# Patient Record
Sex: MALE | Race: WHITE | NOT HISPANIC OR LATINO | Employment: OTHER | URBAN - METROPOLITAN AREA
[De-identification: names, ages, dates, MRNs, and addresses within clinical notes are randomized per-mention and may not be internally consistent; named-entity substitution may affect disease eponyms.]

---

## 2010-09-01 LAB — CRC RECOMMENDATION EXT: NORMAL

## 2017-01-05 RX ORDER — BENZONATATE 100 MG/1
CAPSULE ORAL
Qty: 30 CAPSULE | Refills: 0 | Status: SHIPPED | OUTPATIENT
Start: 2017-01-05 | End: 2018-12-15 | Stop reason: SDUPTHER

## 2017-04-30 RX ORDER — ZOLPIDEM TARTRATE 5 MG/1
TABLET ORAL
Qty: 90 TABLET | Refills: 1 | Status: SHIPPED | OUTPATIENT
Start: 2017-04-30 | End: 2017-11-02 | Stop reason: SDUPTHER

## 2017-06-22 RX ORDER — TAMSULOSIN HYDROCHLORIDE 0.4 MG/1
CAPSULE ORAL
Qty: 90 CAPSULE | Refills: 3 | Status: SHIPPED | OUTPATIENT
Start: 2017-06-22 | End: 2018-06-28 | Stop reason: SDUPTHER

## 2017-06-26 ENCOUNTER — TELEPHONE (OUTPATIENT)
Dept: HEMATOLOGY/ONCOLOGY | Facility: CLINIC | Age: 67
End: 2017-06-26

## 2017-06-26 NOTE — TELEPHONE ENCOUNTER
----- Message from Amanda Hale sent at 6/26/2017  3:15 PM CDT -----  Patient is returning nurse's (Natacha) call/please call patient back at [phone]

## 2017-06-26 NOTE — TELEPHONE ENCOUNTER
----- Message from David Granados sent at 6/26/2017  1:50 PM CDT -----  Contact: pt  He's calling in regards to being worked into the drs schedule on 7/10/17 for 9 ma, please advise, [phone] (home)

## 2017-07-17 ENCOUNTER — LAB VISIT (OUTPATIENT)
Dept: LAB | Facility: HOSPITAL | Age: 67
End: 2017-07-17
Attending: INTERNAL MEDICINE
Payer: COMMERCIAL

## 2017-07-17 DIAGNOSIS — C91.10 CHRONIC LYMPHOCYTIC LEUKEMIA OF B-CELL TYPE NOT HAVING ACHIEVED REMISSION: ICD-10-CM

## 2017-07-17 LAB
ALBUMIN SERPL BCP-MCNC: 3.4 G/DL
ALP SERPL-CCNC: 54 U/L
ALT SERPL W/O P-5'-P-CCNC: 11 U/L
ANION GAP SERPL CALC-SCNC: 10 MMOL/L
AST SERPL-CCNC: 11 U/L
B2 MICROGLOB SERPL-MCNC: 4.3 UG/ML
BASOPHILS # BLD AUTO: ABNORMAL K/UL
BASOPHILS NFR BLD: 0 %
BILIRUB SERPL-MCNC: 0.7 MG/DL
BUN SERPL-MCNC: 19 MG/DL
CALCIUM SERPL-MCNC: 8.9 MG/DL
CHLORIDE SERPL-SCNC: 101 MMOL/L
CO2 SERPL-SCNC: 27 MMOL/L
CREAT SERPL-MCNC: 1 MG/DL
DIFFERENTIAL METHOD: ABNORMAL
EOSINOPHIL # BLD AUTO: ABNORMAL K/UL
EOSINOPHIL NFR BLD: 1 %
ERYTHROCYTE [DISTWIDTH] IN BLOOD BY AUTOMATED COUNT: 13.7 %
EST. GFR  (AFRICAN AMERICAN): >60 ML/MIN/1.73 M^2
EST. GFR  (NON AFRICAN AMERICAN): >60 ML/MIN/1.73 M^2
GLUCOSE SERPL-MCNC: 129 MG/DL
HCT VFR BLD AUTO: 38.4 %
HGB BLD-MCNC: 12.7 G/DL
LDH SERPL L TO P-CCNC: 138 U/L
LYMPHOCYTES # BLD AUTO: ABNORMAL K/UL
LYMPHOCYTES NFR BLD: 73 %
MCH RBC QN AUTO: 30.2 PG
MCHC RBC AUTO-ENTMCNC: 33.1 %
MCV RBC AUTO: 91 FL
MONOCYTES # BLD AUTO: ABNORMAL K/UL
MONOCYTES NFR BLD: 7 %
NEUTROPHILS NFR BLD: 19 %
PLATELET # BLD AUTO: 167 K/UL
PLATELET BLD QL SMEAR: ABNORMAL
PMV BLD AUTO: 9.1 FL
POTASSIUM SERPL-SCNC: 4.4 MMOL/L
PROT SERPL-MCNC: 7.2 G/DL
RBC # BLD AUTO: 4.2 M/UL
SODIUM SERPL-SCNC: 138 MMOL/L
WBC # BLD AUTO: 12.74 K/UL

## 2017-07-17 PROCEDURE — 83615 LACTATE (LD) (LDH) ENZYME: CPT | Mod: PO

## 2017-07-17 PROCEDURE — 85027 COMPLETE CBC AUTOMATED: CPT | Mod: PO

## 2017-07-17 PROCEDURE — 82232 ASSAY OF BETA-2 PROTEIN: CPT

## 2017-07-17 PROCEDURE — 80053 COMPREHEN METABOLIC PANEL: CPT | Mod: PO

## 2017-07-17 PROCEDURE — 36415 COLL VENOUS BLD VENIPUNCTURE: CPT | Mod: PO

## 2017-07-17 PROCEDURE — 85007 BL SMEAR W/DIFF WBC COUNT: CPT | Mod: PO

## 2017-07-31 ENCOUNTER — OFFICE VISIT (OUTPATIENT)
Dept: HEMATOLOGY/ONCOLOGY | Facility: CLINIC | Age: 67
End: 2017-07-31
Payer: COMMERCIAL

## 2017-07-31 VITALS
HEIGHT: 70 IN | TEMPERATURE: 98 F | RESPIRATION RATE: 12 BRPM | BODY MASS INDEX: 25.98 KG/M2 | DIASTOLIC BLOOD PRESSURE: 64 MMHG | SYSTOLIC BLOOD PRESSURE: 114 MMHG | HEART RATE: 75 BPM | WEIGHT: 181.44 LBS

## 2017-07-31 DIAGNOSIS — C91.10 CLL (CHRONIC LYMPHOCYTIC LEUKEMIA): Primary | ICD-10-CM

## 2017-07-31 PROCEDURE — 99999 PR PBB SHADOW E&M-EST. PATIENT-LVL IV: CPT | Mod: PBBFAC,,, | Performed by: NURSE PRACTITIONER

## 2017-07-31 PROCEDURE — 99213 OFFICE O/P EST LOW 20 MIN: CPT | Mod: S$GLB,,, | Performed by: NURSE PRACTITIONER

## 2017-11-01 RX ORDER — ZOLPIDEM TARTRATE 5 MG/1
TABLET ORAL
Qty: 90 TABLET | Refills: 1 | OUTPATIENT
Start: 2017-11-01

## 2017-11-02 NOTE — TELEPHONE ENCOUNTER
Refill request. Last office visit 9/19/2016. Please advise. Called patient to schedule annual wellness exam. He did not realized he was past due. Scheduled on 12/11/2017.

## 2017-11-03 RX ORDER — ZOLPIDEM TARTRATE 5 MG/1
TABLET ORAL
Qty: 90 TABLET | Refills: 0 | Status: SHIPPED | OUTPATIENT
Start: 2017-11-03 | End: 2018-03-02 | Stop reason: SDUPTHER

## 2017-12-11 ENCOUNTER — HOSPITAL ENCOUNTER (OUTPATIENT)
Dept: RADIOLOGY | Facility: HOSPITAL | Age: 67
Discharge: HOME | End: 2017-12-11
Attending: FAMILY MEDICINE
Payer: COMMERCIAL

## 2017-12-11 ENCOUNTER — OFFICE VISIT (OUTPATIENT)
Dept: FAMILY MEDICINE | Facility: CLINIC | Age: 67
End: 2017-12-11
Payer: COMMERCIAL

## 2017-12-11 VITALS
DIASTOLIC BLOOD PRESSURE: 80 MMHG | HEIGHT: 70 IN | TEMPERATURE: 99 F | HEART RATE: 71 BPM | SYSTOLIC BLOOD PRESSURE: 138 MMHG | BODY MASS INDEX: 26.2 KG/M2 | WEIGHT: 183 LBS | OXYGEN SATURATION: 98 %

## 2017-12-11 DIAGNOSIS — I10 HYPERTENSION, UNSPECIFIED TYPE: ICD-10-CM

## 2017-12-11 DIAGNOSIS — Z12.5 PROSTATE CANCER SCREENING: ICD-10-CM

## 2017-12-11 DIAGNOSIS — Z13.6 ENCOUNTER FOR ABDOMINAL AORTIC ANEURYSM (AAA) SCREENING: ICD-10-CM

## 2017-12-11 DIAGNOSIS — F17.200 TOBACCO USE DISORDER: ICD-10-CM

## 2017-12-11 DIAGNOSIS — Z00.00 PREVENTATIVE HEALTH CARE: Primary | ICD-10-CM

## 2017-12-11 DIAGNOSIS — E78.5 HYPERLIPIDEMIA, UNSPECIFIED HYPERLIPIDEMIA TYPE: ICD-10-CM

## 2017-12-11 PROCEDURE — 90670 PCV13 VACCINE IM: CPT | Mod: S$GLB,,, | Performed by: FAMILY MEDICINE

## 2017-12-11 PROCEDURE — 71020 XR CHEST PA AND LATERAL: CPT | Mod: TC,PO

## 2017-12-11 PROCEDURE — 99999 PR PBB SHADOW E&M-EST. PATIENT-LVL V: CPT | Mod: PBBFAC,,, | Performed by: FAMILY MEDICINE

## 2017-12-11 PROCEDURE — 90471 IMMUNIZATION ADMIN: CPT | Mod: S$GLB,,, | Performed by: FAMILY MEDICINE

## 2017-12-11 PROCEDURE — 71020 XR CHEST PA AND LATERAL: CPT | Mod: 26,,, | Performed by: RADIOLOGY

## 2017-12-11 PROCEDURE — 76775 US EXAM ABDO BACK WALL LIM: CPT | Mod: TC,PO

## 2017-12-11 PROCEDURE — 99397 PER PM REEVAL EST PAT 65+ YR: CPT | Mod: 25,S$GLB,, | Performed by: FAMILY MEDICINE

## 2017-12-11 PROCEDURE — 76775 US EXAM ABDO BACK WALL LIM: CPT | Mod: 26,,, | Performed by: RADIOLOGY

## 2017-12-11 RX ORDER — LOSARTAN POTASSIUM AND HYDROCHLOROTHIAZIDE 12.5; 1 MG/1; MG/1
1 TABLET ORAL EVERY MORNING
Qty: 90 TABLET | Refills: 3 | Status: SHIPPED | OUTPATIENT
Start: 2017-12-11 | End: 2018-08-22 | Stop reason: SDUPTHER

## 2017-12-11 NOTE — PROGRESS NOTES
"Chief Complaint   Patient presents with    Preventative health care     HPI: 66 yo WM for annual physical.    HTN -  tolerating the Losartan HCT  He reports overall feeling well.  BPH -  tolerating the Flomax daily and saw palmetto  HLD - following low fat diet  FH CAD - tolerating ASA 81mg daily  Insomnia - taking Ambien 5mg QHS about 3 nights a week  Smoking 5 cigarettes daily    PAST MEDICAL HISTORY:   HTN  PURE HYPERCHOLESTEROLEM   F/HXOTHER CARDIAC DISEAS (Father/brother)   internal hemorrhoids  Knee DJD    REVIEW OF SYSTEMS:   Bothersome joint pains YES   Sexual problems (getting and keeping erections, completing intercourse, etc.) NO   Change in size/firmness of stools NO   Change in size/color of a mole NO   Often feeling down, depressed or hopeless during the past month NO   Often having little interest or pleasure in doing things duringthe past month NO   Problems with falling or doing routine tasks at home NO   Periods of weakness, numbness or inability to talk NO     FAMILY HISTORY:   Cancer of the prostate or intestine NO   Heart pain or heart attacks before the age of 55 YES (FATHER mi/BROTHER S/P PTCA W/STENT)     TOBACCO:   Have you ever used tobacco? YES   Average number of packs/day: 1/2   Number of years smoked: 20     ALCOHOL:   Do you drink alcohol? YES     Exercise:   Activity: BIKE  Days per week: 3-4  Time/duration: 60 minutes   Exertion: HEAVY      Colonoscopy 2010    PHYSICAL EXAMINATION:   /80   Pulse 71   Temp 98.5 °F (36.9 °C)   Ht 5' 10" (1.778 m)   Wt 83 kg (182 lb 15.7 oz)   SpO2 98%   BMI 26.26 kg/m²   ORAL EXAM : Mucous membranes moist; pharynx clear. No lesions.   NECK: Supple without nodes JVD, or bruits. No thyromegaly noted.   LUNGS: Clear to auscultation and percussion bilaterally.   HEART: Regular rate and rhythm; no rubs, murmurs or gallops.   ABD: soft, NT/ND, +BS no HSM   EXTREMITIES: No clubbing, cyanosis or edema. Distal pulses full/equal.   NEUROLOGIC: Alert " and oriented x 4. Cranial nerves grossly intact.   SKIN: Warm, dry; no lesions noted.   Left shoulder: some crepitus with ROM; FAROM without pain  Wrists: FROM; no weakness or thenar atrophy    Results for orders placed or performed in visit on 07/17/17   CBC w/ DIFF   Result Value Ref Range    WBC 12.74 (H) 3.90 - 12.70 K/uL    RBC 4.20 (L) 4.60 - 6.20 M/uL    Hemoglobin 12.7 (L) 14.0 - 18.0 g/dL    Hematocrit 38.4 (L) 40.0 - 54.0 %    MCV 91 82 - 98 fL    MCH 30.2 27.0 - 31.0 pg    MCHC 33.1 32.0 - 36.0 %    RDW 13.7 11.5 - 14.5 %    Platelets 167 150 - 350 K/uL    MPV 9.1 (L) 9.2 - 12.9 fL    Lymph # CANCELED 1.0 - 4.8 K/uL    Mono # CANCELED 0.3 - 1.0 K/uL    Eos # CANCELED 0.0 - 0.5 K/uL    Baso # CANCELED 0.00 - 0.20 K/uL    Gran% 19.0 (L) 38.0 - 73.0 %    Lymph% 73.0 (H) 18.0 - 48.0 %    Mono% 7.0 4.0 - 15.0 %    Eosinophil% 1.0 0.0 - 8.0 %    Basophil% 0.0 0.0 - 1.9 %    Platelet Estimate Appears normal     Differential Method Manual    CMP   Result Value Ref Range    Sodium 138 136 - 145 mmol/L    Potassium 4.4 3.5 - 5.1 mmol/L    Chloride 101 95 - 110 mmol/L    CO2 27 23 - 29 mmol/L    Glucose 129 (H) 70 - 110 mg/dL    BUN, Bld 19 8 - 23 mg/dL    Creatinine 1.0 0.5 - 1.4 mg/dL    Calcium 8.9 8.7 - 10.5 mg/dL    Total Protein 7.2 6.0 - 8.4 g/dL    Albumin 3.4 (L) 3.5 - 5.2 g/dL    Total Bilirubin 0.7 0.1 - 1.0 mg/dL    Alkaline Phosphatase 54 (L) 55 - 135 U/L    AST 11 10 - 40 U/L    ALT 11 10 - 44 U/L    Anion Gap 10 8 - 16 mmol/L    eGFR if African American >60 >60 mL/min/1.73 m^2    eGFR if non African American >60 >60 mL/min/1.73 m^2   LDH   Result Value Ref Range     110 - 260 U/L   BETA 2 MICROGLOBULIN   Result Value Ref Range    Beta-2 Microglobulin 4.3 (H) 0.0 - 2.5 ug/mL       DIAGNOSIS/PLAN:   Preventative health care  -     (In Office Administered) Pneumococcal Conjugate Vaccine (13 Valent) (IM)    Hypertension, unspecified type  -     X-Ray Chest PA And Lateral; Future; Expected date:  12/11/2017  -     losartan-hydrochlorothiazide 100-12.5 mg (HYZAAR) 100-12.5 mg Tab; Take 1 tablet by mouth every morning.  Dispense: 90 tablet; Refill: 3    Hyperlipidemia, unspecified hyperlipidemia type  -     Lipid panel; Future; Expected date: 12/06/2018  -     Comprehensive metabolic panel; Future; Expected date: 12/06/2018    Prostate cancer screening  -     PSA, Screening; Future; Expected date: 12/06/2018    Encounter for abdominal aortic aneurysm (AAA) screening  -     US Abdominal Aorta; Future; Expected date: 12/11/2017    Tobacco use disorder  -     Ambulatory referral to Smoking Cessation Program    continue Bradford Garcia  Counseled on regular exercise, maintenance of a healthy weight, balanced diet rich in fruits/vegetables and lean protein, and avoidance of unhealthy habits like smoking and excessive alcohol intake.  CTS stretching and left rotator cuff stretching  ASA 81 mg/d   continue losartan /12.5 daily  Continue smoking cessation efforts  F/u 6 months

## 2018-01-30 ENCOUNTER — TELEPHONE (OUTPATIENT)
Dept: FAMILY MEDICINE | Facility: CLINIC | Age: 68
End: 2018-01-30

## 2018-01-30 DIAGNOSIS — R68.89 FLU-LIKE SYMPTOMS: Primary | ICD-10-CM

## 2018-01-30 RX ORDER — OSELTAMIVIR PHOSPHATE 75 MG/1
75 CAPSULE ORAL 2 TIMES DAILY
Qty: 10 CAPSULE | Refills: 0 | Status: SHIPPED | OUTPATIENT
Start: 2018-01-30 | End: 2018-02-04

## 2018-01-30 NOTE — TELEPHONE ENCOUNTER
Spoke to patient. Patient is requesting a prescription for tamiflu. Patient states he has a cough and congestion that started on Sunday. He has been exposed to the flu. He states half of the people in his office have the flu. Please advise.

## 2018-01-30 NOTE — TELEPHONE ENCOUNTER
----- Message from Saida Paula sent at 1/30/2018  1:34 PM CST -----  Please call [phone] asking for a prescription for Tamiflu as a preventative   [facility] - [place] - [address]  [address] [place] [address]  Phone: [phone] Fax: [phone]

## 2018-03-02 RX ORDER — ZOLPIDEM TARTRATE 5 MG/1
TABLET ORAL
Qty: 90 TABLET | Refills: 0 | Status: SHIPPED | OUTPATIENT
Start: 2018-03-02 | End: 2018-08-02 | Stop reason: SDUPTHER

## 2018-05-15 ENCOUNTER — TELEPHONE (OUTPATIENT)
Dept: FAMILY MEDICINE | Facility: CLINIC | Age: 68
End: 2018-05-15

## 2018-05-15 NOTE — TELEPHONE ENCOUNTER
----- Message from Yue Shook sent at 5/15/2018 10:06 AM CDT -----  Contact: self [phone]  Type: Needs Medical Advice    Who Called:  Self   Symptoms (please be specific):  NA   How long has patient had these symptoms:  ALFREDO   Pharmacy name and phone #:  [facility] Call Back Number: [phone] Information: Patient called stating he received leaflet regarding lifeline screening. Patient asking if he should have test done.

## 2018-06-25 ENCOUNTER — TELEPHONE (OUTPATIENT)
Dept: FAMILY MEDICINE | Facility: CLINIC | Age: 68
End: 2018-06-25

## 2018-06-25 NOTE — TELEPHONE ENCOUNTER
"Spoke with patient and he states that he heard of a new procedure for prostate and wanted to know your thoughts. "Eurolift". Please advise if he needs to see urology.   "

## 2018-06-25 NOTE — TELEPHONE ENCOUNTER
"----- Message from Amanda Hale sent at 6/25/2018  2:43 PM CDT -----  Type: Needs Medical Advice    Who Called:  Patient  Best Call Back Number: [phone]  Additional Information: Patient requesting to speak with nurse or doctor concerning procedure he heard about "Eurolift" for enlarged prostate/has question/please call back to advise.  "

## 2018-06-26 NOTE — TELEPHONE ENCOUNTER
The prostatic lift (eg, Urolift) procedure uses a device that is introduced into the urethra to increase the size of the urethral opening and reduce obstruction to urine flow. Then, one or more small implants are placed to keep the urethra open. The prostatic lift technique may be an option for men who are poor candidates for other procedures. Longer term follow-up will be needed to determine the durability of the results.  If he is interested in this procedure, he should contact the urologist first to see if it is something they perform.

## 2018-06-28 RX ORDER — TAMSULOSIN HYDROCHLORIDE 0.4 MG/1
CAPSULE ORAL
Qty: 90 CAPSULE | Refills: 3 | Status: SHIPPED | OUTPATIENT
Start: 2018-06-28 | End: 2018-08-22 | Stop reason: SDUPTHER

## 2018-07-23 ENCOUNTER — TELEPHONE (OUTPATIENT)
Dept: FAMILY MEDICINE | Facility: CLINIC | Age: 68
End: 2018-07-23

## 2018-07-23 NOTE — TELEPHONE ENCOUNTER
----- Message from Amanda Jeffrey sent at 7/23/2018  2:37 PM CDT -----  Contact: Patient  Type: Needs Medical Advice    Who Called: Patient    Pharmacy name and phone #:    [facility]  [phone]    Best Call Back Number: [phone]  Additional Information: Patient is requesting a change in Pharmacy to [facility] due to change in insurance

## 2018-07-25 ENCOUNTER — TELEPHONE (OUTPATIENT)
Dept: FAMILY MEDICINE | Facility: CLINIC | Age: 68
End: 2018-07-25

## 2018-07-25 NOTE — TELEPHONE ENCOUNTER
----- Message from Amanda Hale sent at 7/25/2018  1:17 PM CDT -----  Type:  RX Refill Request    RX Name and Strength:  losartan-hydrochlorothiazide 100-12.5 mg (HYZAAR) 100-12.5 mg Tab;tamsulosin (FLOMAX) 0.4 mg Cp24 and zolpidem (AMBIEN) 5 MG Tab  Preferred Pharmacy with phone number:  [facility] at [phone]  Local or Mail Order:  Mail Order  Ordering Provider:  Dr. Dominga Colvin Call Back Number:  [phone]  Additional Information:

## 2018-07-30 ENCOUNTER — TELEPHONE (OUTPATIENT)
Dept: FAMILY MEDICINE | Facility: CLINIC | Age: 68
End: 2018-07-30

## 2018-07-30 NOTE — TELEPHONE ENCOUNTER
----- Message from Denver Mendoza sent at 7/30/2018  2:33 PM CDT -----  Type:  Patient Returning Call    Who Called:  Patient  Best Call Back Number:  [phone]  Additional Information:  Patient would like a call back from someone he can call about switching to [facility] and how it would work - he also has additional questions. Please call to advise today.

## 2018-07-30 NOTE — TELEPHONE ENCOUNTER
----- Message from Italia Romero sent at 7/30/2018  3:22 PM CDT -----  Type:  Patient Returning Call    Who Called:  Patient  Who Left Message for Patient:  MELODY VEGA  Does the patient know what this is regarding?:  Prescription  Best Call Back Number:  [phone]  Additional Information:  Just missed the call.

## 2018-08-02 RX ORDER — ZOLPIDEM TARTRATE 5 MG/1
TABLET ORAL
Qty: 90 TABLET | Refills: 0 | Status: SHIPPED | OUTPATIENT
Start: 2018-08-02 | End: 2018-08-22

## 2018-08-03 ENCOUNTER — TELEPHONE (OUTPATIENT)
Dept: HEMATOLOGY/ONCOLOGY | Facility: CLINIC | Age: 68
End: 2018-08-03

## 2018-08-03 NOTE — TELEPHONE ENCOUNTER
As per patient, labs and clinic visits schedule.  Pt verbalized understanding date, time and location.

## 2018-08-03 NOTE — TELEPHONE ENCOUNTER
----- Message from Ignacia Key sent at 8/3/2018 10:30 AM CDT -----  Contact: self  Patient needs first available appointment due to follow up. Please call patient at [phone]. Thanks!

## 2018-08-06 ENCOUNTER — LAB VISIT (OUTPATIENT)
Dept: LAB | Facility: HOSPITAL | Age: 68
End: 2018-08-06
Attending: NURSE PRACTITIONER
Payer: MEDICARE

## 2018-08-06 DIAGNOSIS — C91.10 CLL (CHRONIC LYMPHOCYTIC LEUKEMIA): ICD-10-CM

## 2018-08-06 LAB
ALBUMIN SERPL BCP-MCNC: 3.5 G/DL
ALP SERPL-CCNC: 60 U/L
ALT SERPL W/O P-5'-P-CCNC: 13 U/L
ANION GAP SERPL CALC-SCNC: 8 MMOL/L
ANISOCYTOSIS BLD QL SMEAR: SLIGHT
AST SERPL-CCNC: 12 U/L
B2 MICROGLOB SERPL-MCNC: 4.3 UG/ML
BASOPHILS # BLD AUTO: ABNORMAL K/UL
BASOPHILS NFR BLD: 0 %
BILIRUB SERPL-MCNC: 0.5 MG/DL
BUN SERPL-MCNC: 16 MG/DL
CALCIUM SERPL-MCNC: 9.4 MG/DL
CHLORIDE SERPL-SCNC: 102 MMOL/L
CO2 SERPL-SCNC: 26 MMOL/L
CREAT SERPL-MCNC: 1 MG/DL
DIFFERENTIAL METHOD: ABNORMAL
EOSINOPHIL # BLD AUTO: ABNORMAL K/UL
EOSINOPHIL NFR BLD: 0 %
ERYTHROCYTE [DISTWIDTH] IN BLOOD BY AUTOMATED COUNT: 13.6 %
EST. GFR  (AFRICAN AMERICAN): >60 ML/MIN/1.73 M^2
EST. GFR  (NON AFRICAN AMERICAN): >60 ML/MIN/1.73 M^2
GLUCOSE SERPL-MCNC: 107 MG/DL
HCT VFR BLD AUTO: 36.8 %
HGB BLD-MCNC: 11.9 G/DL
HYPOCHROMIA BLD QL SMEAR: ABNORMAL
LDH SERPL L TO P-CCNC: 159 U/L
LYMPHOCYTES # BLD AUTO: ABNORMAL K/UL
LYMPHOCYTES NFR BLD: 71 %
MCH RBC QN AUTO: 28.4 PG
MCHC RBC AUTO-ENTMCNC: 32.3 G/DL
MCV RBC AUTO: 88 FL
MONOCYTES # BLD AUTO: ABNORMAL K/UL
MONOCYTES NFR BLD: 4 %
NEUTROPHILS # BLD AUTO: ABNORMAL K/UL
NEUTROPHILS NFR BLD: 25 %
NRBC BLD-RTO: 0 /100 WBC
PLATELET # BLD AUTO: 169 K/UL
PLATELET BLD QL SMEAR: ABNORMAL
PMV BLD AUTO: 9.4 FL
POTASSIUM SERPL-SCNC: 4.6 MMOL/L
PROT SERPL-MCNC: 7.3 G/DL
RBC # BLD AUTO: 4.19 M/UL
SODIUM SERPL-SCNC: 136 MMOL/L
WBC # BLD AUTO: 13.38 K/UL

## 2018-08-06 PROCEDURE — 83615 LACTATE (LD) (LDH) ENZYME: CPT | Mod: PO

## 2018-08-06 PROCEDURE — 85027 COMPLETE CBC AUTOMATED: CPT

## 2018-08-06 PROCEDURE — 85060 BLOOD SMEAR INTERPRETATION: CPT | Mod: ,,, | Performed by: PATHOLOGY

## 2018-08-06 PROCEDURE — 36415 COLL VENOUS BLD VENIPUNCTURE: CPT | Mod: PO

## 2018-08-06 PROCEDURE — 85007 BL SMEAR W/DIFF WBC COUNT: CPT

## 2018-08-06 PROCEDURE — 82232 ASSAY OF BETA-2 PROTEIN: CPT

## 2018-08-06 PROCEDURE — 80053 COMPREHEN METABOLIC PANEL: CPT | Mod: PO

## 2018-08-07 LAB — PATH REV BLD -IMP: NORMAL

## 2018-08-13 ENCOUNTER — OFFICE VISIT (OUTPATIENT)
Dept: HEMATOLOGY/ONCOLOGY | Facility: CLINIC | Age: 68
End: 2018-08-13
Payer: MEDICARE

## 2018-08-13 VITALS
HEIGHT: 70 IN | DIASTOLIC BLOOD PRESSURE: 77 MMHG | SYSTOLIC BLOOD PRESSURE: 150 MMHG | RESPIRATION RATE: 16 BRPM | BODY MASS INDEX: 26.26 KG/M2 | HEART RATE: 75 BPM | WEIGHT: 183.44 LBS | TEMPERATURE: 98 F

## 2018-08-13 DIAGNOSIS — C91.10 CLL (CHRONIC LYMPHOCYTIC LEUKEMIA): Primary | ICD-10-CM

## 2018-08-13 PROCEDURE — 3077F SYST BP >= 140 MM HG: CPT | Mod: S$GLB,,, | Performed by: NURSE PRACTITIONER

## 2018-08-13 PROCEDURE — 3078F DIAST BP <80 MM HG: CPT | Mod: S$GLB,,, | Performed by: NURSE PRACTITIONER

## 2018-08-13 PROCEDURE — 99999 PR PBB SHADOW E&M-EST. PATIENT-LVL IV: CPT | Mod: PBBFAC,,, | Performed by: NURSE PRACTITIONER

## 2018-08-13 PROCEDURE — 99213 OFFICE O/P EST LOW 20 MIN: CPT | Mod: S$GLB,,, | Performed by: NURSE PRACTITIONER

## 2018-08-13 NOTE — PROGRESS NOTES
HISTORY OF PRESENT ILLNESS:  The patient is a 68-year-old white gentleman   known to Dr. Solares for mild anemia and chronic phase CLL.  He returns late to   follow for interval evaluation.  He denies any recurring illness, fevers, chills, drenching   night sweats, painful lymphadenopathy, unexplained weight loss, rashes, pruritus, etc.    No new complaint or pertinent finding on a 14-point review of systems.    PHYSICAL EXAMINATION:    GENERAL:  Well-developed, well-nourished, elderly white gentleman in no acute   distress.  Alert & oriented x 3.  VITAL SIGNS:  Weight:  Gain of 2 pounds in 1 year  Wt Readings from Last 3 Encounters:   08/13/18 83.2 kg (183 lb 6.8 oz)   12/11/17 83 kg (182 lb 15.7 oz)   07/31/17 82.3 kg (181 lb 7 oz)     Temp Readings from Last 3 Encounters:   08/13/18 98.4 °F (36.9 °C) (Oral)   12/11/17 98.5 °F (36.9 °C)   07/31/17 98.4 °F (36.9 °C)     BP Readings from Last 3 Encounters:   08/13/18 (!) 150/77   12/11/17 138/80   07/31/17 114/64     Pulse Readings from Last 3 Encounters:   08/13/18 75   12/11/17 71   07/31/17 75     HEENT:  Normocephalic, atraumatic.  Oral mucosa pink and moist.  Lips without   lesions.  Tongue midline.  Oropharynx clear.  Nonicteric sclerae.   NECK:  Supple, no adenopathy.  No carotid bruits, thyromegaly or thyroid nodule.  HEART:  Regular rate and rhythm without murmur, gallop or rub.                LUNGS:  Clear to auscultation bilaterally.  Normal respiratory effort.       ABDOMEN:  Soft, nontender, nondistended with positive normoactive bowel sounds,   no hepatosplenomegaly.    EXTREMITIES:  No cyanosis, clubbing or edema.  Distal pulses are intact.          AXILLAE AND GROIN:  No palpable pathologic lymphadenopathy is appreciated.        SKIN:  Intact/turgor normal                                                       NEUROLOGIC:  Cranial nerves II-XII grossly intact.  Motor:  Good muscle bulk and   tone.  Strength/sensory 5/5 throughout.  Gait stable.      LABORATORY:    Lab Results   Component Value Date    WBC 13.38 (H) 08/06/2018    HGB 11.9 (L) 08/06/2018    HCT 36.8 (L) 08/06/2018    MCV 88 08/06/2018     08/06/2018     Differential remarkable for 25% grans, 71% lymph    CMP  Sodium   Date Value Ref Range Status   08/06/2018 136 136 - 145 mmol/L Final     Potassium   Date Value Ref Range Status   08/06/2018 4.6 3.5 - 5.1 mmol/L Final     Chloride   Date Value Ref Range Status   08/06/2018 102 95 - 110 mmol/L Final     CO2   Date Value Ref Range Status   08/06/2018 26 23 - 29 mmol/L Final     Glucose   Date Value Ref Range Status   08/06/2018 107 70 - 110 mg/dL Final     BUN, Bld   Date Value Ref Range Status   08/06/2018 16 8 - 23 mg/dL Final     Creatinine   Date Value Ref Range Status   08/06/2018 1.0 0.5 - 1.4 mg/dL Final     Calcium   Date Value Ref Range Status   08/06/2018 9.4 8.7 - 10.5 mg/dL Final     Total Protein   Date Value Ref Range Status   08/06/2018 7.3 6.0 - 8.4 g/dL Final     Albumin   Date Value Ref Range Status   08/06/2018 3.5 3.5 - 5.2 g/dL Final     Total Bilirubin   Date Value Ref Range Status   08/06/2018 0.5 0.1 - 1.0 mg/dL Final     Comment:     For infants and newborns, interpretation of results should be based  on gestational age, weight and in agreement with clinical  observations.  Premature Infant recommended reference ranges:  Up to 24 hours.............<8.0 mg/dL  Up to 48 hours............<12.0 mg/dL  3-5 days..................<15.0 mg/dL  6-29 days.................<15.0 mg/dL       Alkaline Phosphatase   Date Value Ref Range Status   08/06/2018 60 55 - 135 U/L Final     AST ([place])   Date Value Ref Range Status   04/04/2016 16 (L) 17 - 59 U/L Final     AST   Date Value Ref Range Status   08/06/2018 12 10 - 40 U/L Final     ALT   Date Value Ref Range Status   08/06/2018 13 10 - 44 U/L Final     Anion Gap   Date Value Ref Range Status   08/06/2018 8 8 - 16 mmol/L Final     eGFR if    Date Value  Ref Range Status   08/06/2018 >60 >60 mL/min/1.73 m^2 Final     eGFR if non    Date Value Ref Range Status   08/06/2018 >60 >60 mL/min/1.73 m^2 Final     Comment:     Calculation used to obtain the estimated glomerular filtration  rate (eGFR) is the CKD-EPI equation.        , [id number] remains at 4.3    IMPRESSION:  1.  Chronic phase chronic lymphocytic leukemia, stable.  2.  Treated iron deficiency anemia.    PLAN:  Return to clinic in six months for surveillance with interval CBC, CMP, LDH and beta-2 microglobulin prior.      Assessment/plan reviewed and approved by Dr. Solares.

## 2018-08-22 ENCOUNTER — PATIENT MESSAGE (OUTPATIENT)
Dept: ADMINISTRATIVE | Facility: OTHER | Age: 68
End: 2018-08-22

## 2018-08-22 DIAGNOSIS — I10 HYPERTENSION, UNSPECIFIED TYPE: ICD-10-CM

## 2018-08-22 RX ORDER — RAMELTEON 8 MG/1
8 TABLET ORAL NIGHTLY
Qty: 30 TABLET | Refills: 1 | Status: SHIPPED | OUTPATIENT
Start: 2018-08-22 | End: 2018-12-26

## 2018-08-22 NOTE — TELEPHONE ENCOUNTER
Patient states that his insurance told him that they will not cover the ambien. States that he was told that he needs to try Rozerem (30 day supply) before he can go back to the ambien due to the Rozerem not working. Please advise on what you think and if this is possible.

## 2018-08-22 NOTE — TELEPHONE ENCOUNTER
Notified patient that prescription was sent. Patient needs other pending medications sent to his mail pharmacy. Please advise

## 2018-08-22 NOTE — TELEPHONE ENCOUNTER
----- Message from Yadira Gomez sent at 8/22/2018  9:31 AM CDT -----  Type:  RX Refill Request    Who Called: Patient  Refill or New Rx:  refilll  RX Name and Strength:  Rozerem  How is the patient currently taking it? (ex. 1XDay):  Na  Is this a 30 day or 90 day RX:  30  Preferred Pharmacy with phone number:    [id number] - [place] - [address]  [address]  [place] [address]  Phone: [phone] Fax: [phone]      Local or Mail Order:  local  Ordering Provider:  Dominga  Best Call Back Number:  [phone] (home)     Additional Information:  Stating he switched from [employer] to [employer] plan, advised to try this Rx before will pay for Ambien Rx he normally receives. Will also take generic version if

## 2018-08-23 RX ORDER — LOSARTAN POTASSIUM AND HYDROCHLOROTHIAZIDE 12.5; 1 MG/1; MG/1
1 TABLET ORAL EVERY MORNING
Qty: 90 TABLET | Refills: 3 | Status: SHIPPED | OUTPATIENT
Start: 2018-08-23 | End: 2019-01-18 | Stop reason: SDUPTHER

## 2018-08-23 RX ORDER — TAMSULOSIN HYDROCHLORIDE 0.4 MG/1
1 CAPSULE ORAL DAILY
Qty: 90 CAPSULE | Refills: 3 | Status: SHIPPED | OUTPATIENT
Start: 2018-08-23 | End: 2019-01-18 | Stop reason: SDUPTHER

## 2018-09-26 ENCOUNTER — PATIENT OUTREACH (OUTPATIENT)
Dept: OTHER | Facility: OTHER | Age: 68
End: 2018-09-26

## 2018-09-26 NOTE — LETTER
Imani Parks PharmD  [address] [place] [address]     Dear Saurav Bahena,    Welcome to the [facility]!           My name is Imani Parks PharmD and I am your dedicated Digital Medicine clinician.  As an expert in medication management, I will help ensure that the medications you are taking continue to provide you with the intended benefits.        I am Jeni Marina and I will be your health  for the duration of the program.  My  job is to help you identify lifestyle changes to improve your blood pressure control.  We will talk about nutrition, exercise, and other ways that you may be able to adjust your current habits to better your health. Together, we will work to improve your overall health and encourage you to meet your goals for a healthier lifestyle.    What we expect from YOU:    You will need to take blood pressure readings multiple times a week and no less than one reading per week.   It is important that you take your measurements at different times during the day, when possible.     What you should expect from your Digital Medicine Care Team:   We will provide you with education about high blood pressure, including lifestyle changes that could help you to control your blood pressure.   We will review your weekly readings and provide you with monthly blood pressure progress reports after you have been in the program for more than 30 days.   We will send monthly progress reports on your blood pressure control to your physician so they can follow along with your progress as well.    You will be able to reach me by phone at  or through your [facility] account by clicking my name under Care Team on the right side of the home screen.    I look forward to working with you to achieve your blood pressure goals!  Sincerely,    Imani Parks PharmD  Your personal clinician    Please visit [email or web address] to  learn more about high blood pressure and what you can do lower your blood pressure.                                                                                           Saurav Bahena  [address]  [place] [id number]

## 2018-09-26 NOTE — PROGRESS NOTES
Last 5 Patient Entered Readings                                      Current 30 Day Average: 133/77     Recent Readings 9/25/2018 9/24/2018 9/24/2018 9/22/2018 9/21/2018    SBP (mmHg) 137 153 132 123 111    DBP (mmHg) 78 87 80 71 68    Pulse 67 69 67 70 65        Digital Medicine: Health  Introduction    Introduced Mr. Saurav Bahena to Digital Medicine. Discussed health  role and recommended lifestyle modifications.    Lifestyle Assessment:  Current Dietary Habits(i.e. low sodium, food labels, dining out):    Exercise:   Would like to do more exercise but states that it is a matter of time.     Alcohol/Tobacco:    Medication Adherence: has been compliant with the medicaiton regimen Takes in AM. Patient has been taking baby aspirin for a long time for his BP, but wonders if he still should. [place] on the news that NSAIDS not good for BP. Informed patient to consult with Imani on this when she calls    Other goals:     Reviewed BP measuring technique. Patient sits for 10 minutes, but is watching TV while doing so. Sitting on couch. Agrees to adjust to table and chair.    Reviewed AHA/AACE recommendations:  Limit sodium intake to <2000mg/day  Recommended CHO intake, 45-65% of daily caloric intake  Perform 150 minutes of physical activity per week    Reviewed the importance of self-monitoring, medication adherence, and that the health  can be used as a resource for lifestyle modifications to help reduce or maintain a healthy lifestyle.  Reviewed that the Digital Medicine team is not available for emergencies and instructed the patient to call 911 or [facility] ([phone] or [phone]) if one arises.

## 2018-10-04 ENCOUNTER — PATIENT MESSAGE (OUTPATIENT)
Dept: FAMILY MEDICINE | Facility: CLINIC | Age: 68
End: 2018-10-04

## 2018-10-04 NOTE — TELEPHONE ENCOUNTER
----- Message from Hazel Robertson sent at 10/4/2018 11:20 AM CDT -----  Contact: Patient  Type: Needs Medical Advice    Who Called:  Patient  Symptoms (please be specific):  NA  How long has patient had these symptoms:  ALFREDO  Pharmacy name and phone #:  [facility]  Best Call Back Number: [phone] (home)     Additional Information: Patient has paperwork that he needs to have filled out by Dr. Orr, he is requesting a call back to discuss the best way to get the information to him, please call patient to discuss, thank you!

## 2018-10-04 NOTE — TELEPHONE ENCOUNTER
Spoke to patient. Patient says that he attached the [facility] that needs [facility] to get a determination to get approved to take Ambien instead of rozerem because the rozerem did not work for him at all. Printed and placed in box for review.

## 2018-10-05 ENCOUNTER — PATIENT MESSAGE (OUTPATIENT)
Dept: FAMILY MEDICINE | Facility: CLINIC | Age: 68
End: 2018-10-05

## 2018-10-05 RX ORDER — ZOLPIDEM TARTRATE 5 MG/1
TABLET ORAL
Qty: 90 TABLET | Refills: 1 | Status: SHIPPED | OUTPATIENT
Start: 2018-10-05 | End: 2019-01-18 | Stop reason: SDUPTHER

## 2018-10-05 NOTE — TELEPHONE ENCOUNTER
I attempted to do the PA for Ambien 5 mg. This was the result.     Drug is covered by current benefit plan. No further PA activity needed.    Need new prescription sent to [facility]. Please advise.

## 2018-10-06 ENCOUNTER — PATIENT MESSAGE (OUTPATIENT)
Dept: FAMILY MEDICINE | Facility: CLINIC | Age: 68
End: 2018-10-06

## 2018-10-09 NOTE — TELEPHONE ENCOUNTER
Spoke to [facility] pharmacy. Pharmacist said medication went through insurance but they needed to order the medication.

## 2018-10-09 NOTE — TELEPHONE ENCOUNTER
Informed patient medication was able to be ran through insurance. Patient verbalized understanding.

## 2018-10-15 ENCOUNTER — PATIENT OUTREACH (OUTPATIENT)
Dept: OTHER | Facility: OTHER | Age: 68
End: 2018-10-15

## 2018-10-15 NOTE — LETTER
November 28, 2018     Saurav aBhena  [address]  [place] [address]       Dear Saurav,    Thank you for enrolling in the [facility]. To participate in our programs, we ask that you submit weekly home readings through your [facility] account and maintain regular contact with your Care Team.  We have not received any data or heard from you in some time.     The Digital Medicine Care Team has attempted to reach you on multiple occasions to determine if you would like to continue participating in the program. While we encourage you to continue participating fully, we understand that circumstances may change.      To continue participating in the program, please contact me at [phone] . If we do not hear back, you will be un-enrolled and your physician will be notified of your decision.    If you have submitted home readings readings during the past 2 days and believe you are receiving this letter in error, please call the Digital Medicine Patient Support Line at [phone] for troubleshooting.      We look forward to hearing from you soon.    Sincerely,     Imani Parks  [facility]

## 2018-10-15 NOTE — PROGRESS NOTES
Last 5 Patient Entered Readings                                      Current 30 Day Average: 132/76     Recent Readings 10/7/2018 10/5/2018 10/4/2018 10/3/2018 10/2/2018    SBP (mmHg) 134 128 136 130 131    DBP (mmHg) 76 75 75 69 77    Pulse 71 71 72 69 68        1/3/18- Called patient for digital medicine clinical pharmacist introduction. No answer. Left message for call back. Will remove patient from the digital medicine program due to non-compliance and lack of communication with digital medicine team.     12/31/18- Called patient for digital medicine clinical pharmacist introduction. No answer. Left message for call back.     11/28/18- Called patient for digital medicine clinical pharmacist introduction. No answer. Left message for call back. Will send non-compliance letter.    11/23/18- Called patient for digital medicine clinical pharmacist introduction. No answer. Left message for call back. Will call back next week before sending non-compliance letter. Patient reported technical difficulties last time he spoke with health     10/19/18- Called patient for digital medicine clinical pharmacist introduction. Second attempt. No answer. Left message for call back. Will send [employer] message    Called patient for digital medicine clinical pharmacist introduction. No answer. Left message for call back      Imani Parks, Pharm.D.   Digital Medicine Clinical Pharmacist  [phone]

## 2018-10-17 ENCOUNTER — PATIENT OUTREACH (OUTPATIENT)
Dept: OTHER | Facility: OTHER | Age: 68
End: 2018-10-17

## 2018-10-17 NOTE — PROGRESS NOTES
Last 5 Patient Entered Readings                                      Current 30 Day Average: 132/77     Recent Readings 10/15/2018 10/11/2018 10/10/2018 10/7/2018 10/5/2018    SBP (mmHg) 131 129 134 134 128    DBP (mmHg) 73 99 77 76 75    Pulse 76 73 66 71 71        Mr. Bahena calls today to clarify his text alert about no recent readings. Patient discusses that he does not leave [employer] running on his phone, and typically logs out. Advised patient to leave [employer] running so that readings will be sent in real time, especially in case there are any highs or lows.     No further check in at this time on health and wellness goals. WCB in 3 weeks to allow for pharmD intro.

## 2018-11-08 ENCOUNTER — PATIENT OUTREACH (OUTPATIENT)
Dept: OTHER | Facility: OTHER | Age: 68
End: 2018-11-08

## 2018-11-08 NOTE — PROGRESS NOTES
Last 5 Patient Entered Readings                                      Current 30 Day Average: 131/77     Recent Readings 11/5/2018 10/30/2018 10/30/2018 10/29/2018 10/29/2018    SBP (mmHg) 151 131 120 149 161    DBP (mmHg) 82 68 80 90 80    Pulse 71 70 80 70 70        Digital Medicine: Health  Follow Up    Patient has been having trouble connecting phone with the cuff. Reports something with the bluetooth is not working. States that sometimes the cuff will begin taking a reading, but then will stop in the middle. Believes high reading recently was due to frustration with technical issues. Plans to go to [facility] on 11[facility]12    Patient's pharmD will plan to call in 1-2 weeks once he establishes readings with new cuff. WCB in 4 weeks

## 2018-12-04 ENCOUNTER — PATIENT OUTREACH (OUTPATIENT)
Dept: OTHER | Facility: OTHER | Age: 68
End: 2018-12-04

## 2018-12-04 NOTE — PROGRESS NOTES
Last 5 Patient Entered Readings                                      Current 30 Day Average: 138/78     Recent Readings 11/26/2018 11/25/2018 11/18/2018 11/8/2018 11/5/2018    SBP (mmHg) 138 131 137 134 151    DBP (mmHg) 76 78 77 75 82    Pulse 76 75 79 70 71        States that it takes a while for the bluetooth to connect, and then sometimes it will stop FPC through taking a reading. States that if he tries repeatedly he can get it to work, but it is aggravating. Has a different home cuff that compares very well with digital cuff.     Patient has not had time to go to the [facility] due to busy work schedule, but still intends to go eventually. We discussed the option of manual readings from his other cuff as a temporary solution in the meantime. Patient states that he can continue to take weekly readings and just persist with his digital cuff until he gets it replaced. Thanked Mr. Bahena for his patience

## 2018-12-17 RX ORDER — BENZONATATE 100 MG/1
CAPSULE ORAL
Qty: 30 CAPSULE | Refills: 0 | Status: SHIPPED | OUTPATIENT
Start: 2018-12-17 | End: 2019-01-03

## 2018-12-26 ENCOUNTER — OFFICE VISIT (OUTPATIENT)
Dept: FAMILY MEDICINE | Facility: CLINIC | Age: 68
End: 2018-12-26
Payer: MEDICARE

## 2018-12-26 ENCOUNTER — LAB VISIT (OUTPATIENT)
Dept: LAB | Facility: HOSPITAL | Age: 68
End: 2018-12-26
Attending: FAMILY MEDICINE
Payer: MEDICARE

## 2018-12-26 VITALS
HEART RATE: 66 BPM | HEIGHT: 70 IN | TEMPERATURE: 98 F | SYSTOLIC BLOOD PRESSURE: 140 MMHG | WEIGHT: 178.38 LBS | DIASTOLIC BLOOD PRESSURE: 70 MMHG | BODY MASS INDEX: 25.54 KG/M2

## 2018-12-26 DIAGNOSIS — Z00.00 PREVENTATIVE HEALTH CARE: Primary | ICD-10-CM

## 2018-12-26 DIAGNOSIS — Z00.00 PREVENTATIVE HEALTH CARE: ICD-10-CM

## 2018-12-26 DIAGNOSIS — Z12.9 SCREENING FOR CANCER: ICD-10-CM

## 2018-12-26 LAB
ALBUMIN SERPL BCP-MCNC: 3.6 G/DL
ALP SERPL-CCNC: 70 U/L
ALT SERPL W/O P-5'-P-CCNC: 14 U/L
ANION GAP SERPL CALC-SCNC: 8 MMOL/L
AST SERPL-CCNC: 15 U/L
BILIRUB SERPL-MCNC: 0.5 MG/DL
BUN SERPL-MCNC: 15 MG/DL
CALCIUM SERPL-MCNC: 9.8 MG/DL
CHLORIDE SERPL-SCNC: 100 MMOL/L
CHOLEST SERPL-MCNC: 159 MG/DL
CHOLEST/HDLC SERPL: 3.6 {RATIO}
CO2 SERPL-SCNC: 29 MMOL/L
COMPLEXED PSA SERPL-MCNC: 0.86 NG/ML
CREAT SERPL-MCNC: 1 MG/DL
EST. GFR  (AFRICAN AMERICAN): >60 ML/MIN/1.73 M^2
EST. GFR  (NON AFRICAN AMERICAN): >60 ML/MIN/1.73 M^2
GLUCOSE SERPL-MCNC: 109 MG/DL
HDLC SERPL-MCNC: 44 MG/DL
HDLC SERPL: 27.7 %
LDLC SERPL CALC-MCNC: 99.2 MG/DL
NONHDLC SERPL-MCNC: 115 MG/DL
POTASSIUM SERPL-SCNC: 4.6 MMOL/L
PROT SERPL-MCNC: 8 G/DL
SODIUM SERPL-SCNC: 137 MMOL/L
TRIGL SERPL-MCNC: 79 MG/DL

## 2018-12-26 PROCEDURE — 99999 PR PBB SHADOW E&M-EST. PATIENT-LVL III: CPT | Mod: PBBFAC,,, | Performed by: FAMILY MEDICINE

## 2018-12-26 PROCEDURE — 99397 PER PM REEVAL EST PAT 65+ YR: CPT | Mod: S$GLB,,, | Performed by: FAMILY MEDICINE

## 2018-12-26 PROCEDURE — 3077F SYST BP >= 140 MM HG: CPT | Mod: S$GLB,,, | Performed by: FAMILY MEDICINE

## 2018-12-26 PROCEDURE — 84153 ASSAY OF PSA TOTAL: CPT

## 2018-12-26 PROCEDURE — 3078F DIAST BP <80 MM HG: CPT | Mod: S$GLB,,, | Performed by: FAMILY MEDICINE

## 2018-12-26 PROCEDURE — 36415 COLL VENOUS BLD VENIPUNCTURE: CPT | Mod: PO

## 2018-12-26 PROCEDURE — 80053 COMPREHEN METABOLIC PANEL: CPT

## 2018-12-26 PROCEDURE — 80061 LIPID PANEL: CPT

## 2018-12-26 NOTE — PROGRESS NOTES
Chief Complaint   Patient presents with    Annual Exam     HPI: 69 yo WM for annual physical.    HTN -  tolerating the Losartan HCT  He reports overall feeling well.  BPH -  tolerating the Flomax daily and saw palmetto  HLD - following low fat diet  FH CAD - tolerating ASA 81mg daily  Insomnia - taking Ambien 5mg QHS about 2-3 nights a week  Smoking 5 cigarettes daily for over 30 years  C/o recovering from URI    PAST MEDICAL HISTORY:   HTN  PURE HYPERCHOLESTEROLEM   F/HXOTHER CARDIAC DISEAS (Father/brother)   internal hemorrhoids  Knee DJD    REVIEW OF SYSTEMS:   Bothersome joint pains YES   Sexual problems (getting and keeping erections, completing intercourse, etc.) NO   Change in size/firmness of stools NO   Change in size/color of a mole NO   Often feeling down, depressed or hopeless during the past month NO   Often having little interest or pleasure in doing things duringthe past month NO   Problems with falling or doing routine tasks at home NO   Periods of weakness, numbness or inability to talk NO     FAMILY HISTORY:   Cancer of the prostate or intestine NO   Heart pain or heart attacks before the age of 55 YES (FATHER mi/BROTHER S/P PTCA W/STENT)     TOBACCO:   Have you ever used tobacco? YES   Average number of packs/day: 1/2   Number of years smoked: 20     ALCOHOL:   Do you drink alcohol? YES     Exercise:   Activity: BIKE  Days per week: 3-4  Time/duration: 60 minutes   Exertion: HEAVY      Colonoscopy 2010    Current Outpatient Medications on File Prior to Visit   Medication Sig Dispense Refill    ascorbic acid (VITAMIN C) 500 MG tablet Take 500 mg by mouth once daily.       aspirin (ASPIRIN LOW DOSE) 81 MG EC tablet Take 81 mg by mouth once daily. Every day      benzonatate (TESSALON) 100 MG capsule TAKE ONE CAPSULE BY MOUTH THREE TIMES DAILY AS NEEDED FOR COUGH 30 capsule 0    ERGOCALCIFEROL, VITAMIN D2, (VITAMIN D ORAL) Take 5,000 Units by mouth once daily.      fish oil-omega-3 fatty acids  "300-1,000 mg capsule Take 4 capsules by mouth once daily.       losartan-hydrochlorothiazide 100-12.5 mg (HYZAAR) 100-12.5 mg Tab Take 1 tablet by mouth every morning. 90 tablet 3    multivitamin capsule Every day      tamsulosin (FLOMAX) 0.4 mg Cap Take 1 capsule (0.4 mg total) by mouth once daily. 90 capsule 3    zolpidem (AMBIEN) 5 MG Tab TAKE 1 TABLET BY MOUTH ONCE DAILY AT BEDTIME AS NEEDED 90 tablet 1    [DISCONTINUED] FLUZONE HIGH-DOSE 2018-19, PF, 180 mcg/0.5 mL vaccine       [DISCONTINUED] ramelteon (ROZEREM) 8 mg tablet Take 1 tablet (8 mg total) by mouth every evening. 30 tablet 1     No current facility-administered medications on file prior to visit.          PHYSICAL EXAMINATION:   BP (!) 140/70   Pulse 66   Temp 97.8 °F (36.6 °C) (Oral)   Ht 5' 10" (1.778 m)   Wt 80.9 kg (178 lb 5.6 oz)   BMI 25.59 kg/m²   ORAL EXAM : Mucous membranes moist; pharynx clear. No lesions.   NECK: Supple without nodes JVD, or bruits. No thyromegaly noted.   LUNGS: Clear to auscultation and percussion bilaterally.   HEART: Regular rate and rhythm; no rubs, murmurs or gallops.   ABD: soft, NT/ND, +BS no HSM   EXTREMITIES: No clubbing, cyanosis or edema. Distal pulses full/equal.   NEUROLOGIC: Alert and oriented x 4. Cranial nerves grossly intact.   SKIN: Warm, dry; no lesions noted.     Results for orders placed or performed in visit on 08/06/18   CBC w/ DIFF   Result Value Ref Range    WBC 13.38 (H) 3.90 - 12.70 K/uL    RBC 4.19 (L) 4.60 - 6.20 M/uL    Hemoglobin 11.9 (L) 14.0 - 18.0 g/dL    Hematocrit 36.8 (L) 40.0 - 54.0 %    MCV 88 82 - 98 fL    MCH 28.4 27.0 - 31.0 pg    MCHC 32.3 32.0 - 36.0 g/dL    RDW 13.6 11.5 - 14.5 %    Platelets 169 150 - 350 K/uL    MPV 9.4 9.2 - 12.9 fL    Gran # (ANC) CANCELED 1.8 - 7.7 K/uL    Lymph # CANCELED 1.0 - 4.8 K/uL    Mono # CANCELED 0.3 - 1.0 K/uL    Eos # CANCELED 0.0 - 0.5 K/uL    Baso # CANCELED 0.00 - 0.20 K/uL    nRBC 0 0 /100 WBC    Gran% 25.0 (L) 38.0 - 73.0 %    " Lymph% 71.0 (H) 18.0 - 48.0 %    Mono% 4.0 4.0 - 15.0 %    Eosinophil% 0.0 0.0 - 8.0 %    Basophil% 0.0 0.0 - 1.9 %    Platelet Estimate Appears normal     Aniso Slight     Hypo Occasional     Differential Method Manual    CMP   Result Value Ref Range    Sodium 136 136 - 145 mmol/L    Potassium 4.6 3.5 - 5.1 mmol/L    Chloride 102 95 - 110 mmol/L    CO2 26 23 - 29 mmol/L    Glucose 107 70 - 110 mg/dL    BUN, Bld 16 8 - 23 mg/dL    Creatinine 1.0 0.5 - 1.4 mg/dL    Calcium 9.4 8.7 - 10.5 mg/dL    Total Protein 7.3 6.0 - 8.4 g/dL    Albumin 3.5 3.5 - 5.2 g/dL    Total Bilirubin 0.5 0.1 - 1.0 mg/dL    Alkaline Phosphatase 60 55 - 135 U/L    AST 12 10 - 40 U/L    ALT 13 10 - 44 U/L    Anion Gap 8 8 - 16 mmol/L    eGFR if African American >60 >60 mL/min/1.73 m^2    eGFR if non African American >60 >60 mL/min/1.73 m^2   LDH   Result Value Ref Range     110 - 260 U/L   BETA 2 MICROGLOBULIN   Result Value Ref Range    Beta-2 Microglobulin 4.3 (H) 0.0 - 2.5 ug/mL   Pathologist Review   Result Value Ref Range    Pathologist Review Peripheral Smear REVIEWED        DIAGNOSIS/PLAN:   Preventative health care  -     Comprehensive metabolic panel; Future; Expected date: 12/26/2018  -     Lipid panel; Future; Expected date: 12/26/2018  -     PSA, Screening; Future; Expected date: 12/26/2018    Screening for cancer  -     CT Chest Lung Screening Low Dose; Future; Expected date: 12/26/2018    continue Bradford Garcia  Counseled on regular exercise, maintenance of a healthy weight, balanced diet rich in fruits/vegetables and lean protein, and avoidance of unhealthy habits like smoking and excessive alcohol intake.  CTS stretching and left rotator cuff stretching  ASA 81 mg/d   continue losartan /12.5 daily  Continue smoking cessation efforts  Continue monitoring in digital HTN program  F/u 12 months

## 2018-12-28 ENCOUNTER — TELEPHONE (OUTPATIENT)
Dept: FAMILY MEDICINE | Facility: CLINIC | Age: 68
End: 2018-12-28

## 2018-12-28 NOTE — TELEPHONE ENCOUNTER
Phoned pt back in regards to message. Pt was concerned about his glucose.level and hx of diabetes. Instructed pt to watch diet and increase his exercise. Pt voiced understanding. CLC

## 2018-12-28 NOTE — TELEPHONE ENCOUNTER
----- Message from Kris Lopez sent at 12/28/2018 10:35 AM CST -----  Contact: pt  Type:  Test Results    Who Called:  pt  Name of Test (Lab/Mammo/Etc):  Blood work  Date of Test:  12.26.18  Ordering Provider:  Dr Orr  Where the test was performed:  [facility]  Best Call Back Number:  [phone]  Additional Information:

## 2019-01-02 ENCOUNTER — PATIENT OUTREACH (OUTPATIENT)
Dept: OTHER | Facility: OTHER | Age: 69
End: 2019-01-02

## 2019-01-02 NOTE — PROGRESS NOTES
Last 5 Patient Entered Readings                                      Current 30 Day Average: 134/77     Recent Readings 12/26/2018 12/20/2018 12/11/2018 12/6/2018 11/26/2018    SBP (mmHg) 140 135 131 130 138    DBP (mmHg) 80 76 76 77 76    Pulse 80 66 77 69 76        Deferred due to pharmD outreach- NC protocol

## 2019-01-03 ENCOUNTER — PATIENT OUTREACH (OUTPATIENT)
Dept: OTHER | Facility: OTHER | Age: 69
End: 2019-01-03

## 2019-01-03 NOTE — PROGRESS NOTES
Last 5 Patient Entered Readings                                      Current 30 Day Average: 134/77     Recent Readings 12/26/2018 12/20/2018 12/11/2018 12/6/2018 11/26/2018    SBP (mmHg) 140 135 131 130 138    DBP (mmHg) 80 76 76 77 76    Pulse 80 66 77 69 76        Hypertension Medications     losartan-hydrochlorothiazide 100-12.5 mg (HYZAAR) 100-12.5 mg Tab Take 1 tablet by mouth every morning.     Patient returned my call and says that he apologizes for not returning my calls. He is interested in continuing in the digital medicine program. He reports no issues with medication compliance or issues with losartan/ HCTZ.     1) BP exceeds goal of <130/<80. Continue current BP medications. Last labs WNL  2) Discussed proper BP monitoring technique and BP goal  3) Patient knows to contact me with any non-urgent clinical changes or concerns. Go to ED or call [facility] for emergencies.   4) Will defer lifestyle counseling to digital medicine health .   5) Will continue to follow up. Will consider switching losartan to olmesartan 40 mg QD.     Imani Parks, Pharm.D.  [facility] Digital Medicine Clinical Pharmacist  [phone]

## 2019-01-04 ENCOUNTER — PATIENT MESSAGE (OUTPATIENT)
Dept: OTHER | Facility: OTHER | Age: 69
End: 2019-01-04

## 2019-01-14 ENCOUNTER — HOSPITAL ENCOUNTER (OUTPATIENT)
Dept: RADIOLOGY | Facility: HOSPITAL | Age: 69
Discharge: HOME | End: 2019-01-14
Attending: FAMILY MEDICINE
Payer: MEDICARE

## 2019-01-14 DIAGNOSIS — Z12.9 SCREENING FOR CANCER: ICD-10-CM

## 2019-01-14 PROCEDURE — G0297 LDCT FOR LUNG CA SCREEN: HCPCS | Mod: 26,,, | Performed by: RADIOLOGY

## 2019-01-14 PROCEDURE — G0297 CT CHEST LUNG SCREENING LOW DOSE: ICD-10-PCS | Mod: 26,,, | Performed by: RADIOLOGY

## 2019-01-14 PROCEDURE — G0297 LDCT FOR LUNG CA SCREEN: HCPCS | Mod: TC,PO

## 2019-01-16 NOTE — PROGRESS NOTES
Last 5 Patient Entered Readings                                      Current 30 Day Average: 138/79     Recent Readings 1/15/2019 1/13/2019 1/13/2019 1/10/2019 1/7/2019    SBP (mmHg) 133 162 161 140 144    DBP (mmHg) 77 89 83 80 79    Pulse 70 72 75 80 69        Patient exchanged BP cuff    Digital Medicine: Health  Follow Up    Lifestyle Modifications:    1.Dietary Modifications (Sodium intake <2,000mg/day, food labels, dining out):    Patient has been trying to eat more salad in the evenings with chicken or red beans and rice. Would like to lose 10-12 lbs- has not weighed himself yet.     2.Physical Activity:     3.Medication Therapy: Patient has been compliant with the medication regimen.    4.Patient has the following medication side effects/concerns:   (Frequency/Alleviating factors/Precipitating factors, etc.)     Follow up with Mr. Saurav Bahena completed. No further questions or concerns. Will continue to follow up to achieve health goals.

## 2019-01-18 DIAGNOSIS — G47.00 INSOMNIA, UNSPECIFIED TYPE: ICD-10-CM

## 2019-01-18 DIAGNOSIS — N40.0 BENIGN PROSTATIC HYPERPLASIA, UNSPECIFIED WHETHER LOWER URINARY TRACT SYMPTOMS PRESENT: Primary | ICD-10-CM

## 2019-01-18 DIAGNOSIS — I10 HYPERTENSION, UNSPECIFIED TYPE: ICD-10-CM

## 2019-01-18 RX ORDER — LOSARTAN POTASSIUM AND HYDROCHLOROTHIAZIDE 12.5; 1 MG/1; MG/1
1 TABLET ORAL EVERY MORNING
Qty: 90 TABLET | Refills: 1 | Status: SHIPPED | OUTPATIENT
Start: 2019-01-18 | End: 2019-01-21 | Stop reason: DRUGHIGH

## 2019-01-18 RX ORDER — TAMSULOSIN HYDROCHLORIDE 0.4 MG/1
1 CAPSULE ORAL DAILY
Qty: 90 CAPSULE | Refills: 3 | Status: SHIPPED | OUTPATIENT
Start: 2019-01-18 | End: 2019-12-05

## 2019-01-18 RX ORDER — ZOLPIDEM TARTRATE 5 MG/1
TABLET ORAL
Qty: 90 TABLET | Refills: 0 | Status: SHIPPED | OUTPATIENT
Start: 2019-01-18 | End: 2019-06-18 | Stop reason: SDUPTHER

## 2019-01-18 NOTE — PROGRESS NOTES
Refill Authorization Note     is requesting a refill authorization.    Brief assessment and rationale for refill: DEFER: zolpidem (outside of protocol);   Amount/Quantity of medication ordered: 90d        Refills Authorized: Yes  If authorized number of refills: 0           Medication Therapy Plan: HTN/BPH LCO 12/18 tolerating meds; Labs WNL 12/18; approve 6 more on losartan-hctz and 12 more on tamsulosin  Name and strength of medication: losartan-hydrochlorothiazide 100-12.5 mg (HYZAAR) 100-12.5 mg Tab/tamsulosin (FLOMAX) 0.4 mg Cap  How patient will take medication: utd  Medication reconciliation completed: No        Comments:   BP Readings from Last 3 Encounters:   12/26/18 (!) 140/70   08/13/18 (!) 150/77   12/11/17 138/80     Lab Results   Component Value Date    CREATININE 1.0 12/26/2018    BUN 15 12/26/2018     12/26/2018    K 4.6 12/26/2018     12/26/2018    CO2 29 12/26/2018

## 2019-01-21 ENCOUNTER — PATIENT OUTREACH (OUTPATIENT)
Dept: OTHER | Facility: OTHER | Age: 69
End: 2019-01-21

## 2019-01-21 DIAGNOSIS — I10 HYPERTENSION, UNSPECIFIED TYPE: Primary | ICD-10-CM

## 2019-01-21 RX ORDER — LOSARTAN POTASSIUM 100 MG/1
100 TABLET ORAL DAILY
Qty: 90 TABLET | Refills: 1 | Status: SHIPPED | OUTPATIENT
Start: 2019-01-21 | End: 2019-06-18 | Stop reason: ALTCHOICE

## 2019-01-21 RX ORDER — HYDROCHLOROTHIAZIDE 25 MG/1
25 TABLET ORAL DAILY
Qty: 90 TABLET | Refills: 1 | Status: SHIPPED | OUTPATIENT
Start: 2019-01-21 | End: 2019-06-18 | Stop reason: ALTCHOICE

## 2019-01-21 NOTE — PROGRESS NOTES
Last 5 Patient Entered Readings                                      Current 30 Day Average: 142/81     Recent Readings 1/15/2019 1/13/2019 1/13/2019 1/10/2019 1/7/2019    SBP (mmHg) 133 162 161 140 144    DBP (mmHg) 77 89 83 80 79    Pulse 70 72 75 80 69        Hypertension Medications     losartan-hydrochlorothiazide 100-12.5 mg (HYZAAR) 100-12.5 mg Tab Take 1 tablet by mouth every morning.     Called patient for BP follow up. No answer. Left message for call back.     Will consider increasing HCTZ to 25 mg    Padmini Ocasio.D.  Expert Medicine Clinical Pharmacist  [phone]

## 2019-01-21 NOTE — PROGRESS NOTES
Last 5 Patient Entered Readings                                      Current 30 Day Average: 142/81     Recent Readings 1/15/2019 1/13/2019 1/13/2019 1/10/2019 1/7/2019    SBP (mmHg) 133 162 161 140 144    DBP (mmHg) 77 89 83 80 79    Pulse 70 72 75 80 69        Hypertension Medications     losartan-hydrochlorothiazide 100-12.5 mg (HYZAAR) 100-12.5 mg Tab Take 1 tablet by mouth every morning.     Patient returned my call for BP follow up. Doing well with no complaints. BP trending up. Patient can't identify anything that has changed. He occasionally eats a can of soup or a hot pocket for lunch. Red beans, rice, salad for dinner. He says that he doesn't put a lot of dressing on his salads, but he does add chopped up olive salad.     1) BP exceeds goal of <130/<80. Stop losartan 100/ HCTZ 12.5. Start losartan 100 mg QD and HCTZ 25 mg QD  2) Patient knows to contact me with any non-urgent clinical changes or concerns. Go to ED or call [facility] for emergencies.   3) Will defer lifestyle counseling to digital medicine health . Increase hydration  4) Will continue to follow up. Will schedule follow up BMP at next encounter.     Imani Parks, Pharm.D.   Digital Medicine Clinical Pharmacist  [phone]

## 2019-02-08 ENCOUNTER — PATIENT OUTREACH (OUTPATIENT)
Dept: OTHER | Facility: OTHER | Age: 69
End: 2019-02-08

## 2019-02-08 NOTE — PROGRESS NOTES
Last 5 Patient Entered Readings                                      Current 30 Day Average: 139/80     Recent Readings 2/6/2019 2/4/2019 1/29/2019 1/23/2019 1/15/2019    SBP (mmHg) 135 146 117 138 133    DBP (mmHg) 76 82 75 80 77    Pulse - 67 75 70 70        Hypertension Medications     hydroCHLOROthiazide (HYDRODIURIL) 25 MG tablet Take 1 tablet (25 mg total) by mouth once daily.    losartan (COZAAR) 100 MG tablet Take 1 tablet (100 mg total) by mouth once daily.     Called patient for BP follow up. At last encounter, I increased HCTZ from 12.5 mg to 25 mg. Patient has not yet made this change. He has still been taking Losartan 100/ HCTZ 12.5 mg. He says he has a few more tablets left and will then begin losartan 100 mg and HCTZ 25 mg QD. He was at the ED yesterday for a cat bite and says his SBP never exceeded 134 mmHg.     1) BP exceeds goal of <130/<80. Start losartan 100 mg QD and HCTZ 25 mg QD.   2) Patient knows to contact me with any non-urgent clinical changes or concerns. Go to ED or call [facility] for emergencies.   3) Will defer lifestyle counseling to digital medicine health .   4) Will continue to follow up. Will schedule follow up labs at next encounter.     Imani Parks, Pharm.D.   Digital Medicine Clinical Pharmacist  [phone]

## 2019-02-11 ENCOUNTER — TELEPHONE (OUTPATIENT)
Dept: FAMILY MEDICINE | Facility: CLINIC | Age: 69
End: 2019-02-11
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
## 2019-02-11 PROBLEM — S51.852A CAT BITE OF LEFT FOREARM WITH INFECTION: Status: ACTIVE | Noted: 2019-02-11

## 2019-02-11 PROBLEM — L08.9 CAT BITE OF LEFT FOREARM WITH INFECTION: Status: ACTIVE | Noted: 2019-02-11

## 2019-02-11 NOTE — TELEPHONE ENCOUNTER
----- Message from Laura Zaragoza sent at 2/11/2019  2:20 PM CST -----  Contact: [phone]  Patient is requesting a call back from the nurse stated he at [facility] ER, stated he was bitten by michael cat last week red and swollen, he's checking in to get IV.  Patient was advised to notify his pcp.  Please call the patient upon request at phone number [phone].

## 2019-02-11 NOTE — TELEPHONE ENCOUNTER
----- Message from Laura Zaragoza sent at 2/11/2019  2:20 PM CST -----  Contact: [phone]  Patient is requesting a call back from the nurse stated he at [facility] ER, stated he was bitten by michael cat last week red and swollen, he's checking in to get IV.  Patient was advised to notify his pcp.  Please call the patient upon request at phone number [phone].

## 2019-02-15 ENCOUNTER — PATIENT OUTREACH (OUTPATIENT)
Dept: OTHER | Facility: OTHER | Age: 69
End: 2019-02-15

## 2019-02-20 ENCOUNTER — TELEPHONE (OUTPATIENT)
Dept: FAMILY MEDICINE | Facility: CLINIC | Age: 69
End: 2019-02-20

## 2019-02-20 NOTE — TELEPHONE ENCOUNTER
----- Message from Carolyn Roman sent at 2/20/2019 10:48 AM CST -----  Contact: patient  Type: Needs Medical Advice    Who Called:  patient  Best Call Back Number: [phone]  Additional Information: patient calling to schedule hospital followup from [facility] 02/13/2019 due to a dog bite. He wants to know if a followup is necessary before scheduling. Please advise
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
## 2019-02-25 ENCOUNTER — OFFICE VISIT (OUTPATIENT)
Dept: FAMILY MEDICINE | Facility: CLINIC | Age: 69
End: 2019-02-25
Payer: MEDICARE

## 2019-02-25 VITALS
DIASTOLIC BLOOD PRESSURE: 64 MMHG | OXYGEN SATURATION: 96 % | WEIGHT: 175.25 LBS | BODY MASS INDEX: 25.09 KG/M2 | HEART RATE: 83 BPM | HEIGHT: 70 IN | SYSTOLIC BLOOD PRESSURE: 118 MMHG | RESPIRATION RATE: 18 BRPM

## 2019-02-25 DIAGNOSIS — L08.9 CAT BITE OF LEFT FOREARM WITH INFECTION, SUBSEQUENT ENCOUNTER: Primary | ICD-10-CM

## 2019-02-25 DIAGNOSIS — I10 HYPERTENSION, UNSPECIFIED TYPE: ICD-10-CM
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
## 2019-02-25 DIAGNOSIS — S51.852D CAT BITE OF LEFT FOREARM WITH INFECTION, SUBSEQUENT ENCOUNTER: Primary | ICD-10-CM

## 2019-02-25 PROCEDURE — 1101F PR PT FALLS ASSESS DOC 0-1 FALLS W/OUT INJ PAST YR: ICD-10-PCS | Mod: CPTII,S$GLB,, | Performed by: FAMILY MEDICINE

## 2019-02-25 PROCEDURE — 99213 PR OFFICE/OUTPT VISIT, EST, LEVL III, 20-29 MIN: ICD-10-PCS | Mod: S$GLB,,, | Performed by: FAMILY MEDICINE

## 2019-02-25 PROCEDURE — 99213 OFFICE O/P EST LOW 20 MIN: CPT | Mod: S$GLB,,, | Performed by: FAMILY MEDICINE

## 2019-02-25 PROCEDURE — 99999 PR PBB SHADOW E&M-EST. PATIENT-LVL III: ICD-10-PCS | Mod: PBBFAC,,, | Performed by: FAMILY MEDICINE

## 2019-02-25 PROCEDURE — 3074F PR MOST RECENT SYSTOLIC BLOOD PRESSURE < 130 MM HG: ICD-10-PCS | Mod: CPTII,S$GLB,, | Performed by: FAMILY MEDICINE

## 2019-02-25 PROCEDURE — 1101F PT FALLS ASSESS-DOCD LE1/YR: CPT | Mod: CPTII,S$GLB,, | Performed by: FAMILY MEDICINE

## 2019-02-25 PROCEDURE — 3074F SYST BP LT 130 MM HG: CPT | Mod: CPTII,S$GLB,, | Performed by: FAMILY MEDICINE

## 2019-02-25 PROCEDURE — 99999 PR PBB SHADOW E&M-EST. PATIENT-LVL III: CPT | Mod: PBBFAC,,, | Performed by: FAMILY MEDICINE

## 2019-02-25 PROCEDURE — 3078F PR MOST RECENT DIASTOLIC BLOOD PRESSURE < 80 MM HG: ICD-10-PCS | Mod: CPTII,S$GLB,, | Performed by: FAMILY MEDICINE

## 2019-02-25 PROCEDURE — 3078F DIAST BP <80 MM HG: CPT | Mod: CPTII,S$GLB,, | Performed by: FAMILY MEDICINE

## 2019-02-25 NOTE — PROGRESS NOTES
Chief Complaint   Patient presents with    Animal Bite     HPI: 69 yo WM for f/u on recent hospitalization.    Patient Name: Saurav Bahena  MRN: [id number]  Admission Date: 2/11/2019  Hospital Length of Stay: 2 days  Discharge Date and Time:  02/13/2019 9:39 AM  Attending Physician: Lauri Boston MD   Discharging Provider: Lauri Boston MD  Primary Care Provider: Robbie Orr MD        HPI:   Briefly, this is a 68-year-old male with history of CLL, hypertension, hyperlipidemia, tobacco abuse who presents to the emergency department today with left forearm cellulitis.  Patient reports he had a cat bite and scratch to his left forearm on 02/06.  He presented to the emergency department the subsequent day and was given IV antibiotics and sent home with Augmentin.  He reports compliance with his Augmentin however he has continued edema to his left upper extremity extending to his left elbow.  Patient reports low-grade fevers at home but denies any shortness of breath, headaches, visual changes, chest pain, shortness of breath.  He does have decreased range of motion to his left elbow.  The severity is severe nature acute-onset no aggravating symptoms appreciated.  Consults:           Consults (From admission, onward)         Status Ordering Provider       Inpatient consult to Orthopedic Surgery  Once     Provider:  CHON Mccoy MD    Completed LAURI BOSTON.          * Cat bite of left forearm with infection     Patient is a 68-year-old male with history of CLL, hypertension, hyperlipidemia who initially presented to the emergency department after sustaining a cat bite and failing outpatient treatment with Augmentin.  He was placed on inpatient unit and treated with IV vancomycin and Zosyn.  He clinically improved rather quickly in-house requiring only 2 days of IV antibiotics.  I have discussed the case with Infectious Disease who is recommended Augmentin and doxycycline for  discharge. He will go out on 7 days of p.o. antibiotics with close outpatient follow-up.     He completed the course of Augmentin and doxycycline.  He reports left forearm seems back to normal with the exception of pain and some tenderness at the bite site.  He has last injection for rabies upcoming.    HTN -  tolerating the Losartan HCT  He reports overall feeling well.  BPH -  tolerating the Flomax daily and saw palmetto  HLD - following low fat diet  FH CAD - tolerating ASA 81mg daily  Insomnia - taking Ambien 5mg QHS about 2-3 nights a week  Smoking 5 cigarettes daily for over 30 years    PAST MEDICAL HISTORY:   HTN  PURE HYPERCHOLESTEROLEM   F/HXOTHER CARDIAC DISEAS (Father/brother)   internal hemorrhoids  Knee DJD    REVIEW OF SYSTEMS:   Bothersome joint pains YES   Sexual problems (getting and keeping erections, completing intercourse, etc.) NO   Change in size/firmness of stools NO   Change in size/color of a mole NO   Often feeling down, depressed or hopeless during the past month NO   Often having little interest or pleasure in doing things duringthe past month NO   Problems with falling or doing routine tasks at home NO   Periods of weakness, numbness or inability to talk NO     FAMILY HISTORY:   Cancer of the prostate or intestine NO   Heart pain or heart attacks before the age of 55 YES (FATHER mi/BROTHER S/P PTCA W/STENT)     TOBACCO:   Have you ever used tobacco? YES   Average number of packs/day: 1/2   Number of years smoked: 20     ALCOHOL:   Do you drink alcohol? YES     Exercise:   Activity: BIKE  Days per week: 3-4  Time/duration: 60 minutes   Exertion: HEAVY      Colonoscopy 2010    Current Outpatient Medications on File Prior to Visit   Medication Sig Dispense Refill    ascorbic acid (VITAMIN C) 500 MG tablet Take 500 mg by mouth once daily.       aspirin (ASPIRIN LOW DOSE) 81 MG EC tablet Take 81 mg by mouth once daily. Every day      ERGOCALCIFEROL, VITAMIN D2, (VITAMIN D ORAL) Take 5,000  "Units by mouth once daily.      fish oil-omega-3 fatty acids 300-1,000 mg capsule Take 4 capsules by mouth once daily.       hydroCHLOROthiazide (HYDRODIURIL) 25 MG tablet Take 1 tablet (25 mg total) by mouth once daily. 90 tablet 1    losartan (COZAAR) 100 MG tablet Take 1 tablet (100 mg total) by mouth once daily. 90 tablet 1    multivitamin capsule Every day      tamsulosin (FLOMAX) 0.4 mg Cap Take 1 capsule (0.4 mg total) by mouth once daily. 90 capsule 3    traMADol (ULTRAM) 50 mg tablet Take 1 tablet (50 mg total) by mouth every 4 (four) hours as needed for Pain (severe pain). 12 tablet 0    zolpidem (AMBIEN) 5 MG Tab TAKE 1 TABLET BY MOUTH ONCE DAILY AT BEDTIME AS NEEDED 90 tablet 0     No current facility-administered medications on file prior to visit.          PHYSICAL EXAMINATION:   /64   Pulse 83   Resp 18   Ht 5' 10" (1.778 m)   Wt 79.5 kg (175 lb 4.3 oz)   SpO2 96%   BMI 25.15 kg/m²   ORAL EXAM : Mucous membranes moist; pharynx clear. No lesions.   NECK: Supple without nodes JVD, or bruits. No thyromegaly noted.   LUNGS: Clear to auscultation and percussion bilaterally.   HEART: Regular rate and rhythm; no rubs, murmurs or gallops.   ABD: soft, NT/ND, +BS no HSM   EXTREMITIES: No clubbing, cyanosis or edema. Distal pulses full/equal.   NEUROLOGIC: Alert and oriented x 4. Cranial nerves grossly intact.   SKIN: Warm, dry; no lesions noted.   Left forearm: bite marks with no signs of infection; FROm in wrist; some pain with resisted wrist extension    Results for orders placed or performed during the hospital encounter of 02/11/19   Blood culture (site 1)   Result Value Ref Range    Blood Culture, Routine No growth after 5 days.    Blood culture (site 2)   Result Value Ref Range    Blood Culture, Routine No growth after 5 days.    CBC auto differential   Result Value Ref Range    WBC 17.50 (H) 3.90 - 12.70 K/uL    RBC 3.85 (L) 4.60 - 6.20 M/uL    Hemoglobin 10.5 (L) 14.0 - 18.0 g/dL    " Hematocrit 32.9 (L) 40.0 - 54.0 %    MCV 86 82 - 98 fL    MCH 27.3 27.0 - 31.0 pg    MCHC 31.9 (L) 32.0 - 36.0 g/dL    RDW 13.9 11.5 - 14.5 %    Platelets 190 150 - 350 K/uL    MPV 9.4 9.2 - 12.9 fL    Immature Granulocytes CANCELED 0.0 - 0.5 %    Gran # (ANC) 4.6 1.8 - 7.7 K/uL    Immature Grans (Abs) CANCELED 0.00 - 0.04 K/uL    nRBC 0 0 /100 WBC    Gran% 26.0 (L) 38.0 - 73.0 %    Lymph% 71.0 (H) 18.0 - 48.0 %    Mono% 2.0 (L) 4.0 - 15.0 %    Eosinophil% 0.0 0.0 - 8.0 %    Basophil% 0.0 0.0 - 1.9 %    Metamyelocytes 1.0 %    Platelet Estimate Appears normal     Aniso Slight     Poly Occasional     Hypo Occasional     Differential Method Manual    Comprehensive metabolic panel (CMP)   Result Value Ref Range    Sodium 134 (L) 136 - 145 mmol/L    Potassium 4.2 3.5 - 5.1 mmol/L    Chloride 97 95 - 110 mmol/L    CO2 27 22 - 31 mmol/L    Glucose 104 70 - 110 mg/dL    BUN, Bld 16 9 - 21 mg/dL    Creatinine 0.96 0.50 - 1.40 mg/dL    Calcium 9.6 8.4 - 10.2 mg/dL    Total Protein 8.3 6.0 - 8.4 g/dL    Albumin 4.1 3.5 - 5.2 g/dL    Total Bilirubin 0.6 0.2 - 1.3 mg/dL    Alkaline Phosphatase 77 38 - 145 U/L    AST 22 17 - 59 U/L    ALT 17 10 - 44 U/L    Anion Gap 10 8 - 16 mmol/L    eGFR if African American >60 >60 mL/min/1.73 m^2    eGFR if non African American >60 >60 mL/min/1.73 m^2   Sedimentation rate   Result Value Ref Range    Sed Rate 79 (H) 0 - 19 mm/Hr   C-reactive protein   Result Value Ref Range    CRP 7.20 (H) 0.00 - 0.90 mg/dL   Basic Metabolic Panel (BMP)   Result Value Ref Range    Sodium 136 136 - 145 mmol/L    Potassium 3.7 3.5 - 5.1 mmol/L    Chloride 101 95 - 110 mmol/L    CO2 29 22 - 31 mmol/L    Glucose 91 70 - 110 mg/dL    BUN, Bld 13 9 - 21 mg/dL    Creatinine 0.96 0.50 - 1.40 mg/dL    Calcium 8.9 8.4 - 10.2 mg/dL    Anion Gap 6 (L) 8 - 16 mmol/L    eGFR if African American >60 >60 mL/min/1.73 m^2    eGFR if non African American >60 >60 mL/min/1.73 m^2   CBC with Automated Differential   Result Value  Ref Range    WBC 14.10 (H) 3.90 - 12.70 K/uL    RBC 3.33 (L) 4.60 - 6.20 M/uL    Hemoglobin 9.3 (L) 14.0 - 18.0 g/dL    Hematocrit 28.8 (L) 40.0 - 54.0 %    MCV 87 82 - 98 fL    MCH 27.9 27.0 - 31.0 pg    MCHC 32.3 32.0 - 36.0 g/dL    RDW 14.0 11.5 - 14.5 %    Platelets 159 150 - 350 K/uL    MPV 9.4 9.2 - 12.9 fL    Immature Granulocytes CANCELED 0.0 - 0.5 %    Gran # (ANC) 2.3 1.8 - 7.7 K/uL    Immature Grans (Abs) CANCELED 0.00 - 0.04 K/uL    nRBC 0 0 /100 WBC    Gran% 16.0 (L) 38.0 - 73.0 %    Lymph% 80.0 (H) 18.0 - 48.0 %    Mono% 3.0 (L) 4.0 - 15.0 %    Eosinophil% 1.0 0.0 - 8.0 %    Basophil% 0.0 0.0 - 1.9 %    Aniso Slight     Poly Occasional     Hypo Occasional     Differential Method Manual    Pathologist Review   Result Value Ref Range    Pathologist Review Peripheral Smear REVIEWED    VANCOMYCIN, TROUGH before 4th dose   Result Value Ref Range    Vancomycin-Trough 18.5 10.0 - 20.0 ug/mL   CBC auto differential   Result Value Ref Range    WBC 12.09 3.90 - 12.70 K/uL    RBC 3.44 (L) 4.60 - 6.20 M/uL    Hemoglobin 9.5 (L) 14.0 - 18.0 g/dL    Hematocrit 29.8 (L) 40.0 - 54.0 %    MCV 87 82 - 98 fL    MCH 27.6 27.0 - 31.0 pg    MCHC 31.9 (L) 32.0 - 36.0 g/dL    RDW 13.9 11.5 - 14.5 %    Platelets 178 150 - 350 K/uL    MPV 9.4 9.2 - 12.9 fL    Immature Granulocytes CANCELED 0.0 - 0.5 %    Gran # (ANC) 2.5 1.8 - 7.7 K/uL    Immature Grans (Abs) CANCELED 0.00 - 0.04 K/uL    Lymph # CANCELED 1.0 - 4.8 K/uL    Mono # CANCELED 0.3 - 1.0 K/uL    Eos # CANCELED 0.0 - 0.5 K/uL    Baso # CANCELED 0.00 - 0.20 K/uL    nRBC 0 0 /100 WBC    Gran% 21.0 (L) 38.0 - 73.0 %    Lymph% 76.0 (H) 18.0 - 48.0 %    Mono% 2.0 (L) 4.0 - 15.0 %    Eosinophil% 1.0 0.0 - 8.0 %    Basophil% 0.0 0.0 - 1.9 %    Platelet Estimate Appears normal     Aniso Slight     Poik Slight     Hypo Occasional     Differential Method Manual      Hospital records reviewed      DIAGNOSIS/PLAN:   Cat bite of left forearm with infection, subsequent  encounter    Hypertension, unspecified type       Reassurance regarding healing bites  Cat bite healing as expected with no residual signs of infection  Complete rabies series as planned  Counseled on regular exercise, maintenance of a healthy weight, balanced diet rich in fruits/vegetables and lean protein, and avoidance of unhealthy habits like smoking and excessive alcohol intake.  ASA 81 mg/d   continue losartan /12.5 daily  Continue smoking cessation efforts  Continue monitoring in digital HTN program  F/u as previously advised

## 2019-03-06 ENCOUNTER — PATIENT OUTREACH (OUTPATIENT)
Dept: OTHER | Facility: OTHER | Age: 69
End: 2019-03-06

## 2019-03-06 NOTE — PROGRESS NOTES
Last 5 Patient Entered Readings                                      Current 30 Day Average: 132/73     Recent Readings 2/26/2019 2/6/2019 2/4/2019 1/29/2019 1/23/2019    SBP (mmHg) 116 135 146 117 138    DBP (mmHg) 62 76 82 75 80    Pulse 72 - 67 75 70        3/6-  Left voicemail for follow up

## 2019-03-21 ENCOUNTER — TELEPHONE (OUTPATIENT)
Dept: FAMILY MEDICINE | Facility: CLINIC | Age: 69
End: 2019-03-21

## 2019-03-21 NOTE — TELEPHONE ENCOUNTER
Please ask if he is on medications?   Yes he needs to be seen   Either today if he feels urgent  or i can see him tomorrow

## 2019-03-21 NOTE — TELEPHONE ENCOUNTER
----- Message from Italia Romero sent at 3/21/2019  9:21 AM CDT -----  Type: Needs Medical Advice    Who Called:  Patient  Best Call Back Number: [phone]  Additional Information: Woke up yesterday with bad abdominal pain. He went to urgent care who said it looks like he has diverticulitis and advised him to get an abdominal cat scan. Patient would like the doctor's opinion to see if this is the best route to take. Please call to advise. He would like to get the cat scan tomorrow, if needed.

## 2019-03-21 NOTE — TELEPHONE ENCOUNTER
Pt states that he woke up yesterday with abd pain. He went to UC and they said it maybe divertiiculitis advising him to get a CT. Pt wanted Drs opinion for best route. Would like CT tomorrow if possible. You want me to make an appt for him with you tomorrow or put him in one of Dr. Barton slots for tomorrow if he has one avail? Please advise.

## 2019-03-22 ENCOUNTER — HOSPITAL ENCOUNTER (OUTPATIENT)
Dept: RADIOLOGY | Facility: HOSPITAL | Age: 69
Discharge: HOME | End: 2019-03-22
Attending: NURSE PRACTITIONER
Payer: MEDICARE

## 2019-03-22 ENCOUNTER — OFFICE VISIT (OUTPATIENT)
Dept: FAMILY MEDICINE | Facility: CLINIC | Age: 69
End: 2019-03-22
Payer: MEDICARE

## 2019-03-22 VITALS
HEART RATE: 79 BPM | TEMPERATURE: 98 F | DIASTOLIC BLOOD PRESSURE: 72 MMHG | WEIGHT: 175.69 LBS | OXYGEN SATURATION: 98 % | HEIGHT: 70 IN | RESPIRATION RATE: 18 BRPM | SYSTOLIC BLOOD PRESSURE: 130 MMHG | BODY MASS INDEX: 25.15 KG/M2

## 2019-03-22 DIAGNOSIS — R19.7 DIARRHEA, UNSPECIFIED TYPE: Primary | ICD-10-CM

## 2019-03-22 DIAGNOSIS — R10.32 LLQ PAIN: ICD-10-CM

## 2019-03-22 DIAGNOSIS — K52.9 COLITIS: ICD-10-CM

## 2019-03-22 DIAGNOSIS — I10 HYPERTENSION, UNSPECIFIED TYPE: ICD-10-CM

## 2019-03-22 DIAGNOSIS — K92.1 BLOOD IN STOOL: ICD-10-CM

## 2019-03-22 DIAGNOSIS — C91.10 CLL (CHRONIC LYMPHOCYTIC LEUKEMIA): ICD-10-CM

## 2019-03-22 LAB
BILIRUB SERPL-MCNC: NORMAL MG/DL
BLOOD URINE, POC: NORMAL
COLOR, POC UA: NORMAL
GLUCOSE UR QL STRIP: NORMAL
KETONES UR QL STRIP: NORMAL
LEUKOCYTE ESTERASE URINE, POC: NORMAL
NITRITE, POC UA: NORMAL
PH, POC UA: 7
PROTEIN, POC: NORMAL
SPECIFIC GRAVITY, POC UA: 1
UROBILINOGEN, POC UA: NORMAL

## 2019-03-22 PROCEDURE — 81002 URINALYSIS NONAUTO W/O SCOPE: CPT | Mod: S$GLB,,, | Performed by: NURSE PRACTITIONER

## 2019-03-22 PROCEDURE — 99999 PR PBB SHADOW E&M-EST. PATIENT-LVL IV: ICD-10-PCS | Mod: PBBFAC,,, | Performed by: NURSE PRACTITIONER

## 2019-03-22 PROCEDURE — 3078F DIAST BP <80 MM HG: CPT | Mod: CPTII,S$GLB,, | Performed by: NURSE PRACTITIONER

## 2019-03-22 PROCEDURE — 3075F PR MOST RECENT SYSTOLIC BLOOD PRESS GE 130-139MM HG: ICD-10-PCS | Mod: CPTII,S$GLB,, | Performed by: NURSE PRACTITIONER

## 2019-03-22 PROCEDURE — 99499 RISK ADDL DX/OHS AUDIT: ICD-10-PCS | Mod: S$GLB,,, | Performed by: NURSE PRACTITIONER

## 2019-03-22 PROCEDURE — 1101F PR PT FALLS ASSESS DOC 0-1 FALLS W/OUT INJ PAST YR: ICD-10-PCS | Mod: CPTII,S$GLB,, | Performed by: NURSE PRACTITIONER

## 2019-03-22 PROCEDURE — 99999 PR PBB SHADOW E&M-EST. PATIENT-LVL IV: CPT | Mod: PBBFAC,,, | Performed by: NURSE PRACTITIONER

## 2019-03-22 PROCEDURE — 81002 POCT URINE DIPSTICK WITHOUT MICROSCOPE: ICD-10-PCS | Mod: S$GLB,,, | Performed by: NURSE PRACTITIONER

## 2019-03-22 PROCEDURE — 74177 CT ABDOMEN PELVIS WITH CONTRAST: ICD-10-PCS | Mod: 26,,, | Performed by: RADIOLOGY

## 2019-03-22 PROCEDURE — 3078F PR MOST RECENT DIASTOLIC BLOOD PRESSURE < 80 MM HG: ICD-10-PCS | Mod: CPTII,S$GLB,, | Performed by: NURSE PRACTITIONER

## 2019-03-22 PROCEDURE — 99214 OFFICE O/P EST MOD 30 MIN: CPT | Mod: S$GLB,,, | Performed by: NURSE PRACTITIONER

## 2019-03-22 PROCEDURE — 74177 CT ABD & PELVIS W/CONTRAST: CPT | Mod: TC,PO

## 2019-03-22 PROCEDURE — 25500020 PHARM REV CODE 255: Mod: PO | Performed by: NURSE PRACTITIONER

## 2019-03-22 PROCEDURE — 1101F PT FALLS ASSESS-DOCD LE1/YR: CPT | Mod: CPTII,S$GLB,, | Performed by: NURSE PRACTITIONER

## 2019-03-22 PROCEDURE — 3075F SYST BP GE 130 - 139MM HG: CPT | Mod: CPTII,S$GLB,, | Performed by: NURSE PRACTITIONER

## 2019-03-22 PROCEDURE — 74177 CT ABD & PELVIS W/CONTRAST: CPT | Mod: 26,,, | Performed by: RADIOLOGY

## 2019-03-22 PROCEDURE — 99499 UNLISTED E&M SERVICE: CPT | Mod: S$GLB,,, | Performed by: NURSE PRACTITIONER

## 2019-03-22 PROCEDURE — 99214 PR OFFICE/OUTPT VISIT, EST, LEVL IV, 30-39 MIN: ICD-10-PCS | Mod: S$GLB,,, | Performed by: NURSE PRACTITIONER

## 2019-03-22 RX ORDER — AMOXICILLIN AND CLAVULANATE POTASSIUM 875; 125 MG/1; MG/1
1 TABLET, FILM COATED ORAL 2 TIMES DAILY
Qty: 20 TABLET | Refills: 0 | Status: SHIPPED | OUTPATIENT
Start: 2019-03-22 | End: 2019-04-08

## 2019-03-22 RX ADMIN — IOHEXOL 100 ML: 350 INJECTION, SOLUTION INTRAVENOUS at 11:03

## 2019-03-22 RX ADMIN — IOHEXOL 30 ML: 350 INJECTION, SOLUTION INTRAVENOUS at 11:03

## 2019-03-22 NOTE — PROGRESS NOTES
Subjective:       Patient ID: Saurav Bahena is a 68 y.o. male.    Chief Complaint: Bladder Pain (started wednesday morning, feels like cramps, needs CT)    Here today with pain in LLQ     Went to urgent care- told he had diverticulitis   [facility]   Was not prescribed meds     Last colon 2010 - no polyp     Ate peanuts last Saturday - and had diarrhea and then ate peanuts night before pain returned \  Has had off and on bloody diarrhea     Abdominal Pain   This is a new problem. The current episode started in the past 7 days (2 days ). The onset quality is sudden. The problem occurs daily. The pain is located in the LLQ. The pain is at a severity of 5/10. The pain is moderate. The quality of the pain is colicky. The abdominal pain radiates to the LLQ and LUQ. Associated symptoms include diarrhea and melena. Pertinent negatives include no anorexia, arthralgias, belching, constipation, dysuria, fever, flatus, frequency, headaches, hematochezia, hematuria, myalgias, nausea, vomiting or weight loss. Associated symptoms comments: Bloody diarrhea  Intermittent pain . The pain is aggravated by palpation and movement. The pain is relieved by nothing. Treatments tried: anti diarrheal.     Vitals:    03/22/19 0859   BP: 130/72   Pulse: 79   Resp: 18   Temp: 98.3 °F (36.8 °C)     Review of Systems   Constitutional: Positive for activity change and appetite change. Negative for fatigue, fever and weight loss.   HENT: Negative.    Eyes: Negative.    Respiratory: Negative.  Negative for cough and shortness of breath.    Cardiovascular: Negative.  Negative for chest pain.   Gastrointestinal: Positive for abdominal distention, abdominal pain, blood in stool, diarrhea and melena. Negative for anorexia, constipation, flatus, hematochezia, nausea and vomiting.   Endocrine: Negative.    Genitourinary: Negative for dysuria, frequency and hematuria.   Musculoskeletal: Negative.  Negative for arthralgias and  myalgias.   Skin: Negative.  Negative for color change and rash.   Allergic/Immunologic: Negative.    Neurological: Negative.  Negative for numbness and headaches.   Hematological: Negative.    Psychiatric/Behavioral: Negative.        Past Medical History:   Diagnosis Date    BPH (benign prostatic hyperplasia)     Cat bite of left forearm with infection 2/11/2019    CTS (carpal tunnel syndrome)     Current smoker on some days     using e cig, regular cigarettes sometimes    DJD (degenerative joint disease) of knee     left    DJD (degenerative joint disease) of knee     bilateral/ LT more than RT    HLD (hyperlipidemia)     no current meds    HTN (hypertension)     Insomnia     Nocturia      Objective:      Physical Exam   Constitutional: He is oriented to person, place, and time. He appears well-developed and well-nourished.   HENT:   Head: Normocephalic and atraumatic.   Right Ear: Hearing, tympanic membrane, external ear and ear canal normal.   Left Ear: Hearing, tympanic membrane, external ear and ear canal normal.   Nose: Nose normal. No mucosal edema or rhinorrhea. Right sinus exhibits no maxillary sinus tenderness and no frontal sinus tenderness. Left sinus exhibits no maxillary sinus tenderness and no frontal sinus tenderness.   Mouth/Throat: Uvula is midline, oropharynx is clear and moist and mucous membranes are normal.   Eyes: Conjunctivae and EOM are normal. Pupils are equal, round, and reactive to light.   Neck: Normal range of motion. Neck supple.   Cardiovascular: Normal rate, regular rhythm, normal heart sounds and intact distal pulses. Exam reveals no friction rub.   No murmur heard.  Pulmonary/Chest: Effort normal and breath sounds normal. No stridor. No respiratory distress. He has no wheezes.   Abdominal: Soft. Bowel sounds are normal. He exhibits no distension and no mass. There is tenderness in the left upper quadrant and left lower quadrant. There is guarding.   Musculoskeletal:  Normal range of motion. He exhibits no edema.   Neurological: He is alert and oriented to person, place, and time. No cranial nerve deficit.   Skin: Skin is warm and dry.   Psychiatric: He has a normal mood and affect. His behavior is normal. Judgment and thought content normal.   Nursing note and vitals reviewed.      Assessment:       1. Diarrhea, unspecified type    2. LLQ pain    3. Blood in stool    4. Colitis    5. Hypertension, unspecified type    6. CLL (chronic lymphocytic leukemia)        Plan:       Diarrhea, unspecified type  -     CBC auto differential; Future; Expected date: 03/22/2019  -     Comprehensive metabolic panel; Future; Expected date: 03/22/2019  -     Ambulatory referral to Gastroenterology  -     amoxicillin-clavulanate 875-125mg (AUGMENTIN) 875-125 mg per tablet; Take 1 tablet by mouth 2 (two) times daily.  Dispense: 20 tablet; Refill: 0    LLQ pain  -     CT Abdomen Pelvis With Contrast; Future; Expected date: 03/22/2019  -     POCT URINE DIPSTICK WITHOUT MICROSCOPE  -     CBC auto differential; Future; Expected date: 03/22/2019  -     Comprehensive metabolic panel; Future; Expected date: 03/22/2019  -     Ambulatory referral to Gastroenterology  -     amoxicillin-clavulanate 875-125mg (AUGMENTIN) 875-125 mg per tablet; Take 1 tablet by mouth 2 (two) times daily.  Dispense: 20 tablet; Refill: 0    Blood in stool  -     CBC auto differential; Future; Expected date: 03/22/2019  -     Comprehensive metabolic panel; Future; Expected date: 03/22/2019  -     Ambulatory referral to Gastroenterology  -     amoxicillin-clavulanate 875-125mg (AUGMENTIN) 875-125 mg per tablet; Take 1 tablet by mouth 2 (two) times daily.  Dispense: 20 tablet; Refill: 0    Colitis  -     Ambulatory referral to Gastroenterology  -     amoxicillin-clavulanate 875-125mg (AUGMENTIN) 875-125 mg per tablet; Take 1 tablet by mouth 2 (two) times daily.  Dispense: 20 tablet; Refill: 0    Hypertension, unspecified type  Stable  on meds     CLL (chronic lymphocytic leukemia)  Followed by hematology and PCP             Reviewed with patient the results  To see Dr Orr also in few weeks to discuss further things on CT

## 2019-03-25 ENCOUNTER — PATIENT MESSAGE (OUTPATIENT)
Dept: ADMINISTRATIVE | Facility: OTHER | Age: 69
End: 2019-03-25

## 2019-03-25 ENCOUNTER — PATIENT MESSAGE (OUTPATIENT)
Dept: FAMILY MEDICINE | Facility: CLINIC | Age: 69
End: 2019-03-25

## 2019-03-29 ENCOUNTER — PATIENT MESSAGE (OUTPATIENT)
Dept: FAMILY MEDICINE | Facility: CLINIC | Age: 69
End: 2019-03-29

## 2019-04-01 ENCOUNTER — PATIENT OUTREACH (OUTPATIENT)
Dept: ADMINISTRATIVE | Facility: HOSPITAL | Age: 69
End: 2019-04-01

## 2019-04-04 ENCOUNTER — TELEPHONE (OUTPATIENT)
Dept: FAMILY MEDICINE | Facility: CLINIC | Age: 69
End: 2019-04-04

## 2019-04-04 ENCOUNTER — PATIENT OUTREACH (OUTPATIENT)
Dept: OTHER | Facility: OTHER | Age: 69
End: 2019-04-04

## 2019-04-04 NOTE — PROGRESS NOTES
Last 5 Patient Entered Readings                                      Current 30 Day Average: 135/76     Recent Readings 4/1/2019 4/1/2019 3/27/2019 3/26/2019 3/25/2019    SBP (mmHg) 153 152 125 132 128    DBP (mmHg) 74 84 75 76 70    Pulse 70 68 69 71 70        Digital Medicine: Health  Follow Up    Patient believes recent elevation was due to recent course of antibiotics    Lifestyle Modifications:    1.Dietary Modifications (Sodium intake <2,000mg/day, food labels, dining out):    Reviewed patient's typical diet. Upon further assessment, patient likely not following a low sodium diet. In a typical day, patient drinks a protein drink (2-300 mg) in the morning, boiled eggs or orange peanut butter crackers (300 mg) as a snack, bagged salad with avocado, canned artichoke hearts (400 mg per serving) and lean cuisine (5-600 mg) for dinner. Patient agrees to track his sodium as an exercise for a few days to further assess his sodium intake and look for areas for improvement. Will follow up in 2-3 weeks.     2.Physical Activity:    Patient would like to resume with 5-10 miles of bike riding on weekends, and walks on the treadmill a couple days a week.     3.Medication Therapy: Patient has been compliant with the medication regimen.    4.Patient has the following medication side effects/concerns:   (Frequency/Alleviating factors/Precipitating factors, etc.)     Follow up with Mr. Saurav Bahena completed. No further questions or concerns. Will continue to follow up to achieve health goals.

## 2019-04-04 NOTE — TELEPHONE ENCOUNTER
Me           3/21/19 9:37 AM   Note      Pt states that he woke up yesterday with abd pain. He went to UC and they said it maybe divertiiculitis advising him to get a CT. Pt wanted Drs opinion for best route. Would like CT tomorrow if possible. You want me to make an appt for him with you tomorrow or put him in one of Dr. Ps slots for tomorrow if he has one avail? Please advise.                                3/21/19 1:53 PM      You contacted Saurav Bahena NP   to [facility] Staff         3/21/19 12:45 PM   Note      Please ask if he is on medications?   Yes he needs to be seen   Either today if he feels urgent  or i can see him tomorrow                     Me           3/21/19 1:53 PM   Note      Pt states he is not taking anything for the diverticulitis, appt scheduled for tomorrow

## 2019-04-08 ENCOUNTER — OFFICE VISIT (OUTPATIENT)
Dept: GASTROENTEROLOGY | Facility: CLINIC | Age: 69
End: 2019-04-08
Payer: MEDICARE

## 2019-04-08 ENCOUNTER — TELEPHONE (OUTPATIENT)
Dept: GASTROENTEROLOGY | Facility: CLINIC | Age: 69
End: 2019-04-08

## 2019-04-08 VITALS
WEIGHT: 174.81 LBS | RESPIRATION RATE: 18 BRPM | SYSTOLIC BLOOD PRESSURE: 129 MMHG | BODY MASS INDEX: 25.03 KG/M2 | HEIGHT: 70 IN | DIASTOLIC BLOOD PRESSURE: 72 MMHG | HEART RATE: 64 BPM

## 2019-04-08 DIAGNOSIS — K63.9 COLON WALL THICKENING: ICD-10-CM

## 2019-04-08 DIAGNOSIS — R79.89 ABNORMAL CBC: ICD-10-CM

## 2019-04-08 DIAGNOSIS — Z87.19 HISTORY OF RECTAL BLEEDING: ICD-10-CM

## 2019-04-08 DIAGNOSIS — R10.30 LOWER ABDOMINAL PAIN: Primary | ICD-10-CM

## 2019-04-08 DIAGNOSIS — Z79.01 ANTICOAGULANT LONG-TERM USE: ICD-10-CM

## 2019-04-08 DIAGNOSIS — R93.2 ABNORMAL LIVER CT: ICD-10-CM

## 2019-04-08 PROCEDURE — 3074F PR MOST RECENT SYSTOLIC BLOOD PRESSURE < 130 MM HG: ICD-10-PCS | Mod: CPTII,S$GLB,, | Performed by: NURSE PRACTITIONER

## 2019-04-08 PROCEDURE — 99999 PR PBB SHADOW E&M-EST. PATIENT-LVL IV: CPT | Mod: PBBFAC,,, | Performed by: NURSE PRACTITIONER

## 2019-04-08 PROCEDURE — 99204 PR OFFICE/OUTPT VISIT, NEW, LEVL IV, 45-59 MIN: ICD-10-PCS | Mod: S$GLB,,, | Performed by: NURSE PRACTITIONER

## 2019-04-08 PROCEDURE — 3078F PR MOST RECENT DIASTOLIC BLOOD PRESSURE < 80 MM HG: ICD-10-PCS | Mod: CPTII,S$GLB,, | Performed by: NURSE PRACTITIONER

## 2019-04-08 PROCEDURE — 1101F PR PT FALLS ASSESS DOC 0-1 FALLS W/OUT INJ PAST YR: ICD-10-PCS | Mod: CPTII,S$GLB,, | Performed by: NURSE PRACTITIONER

## 2019-04-08 PROCEDURE — 3078F DIAST BP <80 MM HG: CPT | Mod: CPTII,S$GLB,, | Performed by: NURSE PRACTITIONER

## 2019-04-08 PROCEDURE — 3074F SYST BP LT 130 MM HG: CPT | Mod: CPTII,S$GLB,, | Performed by: NURSE PRACTITIONER

## 2019-04-08 PROCEDURE — 1101F PT FALLS ASSESS-DOCD LE1/YR: CPT | Mod: CPTII,S$GLB,, | Performed by: NURSE PRACTITIONER

## 2019-04-08 PROCEDURE — 99204 OFFICE O/P NEW MOD 45 MIN: CPT | Mod: S$GLB,,, | Performed by: NURSE PRACTITIONER

## 2019-04-08 PROCEDURE — 99999 PR PBB SHADOW E&M-EST. PATIENT-LVL IV: ICD-10-PCS | Mod: PBBFAC,,, | Performed by: NURSE PRACTITIONER

## 2019-04-08 NOTE — PROGRESS NOTES
Subjective:       Patient ID: Saurav Bahena is a 68 y.o. male Body mass index is 25.08 kg/m².    Chief Complaint: GI Problem (lower abd pain, colitis, abn ct scan)    This patient is new to me.  Referring Provider: MYRNA Melton NP for LLQ pain, blood in stool.  Established patient of Dr. Aguilar (last in 2010).    Abdominal Pain   Episode onset: started ~3/19/19, went to urgent care and told had diverticulitis, saw PCP team and had CT scan & was prescribed augmentin. The most recent episode lasted 3 days. The problem has been resolved. The pain is located in the LLQ and periumbilical region. The pain is at a severity of 0/10 (currently). Associated symptoms include diarrhea (had when abdominal pain first started; has resolved) and hematochezia (reports when abdominal pain was present; has resolved). Pertinent negatives include no anorexia, belching, constipation, dysuria, fever, flatus, frequency, melena, nausea, vomiting or weight loss. Associated symptoms comments: Bowel movements once daily of formed stool currently. Exacerbated by: had eaten roasted peanuts the weekend prior episode onset. He has tried antibiotics (completed augmentin course) for the symptoms. The treatment provided significant relief. Prior diagnostic workup includes CT scan.     Review of Systems   Constitutional: Negative for appetite change, chills, fatigue, fever and weight loss.   HENT: Negative for sore throat and trouble swallowing.    Respiratory: Negative for cough, choking and shortness of breath.    Cardiovascular: Negative for chest pain.   Gastrointestinal: Positive for anal bleeding, diarrhea (had when abdominal pain first started; has resolved) and hematochezia (reports when abdominal pain was present; has resolved). Negative for abdominal pain, anorexia, constipation, flatus, melena, nausea, rectal pain and vomiting.   Genitourinary: Negative for difficulty urinating, dysuria, flank pain and frequency.   Neurological:  Negative for weakness.       Past Medical History:   Diagnosis Date    BPH (benign prostatic hyperplasia)     Cat bite of left forearm with infection 2/11/2019    CLL (chronic lymphocytic leukemia)     sees Dr. Solares    CTS (carpal tunnel syndrome)     Current smoker on some days     using e cig, regular cigarettes sometimes    DJD (degenerative joint disease) of knee     left    DJD (degenerative joint disease) of knee     bilateral/ LT more than RT    HLD (hyperlipidemia)     no current meds    HTN (hypertension)     Insomnia     Nocturia      Past Surgical History:   Procedure Laterality Date    COLONOSCOPY  09/01/2010    Dr. Aguilar, in legacy: int. hemorrhoids, repeat in 10 years for screening    ELBOW SURGERY      left elbow, bursitis    KNEE SURGERY  1981?    left    MULTIPLE TOOTH EXTRACTIONS      RELEASE-FINGER-TRIGGER, left 4th digit Left 9/17/2015    Performed by Gregory [facility]ila [facility] at [facility]    TONSILLECTOMY      TRIGGER FINGER RELEASE Left      Family History   Problem Relation Age of Onset    Stroke Mother     Diabetes Mother     Heart disease Father 65        MI    Arthritis Father         RA    Diabetes Brother     Colon cancer Neg Hx     Crohn's disease Neg Hx     Ulcerative colitis Neg Hx     Stomach cancer Neg Hx     Esophageal cancer Neg Hx      Wt Readings from Last 10 Encounters:   04/08/19 79.3 kg (174 lb 13.2 oz)   03/22/19 79.7 kg (175 lb 11.3 oz)   02/25/19 79.5 kg (175 lb 4.3 oz)   02/11/19 79.7 kg (175 lb 11.3 oz)   02/11/19 78 kg (171 lb 15.3 oz)   02/07/19 79.7 kg (175 lb 11.3 oz)   12/26/18 80.9 kg (178 lb 5.6 oz)   08/13/18 83.2 kg (183 lb 6.8 oz)   12/11/17 83 kg (182 lb 15.7 oz)   07/31/17 82.3 kg (181 lb 7 oz)     Lab Results   Component Value Date    WBC 15.23 (H) 03/22/2019    HGB 11.1 (L) 03/22/2019    HCT 35.1 (L) 03/22/2019    MCV 87 03/22/2019     03/22/2019     CMP  Sodium   Date Value Ref Range Status   03/22/2019 137 136 - 145 mmol/L  Final     Potassium   Date Value Ref Range Status   03/22/2019 4.1 3.5 - 5.1 mmol/L Final     Chloride   Date Value Ref Range Status   03/22/2019 99 95 - 110 mmol/L Final     CO2   Date Value Ref Range Status   03/22/2019 25 23 - 29 mmol/L Final     Glucose   Date Value Ref Range Status   03/22/2019 97 70 - 110 mg/dL Final     BUN, Bld   Date Value Ref Range Status   03/22/2019 17 8 - 23 mg/dL Final     Creatinine   Date Value Ref Range Status   03/22/2019 1.0 0.5 - 1.4 mg/dL Final     Calcium   Date Value Ref Range Status   03/22/2019 9.7 8.7 - 10.5 mg/dL Final     Total Protein   Date Value Ref Range Status   03/22/2019 7.6 6.0 - 8.4 g/dL Final     Albumin   Date Value Ref Range Status   03/22/2019 3.5 3.5 - 5.2 g/dL Final     Total Bilirubin   Date Value Ref Range Status   03/22/2019 0.5 0.1 - 1.0 mg/dL Final     Comment:     For infants and newborns, interpretation of results should be based  on gestational age, weight and in agreement with clinical  observations.  Premature Infant recommended reference ranges:  Up to 24 hours.............<8.0 mg/dL  Up to 48 hours............<12.0 mg/dL  3-5 days..................<15.0 mg/dL  6-29 days.................<15.0 mg/dL       Alkaline Phosphatase   Date Value Ref Range Status   03/22/2019 66 55 - 135 U/L Final     AST ([place])   Date Value Ref Range Status   04/04/2016 16 (L) 17 - 59 U/L Final     AST   Date Value Ref Range Status   03/22/2019 12 10 - 40 U/L Final     ALT   Date Value Ref Range Status   03/22/2019 13 10 - 44 U/L Final     Anion Gap   Date Value Ref Range Status   03/22/2019 13 8 - 16 mmol/L Final     eGFR if    Date Value Ref Range Status   03/22/2019 >60 >60 mL/min/1.73 m^2 Final     eGFR if non    Date Value Ref Range Status   03/22/2019 >60 >60 mL/min/1.73 m^2 Final     Comment:     Calculation used to obtain the estimated glomerular filtration  rate (eGFR) is the CKD-EPI equation.        Lab Results    Component Value Date    TSH 1.386 09/19/2016     Reviewed prior medical records including radiology report of 3/22/19 ct abdomen pelvis & endoscopy history (see surgical history).    Objective:      Physical Exam   Constitutional: He is oriented to person, place, and time. He appears well-developed and well-nourished. No distress.   HENT:   Mouth/Throat: Oropharynx is clear and moist and mucous membranes are normal. No oral lesions. No oropharyngeal exudate.   Eyes: Pupils are equal, round, and reactive to light. Conjunctivae are normal. No scleral icterus.   Pulmonary/Chest: Effort normal and breath sounds normal. No respiratory distress. He has no wheezes.   Abdominal: Soft. Normal appearance and bowel sounds are normal. He exhibits no distension, no abdominal bruit and no mass. There is no tenderness. There is no rigidity, no rebound, no guarding, no tenderness at McBurney's point and negative Espinoza's sign.   deferred rectal exam.   Neurological: He is alert and oriented to person, place, and time.   Skin: Skin is warm and dry. No rash noted. He is not diaphoretic. No erythema. No pallor.   Non-jaundiced   Psychiatric: He has a normal mood and affect. His behavior is normal. Judgment and thought content normal.   Nursing note and vitals reviewed.      Assessment:       1. Lower abdominal pain    2. Colon wall thickening    3. Abnormal liver CT    4. Abnormal CBC    5. History of rectal bleeding    6. Anticoagulant long-term use        Plan:       Lower abdominal pain & Colon wall thickening  - schedule Colonoscopy to be done in 4-6 weeks, discussed procedure with the patient, verbalized understanding  - discussed the diagnosis of diverticulosis and diverticulitis; advised a low fiber diet is recommended for diverticulitis (for about 4 weeks), but to prevent diverticulitis, high fiber diet is recommended.  -Recommended high fiber diet after episode has completely resolved. Recommended daily exercise, adequate  water intake (six 8-oz glasses of water daily), and high fiber diet. OTC fiber supplements are recommended if diet does not reach daily fiber goal (25 grams daily), such as Metamucil, Citrucel, or FiberCon (take as directed, separate from other oral medications by >2 hours).  - instructed patient that if symptoms do not resolve or if worsen prior to colonoscopy to contact us or go to ER, patient verbalized understanding  - discussed with patient that if episodes of diverticulitis recur frequently, may need to consult general surgery    Abnormal liver CT  -     Ambulatory Referral to Hepatology  - minimize/avoid alcohol and tylenol products.    Abnormal CBC  - schedule Colonoscopy to be done in 4-6 weeks, discussed procedure with the patient, verbalized understanding  Recommend follow-up with Primary Care Provider & hematology (history of leukemia) for continued evaluation and management.    History of rectal bleeding  - discussed about different etiologies that can cause rectal bleeding, such as but not limited to diverticulosis, polyps, colon mass, colon inflammation or infection, anal fissure or hemorrhoids.   - schedule Colonoscopy, discussed procedure with the patient, verbalized understanding   - You may resume normal activity as long as you feel well.  - Avoid/minimize aspirin and anti-inflammatory drugs such as ibuprofen (Advil, Motrin) and naproxen (Aleve and Naprosyn).  - Avoid alcohol.    Anticoagulant long-term use  - informed patient that the anticoagulant(s) will likely need to be held for endoscopy, nurse will confirm with cardiologist/PCP.    Follow up in about 1 month (around 5/8/2019), or if symptoms worsen or fail to improve.      If no improvement in symptoms or symptoms worsen, call/follow-up at clinic or go to ER.

## 2019-04-08 NOTE — LETTER
April 15, 2019      Shell Melton, MARIS  [address] [facility] [address] [place] [address]           [facility]  [address] [facility] [address] [place] [address]  Phone: [phone]          Patient: Saurav Bahena   MR Number: [id number]   YOB: 1950   Date of Visit: 4/8/2019       Dear Shell Melton:    Thank you for referring Saurav Bahena to me for evaluation. Attached you will find relevant portions of my assessment and plan of care.    If you have questions, please do not hesitate to call me. I look forward to following Saurav Bahena along with you.    Sincerely,    Cadence Tracy, [facility]    Enclosure  CC:  No Recipients    If you would like to receive this communication electronically, please contact [email or web address] or [phone] to request more information on [employer] access.    For providers and/or their staff who would like to refer a patient to [facility], please contact us through our one-stop-shop provider referral line, [facility] , at [phone].    If you feel you have received this communication in error or would no longer like to receive these types of communications, please e-mail [email or web address]

## 2019-04-08 NOTE — PATIENT INSTRUCTIONS
Abdominal Pain    Abdominal pain is pain in the stomach or belly area. Everyone has this pain from time to time. In many cases it goes away on its own. But abdominal pain can sometimes be due to a serious problem, such as appendicitis. So its important to know when to seek help.  Causes of abdominal pain  There are many possible causes of abdominal pain. Common causes in adults include:  · Constipation, diarrhea, or gas  · Stomach acid flowing back up into the esophagus (acid reflux or heartburn)  · Severe acid reflux, called GERD (gastroesophageal reflux disease)  · A sore in the lining of the stomach or small intestine (peptic ulcer)  · Inflammation of the gallbladder, liver, or pancreas  · Gallstones or kidney stones  · Appendicitis   · Intestinal blockage   · An internal organ pushing through a muscle or other tissue (hernia)  · Urinary tract infections  · In women, menstrual cramps, fibroids, or endometriosis  · Inflammation or infection of the intestines  Diagnosing the cause of abdominal pain  Your healthcare provider will do a physical exam help find the cause of your pain. If needed, tests will be ordered. Belly pain has many possible causes. So it can be hard to find the reason for your pain. Giving details about your pain can help. Tell your provider where and when you feel the pain, and what makes it better or worse. Also let your provider know if you have other symptoms such as:  · Fever  · Tiredness  · Upset stomach (nausea)  · Vomiting  · Changes in bathroom habits  Treating abdominal pain  Some causes of pain need emergency medical treatment right away. These include appendicitis or a bowel blockage. Other problems can be treated with rest, fluids, or medicines. Your healthcare provider can give you specific instructions for treatment or self-care based on what is causing your pain.  If you have vomiting or diarrhea, sip water or other clear fluids. When you are ready to eat solid foods again,  start with small amounts of easy-to-digest, low-fat foods. These include apple sauce, toast, or crackers.   When to seek medical care  Call 911 or go to the hospital right away if you:  · Cant pass stool and are vomiting  · Are vomiting blood or have bloody diarrhea or black, tarry diarrhea  · Have chest, neck, or shoulder pain  · Feel like you might pass out  · Have pain in your shoulder blades with nausea  · Have sudden, severe belly pain  · Have new, severe pain unlike any you have felt before  · Have a belly that is rigid, hard, and tender to touch  Call your healthcare provider if you have:  · Pain for more than 5 days  · Bloating for more than 2 days  · Diarrhea for more than 5 days  · A fever of 100.4°F (38.0°C) or higher, or as directed by your provider  · Pain that gets worse  · Weight loss for no reason  · Continued lack of appetite  · Blood in your stool  How to prevent abdominal pain  Here are some tips to help prevent abdominal pain:  · Eat smaller amounts of food at one time.  · Avoid greasy, fried, or other high-fat foods.  · Avoid foods that give you gas.  · Exercise regularly.  · Drink plenty of fluids.  To help prevent GERD symptoms:  · Quit smoking.  · Reduce alcohol and certain foods that increase stomach acid.  · Avoid aspirin and over-the-counter pain and fever medicines (NSAIDS or nonsteroidal anti-inflammatory drugs), if possible  · Lose extra weight.  · Finish eating at least 2 hours before you go to bed or lie down.  · Raise the head of your bed.  Date Last Reviewed: 7/1/2016  © 9829-6854 [employer]. [address] [place] [address], [place] [address]. All rights reserved. This information is not intended as a substitute for professional medical care. Always follow your healthcare professional's instructions.

## 2019-04-08 NOTE — TELEPHONE ENCOUNTER
Pt having Colonoscopy 5/13/19 with Dr. Aguilar. Can pt stop ASA 81 mg 3-5 days prior to procedure? Please advise. Thanks

## 2019-04-19 ENCOUNTER — PATIENT MESSAGE (OUTPATIENT)
Dept: ADMINISTRATIVE | Facility: OTHER | Age: 69
End: 2019-04-19

## 2019-04-24 ENCOUNTER — DOCUMENTATION ONLY (OUTPATIENT)
Dept: TRANSPLANT | Facility: CLINIC | Age: 69
End: 2019-04-24

## 2019-04-24 NOTE — LETTER
April 24, 2019    Saurav Bahena  [address]  [place] [address]      Dear Saurav Bahena:    Your doctor has referred you to the [facility]. We are sending this letter to remind you to make an appointment with us to complete the referral process.     Please call us at [phone] to schedule an appointment. We look forward to seeing you soon.     If you received a call and have been scheduled, please disregard this letter.       Sincerely,        [facility]   [facility] [place] [address]  [phone]

## 2019-04-24 NOTE — NURSING
Pt records reviewed.   Pt will be referred to Hepatology.  Abnormal liver CT  Initial referral received  from the [facility].   Referring Provider/diagnosis  CHASIDY Thomas      Referral letter sent to patient.

## 2019-04-25 ENCOUNTER — PATIENT OUTREACH (OUTPATIENT)
Dept: OTHER | Facility: OTHER | Age: 69
End: 2019-04-25

## 2019-04-25 NOTE — PROGRESS NOTES
Last 5 Patient Entered Readings                                      Current 30 Day Average: 131/75     Recent Readings 4/22/2019 4/12/2019 4/5/2019 4/4/2019 4/1/2019    SBP (mmHg) 133 119 125 130 153    DBP (mmHg) 75 69 70 73 74    Pulse 68 69 65 68 70        Left voicemail for follow up

## 2019-05-02 NOTE — PROGRESS NOTES
Last 5 Patient Entered Readings                                      Current 30 Day Average: 127/72     Recent Readings 5/1/2019 4/30/2019 4/22/2019 4/12/2019 4/5/2019    SBP (mmHg) 127 126 133 119 125    DBP (mmHg) 70 72 75 69 70    Pulse 69 67 68 69 65        Patient had received a call that he believes was from billing leading up to his colonoscopy on 5/13. Patient accidentally called patient access, who referred him to me. Provided patient with the number for billing, and did a brief check in.     Patient reports that everything is going well with his BP, and we discussed that while patient initially wasn't sure if his reading were accurate, his most recent office reading was 129/72, which compares very closely to his current 30 day avg.

## 2019-05-07 ENCOUNTER — TELEPHONE (OUTPATIENT)
Dept: GASTROENTEROLOGY | Facility: CLINIC | Age: 69
End: 2019-05-07

## 2019-05-07 NOTE — TELEPHONE ENCOUNTER
----- Message from RT Alexis sent at 5/7/2019  9:24 AM CDT -----  Contact: pt    pt , requesting a call back to confirm his time of arrival for his procedure appt of 05/13/2019, thanks.

## 2019-05-13 ENCOUNTER — TELEPHONE (OUTPATIENT)
Dept: GASTROENTEROLOGY | Facility: CLINIC | Age: 69
End: 2019-05-13

## 2019-05-13 NOTE — TELEPHONE ENCOUNTER
----- Message from Jeannette Hay sent at 5/13/2019  7:08 AM CDT -----  Contact: self [phone]  He is calling this morning to cancel his colonoscopy.  He said he is running a fever of 101 and has been nauseous  since he took the prep.  He will call back when he wants to reschedule.  Thank you!

## 2019-05-20 ENCOUNTER — OFFICE VISIT (OUTPATIENT)
Dept: FAMILY MEDICINE | Facility: CLINIC | Age: 69
End: 2019-05-20
Payer: MEDICARE

## 2019-05-20 VITALS
HEART RATE: 65 BPM | WEIGHT: 169.75 LBS | OXYGEN SATURATION: 97 % | HEIGHT: 70 IN | BODY MASS INDEX: 24.3 KG/M2 | DIASTOLIC BLOOD PRESSURE: 74 MMHG | SYSTOLIC BLOOD PRESSURE: 128 MMHG | TEMPERATURE: 99 F

## 2019-05-20 DIAGNOSIS — Z12.5 PROSTATE CANCER SCREENING: ICD-10-CM

## 2019-05-20 DIAGNOSIS — I10 HYPERTENSION, UNSPECIFIED TYPE: ICD-10-CM

## 2019-05-20 DIAGNOSIS — K52.9 COLITIS: Primary | ICD-10-CM

## 2019-05-20 DIAGNOSIS — N26.1 ATROPHY OF RIGHT KIDNEY: ICD-10-CM

## 2019-05-20 PROCEDURE — 99499 UNLISTED E&M SERVICE: CPT | Mod: S$GLB,,, | Performed by: FAMILY MEDICINE

## 2019-05-20 PROCEDURE — 99214 OFFICE O/P EST MOD 30 MIN: CPT | Mod: S$GLB,,, | Performed by: FAMILY MEDICINE

## 2019-05-20 PROCEDURE — 99214 PR OFFICE/OUTPT VISIT, EST, LEVL IV, 30-39 MIN: ICD-10-PCS | Mod: S$GLB,,, | Performed by: FAMILY MEDICINE

## 2019-05-20 PROCEDURE — 99999 PR PBB SHADOW E&M-EST. PATIENT-LVL IV: ICD-10-PCS | Mod: PBBFAC,,, | Performed by: FAMILY MEDICINE

## 2019-05-20 PROCEDURE — 1101F PT FALLS ASSESS-DOCD LE1/YR: CPT | Mod: CPTII,S$GLB,, | Performed by: FAMILY MEDICINE

## 2019-05-20 PROCEDURE — 3078F PR MOST RECENT DIASTOLIC BLOOD PRESSURE < 80 MM HG: ICD-10-PCS | Mod: CPTII,S$GLB,, | Performed by: FAMILY MEDICINE

## 2019-05-20 PROCEDURE — 99999 PR PBB SHADOW E&M-EST. PATIENT-LVL IV: CPT | Mod: PBBFAC,,, | Performed by: FAMILY MEDICINE

## 2019-05-20 PROCEDURE — 99499 RISK ADDL DX/OHS AUDIT: ICD-10-PCS | Mod: S$GLB,,, | Performed by: FAMILY MEDICINE

## 2019-05-20 PROCEDURE — 1101F PR PT FALLS ASSESS DOC 0-1 FALLS W/OUT INJ PAST YR: ICD-10-PCS | Mod: CPTII,S$GLB,, | Performed by: FAMILY MEDICINE

## 2019-05-20 PROCEDURE — 3078F DIAST BP <80 MM HG: CPT | Mod: CPTII,S$GLB,, | Performed by: FAMILY MEDICINE

## 2019-05-20 PROCEDURE — 3074F SYST BP LT 130 MM HG: CPT | Mod: CPTII,S$GLB,, | Performed by: FAMILY MEDICINE

## 2019-05-20 PROCEDURE — 3074F PR MOST RECENT SYSTOLIC BLOOD PRESSURE < 130 MM HG: ICD-10-PCS | Mod: CPTII,S$GLB,, | Performed by: FAMILY MEDICINE

## 2019-05-20 RX ORDER — LOSARTAN POTASSIUM AND HYDROCHLOROTHIAZIDE 12.5; 1 MG/1; MG/1
TABLET ORAL
COMMUNITY
Start: 2019-04-10 | End: 2019-08-08 | Stop reason: SDUPTHER

## 2019-05-20 NOTE — PROGRESS NOTES
Subjective:       Patient ID: Saurav Bahena is a 68 y.o. male.    Chief Complaint: Follow-up (Possible diverticulitis, follow-up from GI)    Here today to f/u on recent CT scan for diverticulitis    He completed course of Augmentin    Last colonoscopy 2010 - no polyp     He canceled recent colonoscopy due to fever which developed the morning of the test.  He is taking fiber supplement and probiotic.      Past Medical History:   Diagnosis Date    Anticoagulant long-term use     aspirin    BPH (benign prostatic hyperplasia)     Cat bite of left forearm with infection 2/11/2019    CLL (chronic lymphocytic leukemia)     sees Dr. Solares    CTS (carpal tunnel syndrome)     Current smoker on some days     using e cig, regular cigarettes sometimes    DJD (degenerative joint disease) of knee     left    DJD (degenerative joint disease) of knee     bilateral/ LT more than RT    Emphysema lung     HLD (hyperlipidemia)     no current meds    HTN (hypertension)     Insomnia     Nocturia        Past Surgical History:   Procedure Laterality Date    COLONOSCOPY  09/01/2010    Dr. Aguilar, in legacy: int. hemorrhoids, repeat in 10 years for screening    ELBOW SURGERY      left elbow, bursitis    KNEE SURGERY  1981?    left    MULTIPLE TOOTH EXTRACTIONS      RELEASE-FINGER-TRIGGER, left 4th digit Left 9/17/2015    Performed by [facility] at [facility]    TONSILLECTOMY      TRIGGER FINGER RELEASE Left        Review of patient's allergies indicates:   Allergen Reactions    No known drug allergies        Social History     Socioeconomic History    Marital status: Single     Spouse name: Not on file    Number of children: Not on file    Years of education: Not on file    Highest education level: Not on file   Occupational History    Occupation:    Social Needs    Financial resource strain: Somewhat hard    Food insecurity:     Worry: Never true     Inability: Never true     Transportation needs:     Medical: No     Non-medical: No   Tobacco Use    Smoking status: Former Smoker     Packs/day: 0.30     Years: 20.00     Pack years: 6.00     Last attempt to quit: 2015     Years since quittin.4    Smokeless tobacco: Never Used    Tobacco comment: nicorette, vapor cigs/ 3 cigarettes   Substance and Sexual Activity    Alcohol use: Yes     Alcohol/week: 1.2 - 1.8 oz     Types: 2 - 3 Shots of liquor per week     Frequency: 2-4 times a month     Drinks per session: 1 or 2     Binge frequency: Never     Comment: 2-3/day on weekends    Drug use: No    Sexual activity: Not on file   Lifestyle    Physical activity:     Days per week: 2 days     Minutes per session: 30 min    Stress: Not on file   Relationships    Social connections:     Talks on phone: Twice a week     Gets together: Three times a week     Attends Protestant service: Not on file     Active member of club or organization: No     Attends meetings of clubs or organizations: Never     Relationship status: Never    Other Topics Concern    Not on file   Social History Narrative    Not on file       Current Outpatient Medications on File Prior to Visit   Medication Sig Dispense Refill    ascorbic acid (VITAMIN C) 500 MG tablet Take 500 mg by mouth once daily.       aspirin (ASPIRIN LOW DOSE) 81 MG EC tablet Take 81 mg by mouth once daily. Every day      ERGOCALCIFEROL, VITAMIN D2, (VITAMIN D ORAL) Take 5,000 Units by mouth once daily.      fish oil-omega-3 fatty acids 300-1,000 mg capsule Take 4 capsules by mouth once daily.       Lactobac no.41/Bifidobact no.7 (PROBIOTIC-10 ORAL) Take by mouth once daily.      multivitamin capsule Every day      saw palmetto xtr/zinc picolin (SAW PALMETTO EXTRACT ORAL) Take by mouth.      tamsulosin (FLOMAX) 0.4 mg Cap Take 1 capsule (0.4 mg total) by mouth once daily. 90 capsule 3    zolpidem (AMBIEN) 5 MG Tab TAKE 1 TABLET BY MOUTH ONCE DAILY AT BEDTIME AS NEEDED 90  "tablet 0    hydroCHLOROthiazide (HYDRODIURIL) 25 MG tablet Take 1 tablet (25 mg total) by mouth once daily. 90 tablet 1    losartan (COZAAR) 100 MG tablet Take 1 tablet (100 mg total) by mouth once daily. 90 tablet 1    losartan-hydrochlorothiazide 100-12.5 mg (HYZAAR) 100-12.5 mg Tab        No current facility-administered medications on file prior to visit.        Family History   Problem Relation Age of Onset    Stroke Mother     Diabetes Mother     Heart disease Father 65        MI    Arthritis Father         RA    Diabetes Brother     Colon cancer Neg Hx     Crohn's disease Neg Hx     Ulcerative colitis Neg Hx     Stomach cancer Neg Hx     Esophageal cancer Neg Hx        Review of Systems   Constitutional: Negative for activity change, appetite change, fatigue and unexpected weight change.   HENT: Negative.  Negative for hearing loss, rhinorrhea and trouble swallowing.    Eyes: Negative.  Negative for discharge and visual disturbance.   Respiratory: Negative.  Negative for cough, chest tightness, shortness of breath and wheezing.    Cardiovascular: Negative.  Negative for chest pain and palpitations.   Gastrointestinal: Negative for abdominal distention, abdominal pain and blood in stool.   Endocrine: Negative.  Negative for polydipsia.   Genitourinary: Negative for difficulty urinating and urgency.   Musculoskeletal: Negative.  Negative for joint swelling and neck pain.   Skin: Negative.  Negative for color change and rash.   Allergic/Immunologic: Negative.    Neurological: Negative.  Negative for weakness and numbness.   Hematological: Negative.    Psychiatric/Behavioral: Negative.  Negative for confusion and dysphoric mood.         Objective:     /74   Pulse 65   Temp 98.7 °F (37.1 °C) (Oral)   Ht 5' 10" (1.778 m)   Wt 77 kg (169 lb 12.1 oz)   SpO2 97%   BMI 24.36 kg/m²     Physical Exam   Constitutional: He is oriented to person, place, and time. He appears well-developed and " well-nourished.   HENT:   Head: Normocephalic and atraumatic.   Right Ear: Hearing, tympanic membrane, external ear and ear canal normal.   Left Ear: Hearing, tympanic membrane, external ear and ear canal normal.   Nose: Nose normal. No mucosal edema or rhinorrhea. Right sinus exhibits no maxillary sinus tenderness and no frontal sinus tenderness. Left sinus exhibits no maxillary sinus tenderness and no frontal sinus tenderness.   Mouth/Throat: Uvula is midline, oropharynx is clear and moist and mucous membranes are normal.   Eyes: Pupils are equal, round, and reactive to light. Conjunctivae and EOM are normal.   Neck: Normal range of motion. Neck supple.   Cardiovascular: Normal rate, regular rhythm, normal heart sounds and intact distal pulses. Exam reveals no friction rub.   No murmur heard.  Pulmonary/Chest: Effort normal and breath sounds normal. No stridor. No respiratory distress. He has no wheezes.   Abdominal: Soft. Bowel sounds are normal. He exhibits no distension and no mass. There is no tenderness. There is no guarding.   Musculoskeletal: Normal range of motion. He exhibits no edema.   Neurological: He is alert and oriented to person, place, and time. No cranial nerve deficit.   Skin: Skin is warm and dry.   Psychiatric: He has a normal mood and affect. His behavior is normal. Judgment and thought content normal.   Nursing note and vitals reviewed.      Results for orders placed or performed in visit on 03/22/19   CBC auto differential   Result Value Ref Range    WBC 15.23 (H) 3.90 - 12.70 K/uL    RBC 4.03 (L) 4.60 - 6.20 M/uL    Hemoglobin 11.1 (L) 14.0 - 18.0 g/dL    Hematocrit 35.1 (L) 40.0 - 54.0 %    Mean Corpuscular Volume 87 82 - 98 fL    Mean Corpuscular Hemoglobin 27.5 27.0 - 31.0 pg    Mean Corpuscular Hemoglobin Conc 31.6 (L) 32.0 - 36.0 g/dL    RDW 14.4 11.5 - 14.5 %    Platelets 206 150 - 350 K/uL    MPV 9.7 9.2 - 12.9 fL    Lymph # CANCELED 1.0 - 4.8 K/uL    Mono # CANCELED 0.3 - 1.0 K/uL     Eos # CANCELED 0.0 - 0.5 K/uL    Baso # CANCELED 0.00 - 0.20 K/uL    Gran% 28.0 (L) 38.0 - 73.0 %    Lymph% 68.0 (H) 18.0 - 48.0 %    Mono% 4.0 4.0 - 15.0 %    Eosinophil% 0.0 0.0 - 8.0 %    Basophil% 0.0 0.0 - 1.9 %    Platelet Estimate Appears normal     Differential Method Manual    Comprehensive metabolic panel   Result Value Ref Range    Sodium 137 136 - 145 mmol/L    Potassium 4.1 3.5 - 5.1 mmol/L    Chloride 99 95 - 110 mmol/L    CO2 25 23 - 29 mmol/L    Glucose 97 70 - 110 mg/dL    BUN, Bld 17 8 - 23 mg/dL    Creatinine 1.0 0.5 - 1.4 mg/dL    Calcium 9.7 8.7 - 10.5 mg/dL    Total Protein 7.6 6.0 - 8.4 g/dL    Albumin 3.5 3.5 - 5.2 g/dL    Total Bilirubin 0.5 0.1 - 1.0 mg/dL    Alkaline Phosphatase 66 55 - 135 U/L    AST 12 10 - 40 U/L    ALT 13 10 - 44 U/L    Anion Gap 13 8 - 16 mmol/L    eGFR if African American >60 >60 mL/min/1.73 m^2    eGFR if non African American >60 >60 mL/min/1.73 m^2     Recent CT scan reviewed    Assessment:       1. Colitis    2. Atrophy of right kidney    3. Hypertension, unspecified type    4. Prostate cancer screening        Plan:       Colitis    Atrophy of right kidney  -     Ambulatory referral to Urology    Hypertension, unspecified type  -     Comprehensive metabolic panel; Future; Expected date: 11/16/2019  -     Lipid panel; Future; Expected date: 11/16/2019    Prostate cancer screening  -     PSA, Screening; Future; Expected date: 11/16/2019           Recent colitis episode resolved  Recommend repeating labs with Edd Siegel as planned to recheck WBC and CMP  Reassurance regarding other findings on CT scan - likely all related to recent colitis episode  Urology referral for eval of right kidney atrophy  Continue present meds

## 2019-05-22 ENCOUNTER — TELEPHONE (OUTPATIENT)
Dept: HEMATOLOGY/ONCOLOGY | Facility: CLINIC | Age: 69
End: 2019-05-22

## 2019-05-22 NOTE — TELEPHONE ENCOUNTER
----- Message from Juventino ROBEL Frisard sent at 5/22/2019 10:01 AM CDT -----  Contact: same  Patient called in and stated he needs to schedule his 6 month f/u with Edd and really needs an appt early on any Monday.  Call back number is [phone]

## 2019-05-30 ENCOUNTER — PATIENT OUTREACH (OUTPATIENT)
Dept: OTHER | Facility: OTHER | Age: 69
End: 2019-05-30

## 2019-05-30 NOTE — PROGRESS NOTES
Last 5 Patient Entered Readings                                      Current 30 Day Average: 123/69     Recent Readings 5/17/2019 5/9/2019 5/7/2019 5/6/2019 5/2/2019    SBP (mmHg) 114 114 129 122 128    DBP (mmHg) 66 67 67 68 76    Pulse 74 63 70 73 65        Sent message reminding patient to continue taking readings. Will call for follow up in 3 weeks

## 2019-06-03 ENCOUNTER — LAB VISIT (OUTPATIENT)
Dept: LAB | Facility: HOSPITAL | Age: 69
End: 2019-06-03
Attending: NURSE PRACTITIONER
Payer: MEDICARE

## 2019-06-03 DIAGNOSIS — C91.10 CLL (CHRONIC LYMPHOCYTIC LEUKEMIA): ICD-10-CM

## 2019-06-03 LAB
ALBUMIN SERPL BCP-MCNC: 3.4 G/DL (ref 3.5–5.2)
ALP SERPL-CCNC: 65 U/L (ref 55–135)
ALT SERPL W/O P-5'-P-CCNC: 10 U/L (ref 10–44)
ANION GAP SERPL CALC-SCNC: 9 MMOL/L (ref 8–16)
ANISOCYTOSIS BLD QL SMEAR: SLIGHT
AST SERPL-CCNC: 12 U/L (ref 10–40)
B2 MICROGLOB SERPL-MCNC: 4.6 UG/ML (ref 0–2.5)
BASOPHILS # BLD AUTO: ABNORMAL K/UL (ref 0–0.2)
BASOPHILS NFR BLD: 0 % (ref 0–1.9)
BILIRUB SERPL-MCNC: 0.5 MG/DL (ref 0.1–1)
BUN SERPL-MCNC: 13 MG/DL (ref 8–23)
CALCIUM SERPL-MCNC: 9.2 MG/DL (ref 8.7–10.5)
CHLORIDE SERPL-SCNC: 100 MMOL/L (ref 95–110)
CO2 SERPL-SCNC: 27 MMOL/L (ref 23–29)
CREAT SERPL-MCNC: 1 MG/DL (ref 0.5–1.4)
DIFFERENTIAL METHOD: ABNORMAL
EOSINOPHIL # BLD AUTO: ABNORMAL K/UL (ref 0–0.5)
EOSINOPHIL NFR BLD: 0 % (ref 0–8)
ERYTHROCYTE [DISTWIDTH] IN BLOOD BY AUTOMATED COUNT: 14.8 % (ref 11.5–14.5)
EST. GFR  (AFRICAN AMERICAN): >60 ML/MIN/1.73 M^2
EST. GFR  (NON AFRICAN AMERICAN): >60 ML/MIN/1.73 M^2
GLUCOSE SERPL-MCNC: 108 MG/DL (ref 70–110)
HCT VFR BLD AUTO: 34.1 % (ref 40–54)
HGB BLD-MCNC: 10.9 G/DL (ref 14–18)
LDH SERPL L TO P-CCNC: 130 U/L (ref 110–260)
LYMPHOCYTES # BLD AUTO: ABNORMAL K/UL (ref 1–4.8)
LYMPHOCYTES NFR BLD: 77 % (ref 18–48)
MCH RBC QN AUTO: 26.5 PG (ref 27–31)
MCHC RBC AUTO-ENTMCNC: 32 G/DL (ref 32–36)
MCV RBC AUTO: 83 FL (ref 82–98)
MONOCYTES # BLD AUTO: ABNORMAL K/UL (ref 0.3–1)
MONOCYTES NFR BLD: 4 % (ref 4–15)
NEUTROPHILS NFR BLD: 19 % (ref 38–73)
PLATELET # BLD AUTO: 176 K/UL (ref 150–350)
PLATELET BLD QL SMEAR: ABNORMAL
PMV BLD AUTO: 9.1 FL (ref 9.2–12.9)
POTASSIUM SERPL-SCNC: 4.3 MMOL/L (ref 3.5–5.1)
PROT SERPL-MCNC: 7.3 G/DL (ref 6–8.4)
RBC # BLD AUTO: 4.11 M/UL (ref 4.6–6.2)
SODIUM SERPL-SCNC: 136 MMOL/L (ref 136–145)
WBC # BLD AUTO: 13.19 K/UL (ref 3.9–12.7)

## 2019-06-03 PROCEDURE — 83615 LACTATE (LD) (LDH) ENZYME: CPT | Mod: PO

## 2019-06-03 PROCEDURE — 80053 COMPREHEN METABOLIC PANEL: CPT | Mod: PO

## 2019-06-03 PROCEDURE — 36415 COLL VENOUS BLD VENIPUNCTURE: CPT | Mod: PO

## 2019-06-03 PROCEDURE — 85007 BL SMEAR W/DIFF WBC COUNT: CPT | Mod: PO

## 2019-06-03 PROCEDURE — 82232 ASSAY OF BETA-2 PROTEIN: CPT

## 2019-06-03 PROCEDURE — 85027 COMPLETE CBC AUTOMATED: CPT | Mod: PO

## 2019-06-06 ENCOUNTER — TELEPHONE (OUTPATIENT)
Dept: FAMILY MEDICINE | Facility: CLINIC | Age: 69
End: 2019-06-06

## 2019-06-06 RX ORDER — COLCHICINE 0.6 MG/1
0.6 TABLET ORAL 2 TIMES DAILY
Qty: 30 TABLET | Refills: 0 | Status: SHIPPED | OUTPATIENT
Start: 2019-06-06 | End: 2019-07-22

## 2019-06-06 NOTE — TELEPHONE ENCOUNTER
----- Message from Italia Romero sent at 6/6/2019 10:03 AM CDT -----  Type: Needs Medical Advice    Who Called:  Patient  Symptoms (please be specific):  Gout in left foot, red and painful  How long has patient had these symptoms:  Since last night  Pharmacy name and phone #:    [id number] - [place] - [address]  [address]  [place] [address]  Phone: [phone] Fax: [phone]  Best Call Back Number: [phone] (home)     Additional Information: States that he had the gout years ago and would like office to please send something in for him. Please call to advise before you ordering.

## 2019-06-10 ENCOUNTER — OFFICE VISIT (OUTPATIENT)
Dept: HEMATOLOGY/ONCOLOGY | Facility: CLINIC | Age: 69
End: 2019-06-10
Payer: MEDICARE

## 2019-06-10 VITALS
WEIGHT: 171.06 LBS | SYSTOLIC BLOOD PRESSURE: 141 MMHG | BODY MASS INDEX: 24.49 KG/M2 | HEART RATE: 66 BPM | TEMPERATURE: 99 F | HEIGHT: 70 IN | DIASTOLIC BLOOD PRESSURE: 76 MMHG | RESPIRATION RATE: 16 BRPM

## 2019-06-10 DIAGNOSIS — I10 ESSENTIAL HYPERTENSION: ICD-10-CM

## 2019-06-10 DIAGNOSIS — C91.10 CLL (CHRONIC LYMPHOCYTIC LEUKEMIA): Primary | ICD-10-CM

## 2019-06-10 PROCEDURE — 99213 OFFICE O/P EST LOW 20 MIN: CPT | Mod: S$GLB,,, | Performed by: NURSE PRACTITIONER

## 2019-06-10 PROCEDURE — 99499 UNLISTED E&M SERVICE: CPT | Mod: S$GLB,,, | Performed by: NURSE PRACTITIONER

## 2019-06-10 PROCEDURE — 3078F PR MOST RECENT DIASTOLIC BLOOD PRESSURE < 80 MM HG: ICD-10-PCS | Mod: CPTII,S$GLB,, | Performed by: NURSE PRACTITIONER

## 2019-06-10 PROCEDURE — 99213 PR OFFICE/OUTPT VISIT, EST, LEVL III, 20-29 MIN: ICD-10-PCS | Mod: S$GLB,,, | Performed by: NURSE PRACTITIONER

## 2019-06-10 PROCEDURE — 1101F PR PT FALLS ASSESS DOC 0-1 FALLS W/OUT INJ PAST YR: ICD-10-PCS | Mod: CPTII,S$GLB,, | Performed by: NURSE PRACTITIONER

## 2019-06-10 PROCEDURE — 99999 PR PBB SHADOW E&M-EST. PATIENT-LVL IV: CPT | Mod: PBBFAC,,, | Performed by: NURSE PRACTITIONER

## 2019-06-10 PROCEDURE — 99999 PR PBB SHADOW E&M-EST. PATIENT-LVL IV: ICD-10-PCS | Mod: PBBFAC,,, | Performed by: NURSE PRACTITIONER

## 2019-06-10 PROCEDURE — 1101F PT FALLS ASSESS-DOCD LE1/YR: CPT | Mod: CPTII,S$GLB,, | Performed by: NURSE PRACTITIONER

## 2019-06-10 PROCEDURE — 3077F PR MOST RECENT SYSTOLIC BLOOD PRESSURE >= 140 MM HG: ICD-10-PCS | Mod: CPTII,S$GLB,, | Performed by: NURSE PRACTITIONER

## 2019-06-10 PROCEDURE — 99499 RISK ADDL DX/OHS AUDIT: ICD-10-PCS | Mod: S$GLB,,, | Performed by: NURSE PRACTITIONER

## 2019-06-10 PROCEDURE — 3078F DIAST BP <80 MM HG: CPT | Mod: CPTII,S$GLB,, | Performed by: NURSE PRACTITIONER

## 2019-06-10 PROCEDURE — 3077F SYST BP >= 140 MM HG: CPT | Mod: CPTII,S$GLB,, | Performed by: NURSE PRACTITIONER

## 2019-06-10 NOTE — PROGRESS NOTES
HISTORY OF PRESENT ILLNESS:  The patient is a 68-year-old white gentleman   known to Dr. Solares for mild anemia and chronic phase CLL.      He presents to the clinic today late to follow for interval evaluation.  He reports a   cat bite/cellulitis in February requiring hospitalization/IV antibiotics & a bout of colitis   in April.  He denies any recurring illness, fevers, chills, drenching night sweats, painful   lymphadenopathy, unexplained weight loss, rashes, pruritus, etc.  No new complaints   or pertinent finding on a 14-point review of systems.    PHYSICAL EXAMINATION:    GENERAL:  Well-developed, well-nourished, elderly white gentleman in no acute   distress.  Alert & oriented x 3.  VITAL SIGNS:  Weight:  Loss of 12 pounds in 10 months  Wt Readings from Last 3 Encounters:   06/10/19 77.6 kg (171 lb 1.2 oz)   05/20/19 77 kg (169 lb 12.1 oz)   04/08/19 79.3 kg (174 lb 13.2 oz)     Temp Readings from Last 3 Encounters:   06/10/19 98.6 °F (37 °C)   05/20/19 98.7 °F (37.1 °C) (Oral)   03/22/19 98.3 °F (36.8 °C)     BP Readings from Last 3 Encounters:   06/10/19 (!) 141/76   05/20/19 128/74   04/08/19 129/72     Pulse Readings from Last 3 Encounters:   06/10/19 66   05/20/19 65   04/08/19 64     HEENT:  Normocephalic, atraumatic.  Oral mucosa pink and moist.  Lips without   lesions.  Tongue midline.  Oropharynx clear.  Nonicteric sclerae.   NECK:  Supple, no adenopathy.  No carotid bruits, thyromegaly or thyroid nodule.  HEART:  Regular rate and rhythm without murmur, gallop or rub.                LUNGS:  Clear to auscultation bilaterally.  Normal respiratory effort.       ABDOMEN:  Soft, nontender, nondistended with positive normoactive bowel sounds,   no hepatosplenomegaly.    EXTREMITIES:  No cyanosis, clubbing or edema.  Distal pulses are intact.          AXILLAE AND GROIN:  No palpable pathologic lymphadenopathy is appreciated.        SKIN:  Intact/turgor normal                                                        NEUROLOGIC:  Cranial nerves II-XII grossly intact.  Motor:  Good muscle bulk and   tone.  Strength/sensory 5/5 throughout.  Gait stable.     LABORATORY:    Lab Results   Component Value Date    WBC 13.19 (H) 06/03/2019    HGB 10.9 (L) 06/03/2019    HCT 34.1 (L) 06/03/2019    MCV 83 06/03/2019     06/03/2019     Differential remarkable for 19% grans, 77% lymph    CMP  Sodium   Date Value Ref Range Status   06/03/2019 136 136 - 145 mmol/L Final     Potassium   Date Value Ref Range Status   06/03/2019 4.3 3.5 - 5.1 mmol/L Final     Chloride   Date Value Ref Range Status   06/03/2019 100 95 - 110 mmol/L Final     CO2   Date Value Ref Range Status   06/03/2019 27 23 - 29 mmol/L Final     Glucose   Date Value Ref Range Status   06/03/2019 108 70 - 110 mg/dL Final     BUN, Bld   Date Value Ref Range Status   06/03/2019 13 8 - 23 mg/dL Final     Creatinine   Date Value Ref Range Status   06/03/2019 1.0 0.5 - 1.4 mg/dL Final     Calcium   Date Value Ref Range Status   06/03/2019 9.2 8.7 - 10.5 mg/dL Final     Total Protein   Date Value Ref Range Status   06/03/2019 7.3 6.0 - 8.4 g/dL Final     Albumin   Date Value Ref Range Status   06/03/2019 3.4 (L) 3.5 - 5.2 g/dL Final     Total Bilirubin   Date Value Ref Range Status   06/03/2019 0.5 0.1 - 1.0 mg/dL Final     Comment:     For infants and newborns, interpretation of results should be based  on gestational age, weight and in agreement with clinical  observations.  Premature Infant recommended reference ranges:  Up to 24 hours.............<8.0 mg/dL  Up to 48 hours............<12.0 mg/dL  3-5 days..................<15.0 mg/dL  6-29 days.................<15.0 mg/dL       Alkaline Phosphatase   Date Value Ref Range Status   06/03/2019 65 55 - 135 U/L Final     AST ([place])   Date Value Ref Range Status   04/04/2016 16 (L) 17 - 59 U/L Final     AST   Date Value Ref Range Status   06/03/2019 12 10 - 40 U/L Final     ALT   Date Value Ref Range Status    06/03/2019 10 10 - 44 U/L Final     Anion Gap   Date Value Ref Range Status   06/03/2019 9 8 - 16 mmol/L Final     eGFR if    Date Value Ref Range Status   06/03/2019 >60 >60 mL/min/1.73 m^2 Final     eGFR if non    Date Value Ref Range Status   06/03/2019 >60 >60 mL/min/1.73 m^2 Final     Comment:     Calculation used to obtain the estimated glomerular filtration  rate (eGFR) is the CKD-EPI equation.        ,   [id number]: 4.6    IMPRESSION:  1.  Chronic phase chronic lymphocytic leukemia, stable.  2.  Treated iron deficiency anemia - asymptomatic.  3.  HTN - following digital monitoring    PLAN:    1.  In re# 1, Return to clinic in six months for surveillance with interval CBC, CMP, LDH and beta-2 microglobulin prior.  2.  RICK - iron studies over 2 years ago, declining hemoglobin:  Patient declines iron studies, will start    oral iron on his own with Vitamin C.    Assessment/plan reviewed and approved by Dr. Solares.

## 2019-06-14 ENCOUNTER — PATIENT MESSAGE (OUTPATIENT)
Dept: FAMILY MEDICINE | Facility: CLINIC | Age: 69
End: 2019-06-14

## 2019-06-18 ENCOUNTER — PATIENT OUTREACH (OUTPATIENT)
Dept: OTHER | Facility: OTHER | Age: 69
End: 2019-06-18

## 2019-06-18 DIAGNOSIS — G47.00 INSOMNIA, UNSPECIFIED TYPE: ICD-10-CM

## 2019-06-18 RX ORDER — ZOLPIDEM TARTRATE 5 MG/1
TABLET ORAL
Qty: 90 TABLET | Refills: 1 | Status: SHIPPED | OUTPATIENT
Start: 2019-06-18 | End: 2020-05-14 | Stop reason: SDUPTHER

## 2019-06-18 NOTE — PROGRESS NOTES
Last 5 Patient Entered Readings                                      Current 30 Day Average: 134/74     Recent Readings 6/13/2019 6/10/2019 6/7/2019 6/4/2019 6/3/2019    SBP (mmHg) 133 141 127 140 134    DBP (mmHg) 76 76 75 73 69    Pulse 69 80 61 66 70        Hypertension Medications     losartan-hydrochlorothiazide 100-12.5 mg (HYZAAR) 100-12.5 mg Tab      Called patient for BP follow up. Overall, patient is doing well today with no complaints. He says that he was prescribed losartan 100 mg and HCTZ 25 mg after hospital discharge. PCP directed him to continue taking losartan/ HCTZ 100/ 12.5 mg QD. Patient confirms that he takes medications in the morning and takes BP in the evenings.     1) 30-day BP average exceeds goal of <130/<80. Continue current BP medications.   2) Reviewed correct BP monitoring technique  3) Patient knows to contact me with any non-urgent clinical changes or concerns. Go to ED or call [facility] for emergencies.   4) Will defer lifestyle counseling to digital medicine health .   5) Will continue to follow up.     Imani Galicia, Pharm.D.   Digital Medicine Clinical Pharmacist  [phone]

## 2019-06-20 NOTE — PROGRESS NOTES
Last 5 Patient Entered Readings                                      Current 30 Day Average: 134/74     Recent Readings 6/13/2019 6/10/2019 6/7/2019 6/4/2019 6/3/2019    SBP (mmHg) 133 141 127 140 134    DBP (mmHg) 76 76 75 73 69    Pulse 69 80 61 66 70        Deferred due to pharmD follow up this week

## 2019-06-24 ENCOUNTER — TELEPHONE (OUTPATIENT)
Dept: GASTROENTEROLOGY | Facility: CLINIC | Age: 69
End: 2019-06-24

## 2019-06-24 NOTE — TELEPHONE ENCOUNTER
----- Message from Lexi Archer sent at 6/24/2019 11:12 AM CDT -----  Regarding: REFERRAL  Maciej Vila, just an FYI, we have not been successful in our attempts to reach and schedule Mr. Bahena's Hep appointment.  I'm mailing a third and final letter today in hopes that he responds.    Thanks!    Lexi  ----- Message -----  From: Cadence Delaney LPN  Sent: 4/24/2019   5:47 PM  To: Hepatology Scheduling    Pt records reviewed.   Pt will be referred to Hepatology.  Abnormal liver CT  Initial referral received  from the [facility].   Referring Provider/diagnosis  CHASIDY Thomas

## 2019-07-02 ENCOUNTER — PATIENT OUTREACH (OUTPATIENT)
Dept: OTHER | Facility: OTHER | Age: 69
End: 2019-07-02

## 2019-07-02 NOTE — PROGRESS NOTES
Last 5 Patient Entered Readings                                      Current 30 Day Average: 135/74     Recent Readings 6/24/2019 6/23/2019 6/18/2019 6/13/2019 6/10/2019    SBP (mmHg) 127 148 129 133 141    DBP (mmHg) 65 85 73 76 76    Pulse 68 72 69 69 80        Hypertension Medications     losartan-hydrochlorothiazide 100-12.5 mg (HYZAAR) 100-12.5 mg Tab Take 1 tablet by mouth once daily     Called patient for BP follow up. I received a fax requesting a refill on HCTZ 25 mg QD and losartan 100 mg QD. Patient confirms he has been taking losartan/ HCTZ 100/ 12.5 mg QD. He says [facility] is refilling both prescriptions. He says he received an email stating that a prescription was mailed from [facility]. He is not home to tell me what it is. He says he will call back tomorrow to read me the email.     1) 30-day BP average exceeds goal of <130/<80. Continue current BP medications.   2) Patient knows to contact me with any non-urgent clinical changes or concerns. Go to ED or call [facility] for emergencies.   3) Will defer lifestyle counseling to digital medicine health .   4) Will continue to follow up. Will review medications with patient when he calls back    Imani Galicia, Pharm.D.   Digital Medicine Clinical Pharmacist  [phone]

## 2019-07-11 NOTE — PROGRESS NOTES
Last 5 Patient Entered Readings                                      Current 30 Day Average: 134/78     Recent Readings 2/6/2019 2/4/2019 1/29/2019 1/23/2019 1/15/2019    SBP (mmHg) 135 146 117 138 133    DBP (mmHg) 76 82 75 80 77    Pulse - 67 75 70 70        Hypertension Medications     hydroCHLOROthiazide (HYDRODIURIL) 25 MG tablet Take 1 tablet (25 mg total) by mouth once daily.    losartan (COZAAR) 100 MG tablet Take 1 tablet (100 mg total) by mouth once daily.     Patient called to inform me that he hasn't taken a BP reading in a while because he had an accident (cat bite) and has been in and out of the hospital. Patient says that BP in hospital has been running 105-110/ 70s mmHg. He would like to be placed on hiatus for ~ 2 weeks because the arm that he takes his BP on his swollen.     1) BP exceeds goal of <130/<80. Continue current BP medications.   2) Patient knows to contact me with any non-urgent clinical changes or concerns. Go to ED or call [facility] for emergencies.   3) Will defer lifestyle counseling to digital medicine health .   4) Will continue to follow up. Will place patient on hiatus for ~2 weeks     Radha OcasioD.   Digital Medicine Clinical Pharmacist  [phone]               Pt here for results.  Pt denies dysuria or gross hem.  No UA at this time.  GERMAINE Khan CMA

## 2019-07-22 ENCOUNTER — OFFICE VISIT (OUTPATIENT)
Dept: UROLOGY | Facility: CLINIC | Age: 69
End: 2019-07-22
Payer: MEDICARE

## 2019-07-22 VITALS
DIASTOLIC BLOOD PRESSURE: 70 MMHG | SYSTOLIC BLOOD PRESSURE: 124 MMHG | WEIGHT: 170.44 LBS | BODY MASS INDEX: 24.4 KG/M2 | HEIGHT: 70 IN | HEART RATE: 78 BPM

## 2019-07-22 DIAGNOSIS — N13.30 HYDROURETERONEPHROSIS: ICD-10-CM

## 2019-07-22 DIAGNOSIS — N40.1 ENLARGED PROSTATE WITH URINARY OBSTRUCTION: Primary | ICD-10-CM

## 2019-07-22 DIAGNOSIS — N13.8 ENLARGED PROSTATE WITH URINARY OBSTRUCTION: Primary | ICD-10-CM

## 2019-07-22 LAB
BILIRUB SERPL-MCNC: ABNORMAL MG/DL
BLOOD URINE, POC: ABNORMAL
COLOR, POC UA: YELLOW
GLUCOSE UR QL STRIP: ABNORMAL
KETONES UR QL STRIP: ABNORMAL
LEUKOCYTE ESTERASE URINE, POC: ABNORMAL
NITRITE, POC UA: ABNORMAL
PH, POC UA: 7.5
PROTEIN, POC: ABNORMAL
SPECIFIC GRAVITY, POC UA: 1.01
UROBILINOGEN, POC UA: ABNORMAL

## 2019-07-22 PROCEDURE — 81002 POCT URINE DIPSTICK WITHOUT MICROSCOPE: ICD-10-PCS | Mod: S$GLB,,, | Performed by: UROLOGY

## 2019-07-22 PROCEDURE — 1101F PT FALLS ASSESS-DOCD LE1/YR: CPT | Mod: CPTII,S$GLB,, | Performed by: UROLOGY

## 2019-07-22 PROCEDURE — 99204 PR OFFICE/OUTPT VISIT, NEW, LEVL IV, 45-59 MIN: ICD-10-PCS | Mod: 25,S$GLB,, | Performed by: UROLOGY

## 2019-07-22 PROCEDURE — 3078F DIAST BP <80 MM HG: CPT | Mod: CPTII,S$GLB,, | Performed by: UROLOGY

## 2019-07-22 PROCEDURE — 3078F PR MOST RECENT DIASTOLIC BLOOD PRESSURE < 80 MM HG: ICD-10-PCS | Mod: CPTII,S$GLB,, | Performed by: UROLOGY

## 2019-07-22 PROCEDURE — 99999 PR PBB SHADOW E&M-EST. PATIENT-LVL III: CPT | Mod: PBBFAC,,, | Performed by: UROLOGY

## 2019-07-22 PROCEDURE — 3074F SYST BP LT 130 MM HG: CPT | Mod: CPTII,S$GLB,, | Performed by: UROLOGY

## 2019-07-22 PROCEDURE — 99999 PR PBB SHADOW E&M-EST. PATIENT-LVL III: ICD-10-PCS | Mod: PBBFAC,,, | Performed by: UROLOGY

## 2019-07-22 PROCEDURE — 1101F PR PT FALLS ASSESS DOC 0-1 FALLS W/OUT INJ PAST YR: ICD-10-PCS | Mod: CPTII,S$GLB,, | Performed by: UROLOGY

## 2019-07-22 PROCEDURE — 3074F PR MOST RECENT SYSTOLIC BLOOD PRESSURE < 130 MM HG: ICD-10-PCS | Mod: CPTII,S$GLB,, | Performed by: UROLOGY

## 2019-07-22 PROCEDURE — 81002 URINALYSIS NONAUTO W/O SCOPE: CPT | Mod: S$GLB,,, | Performed by: UROLOGY

## 2019-07-22 PROCEDURE — 99204 OFFICE O/P NEW MOD 45 MIN: CPT | Mod: 25,S$GLB,, | Performed by: UROLOGY

## 2019-07-22 NOTE — LETTER
July 22, 2019      Robbie Orr MD  [address] [facility] [address]  [facility] [address]           [facility]  [address] [facility] [address] [place] [address]  Phone: [phone]  Fax: [phone]          Patient: Saurav Bahena   MR Number: [id number]   YOB: 1950   Date of Visit: 7/22/2019       Dear Dr. Robbie Orr:    Thank you for referring Saurav Bahena to me for evaluation. Attached you will find relevant portions of my assessment and plan of care.    If you have questions, please do not hesitate to call me. I look forward to following Saurav Bahena along with you.    Sincerely,    CHON Richter MD    Enclosure  CC:  No Recipients    If you would like to receive this communication electronically, please contact [email or web address] or [phone] to request more information on [facility] access.    For providers and/or their staff who would like to refer a patient to [facility], please contact us through our one-stop-shop provider referral line, [facility], at [phone].    If you feel you have received this communication in error or would no longer like to receive these types of communications, please e-mail [email or web address]

## 2019-07-22 NOTE — PROGRESS NOTES
Subjective:       Patient ID: Suarav Bahena is a 69 y.o. male.    Chief Complaint: Atrophy of right kidney (consult)    HPI     69-year-old who was evaluated for abdominal pain several months ago.  He underwent a CT scan with contrast and findings were suggestive of colitis.  He was treated with antibiotics and his abdominal pain has resolved.  Also in the scanned the right kidney was noted to be atrophic and there was hydro ureteral nephrosis to the level of the bladder.  He has overall normal kidney function with a recent GFR greater than 60.  He has had no previous problems with his kidney.  He denies a history of urinary tract infections or kidney infections.  He denies flank pain he has no history of stones.  He denies gross hematuria or dysuria.  He does have a long history of smoking.  I reviewed the CT images with him the right kidney appears to be poorly functional.  It is atrophic with a very thin rim of parenchyma.  He also has a large prostate.  He has moderate obstructive voiding symptoms.  He is currently taking Flomax.  His PSA last year was 0.86.  Urine dipstick shows negative for all components.      Past Medical History:   Diagnosis Date    Anticoagulant long-term use     aspirin    BPH (benign prostatic hyperplasia)     Cat bite of left forearm with infection 2/11/2019    CLL (chronic lymphocytic leukemia)     sees Dr. Solares    CTS (carpal tunnel syndrome)     Current smoker on some days     using e cig, regular cigarettes sometimes    DJD (degenerative joint disease) of knee     left    DJD (degenerative joint disease) of knee     bilateral/ LT more than RT    Emphysema lung     HLD (hyperlipidemia)     no current meds    HTN (hypertension)     Insomnia     Nocturia      Past Surgical History:   Procedure Laterality Date    COLONOSCOPY  09/01/2010    Dr. Aguilar, in [place]: int. hemorrhoids, repeat in 10 years for screening    ELBOW SURGERY      left elbow, bursitis     KNEE SURGERY  1981?    left    MULTIPLE TOOTH EXTRACTIONS      RELEASE-FINGER-TRIGGER, left 4th digit Left 9/17/2015    Performed by [facility] at [facility] OR    TONSILLECTOMY      TRIGGER FINGER RELEASE Left        Current Outpatient Medications:     ascorbic acid (VITAMIN C) 500 MG tablet, Take 500 mg by mouth once daily. , Disp: , Rfl:     aspirin (ASPIRIN LOW DOSE) 81 MG EC tablet, Take 81 mg by mouth once daily. Every day, Disp: , Rfl:     ERGOCALCIFEROL, VITAMIN D2, (VITAMIN D ORAL), Take 5,000 Units by mouth once daily., Disp: , Rfl:     fish oil-omega-3 fatty acids 300-1,000 mg capsule, Take 4 capsules by mouth once daily. , Disp: , Rfl:     Lactobac no.41/Bifidobact no.7 (PROBIOTIC-10 ORAL), Take by mouth once daily., Disp: , Rfl:     losartan-hydrochlorothiazide 100-12.5 mg (HYZAAR) 100-12.5 mg Tab, , Disp: , Rfl:     multivitamin capsule, Every day, Disp: , Rfl:     saw palmetto xtr/zinc picolin (SAW PALMETTO EXTRACT ORAL), Take by mouth., Disp: , Rfl:     tamsulosin (FLOMAX) 0.4 mg Cap, Take 1 capsule (0.4 mg total) by mouth once daily., Disp: 90 capsule, Rfl: 3    zolpidem (AMBIEN) 5 MG Tab, TAKE 1 TABLET BY MOUTH ONCE DAILY AT BEDTIME AS NEEDED, Disp: 90 tablet, Rfl: 1      Review of Systems   Constitutional: Negative for fever.   Eyes: Negative for visual disturbance.   Respiratory: Negative for shortness of breath.    Cardiovascular: Negative for chest pain.   Gastrointestinal: Negative for nausea.   Genitourinary: Negative for dysuria and hematuria.   Musculoskeletal: Negative for gait problem.   Skin: Negative for rash.   Neurological: Negative for seizures.   Psychiatric/Behavioral: Negative for confusion.       Objective:      Physical Exam   Constitutional: He is oriented to person, place, and time. He appears well-developed and well-nourished.   HENT:   Head: Normocephalic and atraumatic.   Eyes: Conjunctivae are normal.   Cardiovascular: Normal rate.   Pulmonary/Chest:  Effort normal.   Abdominal: Hernia confirmed negative in the right inguinal area and confirmed negative in the left inguinal area.   Genitourinary: Testes normal and penis normal. Rectal exam shows no mass and anal tone normal. Prostate is enlarged (60-70g, s/s/a). Prostate is not tender.   Musculoskeletal: Normal range of motion.   Neurological: He is alert and oriented to person, place, and time.   Skin: Skin is warm and dry. No rash noted.   Psychiatric: He has a normal mood and affect.   Vitals reviewed.      Assessment:       1. Enlarged prostate with urinary obstruction    2. Hydroureteronephrosis        Plan:       Enlarged prostate with urinary obstruction  -     US Retroperitoneal Complete (Kidney and; Future; Expected date: 07/22/2019  -     POCT urine dipstick without microscope    Hydroureteronephrosis  -     US Retroperitoneal Complete (Kidney and; Future; Expected date: 07/22/2019  -     POCT urine dipstick without microscope      he has an atrophic kidney which appears poorly functional.  I suspect this has been a chronic problem.  Possible causes include ureteral stricture vesicoureteral reflux and intravesical tumor or ureteral tumor causing obstruction.  I plan a renal scan to assess the function of the right kidney and will schedule cystoscopy with retrograde pyelogram and likely ureteroscopy.

## 2019-07-30 NOTE — PROGRESS NOTES
Last 5 Patient Entered Readings                                      Current 30 Day Average: 132/70     Recent Readings 7/22/2019 7/14/2019 7/13/2019 7/12/2019 7/8/2019    SBP (mmHg) 124 139 130 135 126    DBP (mmHg) 70 68 69 69 69    Pulse 78 70 71 72 77        Left voicemail for follow up

## 2019-08-01 ENCOUNTER — PATIENT OUTREACH (OUTPATIENT)
Dept: OTHER | Facility: OTHER | Age: 69
End: 2019-08-01

## 2019-08-01 NOTE — PROGRESS NOTES
Last 5 Patient Entered Readings                                      Current 30 Day Average: 132/70     Recent Readings 7/22/2019 7/14/2019 7/13/2019 7/12/2019 7/8/2019    SBP (mmHg) 124 139 130 135 126    DBP (mmHg) 70 68 69 69 69    Pulse 78 70 71 72 77        Hypertension Medications     losartan-hydrochlorothiazide 100-12.5 mg (HYZAAR) 100-12.5 mg Tab      Called patient for BP follow up. No answer. Left message for call back      Imani Galicia, Pharm.D.   Digital Medicine Clinical Pharmacist  [phone]

## 2019-08-05 DIAGNOSIS — I10 HYPERTENSION, UNSPECIFIED TYPE: ICD-10-CM

## 2019-08-06 NOTE — PROGRESS NOTES
Refill Authorization Note     is requesting a refill authorization.    Brief assessment and rationale for refill: APPROVE: prr  Name and strength of medication: LOSARTAN/HCT -12.5       Medication Therapy Plan: BP stable, elevated reading at hemoc OV; Labs WNL 6/19; approve 3 more    Medication reconciliation completed: No              How patient will take medication: t1t qd          Comments:   Blood Pressure Readings: <139/89mmHg (12 months)  BP Readings from Last 3 Encounters:   07/22/19 124/70   06/10/19 (!) 141/76   05/20/19 128/74        Last Creatinine/ Potassium/ Sodium (diuretics): (6 months: Diuretics-(Cr/K/Na)  or 12 months: non-diuretics)  Lab Results   Component Value Date    CREATININE 1.0 06/03/2019    CREATININE 1.0 03/22/2019    CREATININE 0.96 02/12/2019       Lab Results   Component Value Date    K 4.3 06/03/2019    K 4.1 03/22/2019    K 3.7 02/12/2019    Lab Results   Component Value Date     06/03/2019     03/22/2019     02/12/2019        Digital Hypertension Data: (values will display if enrolled)  Last 5 Patient Entered Readings                                      Current 30 Day Average: 129/69     Recent Readings 8/5/2019 7/22/2019 7/14/2019 7/13/2019 7/12/2019    SBP (mmHg) 119 124 139 130 135    DBP (mmHg) 66 70 68 69 69    Pulse 80 78 70 71 72          APPOINTMENTS(past 12m or future 3m authorizing provider)   Date Provider   LAST VISIT  5/20/2019 Robbie Orr MD   NEXT VISIT  Visit date not found Robbie Orr MD

## 2019-08-08 RX ORDER — LOSARTAN POTASSIUM AND HYDROCHLOROTHIAZIDE 12.5; 1 MG/1; MG/1
TABLET ORAL
Qty: 90 TABLET | Refills: 0 | Status: SHIPPED | OUTPATIENT
Start: 2019-08-08 | End: 2019-11-19 | Stop reason: SDUPTHER

## 2019-08-12 ENCOUNTER — HOSPITAL ENCOUNTER (OUTPATIENT)
Dept: RADIOLOGY | Facility: HOSPITAL | Age: 69
Discharge: HOME | End: 2019-08-12
Attending: UROLOGY
Payer: MEDICARE

## 2019-08-12 DIAGNOSIS — N40.1 ENLARGED PROSTATE WITH URINARY OBSTRUCTION: ICD-10-CM

## 2019-08-12 DIAGNOSIS — N13.39 OTHER HYDRONEPHROSIS: ICD-10-CM

## 2019-08-12 DIAGNOSIS — N13.30 HYDROURETERONEPHROSIS: ICD-10-CM

## 2019-08-12 DIAGNOSIS — N13.8 ENLARGED PROSTATE WITH URINARY OBSTRUCTION: ICD-10-CM

## 2019-08-12 PROCEDURE — 76770 US EXAM ABDO BACK WALL COMP: CPT | Mod: 26,,, | Performed by: RADIOLOGY

## 2019-08-12 PROCEDURE — 76770 US EXAM ABDO BACK WALL COMP: CPT | Mod: TC,PO

## 2019-08-12 PROCEDURE — 76770 US RETROPERITONEAL COMPLETE: ICD-10-PCS | Mod: 26,,, | Performed by: RADIOLOGY

## 2019-08-13 NOTE — PROGRESS NOTES
Last 5 Patient Entered Readings                                      Current 30 Day Average: 127/68     Recent Readings 8/5/2019 7/22/2019 7/14/2019 7/13/2019 7/12/2019    SBP (mmHg) 119 124 139 130 135    DBP (mmHg) 66 70 68 69 69    Pulse 80 78 70 71 72        Digital Medicine: Health  Follow Up    Patient expresses his frustrations with his BP monitor. He reports that something is wrong with the bluetooth and will not connect with his phone. Patient would prefer to manually enter readings with a different machine. He states that he will purchase one when he has time at a drug store. Patient states that he still occasionally tries to use his ihealth monitor, but finds himself frustrated.    We discussed that BP is well controlled, and lifestyle continues to go well.

## 2019-08-15 NOTE — PROGRESS NOTES
Last 5 Patient Entered Readings                                      Current 30 Day Average: 122/68     Recent Readings 8/5/2019 7/22/2019 7/14/2019 7/13/2019 7/12/2019    SBP (mmHg) 119 124 139 130 135    DBP (mmHg) 66 70 68 69 69    Pulse 80 78 70 71 72        Hypertension Medications     losartan-hydrochlorothiazide 100-12.5 mg (HYZAAR) 100-12.5 mg Tab TAKE 1 TABLET EVERY MORNING     Called patient for BP follow up. He is doing well with no complaints. He says that is having trouble with ihealth BP machine connecting. He says he prefers to get another BP machine to take BP and manually enter the readings. He says he has an appointment next week with PCP.     1) 30-day BP average meets goal of <130/<80. Continue current BP medications.  2) Discussed with patient that he can manually enter BP using another machine; however, I would like for him to bring new BP machine into clinic for validation. Also discussed with patient that digital medicine may not get BP alerts if he uses another machine and manually enters.   3) Patient knows to contact me with any non-urgent clinical changes or concerns. Go to ED or call [facility] for emergencies.   4) Will defer lifestyle counseling to digital medicine health .   5) Will continue to follow up.     Imani Galicia, Pharm.D.   Digital Medicine Clinical Pharmacist  [phone]

## 2019-09-04 ENCOUNTER — PATIENT MESSAGE (OUTPATIENT)
Dept: ADMINISTRATIVE | Facility: OTHER | Age: 69
End: 2019-09-04

## 2019-09-09 ENCOUNTER — HOSPITAL ENCOUNTER (OUTPATIENT)
Dept: RADIOLOGY | Facility: HOSPITAL | Age: 69
Discharge: HOME | End: 2019-09-09
Attending: UROLOGY
Payer: MEDICARE

## 2019-09-09 DIAGNOSIS — N13.39 OTHER HYDRONEPHROSIS: ICD-10-CM

## 2019-09-09 PROCEDURE — 78708 K FLOW/FUNCT IMAGE W/DRUG: CPT | Mod: 26,,, | Performed by: RADIOLOGY

## 2019-09-09 PROCEDURE — 78708 NM RENOGRAM WITH LASIX: ICD-10-PCS | Mod: 26,,, | Performed by: RADIOLOGY

## 2019-09-09 PROCEDURE — A9562 TC99M MERTIATIDE: HCPCS | Mod: PO

## 2019-09-11 ENCOUNTER — PATIENT MESSAGE (OUTPATIENT)
Dept: ADMINISTRATIVE | Facility: OTHER | Age: 69
End: 2019-09-11

## 2019-09-11 ENCOUNTER — OFFICE VISIT (OUTPATIENT)
Dept: PODIATRY | Facility: CLINIC | Age: 69
End: 2019-09-11
Payer: MEDICARE

## 2019-09-11 ENCOUNTER — HOSPITAL ENCOUNTER (OUTPATIENT)
Dept: RADIOLOGY | Facility: HOSPITAL | Age: 69
Discharge: HOME | End: 2019-09-11
Attending: PODIATRIST
Payer: MEDICARE

## 2019-09-11 VITALS
HEART RATE: 68 BPM | WEIGHT: 170.44 LBS | BODY MASS INDEX: 24.4 KG/M2 | SYSTOLIC BLOOD PRESSURE: 126 MMHG | HEIGHT: 70 IN | DIASTOLIC BLOOD PRESSURE: 69 MMHG

## 2019-09-11 DIAGNOSIS — M76.71 PERONEAL TENDONITIS OF RIGHT LOWER EXTREMITY: Primary | ICD-10-CM

## 2019-09-11 DIAGNOSIS — M76.71 PERONEAL TENDONITIS OF RIGHT LOWER EXTREMITY: ICD-10-CM

## 2019-09-11 PROCEDURE — 73630 X-RAY EXAM OF FOOT: CPT | Mod: TC,PO,RT

## 2019-09-11 PROCEDURE — 1101F PT FALLS ASSESS-DOCD LE1/YR: CPT | Mod: CPTII,S$GLB,, | Performed by: PODIATRIST

## 2019-09-11 PROCEDURE — 73630 XR FOOT COMPLETE 3 VIEW RIGHT: ICD-10-PCS | Mod: 26,RT,, | Performed by: RADIOLOGY

## 2019-09-11 PROCEDURE — 73630 X-RAY EXAM OF FOOT: CPT | Mod: 26,RT,, | Performed by: RADIOLOGY

## 2019-09-11 PROCEDURE — 3078F DIAST BP <80 MM HG: CPT | Mod: CPTII,S$GLB,, | Performed by: PODIATRIST

## 2019-09-11 PROCEDURE — 1101F PR PT FALLS ASSESS DOC 0-1 FALLS W/OUT INJ PAST YR: ICD-10-PCS | Mod: CPTII,S$GLB,, | Performed by: PODIATRIST

## 2019-09-11 PROCEDURE — 99999 PR PBB SHADOW E&M-EST. PATIENT-LVL III: ICD-10-PCS | Mod: PBBFAC,,, | Performed by: PODIATRIST

## 2019-09-11 PROCEDURE — 3074F SYST BP LT 130 MM HG: CPT | Mod: CPTII,S$GLB,, | Performed by: PODIATRIST

## 2019-09-11 PROCEDURE — 3078F PR MOST RECENT DIASTOLIC BLOOD PRESSURE < 80 MM HG: ICD-10-PCS | Mod: CPTII,S$GLB,, | Performed by: PODIATRIST

## 2019-09-11 PROCEDURE — 99203 PR OFFICE/OUTPT VISIT, NEW, LEVL III, 30-44 MIN: ICD-10-PCS | Mod: S$GLB,,, | Performed by: PODIATRIST

## 2019-09-11 PROCEDURE — 3074F PR MOST RECENT SYSTOLIC BLOOD PRESSURE < 130 MM HG: ICD-10-PCS | Mod: CPTII,S$GLB,, | Performed by: PODIATRIST

## 2019-09-11 PROCEDURE — 99999 PR PBB SHADOW E&M-EST. PATIENT-LVL III: CPT | Mod: PBBFAC,,, | Performed by: PODIATRIST

## 2019-09-11 PROCEDURE — 99203 OFFICE O/P NEW LOW 30 MIN: CPT | Mod: S$GLB,,, | Performed by: PODIATRIST

## 2019-09-11 RX ORDER — MELOXICAM 15 MG/1
15 TABLET ORAL DAILY
Qty: 30 TABLET | Refills: 0 | Status: SHIPPED | OUTPATIENT
Start: 2019-09-11 | End: 2019-10-08 | Stop reason: ALTCHOICE

## 2019-09-11 NOTE — PATIENT INSTRUCTIONS
2. Supportive shoes at all times (athletic shoe including yang adrenaline or beast, new balance 840 or 940. For house slippers would recommend Fitflop or Spenco found on amazon.com, Never walk barefoot or in flats.    ([employer] sports, [employer], [place] running company, Masseys, Goodfeet, Cantilever, Feet First, Foot Solutions, Therapeutic shoes, SAS, [facility] pro shop) http://[email or web address].[email or web address]    3. Orthotic (recommend the following brands: Spenco              4. Meloxicam once daily for seven days then once a day as needed for pain and inflammation    5. ICE massage once per day for 30 minutes.

## 2019-09-11 NOTE — LETTER
September 11, 2019      Robbie Orr MD  [address] [facility] [address] [place] [address]           [place] [facility]  [address] [facility] [address] [place] [address]  Phone: [phone]          Patient: Saurav Baehna   MR Number: [id number]   YOB: 1950   Date of Visit: 9/11/2019       Dear Dr. Robbie Orr:    Thank you for referring Saurav Bahena to me for evaluation. Attached you will find relevant portions of my assessment and plan of care.    If you have questions, please do not hesitate to call me. I look forward to following Saurav Bahena along with you.    Sincerely,    Robert Osborn, KAYKAY    Enclosure  CC:  No Recipients    If you would like to receive this communication electronically, please contact [email or web address] or [phone] to request more information on [employer] access.    For providers and/or their staff who would like to refer a patient to [facility], please contact us through our one-stop-shop provider referral line, [facility], at [phone].    If you feel you have received this communication in error or would no longer like to receive these types of communications, please e-mail [email or web address]

## 2019-09-11 NOTE — PROGRESS NOTES
Subjective:      Patient ID: Saurav Bahena is a 69 y.o. male.    Chief Complaint: Foot Pain (Right foot pain, PCP--05/20/2019)    Saurav is a 69 y.o. male who presents to the podiatry clinic  with complaint of  right foot pain lateral foot. Onset of the symptoms was several weeks ago. Precipitating event: none known. Current symptoms include: ability to bear weight, but with some pain, swelling and worsening symptoms after a period of activity. Aggravating factors: any weight bearing. Symptoms have waxed and waned but are worse overall. Patient has had no prior foot problems. Evaluation to date: none. Treatment to date: ibuprofen helps. Patients rates pain 8/10 on pain scale.    Review of Systems   Constitution: Negative for chills and fever.   Cardiovascular: Negative for claudication and leg swelling.   Respiratory: Negative for shortness of breath.    Skin: Negative for itching, nail changes and rash.   Musculoskeletal: Negative for muscle cramps, muscle weakness and myalgias.        Right lateral foot pain   Gastrointestinal: Negative for nausea and vomiting.   Neurological: Negative for focal weakness, loss of balance, numbness and paresthesias.           Objective:      Physical Exam   Constitutional: He is oriented to person, place, and time. He appears well-developed and well-nourished. No distress.   Cardiovascular:   Pulses:       Dorsalis pedis pulses are 2+ on the right side, and 2+ on the left side.        Posterior tibial pulses are 2+ on the right side, and 2+ on the left side.   < 3 sec capillary refill time to toes 1-5 bilateral. Toes and feet are warm to touch proximally with normal distal cooling b/l. There is some hair growth on the feet and toes b/l. There is no edema b/l. No spider veins or varicosities present b/l.      Musculoskeletal:   Right foot pain with palpation at the base of the fifth metatarsal and proximal at the distal aspect of the peroneal brevis. There is mild  pain with active eversion against resistance.    Equinus noted b/l ankles with < 10 deg DF noted. MMT 5/5 in DF/PF/Inv/Ev resistance with no reproduction of pain in any direction. Passive range of motion of ankle and pedal joints is painless b/l.     Neurological: He is alert and oriented to person, place, and time. He has normal strength. He displays no atrophy and no tremor. No sensory deficit. He exhibits normal muscle tone.   Negative tinel sign bilateral.   Skin: Skin is warm, dry and intact. No abrasion, no bruising, no burn, no ecchymosis, no laceration, no lesion, no petechiae and no rash noted. He is not diaphoretic. No cyanosis or erythema. No pallor. Nails show no clubbing.   Skin temperature, texture and turgor within normal limits.   Psychiatric: He has a normal mood and affect. His behavior is normal.             Assessment:       Encounter Diagnosis   Name Primary?    Peroneal tendonitis of right lower extremity Yes         Plan:       Saurav was seen today for foot pain.    Diagnoses and all orders for this visit:    Peroneal tendonitis of right lower extremity  -     X-Ray Foot Complete Right; Future    Other orders  -     meloxicam (MOBIC) 15 MG tablet; Take 1 tablet (15 mg total) by mouth once daily.      I counseled the patient on his conditions, their implications and medical management.    Dispensed, fitted and gait trained with ankle brace right foot. Instructed to wear with supportive shoe at all times when placing weight on the foot.    The patient was advised that NSAID-type medications have two very important potential side effects: gastrointestinal irritation including hemorrhage and renal injuries. He was asked to take the medication with food and to stop if he experiences any GI upset.     Recommended supportive shoes for his foot type I recommended Gill adrenaline or beast or New balance 940 or 840    Return in 6 weeks follow up, consider orthopedic boot in not better    Robert  KAYKAY Osborn

## 2019-10-01 ENCOUNTER — PATIENT OUTREACH (OUTPATIENT)
Dept: OTHER | Facility: OTHER | Age: 69
End: 2019-10-01

## 2019-10-04 ENCOUNTER — PATIENT MESSAGE (OUTPATIENT)
Dept: FAMILY MEDICINE | Facility: CLINIC | Age: 69
End: 2019-10-04

## 2019-10-14 ENCOUNTER — OFFICE VISIT (OUTPATIENT)
Dept: PODIATRY | Facility: CLINIC | Age: 69
End: 2019-10-14
Payer: MEDICARE

## 2019-10-14 VITALS
DIASTOLIC BLOOD PRESSURE: 70 MMHG | SYSTOLIC BLOOD PRESSURE: 137 MMHG | HEIGHT: 70 IN | BODY MASS INDEX: 25.06 KG/M2 | HEART RATE: 74 BPM | WEIGHT: 175.06 LBS

## 2019-10-14 DIAGNOSIS — M76.71 PERONEAL TENDONITIS OF RIGHT LOWER EXTREMITY: Primary | ICD-10-CM

## 2019-10-14 PROCEDURE — 99999 PR PBB SHADOW E&M-EST. PATIENT-LVL III: CPT | Mod: PBBFAC,,, | Performed by: PODIATRIST

## 2019-10-14 PROCEDURE — 3075F SYST BP GE 130 - 139MM HG: CPT | Mod: CPTII,S$GLB,, | Performed by: PODIATRIST

## 2019-10-14 PROCEDURE — 99212 OFFICE O/P EST SF 10 MIN: CPT | Mod: S$GLB,,, | Performed by: PODIATRIST

## 2019-10-14 PROCEDURE — 1101F PR PT FALLS ASSESS DOC 0-1 FALLS W/OUT INJ PAST YR: ICD-10-PCS | Mod: CPTII,S$GLB,, | Performed by: PODIATRIST

## 2019-10-14 PROCEDURE — 3078F DIAST BP <80 MM HG: CPT | Mod: CPTII,S$GLB,, | Performed by: PODIATRIST

## 2019-10-14 PROCEDURE — 3075F PR MOST RECENT SYSTOLIC BLOOD PRESS GE 130-139MM HG: ICD-10-PCS | Mod: CPTII,S$GLB,, | Performed by: PODIATRIST

## 2019-10-14 PROCEDURE — 3078F PR MOST RECENT DIASTOLIC BLOOD PRESSURE < 80 MM HG: ICD-10-PCS | Mod: CPTII,S$GLB,, | Performed by: PODIATRIST

## 2019-10-14 PROCEDURE — 99999 PR PBB SHADOW E&M-EST. PATIENT-LVL III: ICD-10-PCS | Mod: PBBFAC,,, | Performed by: PODIATRIST

## 2019-10-14 PROCEDURE — 99212 PR OFFICE/OUTPT VISIT, EST, LEVL II, 10-19 MIN: ICD-10-PCS | Mod: S$GLB,,, | Performed by: PODIATRIST

## 2019-10-14 PROCEDURE — 1101F PT FALLS ASSESS-DOCD LE1/YR: CPT | Mod: CPTII,S$GLB,, | Performed by: PODIATRIST

## 2019-10-14 NOTE — PROGRESS NOTES
Subjective:      Patient ID: Saurav Bahena is a 69 y.o. male.    Chief Complaint: Foot Pain (6 week f/u right foot, PCP--05/20/2019)    Saurav is a 69 y.o. male who presents to the podiatry clinic  with complaint of  right foot pain lateral foot. Onset of the symptoms was several weeks ago. Precipitating event: none known. Current symptoms include: ability to bear weight, but with some pain, swelling and worsening symptoms after a period of activity. Aggravating factors: any weight bearing. Symptoms have waxed and waned but are worse overall. Patient has had no prior foot problems. Evaluation to date: none. Treatment to date: ibuprofen helps. Patients rates pain 8/10 on pain scale.    10/14/19: Patient returns for follow up right peroneal tendonitis and pes planovalgus pain, he did get the Gill adrenaline and the New Balance 940 and notices a big difference, he relates about 75% better overall. Some pain still especially with going up and down stairs.    Review of Systems   Constitution: Negative for chills and fever.   Cardiovascular: Negative for claudication and leg swelling.   Respiratory: Negative for shortness of breath.    Skin: Negative for itching, nail changes and rash.   Musculoskeletal: Negative for muscle cramps, muscle weakness and myalgias.        Right lateral foot pain   Gastrointestinal: Negative for nausea and vomiting.   Neurological: Negative for focal weakness, loss of balance, numbness and paresthesias.           Objective:      Physical Exam   Constitutional: He is oriented to person, place, and time. He appears well-developed and well-nourished. No distress.   Cardiovascular:   Pulses:       Dorsalis pedis pulses are 2+ on the right side, and 2+ on the left side.        Posterior tibial pulses are 2+ on the right side, and 2+ on the left side.   < 3 sec capillary refill time to toes 1-5 bilateral. Toes and feet are warm to touch proximally with normal distal cooling b/l.  There is some hair growth on the feet and toes b/l. There is no edema b/l. No spider veins or varicosities present b/l.      Musculoskeletal:   Right foot discomfort with palpation at the base of the fifth metatarsal and proximal at the distal aspect of the peroneal brevis. There is no pain with active eversion against resistance.    Equinus noted b/l ankles with < 10 deg DF noted. MMT 5/5 in DF/PF/Inv/Ev resistance with no reproduction of pain in any direction. Passive range of motion of ankle and pedal joints is painless b/l.     Neurological: He is alert and oriented to person, place, and time. He has normal strength. He displays no atrophy and no tremor. No sensory deficit. He exhibits normal muscle tone.   Negative tinel sign bilateral.   Skin: Skin is warm, dry and intact. No abrasion, no bruising, no burn, no ecchymosis, no laceration, no lesion, no petechiae and no rash noted. He is not diaphoretic. No cyanosis or erythema. No pallor. Nails show no clubbing.   Skin temperature, texture and turgor within normal limits.   Psychiatric: He has a normal mood and affect. His behavior is normal.             Assessment:       Encounter Diagnosis   Name Primary?    Peroneal tendonitis of right lower extremity Yes         Plan:       Saurav was seen today for foot pain.    Diagnoses and all orders for this visit:    Peroneal tendonitis of right lower extremity      I counseled the patient on his conditions, their implications and medical management.    Continue wearing the supportive shoes at all times    Patient will stretch the tendo achilles complex three times daily as demonstrated in the office.  Literature was dispensed illustrating proper stretching technique.    Return PRN if pain does not continue to resolve or if it returns    Robert Osborn DPM

## 2019-10-21 ENCOUNTER — TELEPHONE (OUTPATIENT)
Dept: FAMILY MEDICINE | Facility: CLINIC | Age: 69
End: 2019-10-21

## 2019-10-21 NOTE — TELEPHONE ENCOUNTER
----- Message from Yadira Gomez MA sent at 10/19/2019 11:08 AM CDT -----  Type: Needs Medical Advice    Who Called: Patient    Best Call Back Number: [phone] (home)     Additional Information: Patient would like annual labs ordered before his appt scheduled for 12/16. Thank you

## 2019-10-22 NOTE — PROGRESS NOTES
"Digital Medicine: Health  Follow-Up        Follow Up  Follow-up reason(s): reading review and goal follow-up      Readings are missing.   patient reminder needed.Patient will continue with manual entry. Agrees to resume with more regular readings.           Diet:   Patient reports eating or drinking the following: fresh vegetables and "the same"; continuing to eat salads    Physical Activity:   When asked if exercising, patient responded: yes  Patient has limited mobility: old injury1 day(s) a week.      Patient participates in the following activities: weights and biking    Patient has not been exercising as much as he would like due to foot problem, but he has gotten new shoes recently. Rides his bike and does free weights. Would like to increase but denies a specific goal.       SDOH    INTERVENTION(S)  encouragement/support    PLAN  additional monitoring needed      There are no preventive care reminders to display for this patient.    Last 5 Patient Entered Readings                                      Current 30 Day Average: 128/76     Recent Readings 10/8/2019 10/7/2019 10/7/2019 10/7/2019 10/6/2019    SBP (mmHg) 117 145 138 141 121    DBP (mmHg) 83 75 79 75 69    Pulse 80 71 69 68 81                "

## 2019-11-12 ENCOUNTER — PATIENT MESSAGE (OUTPATIENT)
Dept: PODIATRY | Facility: CLINIC | Age: 69
End: 2019-11-12

## 2019-11-19 DIAGNOSIS — N40.0 BENIGN PROSTATIC HYPERPLASIA, UNSPECIFIED WHETHER LOWER URINARY TRACT SYMPTOMS PRESENT: ICD-10-CM

## 2019-11-19 DIAGNOSIS — I10 HYPERTENSION, UNSPECIFIED TYPE: ICD-10-CM

## 2019-11-20 RX ORDER — TAMSULOSIN HYDROCHLORIDE 0.4 MG/1
CAPSULE ORAL
Qty: 90 CAPSULE | Refills: 0 | OUTPATIENT
Start: 2019-11-20

## 2019-11-20 RX ORDER — LOSARTAN POTASSIUM AND HYDROCHLOROTHIAZIDE 12.5; 1 MG/1; MG/1
1 TABLET ORAL EVERY MORNING
Qty: 90 TABLET | Refills: 0 | Status: SHIPPED | OUTPATIENT
Start: 2019-11-20 | End: 2020-03-11 | Stop reason: SDUPTHER

## 2019-11-20 RX ORDER — LOSARTAN POTASSIUM AND HYDROCHLOROTHIAZIDE 12.5; 1 MG/1; MG/1
TABLET ORAL
Qty: 90 TABLET | Refills: 0 | OUTPATIENT
Start: 2019-11-20

## 2019-11-20 NOTE — TELEPHONE ENCOUNTER
I have reviewed and agree with the assessment below with changes. Duplicate request. Handled in earlier encounter.    Last Prescribed Info:       Disp Refills Start End    losartan-hydrochlorothiazide 100-12.5 mg (HYZAAR) 100-12.5 mg Tab 90 tablet 0 11/20/2019     Sig - Route: Take 1 tablet by mouth every morning. - Oral    Sent to pharmacy as: losartan-hydrochlorothiazide 100-12.5 mg (HYZAAR) 100-12.5 mg Tab    E-Prescribing Status: Receipt confirmed by pharmacy (11/20/2019  2:29 PM CST)

## 2019-11-20 NOTE — PROGRESS NOTES
Refill Authorization Note     is requesting a refill authorization.    Brief assessment and rationale for refill: APPROVE: prr   Name and strength of medication: losartan-hydrochlorothiazide 100-12.5 mg (HYZAAR) 100-12.5 mg Tab            Medication reconciliation completed: No              How patient will take medication: t1t po qam           Comments:   Requested Prescriptions   Pending Prescriptions Disp Refills    losartan-hydrochlorothiazide 100-12.5 mg (HYZAAR) 100-12.5 mg Tab 90 tablet 0     Sig: Take 1 tablet by mouth every morning.       Cardiovascular: ARB + Diuretic Combos Passed - 11/19/2019 11:21 AM        Passed - Patient is at least 18 years old        Passed - Last BP in normal range within 360 days     BP Readings from Last 3 Encounters:   10/14/19 137/70   10/08/19 121/78   09/11/19 126/69              Passed - Office visit in past 12 months or future 90 days     Recent Outpatient Visits            1 month ago Peroneal tendonitis of right lower extremity    [place] [facility] Robert Osborn DPM    2 months ago Peroneal tendonitis of right lower extremity    [place] - Podiatrjennifer Osborn DPM    4 months ago Enlarged prostate with urinary obstruction    [facility] CHON Richter MD    5 months ago CLL (chronic lymphocytic leukemia)    [facility] Edd Siegel NP    6 months ago Colitis    [facility] Robbie Orr MD          Future Appointments              In 2 weeks LAB, [place] [facility] [place]    In 3 weeks Robbie Orr MD [facility]                Passed - K in normal range and within 180 days     POC Potassium   Date Value Ref Range Status   02/11/2019 3.9 3.6 - 5.1 mmol/L Final   02/07/2019 4.0 3.6 - 5.1 mmol/L Final     Potassium   Date Value Ref Range Status   06/03/2019 4.3 3.5 - 5.1 mmol/L Final   03/22/2019 4.1 3.5 - 5.1 mmol/L Final    02/12/2019 3.7 3.5 - 5.1 mmol/L Final              Passed - Na in normal range and within 180 days     POC Sodium   Date Value Ref Range Status   02/11/2019 135 128 - 145 mmol/L Final   02/07/2019 134 128 - 145 mmol/L Final     Sodium   Date Value Ref Range Status   06/03/2019 136 136 - 145 mmol/L Final   03/22/2019 137 136 - 145 mmol/L Final   02/12/2019 136 136 - 145 mmol/L Final              Passed - Cr is 1.3 or below and within 180 days     Creatinine   Date Value Ref Range Status   06/03/2019 1.0 0.5 - 1.4 mg/dL Final   03/22/2019 1.0 0.5 - 1.4 mg/dL Final   02/12/2019 0.96 0.50 - 1.40 mg/dL Final     POC Creatinine   Date Value Ref Range Status   02/11/2019 0.8 0.6 - 1.2 mg/dL Final   02/07/2019 1.1 0.6 - 1.2 mg/dL Final              Passed - Ca in normal range and within 360 days     Calcium   Date Value Ref Range Status   06/03/2019 9.2 8.7 - 10.5 mg/dL Final   03/22/2019 9.7 8.7 - 10.5 mg/dL Final   02/12/2019 8.9 8.4 - 10.2 mg/dL Final     Calcium, POC   Date Value Ref Range Status   02/11/2019 9.2 8.0 - 10.3 mg/dL Final   02/07/2019 9.6 8.0 - 10.3 mg/dL Final              Passed - eGFR within 180 days     POC eGFR   Date Value Ref Range Status   02/11/2019 >60 61 - 2,000 mL/min Final   02/07/2019 >60 61 - 2,000 mL/min Final     eGFR if non    Date Value Ref Range Status   06/03/2019 >60 >60 mL/min/1.73 m^2 Final     Comment:     Calculation used to obtain the estimated glomerular filtration  rate (eGFR) is the CKD-EPI equation.      03/22/2019 >60 >60 mL/min/1.73 m^2 Final     Comment:     Calculation used to obtain the estimated glomerular filtration  rate (eGFR) is the CKD-EPI equation.      02/12/2019 >60 >60 mL/min/1.73 m^2 Final     Comment:     Calculation used to obtain the estimated glomerular filtration  rate (eGFR) is the CKD-EPI equation.

## 2019-11-20 NOTE — PROGRESS NOTES
Refill Authorization Note     is requesting a refill authorization.    Brief assessment and rationale for refill: QUICK DC: rts(1/20)-Tamsulosin // APPROVE: prr(Hyzaar)   Name and strength of medication: TAMSULOSIN CAP 0.4MG // LOSARTAN/HCT -12.5            Medication reconciliation completed: No              How patient will take medication: utd          Comments:   Requested Prescriptions   Pending Prescriptions Disp Refills    tamsulosin (FLOMAX) 0.4 mg Cap [Pharmacy Med Name: TAMSULOSIN CAP 0.4MG] 90 capsule 0     Sig: TAKE 1 CAPSULE ONCE DAILY       Urology: Alpha-Adrenergic Blocker Passed - 11/19/2019 11:52 AM        Passed - Patient is at least 18 years old        Passed - Last BP in normal range within 360 days     BP Readings from Last 3 Encounters:   10/14/19 137/70   10/08/19 121/78   09/11/19 126/69              Passed - Office visit in past 12 months or future 90 days     Recent Outpatient Visits            1 month ago Peroneal tendonitis of right lower extremity    [place] - Podiatry Robert Osborn DPM    2 months ago Peroneal tendonitis of right lower extremity    [place] - Podiat[facility]enn[facility] Osborn DPM    4 months ago Enlarged prostate with urinary obstruction    [facility] CHON Richter MD    5 months ago CLL (chronic lymphocytic leukemia)    [facility] Edd Siegel NP    6 months ago Colitis    [facility] Robbie Orr MD          Future Appointments              In 2 weeks LAB, [place] [facility]    In 3 weeks Robbie Orr MD [facility]               losartan-hydrochlorothiazide 100-12.5 mg (HYZAAR) 100-12.5 mg Tab [Pharmacy Med Name: LOSARTAN/HCT -12.5] 90 tablet 0     Sig: TAKE 1 TABLET EVERY MORNING       Cardiovascular: ARB + Diuretic Combos Passed - 11/19/2019 11:52 AM        Passed - Patient is at least 18 years old         Passed - Last BP in normal range within 360 days     BP Readings from Last 3 Encounters:   10/14/19 137/70   10/08/19 121/78   09/11/19 126/69              Passed - Office visit in past 12 months or future 90 days     Recent Outpatient Visits            1 month ago Peroneal tendonitis of right lower extremity    [facility]iat[facility]ennifer [facility]born DPM    2 months ago Peroneal tendonitis of right lower extremity    [facility] Podyo Osborn DPM    4 months ago Enlarged prostate with urinary obstruction    [place] - [facility] CHON Richter MD    5 months ago CLL (chronic lymphocytic leukemia)    [facility] Edd Siegel NP    6 months ago Colitis    [facility] Robbie Orr MD          Future Appointments              In 2 weeks LAB, [place] [facility] [place]    In 3 weeks Robbie Orr MD [facility]                Passed - K in normal range and within 180 days     POC Potassium   Date Value Ref Range Status   02/11/2019 3.9 3.6 - 5.1 mmol/L Final   02/07/2019 4.0 3.6 - 5.1 mmol/L Final     Potassium   Date Value Ref Range Status   06/03/2019 4.3 3.5 - 5.1 mmol/L Final   03/22/2019 4.1 3.5 - 5.1 mmol/L Final   02/12/2019 3.7 3.5 - 5.1 mmol/L Final              Passed - Na in normal range and within 180 days     POC Sodium   Date Value Ref Range Status   02/11/2019 135 128 - 145 mmol/L Final   02/07/2019 134 128 - 145 mmol/L Final     Sodium   Date Value Ref Range Status   06/03/2019 136 136 - 145 mmol/L Final   03/22/2019 137 136 - 145 mmol/L Final   02/12/2019 136 136 - 145 mmol/L Final              Passed - Cr is 1.3 or below and within 180 days     Creatinine   Date Value Ref Range Status   06/03/2019 1.0 0.5 - 1.4 mg/dL Final   03/22/2019 1.0 0.5 - 1.4 mg/dL Final   02/12/2019 0.96 0.50 - 1.40 mg/dL Final     POC Creatinine   Date Value Ref Range Status   02/11/2019 0.8 0.6 - 1.2  mg/dL Final   02/07/2019 1.1 0.6 - 1.2 mg/dL Final              Passed - Ca in normal range and within 360 days     Calcium   Date Value Ref Range Status   06/03/2019 9.2 8.7 - 10.5 mg/dL Final   03/22/2019 9.7 8.7 - 10.5 mg/dL Final   02/12/2019 8.9 8.4 - 10.2 mg/dL Final     Calcium, POC   Date Value Ref Range Status   02/11/2019 9.2 8.0 - 10.3 mg/dL Final   02/07/2019 9.6 8.0 - 10.3 mg/dL Final              Passed - eGFR within 180 days     POC eGFR   Date Value Ref Range Status   02/11/2019 >60 61 - 2,000 mL/min Final   02/07/2019 >60 61 - 2,000 mL/min Final     eGFR if non    Date Value Ref Range Status   06/03/2019 >60 >60 mL/min/1.73 m^2 Final     Comment:     Calculation used to obtain the estimated glomerular filtration  rate (eGFR) is the CKD-EPI equation.      03/22/2019 >60 >60 mL/min/1.73 m^2 Final     Comment:     Calculation used to obtain the estimated glomerular filtration  rate (eGFR) is the CKD-EPI equation.      02/12/2019 >60 >60 mL/min/1.73 m^2 Final     Comment:     Calculation used to obtain the estimated glomerular filtration  rate (eGFR) is the CKD-EPI equation.                 Last Prescribed Info:           Ordering Provider: Robbie Orr MD KYREE #:  [id number] NPI:  [id number]    Authorizing Provider: Robbie Orr MD KYREE #:  [id number] NPI:  [id number]    Ordering User:  Kendall Noble, Sandra            Diagnosis Association: Benign prostatic hyperplasia, unspecified whether lower urinary tract symptoms present (N40.0)      Original Order:  tamsulosin (FLOMAX) 0.4 mg Cap [[id number]]      Pharmacy:  [facility] - [place], [place] - [address] AT Portal to [facility] Sites KYREE #:  --     Pharmacy Comments:  --          Fill quantity remaining:  -- Fill quantity used:  -- Next fill due: --       Outpatient Medication Detail      Disp Refills Start End    tamsulosin (FLOMAX) 0.4 mg Cap 90 capsule 3 1/18/2019     Sig - Route: Take  1 capsule (0.4 mg total) by mouth once daily. - Oral    Sent to pharmacy as: tamsulosin (FLOMAX) 0.4 mg Cap    E-Prescribing Status: Receipt confirmed by pharmacy (1/18/2019  2:35 PM CST)

## 2019-12-02 ENCOUNTER — TELEPHONE (OUTPATIENT)
Dept: FAMILY MEDICINE | Facility: CLINIC | Age: 69
End: 2019-12-02

## 2019-12-02 DIAGNOSIS — M17.9 DJD (DEGENERATIVE JOINT DISEASE) OF KNEE: ICD-10-CM

## 2019-12-02 RX ORDER — MELOXICAM 15 MG/1
TABLET ORAL
Qty: 30 TABLET | Refills: 0 | OUTPATIENT
Start: 2019-12-02

## 2019-12-02 RX ORDER — NAPROXEN 500 MG/1
500 TABLET ORAL 2 TIMES DAILY PRN
Qty: 60 TABLET | Refills: 5 | Status: SHIPPED | OUTPATIENT
Start: 2019-12-02 | End: 2020-08-10 | Stop reason: ALTCHOICE

## 2019-12-02 NOTE — TELEPHONE ENCOUNTER
Pt states that he had surgery years ago on his right knee, and states that every now and then it swells up and states that he normally takes Anaprox 500mg for it. Pt is requesting a rx. Please advise.

## 2019-12-02 NOTE — TELEPHONE ENCOUNTER
----- Message from Juventino UHSTON Param sent at 12/2/2019  8:51 AM CST -----  Contact: same  Patient called in and wanted to see if Dr. Orr would call him in a anti-inflammatory for his right knee?  Patient stated it has been several years since he has done so and cannot remember name of Rx.      [address] [place] - [address]  [address]  [place] [address]  Phone: [phone] Fax: [phone]    Patient call back number is [phone]

## 2019-12-04 ENCOUNTER — TELEPHONE (OUTPATIENT)
Dept: FAMILY MEDICINE | Facility: CLINIC | Age: 69
End: 2019-12-04

## 2019-12-04 DIAGNOSIS — N40.0 BENIGN PROSTATIC HYPERPLASIA, UNSPECIFIED WHETHER LOWER URINARY TRACT SYMPTOMS PRESENT: ICD-10-CM

## 2019-12-04 NOTE — TELEPHONE ENCOUNTER
----- Message from Juventino HUSTON Umualia sent at 12/4/2019  3:12 PM CST -----  Contact: same  Patient called in and stated he would like a new Rx sent over for his tamsulosin (FLOMAX) 0.4 mg Cap but would like this increased to the 0.8 mg because taking 2/0.4 mg does not help.    [facility] - [place], [place] - [address] AT Portal to [place] [id number]  [facility] [address]  Phone: [phone] Fax: [phone]    Patient call back number is [phone]

## 2019-12-05 RX ORDER — TAMSULOSIN HYDROCHLORIDE 0.4 MG/1
0.8 CAPSULE ORAL DAILY
Qty: 180 CAPSULE | Refills: 3 | Status: SHIPPED | OUTPATIENT
Start: 2019-12-05 | End: 2020-03-16 | Stop reason: SDUPTHER

## 2019-12-09 ENCOUNTER — LAB VISIT (OUTPATIENT)
Dept: LAB | Facility: HOSPITAL | Age: 69
End: 2019-12-09
Attending: FAMILY MEDICINE
Payer: MEDICARE

## 2019-12-09 DIAGNOSIS — Z12.5 PROSTATE CANCER SCREENING: ICD-10-CM

## 2019-12-09 DIAGNOSIS — I10 HYPERTENSION, UNSPECIFIED TYPE: ICD-10-CM

## 2019-12-09 LAB
ALBUMIN SERPL BCP-MCNC: 3.3 G/DL (ref 3.5–5.2)
ALP SERPL-CCNC: 63 U/L (ref 55–135)
ALT SERPL W/O P-5'-P-CCNC: 13 U/L (ref 10–44)
ANION GAP SERPL CALC-SCNC: 7 MMOL/L (ref 8–16)
AST SERPL-CCNC: 15 U/L (ref 10–40)
BILIRUB SERPL-MCNC: 0.5 MG/DL (ref 0.1–1)
BUN SERPL-MCNC: 19 MG/DL (ref 8–23)
CALCIUM SERPL-MCNC: 9.1 MG/DL (ref 8.7–10.5)
CHLORIDE SERPL-SCNC: 103 MMOL/L (ref 95–110)
CHOLEST SERPL-MCNC: 147 MG/DL (ref 120–199)
CHOLEST/HDLC SERPL: 3.2 {RATIO} (ref 2–5)
CO2 SERPL-SCNC: 28 MMOL/L (ref 23–29)
COMPLEXED PSA SERPL-MCNC: 0.7 NG/ML (ref 0–4)
CREAT SERPL-MCNC: 0.9 MG/DL (ref 0.5–1.4)
EST. GFR  (AFRICAN AMERICAN): >60 ML/MIN/1.73 M^2
EST. GFR  (NON AFRICAN AMERICAN): >60 ML/MIN/1.73 M^2
GLUCOSE SERPL-MCNC: 100 MG/DL (ref 70–110)
HDLC SERPL-MCNC: 46 MG/DL (ref 40–75)
HDLC SERPL: 31.3 % (ref 20–50)
LDLC SERPL CALC-MCNC: 89.8 MG/DL (ref 63–159)
NONHDLC SERPL-MCNC: 101 MG/DL
POTASSIUM SERPL-SCNC: 4.4 MMOL/L (ref 3.5–5.1)
PROT SERPL-MCNC: 7.6 G/DL (ref 6–8.4)
SODIUM SERPL-SCNC: 138 MMOL/L (ref 136–145)
TRIGL SERPL-MCNC: 56 MG/DL (ref 30–150)

## 2019-12-09 PROCEDURE — 36415 COLL VENOUS BLD VENIPUNCTURE: CPT | Mod: PO

## 2019-12-09 PROCEDURE — 80061 LIPID PANEL: CPT

## 2019-12-09 PROCEDURE — 84153 ASSAY OF PSA TOTAL: CPT

## 2019-12-09 PROCEDURE — 80053 COMPREHEN METABOLIC PANEL: CPT

## 2019-12-16 ENCOUNTER — OFFICE VISIT (OUTPATIENT)
Dept: FAMILY MEDICINE | Facility: CLINIC | Age: 69
End: 2019-12-16
Payer: MEDICARE

## 2019-12-16 VITALS
HEIGHT: 70 IN | SYSTOLIC BLOOD PRESSURE: 134 MMHG | BODY MASS INDEX: 24.59 KG/M2 | DIASTOLIC BLOOD PRESSURE: 74 MMHG | TEMPERATURE: 99 F | HEART RATE: 72 BPM | WEIGHT: 171.75 LBS | OXYGEN SATURATION: 97 %

## 2019-12-16 DIAGNOSIS — Z12.11 COLON CANCER SCREENING: ICD-10-CM

## 2019-12-16 DIAGNOSIS — Z00.00 PREVENTATIVE HEALTH CARE: Primary | ICD-10-CM

## 2019-12-16 DIAGNOSIS — M54.50 MIDLINE LOW BACK PAIN WITHOUT SCIATICA, UNSPECIFIED CHRONICITY: ICD-10-CM

## 2019-12-16 PROCEDURE — 99214 OFFICE O/P EST MOD 30 MIN: CPT | Mod: S$GLB,,, | Performed by: FAMILY MEDICINE

## 2019-12-16 PROCEDURE — 99999 PR PBB SHADOW E&M-EST. PATIENT-LVL IV: CPT | Mod: PBBFAC,,, | Performed by: FAMILY MEDICINE

## 2019-12-16 PROCEDURE — 99214 PR OFFICE/OUTPT VISIT, EST, LEVL IV, 30-39 MIN: ICD-10-PCS | Mod: S$GLB,,, | Performed by: FAMILY MEDICINE

## 2019-12-16 PROCEDURE — 99999 PR PBB SHADOW E&M-EST. PATIENT-LVL IV: ICD-10-PCS | Mod: PBBFAC,,, | Performed by: FAMILY MEDICINE

## 2019-12-16 NOTE — PROGRESS NOTES
Chief Complaint   Patient presents with    Annual Exam     HPI: 68 yo WM for annual physical.    HTN -  tolerating the Losartan HCT  He reports overall feeling well.  BPH -  tolerating the Flomax daily and saw palmetto; following with urology with planned cystoscopy for atropic right kidney with hydronephrosis  HLD - following low fat diet  FH CAD - tolerating ASA 81mg daily  Insomnia - taking Ambien 5mg QHS about 2-3 nights a week  Smoking 5 cigarettes daily for over 30 years    Requesting Dr. Cummings compression belt for low back pain        Past Medical History:   Diagnosis Date    Anticoagulant long-term use     aspirin    Atrophic kidney     right    BPH (benign prostatic hyperplasia)     Cat bite of left forearm with infection 2/11/2019    CLL (chronic lymphocytic leukemia)     sees Dr. Solares    CTS (carpal tunnel syndrome)     Current smoker on some days     using e cig, regular cigarettes sometimes    DJD (degenerative joint disease) of knee     left    DJD (degenerative joint disease) of knee     bilateral/ LT more than RT    Emphysema lung     HLD (hyperlipidemia)     no current meds    HTN (hypertension)     Insomnia     Nocturia          REVIEW OF SYSTEMS:   Bothersome joint pains YES   Sexual problems (getting and keeping erections, completing intercourse, etc.) NO   Change in size/firmness of stools NO   Change in size/color of a mole NO   Often feeling down, depressed or hopeless during the past month NO   Often having little interest or pleasure in doing things duringthe past month NO   Problems with falling or doing routine tasks at home NO   Periods of weakness, numbness or inability to talk NO     Answers for HPI/ROS submitted by the patient on 12/12/2019   activity change: No  unexpected weight change: No  neck pain: No  hearing loss: No  rhinorrhea: No  trouble swallowing: No  eye discharge: No  visual disturbance: No  chest tightness: No  wheezing: No  chest pain: No  palpitations:  No  blood in stool: No  constipation: No  vomiting: No  diarrhea: No  polydipsia: No  polyuria: No  difficulty urinating: Yes  urgency: Yes  hematuria: No  joint swelling: Yes  arthralgias: No  headaches: No  weakness: No  confusion: No  dysphoric mood: No      FAMILY HISTORY:   Cancer of the prostate or intestine NO   Heart pain or heart attacks before the age of 55 YES (FATHER mi/BROTHER S/P PTCA W/STENT)     TOBACCO:   Have you ever used tobacco? YES   Average number of packs/day: 1/2   Number of years smoked: 20     ALCOHOL:   Do you drink alcohol? YES     Exercise:   Activity: BIKE  Days per week: 3-4  Time/duration: 60 minutes   Exertion: HEAVY      Colonoscopy 2010    Current Outpatient Medications on File Prior to Visit   Medication Sig Dispense Refill    ascorbic acid (VITAMIN C) 500 MG tablet Take 500 mg by mouth once daily.       aspirin (ASPIRIN LOW DOSE) 81 MG EC tablet Take 81 mg by mouth once daily. Every day      ERGOCALCIFEROL, VITAMIN D2, (VITAMIN D ORAL) Take 5,000 Units by mouth once daily.      fish oil-omega-3 fatty acids 300-1,000 mg capsule Take 4 capsules by mouth once daily.       Lactobac no.41/Bifidobact no.7 (PROBIOTIC-10 ORAL) Take by mouth once daily.      losartan-hydrochlorothiazide 100-12.5 mg (HYZAAR) 100-12.5 mg Tab Take 1 tablet by mouth every morning. 90 tablet 0    multivitamin capsule Every day      naproxen (NAPROSYN) 500 MG tablet Take 1 tablet (500 mg total) by mouth 2 (two) times daily as needed. 60 tablet 5    saw palmetto xtr/zinc picolin (SAW PALMETTO EXTRACT ORAL) Take by mouth.      tamsulosin (FLOMAX) 0.4 mg Cap Take 2 capsules (0.8 mg total) by mouth once daily. 180 capsule 3    tramadol-acetaminophen 37.5-325 mg (ULTRACET) 37.5-325 mg Tab Take 1 tablet by mouth every 4 (four) hours as needed for Pain (SEVERE). 8 tablet 0    zolpidem (AMBIEN) 5 MG Tab TAKE 1 TABLET BY MOUTH ONCE DAILY AT BEDTIME AS NEEDED 90 tablet 1    [DISCONTINUED] diclofenac  "(VOLTAREN) 75 MG EC tablet Take 1 tablet (75 mg total) by mouth 2 (two) times daily. 14 tablet 0     No current facility-administered medications on file prior to visit.          PHYSICAL EXAMINATION:   /74 (BP Location: Left arm, Patient Position: Sitting)   Pulse 72   Temp 98.8 °F (37.1 °C) (Oral)   Ht 5' 10" (1.778 m)   Wt 77.9 kg (171 lb 11.8 oz)   SpO2 97%   BMI 24.64 kg/m²   ORAL EXAM : Mucous membranes moist; pharynx clear. No lesions.   NECK: Supple without nodes JVD, or bruits. No thyromegaly noted.   LUNGS: Clear to auscultation and percussion bilaterally.   HEART: Regular rate and rhythm; no rubs, murmurs or gallops.   ABD: soft, NT/ND, +BS no HSM   EXTREMITIES: No clubbing, cyanosis or edema. Distal pulses full/equal.   NEUROLOGIC: Alert and oriented x 4. Cranial nerves grossly intact.   SKIN: Warm, dry; no lesions noted.     Results for orders placed or performed in visit on 12/09/19   Comprehensive metabolic panel   Result Value Ref Range    Sodium 138 136 - 145 mmol/L    Potassium 4.4 3.5 - 5.1 mmol/L    Chloride 103 95 - 110 mmol/L    CO2 28 23 - 29 mmol/L    Glucose 100 70 - 110 mg/dL    BUN, Bld 19 8 - 23 mg/dL    Creatinine 0.9 0.5 - 1.4 mg/dL    Calcium 9.1 8.7 - 10.5 mg/dL    Total Protein 7.6 6.0 - 8.4 g/dL    Albumin 3.3 (L) 3.5 - 5.2 g/dL    Total Bilirubin 0.5 0.1 - 1.0 mg/dL    Alkaline Phosphatase 63 55 - 135 U/L    AST 15 10 - 40 U/L    ALT 13 10 - 44 U/L    Anion Gap 7 (L) 8 - 16 mmol/L    eGFR if African American >60.0 >60 mL/min/1.73 m^2    eGFR if non African American >60.0 >60 mL/min/1.73 m^2   Lipid panel   Result Value Ref Range    Cholesterol 147 120 - 199 mg/dL    Triglycerides 56 30 - 150 mg/dL    HDL 46 40 - 75 mg/dL    LDL Cholesterol 89.8 63.0 - 159.0 mg/dL    Hdl/Cholesterol Ratio 31.3 20.0 - 50.0 %    Total Cholesterol/HDL Ratio 3.2 2.0 - 5.0    Non-HDL Cholesterol 101 mg/dL   PSA, Screening   Result Value Ref Range    PSA, SCREEN 0.70 0.00 - 4.00 ng/mL "       DIAGNOSIS/PLAN:   Preventative health care    Colon cancer screening  -     Fecal Immunochemical Test (iFOBT); Future; Expected date: 12/16/2019    Midline low back pain without sciatica, unspecified chronicity    back brace Rx  Fit Kit  Shingrix at pharmacy  Counseled on regular exercise, maintenance of a healthy weight, balanced diet rich in fruits/vegetables and lean protein, and avoidance of unhealthy habits like smoking and excessive alcohol intake.   ASA 81 mg/d   continue losartan /12.5 daily  Continue smoking cessation efforts  Urology f/u as planned  F/u 12 months

## 2019-12-17 ENCOUNTER — PATIENT OUTREACH (OUTPATIENT)
Dept: OTHER | Facility: OTHER | Age: 69
End: 2019-12-17

## 2019-12-18 ENCOUNTER — LAB VISIT (OUTPATIENT)
Dept: LAB | Facility: HOSPITAL | Age: 69
End: 2019-12-18
Attending: FAMILY MEDICINE
Payer: MEDICARE

## 2019-12-18 DIAGNOSIS — Z12.11 COLON CANCER SCREENING: ICD-10-CM

## 2019-12-18 PROCEDURE — 82274 ASSAY TEST FOR BLOOD FECAL: CPT

## 2019-12-20 ENCOUNTER — TELEPHONE (OUTPATIENT)
Dept: FAMILY MEDICINE | Facility: CLINIC | Age: 69
End: 2019-12-20

## 2019-12-20 DIAGNOSIS — M54.50 MIDLINE LOW BACK PAIN WITHOUT SCIATICA, UNSPECIFIED CHRONICITY: Primary | ICD-10-CM

## 2019-12-20 NOTE — TELEPHONE ENCOUNTER
----- Message from Kay Quiroga sent at 12/20/2019 10:09 AM CST -----  Contact: [facility]  Type: Needs Medical Advice    Who Called:  Amada  Vinicius Call Back Number: [phone]  Additional Information: Need an order for a back brace and she needs clinical notes faxed to [phone] she stated the patient has a paper order for a back brace and this not the way that goes they need an order from the Dr faxed to the number above

## 2019-12-31 LAB — HEMOCCULT STL QL IA: NEGATIVE

## 2020-01-03 ENCOUNTER — TELEPHONE (OUTPATIENT)
Dept: HEMATOLOGY/ONCOLOGY | Facility: CLINIC | Age: 70
End: 2020-01-03

## 2020-01-03 NOTE — TELEPHONE ENCOUNTER
Labs scheduled for 1/20----- Message from Dante Lopez sent at 1/3/2020 12:17 PM CST -----  Contact: Pt   Pt would like to be called back regarding orders in system for labwork per Dr. Siegel. Pt requesting to be scheduled on 1/20/2020.    Pt can be reached at [phone].    Thank You.

## 2020-01-15 RX ORDER — BENZONATATE 100 MG/1
CAPSULE ORAL
Qty: 30 CAPSULE | Refills: 0 | Status: SHIPPED | OUTPATIENT
Start: 2020-01-15 | End: 2020-06-20

## 2020-01-15 NOTE — PROGRESS NOTES
Refill Routing Note     Medication(s) are not appropriate for processing by [facility]:    Medication Outside of Protocol    Appointments  past 12m or future 3m with PCP    Date Provider   Last Visit   12/16/2019 Robbie Orr MD   Next Visit   Visit date not found Robbie Orr MD           Automatic Epic Protocol Generated Data:    Requested Prescriptions   Pending Prescriptions Disp Refills    benzonatate (TESSALON) 100 MG capsule [Pharmacy Med Name: Benzonatate 100 MG Oral Capsule]  0     Sig: TAKE 1 CAPSULE BY MOUTH THREE TIMES DAILY AS NEEDED FOR COUGH       There is no refill protocol information for this order

## 2020-01-20 ENCOUNTER — TELEPHONE (OUTPATIENT)
Dept: HEMATOLOGY/ONCOLOGY | Facility: CLINIC | Age: 70
End: 2020-01-20

## 2020-01-20 ENCOUNTER — LAB VISIT (OUTPATIENT)
Dept: LAB | Facility: HOSPITAL | Age: 70
End: 2020-01-20
Payer: MEDICARE

## 2020-01-20 DIAGNOSIS — C91.10 CLL (CHRONIC LYMPHOCYTIC LEUKEMIA): ICD-10-CM

## 2020-01-20 LAB
ALBUMIN SERPL BCP-MCNC: 3.5 G/DL (ref 3.5–5.2)
ALP SERPL-CCNC: 62 U/L (ref 55–135)
ALT SERPL W/O P-5'-P-CCNC: 23 U/L (ref 10–44)
ANION GAP SERPL CALC-SCNC: 9 MMOL/L (ref 8–16)
ANISOCYTOSIS BLD QL SMEAR: SLIGHT
AST SERPL-CCNC: 17 U/L (ref 10–40)
B2 MICROGLOB SERPL-MCNC: 4.9 UG/ML (ref 0–2.5)
BASOPHILS # BLD AUTO: ABNORMAL K/UL (ref 0–0.2)
BASOPHILS NFR BLD: 0 % (ref 0–1.9)
BILIRUB SERPL-MCNC: 0.7 MG/DL (ref 0.1–1)
BUN SERPL-MCNC: 17 MG/DL (ref 8–23)
CALCIUM SERPL-MCNC: 9.2 MG/DL (ref 8.7–10.5)
CHLORIDE SERPL-SCNC: 97 MMOL/L (ref 95–110)
CO2 SERPL-SCNC: 28 MMOL/L (ref 23–29)
CREAT SERPL-MCNC: 1 MG/DL (ref 0.5–1.4)
DIFFERENTIAL METHOD: ABNORMAL
EOSINOPHIL # BLD AUTO: ABNORMAL K/UL (ref 0–0.5)
EOSINOPHIL NFR BLD: 0 % (ref 0–8)
ERYTHROCYTE [DISTWIDTH] IN BLOOD BY AUTOMATED COUNT: 14.8 % (ref 11.5–14.5)
EST. GFR  (AFRICAN AMERICAN): >60 ML/MIN/1.73 M^2
EST. GFR  (NON AFRICAN AMERICAN): >60 ML/MIN/1.73 M^2
GLUCOSE SERPL-MCNC: 123 MG/DL (ref 70–110)
HCT VFR BLD AUTO: 37.6 % (ref 40–54)
HGB BLD-MCNC: 11.9 G/DL (ref 14–18)
LDH SERPL L TO P-CCNC: 120 U/L (ref 110–260)
LYMPHOCYTES # BLD AUTO: ABNORMAL K/UL (ref 1–4.8)
LYMPHOCYTES NFR BLD: 79 % (ref 18–48)
MCH RBC QN AUTO: 27.4 PG (ref 27–31)
MCHC RBC AUTO-ENTMCNC: 31.6 G/DL (ref 32–36)
MCV RBC AUTO: 86 FL (ref 82–98)
MONOCYTES # BLD AUTO: ABNORMAL K/UL (ref 0.3–1)
MONOCYTES NFR BLD: 1 % (ref 4–15)
NEUTROPHILS # BLD AUTO: ABNORMAL K/UL (ref 1.8–7.7)
NEUTROPHILS NFR BLD: 20 % (ref 38–73)
PLATELET # BLD AUTO: 212 K/UL (ref 150–350)
PLATELET BLD QL SMEAR: ABNORMAL
PMV BLD AUTO: 9.2 FL (ref 9.2–12.9)
POTASSIUM SERPL-SCNC: 4.2 MMOL/L (ref 3.5–5.1)
PROT SERPL-MCNC: 7.7 G/DL (ref 6–8.4)
RBC # BLD AUTO: 4.35 M/UL (ref 4.6–6.2)
SODIUM SERPL-SCNC: 134 MMOL/L (ref 136–145)
SPHEROCYTES BLD QL SMEAR: ABNORMAL
WBC # BLD AUTO: 13.14 K/UL (ref 3.9–12.7)

## 2020-01-20 PROCEDURE — 82232 ASSAY OF BETA-2 PROTEIN: CPT

## 2020-01-20 PROCEDURE — 83615 LACTATE (LD) (LDH) ENZYME: CPT | Mod: PO

## 2020-01-20 PROCEDURE — 85007 BL SMEAR W/DIFF WBC COUNT: CPT | Mod: PO

## 2020-01-20 PROCEDURE — 85027 COMPLETE CBC AUTOMATED: CPT | Mod: PO

## 2020-01-20 PROCEDURE — 36415 COLL VENOUS BLD VENIPUNCTURE: CPT | Mod: PN

## 2020-01-20 PROCEDURE — 80053 COMPREHEN METABOLIC PANEL: CPT | Mod: PO

## 2020-01-20 NOTE — TELEPHONE ENCOUNTER
Attempted to call pt, regarding appt on 1/27/2020.  Need to change time of appt r/t education block.  No answer.  Lm for rtn call w office number provided.

## 2020-01-21 ENCOUNTER — TELEPHONE (OUTPATIENT)
Dept: HEMATOLOGY/ONCOLOGY | Facility: CLINIC | Age: 70
End: 2020-01-21

## 2020-01-21 ENCOUNTER — TELEPHONE (OUTPATIENT)
Dept: FAMILY MEDICINE | Facility: CLINIC | Age: 70
End: 2020-01-21

## 2020-01-21 DIAGNOSIS — J44.9 CHRONIC OBSTRUCTIVE PULMONARY DISEASE, UNSPECIFIED COPD TYPE: Primary | ICD-10-CM

## 2020-01-21 NOTE — TELEPHONE ENCOUNTER
----- Message from Laura Zaragoza sent at 1/21/2020 11:40 AM CST -----  Contact: self [phone]  Patient is requesting a call back from the nurse stated he need order to schedule PFT.    Please call the patient upon request at phone number [phone].

## 2020-01-21 NOTE — TELEPHONE ENCOUNTER
----- Message from Jaz Rand LPN sent at 1/20/2020  4:56 PM CST -----  Contact: Patient      ----- Message -----  From: Zarina Alanis  Sent: 1/20/2020   4:42 PM CST  To: Robbin Puga Staff (Hem/Onc)    Type:  Patient Returning Call    Who Called:  Patient  Who Left Message for Patient:  Jaz  Does the patient know what this is regarding?:  rescheduling  Best Call Back Number:    Additional Information:  Call to pod, no answer. Patient advised the appointment time change was okay with him.

## 2020-01-27 ENCOUNTER — OFFICE VISIT (OUTPATIENT)
Dept: HEMATOLOGY/ONCOLOGY | Facility: CLINIC | Age: 70
End: 2020-01-27
Payer: MEDICARE

## 2020-01-27 VITALS
BODY MASS INDEX: 24.68 KG/M2 | DIASTOLIC BLOOD PRESSURE: 76 MMHG | TEMPERATURE: 98 F | WEIGHT: 172.38 LBS | RESPIRATION RATE: 18 BRPM | OXYGEN SATURATION: 99 % | HEART RATE: 74 BPM | HEIGHT: 70 IN | SYSTOLIC BLOOD PRESSURE: 124 MMHG

## 2020-01-27 DIAGNOSIS — C91.10 CLL (CHRONIC LYMPHOCYTIC LEUKEMIA): Primary | ICD-10-CM

## 2020-01-27 PROCEDURE — 1101F PR PT FALLS ASSESS DOC 0-1 FALLS W/OUT INJ PAST YR: ICD-10-PCS | Mod: CPTII,S$GLB,, | Performed by: NURSE PRACTITIONER

## 2020-01-27 PROCEDURE — 99213 OFFICE O/P EST LOW 20 MIN: CPT | Mod: S$GLB,,, | Performed by: NURSE PRACTITIONER

## 2020-01-27 PROCEDURE — 3074F SYST BP LT 130 MM HG: CPT | Mod: CPTII,S$GLB,, | Performed by: NURSE PRACTITIONER

## 2020-01-27 PROCEDURE — 1126F AMNT PAIN NOTED NONE PRSNT: CPT | Mod: S$GLB,,, | Performed by: NURSE PRACTITIONER

## 2020-01-27 PROCEDURE — 1159F PR MEDICATION LIST DOCUMENTED IN MEDICAL RECORD: ICD-10-PCS | Mod: S$GLB,,, | Performed by: NURSE PRACTITIONER

## 2020-01-27 PROCEDURE — 99999 PR PBB SHADOW E&M-EST. PATIENT-LVL IV: ICD-10-PCS | Mod: PBBFAC,,, | Performed by: NURSE PRACTITIONER

## 2020-01-27 PROCEDURE — 99499 UNLISTED E&M SERVICE: CPT | Mod: S$GLB,,, | Performed by: NURSE PRACTITIONER

## 2020-01-27 PROCEDURE — 99999 PR PBB SHADOW E&M-EST. PATIENT-LVL IV: CPT | Mod: PBBFAC,,, | Performed by: NURSE PRACTITIONER

## 2020-01-27 PROCEDURE — 3078F PR MOST RECENT DIASTOLIC BLOOD PRESSURE < 80 MM HG: ICD-10-PCS | Mod: CPTII,S$GLB,, | Performed by: NURSE PRACTITIONER

## 2020-01-27 PROCEDURE — 1126F PR PAIN SEVERITY QUANTIFIED, NO PAIN PRESENT: ICD-10-PCS | Mod: S$GLB,,, | Performed by: NURSE PRACTITIONER

## 2020-01-27 PROCEDURE — 1159F MED LIST DOCD IN RCRD: CPT | Mod: S$GLB,,, | Performed by: NURSE PRACTITIONER

## 2020-01-27 PROCEDURE — 3078F DIAST BP <80 MM HG: CPT | Mod: CPTII,S$GLB,, | Performed by: NURSE PRACTITIONER

## 2020-01-27 PROCEDURE — 99499 RISK ADDL DX/OHS AUDIT: ICD-10-PCS | Mod: S$GLB,,, | Performed by: NURSE PRACTITIONER

## 2020-01-27 PROCEDURE — 1101F PT FALLS ASSESS-DOCD LE1/YR: CPT | Mod: CPTII,S$GLB,, | Performed by: NURSE PRACTITIONER

## 2020-01-27 PROCEDURE — 3074F PR MOST RECENT SYSTOLIC BLOOD PRESSURE < 130 MM HG: ICD-10-PCS | Mod: CPTII,S$GLB,, | Performed by: NURSE PRACTITIONER

## 2020-01-27 PROCEDURE — 99213 PR OFFICE/OUTPT VISIT, EST, LEVL III, 20-29 MIN: ICD-10-PCS | Mod: S$GLB,,, | Performed by: NURSE PRACTITIONER

## 2020-01-27 NOTE — PROGRESS NOTES
HISTORY OF PRESENT ILLNESS:  The patient is a 69-year-old white gentleman known to Dr. Solares for mild anemia and chronic phase CLL.      He presents to the clinic today late to follow for interval evaluation.  He reports a recent URI in which he presented to the ER earlier this month.  A CXR revealed worsening COPD.  He was given prednisone & advised to have PFT's done with Pulmonology.  He did not need the antibiotic they provided him with.  Another visit to the ER in October/2019 for right elbow cellulitis.  He denies any recurring illness, fevers, chills, drenching night sweats, painful lymphadenopathy, unexplained weight loss, rashes, pruritus, etc.  No new complaints or pertinent finding on a 14-point review of systems.    PHYSICAL EXAMINATION:    GENERAL:  Well-developed, well-nourished, elderly white gentleman in no acute distress.  Alert & oriented x 3.  VITAL SIGNS:  Weight:  Gain of 1 pound in 7 months  Wt Readings from Last 3 Encounters:   01/27/20 78.2 kg (172 lb 6.4 oz)   01/13/20 74.4 kg (164 lb 0.4 oz)   12/16/19 77.9 kg (171 lb 11.8 oz)     Temp Readings from Last 3 Encounters:   01/27/20 97.8 °F (36.6 °C) (Oral)   01/13/20 98.6 °F (37 °C) (Oral)   12/16/19 98.8 °F (37.1 °C) (Oral)     BP Readings from Last 3 Encounters:   01/27/20 124/76   01/13/20 125/73   12/16/19 134/74     Pulse Readings from Last 3 Encounters:   01/27/20 74   01/13/20 91   12/16/19 72     HEENT:  Normocephalic, atraumatic.  Oral mucosa pink and moist.  Lips without lesions.  Tongue midline.  Oropharynx clear.  Nonicteric sclerae.   NECK:  Supple, no adenopathy.  No carotid bruits, thyromegaly or thyroid nodule.  HEART:  Regular rate and rhythm without murmur, gallop or rub.                LUNGS:  Diminished breath sounds to bases.  Normal respiratory effort.       ABDOMEN:  Soft, nontender, nondistended with positive normoactive bowel sounds, no hepatosplenomegaly.    EXTREMITIES:  No cyanosis, clubbing or edema.  Distal  pulses are intact.          AXILLAE AND GROIN:  No palpable pathologic lymphadenopathy is appreciated.        SKIN:  Intact/turgor normal                                                       NEUROLOGIC:  Cranial nerves II-XII grossly intact.  Motor:  Good muscle bulk and tone.  Strength/sensory 5/5 throughout.  Gait stable.     LABORATORY:    Lab Results   Component Value Date    WBC 13.14 (H) 01/20/2020    RBC 4.35 (L) 01/20/2020    HGB 11.9 (L) 01/20/2020    HCT 37.6 (L) 01/20/2020    MCV 86 01/20/2020    MCH 27.4 01/20/2020    MCHC 31.6 (L) 01/20/2020    RDW 14.8 (H) 01/20/2020     01/20/2020    MPV 9.2 01/20/2020    GRAN CANCELED 01/20/2020    GRAN 20.0 (L) 01/20/2020    LYMPH CANCELED 01/20/2020    LYMPH 79.0 (H) 01/20/2020    MONO CANCELED 01/20/2020    MONO 1.0 (L) 01/20/2020    EOS CANCELED 01/20/2020    BASO CANCELED 01/20/2020    EOSINOPHIL 0.0 01/20/2020    BASOPHIL 0.0 01/20/2020     CMP  Sodium   Date Value Ref Range Status   01/20/2020 134 (L) 136 - 145 mmol/L Final     Potassium   Date Value Ref Range Status   01/20/2020 4.2 3.5 - 5.1 mmol/L Final     Chloride   Date Value Ref Range Status   01/20/2020 97 95 - 110 mmol/L Final     CO2   Date Value Ref Range Status   01/20/2020 28 23 - 29 mmol/L Final     Glucose   Date Value Ref Range Status   01/20/2020 123 (H) 70 - 110 mg/dL Final     BUN, Bld   Date Value Ref Range Status   01/20/2020 17 8 - 23 mg/dL Final     Creatinine   Date Value Ref Range Status   01/20/2020 1.0 0.5 - 1.4 mg/dL Final     Calcium   Date Value Ref Range Status   01/20/2020 9.2 8.7 - 10.5 mg/dL Final     Total Protein   Date Value Ref Range Status   01/20/2020 7.7 6.0 - 8.4 g/dL Final     Albumin   Date Value Ref Range Status   01/20/2020 3.5 3.5 - 5.2 g/dL Final     Total Bilirubin   Date Value Ref Range Status   01/20/2020 0.7 0.1 - 1.0 mg/dL Final     Comment:     For infants and newborns, interpretation of results should be based  on gestational age, weight and in  agreement with clinical  observations.  Premature Infant recommended reference ranges:  Up to 24 hours.............<8.0 mg/dL  Up to 48 hours............<12.0 mg/dL  3-5 days..................<15.0 mg/dL  6-29 days.................<15.0 mg/dL       Alkaline Phosphatase   Date Value Ref Range Status   01/20/2020 62 55 - 135 U/L Final     AST ([place])   Date Value Ref Range Status   04/04/2016 16 (L) 17 - 59 U/L Final     AST   Date Value Ref Range Status   01/20/2020 17 10 - 40 U/L Final     ALT   Date Value Ref Range Status   01/20/2020 23 10 - 44 U/L Final     Anion Gap   Date Value Ref Range Status   01/20/2020 9 8 - 16 mmol/L Final     eGFR if    Date Value Ref Range Status   01/20/2020 >60 >60 mL/min/1.73 m^2 Final     eGFR if non    Date Value Ref Range Status   01/20/2020 >60 >60 mL/min/1.73 m^2 Final     Comment:     Calculation used to obtain the estimated glomerular filtration  rate (eGFR) is the CKD-EPI equation.           [id number]: 4.9 (4.6, 4.3)    CXR dated 01/13/2020:  Impression:  Pulmonary hyperinflation and mild chronic interstitial changes.  No lobar type consolidation or evidence of acute cardiac decompensation.    IMPRESSION:  1.  Chronic phase chronic lymphocytic leukemia, stable.  2.  Treated iron deficiency anemia - asymptomatic; continues to take oral iron with Vitamin C  3.  HTN - following digital monitoring; at goal; follow with Dr. Orr  4.  COPD - patient to make an appointment with [facility] & obtain PFT's  5.  URI - recent; responsive to prednisone & Tessalon Perles  6.  Hyponatremia - mild; asymptomatic.    PLAN:    1.  In re# 1, Return to clinic in six months for surveillance with interval CBC, CMP, LDH and beta-2 microglobulin prior.  2.  RICK - iron studies over 2 years ago, declining hemoglobin:  To note:  Patient declined iron studies and opted to start his own oral iron with Vitamin C.    Assessment/plan  reviewed and approved by Dr. Solares.

## 2020-02-07 ENCOUNTER — PATIENT MESSAGE (OUTPATIENT)
Dept: FAMILY MEDICINE | Facility: CLINIC | Age: 70
End: 2020-02-07

## 2020-02-07 NOTE — TELEPHONE ENCOUNTER
Call placed to patient and offered same day appointment, which he declined.   Advised that he seek care at urgent care as it is the weekend and we do not want him to suffer all weekend and come in on Monday. Address given to closest location.     Voiced understanding.

## 2020-02-12 ENCOUNTER — TELEPHONE (OUTPATIENT)
Dept: FAMILY MEDICINE | Facility: CLINIC | Age: 70
End: 2020-02-12

## 2020-02-12 NOTE — TELEPHONE ENCOUNTER
----- Message from Jeni Marina sent at 2/11/2020  8:46 AM CST -----  Regarding: Non-compliant digital medicine patient  Dr. Orr,    Your patient Mr. Saurav Bahena is enrolled in the [facility] Digital Medicine program. Unfortunately, we have been unable to maintain contact with him to continue monitoring, despite our best efforts.     If you still believe this patient is a good candidate for digital medicine, please reach out to him to encourage participation. If going forward we are unable to communicate with him, we will unfortunately have to discharge him from the program.    Please let me know if you have any questions.    Sincerely,  Jeni Marina, MPH  Digital Medicine Health   [phone]

## 2020-02-13 ENCOUNTER — OFFICE VISIT (OUTPATIENT)
Dept: URGENT CARE | Facility: CLINIC | Age: 70
End: 2020-02-13
Payer: MEDICARE

## 2020-02-13 VITALS
OXYGEN SATURATION: 97 % | HEART RATE: 71 BPM | TEMPERATURE: 97 F | RESPIRATION RATE: 16 BRPM | WEIGHT: 172 LBS | HEIGHT: 70 IN | BODY MASS INDEX: 24.62 KG/M2 | DIASTOLIC BLOOD PRESSURE: 78 MMHG | SYSTOLIC BLOOD PRESSURE: 150 MMHG

## 2020-02-13 DIAGNOSIS — R05.9 COUGH: Primary | ICD-10-CM

## 2020-02-13 DIAGNOSIS — R09.81 NASAL CONGESTION: ICD-10-CM

## 2020-02-13 PROCEDURE — 99214 OFFICE O/P EST MOD 30 MIN: CPT | Mod: S$GLB,,, | Performed by: PHYSICIAN ASSISTANT

## 2020-02-13 PROCEDURE — 99214 PR OFFICE/OUTPT VISIT, EST, LEVL IV, 30-39 MIN: ICD-10-PCS | Mod: S$GLB,,, | Performed by: PHYSICIAN ASSISTANT

## 2020-02-13 RX ORDER — FLUTICASONE PROPIONATE 50 MCG
1 SPRAY, SUSPENSION (ML) NASAL 2 TIMES DAILY
Qty: 16 G | Refills: 0 | Status: SHIPPED | OUTPATIENT
Start: 2020-02-13 | End: 2020-07-22

## 2020-02-13 RX ORDER — BENZONATATE 100 MG/1
100 CAPSULE ORAL EVERY 6 HOURS PRN
Qty: 60 CAPSULE | Refills: 1 | Status: SHIPPED | OUTPATIENT
Start: 2020-02-13 | End: 2020-06-20

## 2020-02-13 RX ORDER — PREDNISONE 20 MG/1
20 TABLET ORAL DAILY
Qty: 5 TABLET | Refills: 0 | Status: SHIPPED | OUTPATIENT
Start: 2020-02-13 | End: 2020-02-18

## 2020-02-13 RX ORDER — ALBUTEROL SULFATE 90 UG/1
2 AEROSOL, METERED RESPIRATORY (INHALATION) EVERY 6 HOURS PRN
Qty: 18 G | Refills: 0 | Status: SHIPPED | OUTPATIENT
Start: 2020-02-13 | End: 2022-03-08 | Stop reason: SDUPTHER

## 2020-02-13 RX ORDER — AZELASTINE 1 MG/ML
1 SPRAY, METERED NASAL 2 TIMES DAILY
Qty: 30 ML | Refills: 0 | Status: SHIPPED | OUTPATIENT
Start: 2020-02-13 | End: 2020-07-22

## 2020-02-13 NOTE — PROGRESS NOTES
"Subjective:       Patient ID: Saurav Bahena is a 69 y.o. male.    Vitals:  height is 5' 10" (1.778 m) and weight is 78 kg (172 lb). His temperature is 97 °F (36.1 °C). His blood pressure is 150/78 (abnormal) and his pulse is 71. His respiration is 16 and oxygen saturation is 97%.     Chief Complaint: Sinus Problem    Pt c/o sinus symptoms x 5 weeks . Pt was seen 1-13-20 for same reason and is currently taking tessalon pearls and has a z pack on standby.. Pt is scheduled for PFT early March.     Sinus Problem   This is a new problem. The current episode started more than 1 month ago. The problem is unchanged. There has been no fever. Associated symptoms include congestion and sinus pressure. Pertinent negatives include no chills, coughing, diaphoresis, ear pain, shortness of breath or sore throat. Past treatments include oral decongestants. The treatment provided mild relief.       Constitution: Positive for fatigue. Negative for chills, sweating and fever.   HENT: Positive for congestion and sinus pressure. Negative for ear pain, sinus pain, sore throat and voice change.    Neck: Negative for painful lymph nodes.   Eyes: Negative for eye redness.   Respiratory: Negative for chest tightness, cough, sputum production, bloody sputum, COPD, shortness of breath, stridor, wheezing and asthma.    Gastrointestinal: Negative for nausea and vomiting.   Musculoskeletal: Negative for muscle ache.   Skin: Negative for rash.   Allergic/Immunologic: Positive for seasonal allergies. Negative for asthma.   Hematologic/Lymphatic: Negative for swollen lymph nodes.       Objective:      Physical Exam   Constitutional: He is oriented to person, place, and time. He appears well-developed and well-nourished. He is cooperative.  Non-toxic appearance. He does not have a sickly appearance. He does not appear ill. No distress.   HENT:   Head: Normocephalic and atraumatic.   Right Ear: Hearing, tympanic membrane, external ear and ear " canal normal.   Left Ear: Hearing, tympanic membrane, external ear and ear canal normal.   Nose: Nose normal. No mucosal edema, rhinorrhea or nasal deformity. No epistaxis. Right sinus exhibits no maxillary sinus tenderness and no frontal sinus tenderness. Left sinus exhibits no maxillary sinus tenderness and no frontal sinus tenderness.   Mouth/Throat: Uvula is midline, oropharynx is clear and moist and mucous membranes are normal. No trismus in the jaw. Normal dentition. No uvula swelling. No oropharyngeal exudate, posterior oropharyngeal edema or posterior oropharyngeal erythema.   Eyes: Conjunctivae and lids are normal. No scleral icterus.   Neck: Trachea normal, full passive range of motion without pain and phonation normal. Neck supple. No neck rigidity. No edema and no erythema present.   Cardiovascular: Normal rate, regular rhythm, normal heart sounds, intact distal pulses and normal pulses.   Pulmonary/Chest: Effort normal. No respiratory distress. He has no decreased breath sounds. He has wheezes (faint) in the right lower field and the left lower field. He has no rhonchi.   Abdominal: Normal appearance.   Musculoskeletal: Normal range of motion. He exhibits no edema or deformity.   Neurological: He is alert and oriented to person, place, and time. He exhibits normal muscle tone. Coordination normal.   Skin: Skin is warm, dry, intact, not diaphoretic, not pale and no rash.   Psychiatric: He has a normal mood and affect. His speech is normal and behavior is normal. Judgment and thought content normal. Cognition and memory are normal.   Nursing note and vitals reviewed.        Assessment:       1. Cough    2. Nasal congestion        Plan:         Cough    Nasal congestion    Other orders  -     fluticasone propionate (FLONASE ALLERGY RELIEF) 50 mcg/actuation nasal spray; 1 spray (50 mcg total) by Each Nostril route 2 (two) times daily.  Dispense: 16 g; Refill: 0  -     azelastine (ASTELIN) 137 mcg (0.1 %)  nasal spray; 1 spray (137 mcg total) by Nasal route 2 (two) times daily.  Dispense: 30 mL; Refill: 0  -     predniSONE (DELTASONE) 20 MG tablet; Take 1 tablet (20 mg total) by mouth once daily. for 5 days  Dispense: 5 tablet; Refill: 0  -     benzonatate (TESSALON PERLES) 100 MG capsule; Take 1 capsule (100 mg total) by mouth every 6 (six) hours as needed.  Dispense: 60 capsule; Refill: 1  -     albuterol (PROVENTIL HFA) 90 mcg/actuation inhaler; Inhale 2 puffs into the lungs every 6 (six) hours as needed for Wheezing. Rescue  Dispense: 18 g; Refill: 0     Discussed post viral syndrome versus new virus versus allergies.  Discussed risks versus benefits of steroids and patient would like to proceed with small dose over the next couple of days.  Discussed if develops any fever, shortness of breath or painful breathing should either return to clinic or see PCP.  Patient states understanding and agrees with plan.    You must understand that you've received an Urgent Care treatment only and that you may be released before all your medical problems are known or treated. You, the patient, will arrange for follow up care as instructed.  Follow up with your PCP or specialty clinic as directed in the next 1-2 weeks if not improved or as needed.  You can call [phone] to schedule an appointment with the appropriate provider.  If your condition worsens we recommend that you receive another evaluation at the emergency room immediately or contact your primary medical clinics after hours call service to discuss your concerns.  Please return here or go to the Emergency Department for any concerns or worsening of condition.

## 2020-02-13 NOTE — PATIENT INSTRUCTIONS

## 2020-02-28 NOTE — PROGRESS NOTES
Digital Medicine: Health  Follow-Up        Follow Up  Follow-up reason(s): reading review      Readings are missing.   patient reminder needed.Patient reports that he has been manually checking his BP, and he didn't think that manually entered readings would submit. I instructed him to use the [employer] robb to enter in readings, and to make sure he is logged into his [employer] robb as well. He will try this and see if readings are uploading. WCB to confirm      INTERVENTION(S)  encouragement/support    PLAN  additional monitoring needed      There are no preventive care reminders to display for this patient.    Last 5 Patient Entered Readings                                      Current 30 Day Average:      Recent Readings 12/16/2019 12/2/2019 12/2/2019 12/1/2019 11/30/2019    SBP (mmHg) 143 153 151 153 125    DBP (mmHg) 72 86 88 83 71    Pulse 80 63 69 76 72                  Screenings    SDOH

## 2020-03-11 DIAGNOSIS — N40.0 BENIGN PROSTATIC HYPERPLASIA, UNSPECIFIED WHETHER LOWER URINARY TRACT SYMPTOMS PRESENT: ICD-10-CM

## 2020-03-11 DIAGNOSIS — I10 HYPERTENSION, UNSPECIFIED TYPE: ICD-10-CM

## 2020-03-11 NOTE — TELEPHONE ENCOUNTER
----- Message from Zarina Alanis sent at 3/11/2020 11:27 AM CDT -----  Contact: Patient  Type:  RX Refill Request    Who Called:  Patient  Refill or New Rx:  New RX  RX Name and Strength:  losartan-hydrochlorothiazide 100-12.5 mg (HYZAAR) 100-12.5 mg Tab  How is the patient currently taking it? (ex. 1XDay):  Take 1 tablet by mouth every morning. - Oral  Is this a 30 day or 90 day RX:  90 tablet  Preferred Pharmacy with phone number:    [facility] - [address]  [address] [place] [address]  Phone: [phone] Fax: [phone]  Local or Mail Order:  mail  Ordering Provider:  Robbie Orr MD  Best Call Back Number:  [phone]      Type:  RX Refill Request    Who Called:  Pat ient  Refill or New Rx:  New RX  RX Name and Strength:  tamsulosin (FLOMAX) 0.4 mg Cap  How is the patient currently taking it? (ex. 1XDay):  Take 2 capsules (0.8 mg total) by mouth once daily. - Oral  Is this a 30 day or 90 day RX:  90 day  Preferred Pharmacy with phone number:    [facility] - [address] [place] [address]  Phone: [phone] Fax: [phone]  Local or Mail Order:  mail  Ordering Provider:  Robbie Orr MD  Best Call Back Number:  [phone]  Additional Information: Patient advised the dosage on this medication should be changed to reflect that he takes two once a day and for a 90 day supply. Would also like a call back from the nurse about getting a pneumonia shot possibly

## 2020-03-16 NOTE — PROGRESS NOTES
Refill Authorization Note     is requesting a refill authorization.    Brief assessment and rationale for refill: REVIEW: recent ed visit          Medication Therapy Plan: will pend tamsulosin in addition to losartan as it is requested it pt note to [facility], recent ed visit, please review        Pharmacist Review Requested: Yes                     Comments:   Refill Center Care Gap Closure protocols temporarily suspended.   Requested Prescriptions   Pending Prescriptions Disp Refills    losartan-hydrochlorothiazide 100-12.5 mg (HYZAAR) 100-12.5 mg Tab 90 tablet 1     Sig: Take 1 tablet by mouth every morning.       Cardiovascular: ARB + Diuretic Combos Passed - 3/16/2020  5:03 PM        Passed - Patient is at least 18 years old        Passed - Last BP in normal range within 360 days.     BP Readings from Last 3 Encounters:   03/02/20 130/80   02/13/20 (!) 150/78   01/27/20 124/76              Passed - Office visit in past 12 months or future 90 days.     Recent Outpatient Visits            2 weeks ago Chronic obstructive pulmonary disease, unspecified COPD type    [facility] Ottoniel Fung MD    1 month ago Cough    [facility] MARY Blair    1 month ago CLL (chronic lymphocytic leukemia)    [facility] Edd Siegel NP    3 months ago Preventative health care    [facility] Robbie Orr MD    5 months ago Peroneal tendonitis of right lower extremity    [facility] Robert Osborn, KAYKAY          Future Appointments              In 2 weeks [facility] CT1 LIMIT 500 LBS [facility]                Passed - K in normal range and within 180 days     POC Potassium   Date Value Ref Range Status   02/11/2019 3.9 3.6 - 5.1 mmol/L Final   02/07/2019 4.0 3.6 - 5.1 mmol/L Final     Potassium   Date Value Ref Range Status   01/20/2020 4.2 3.5 - 5.1 mmol/L Final   12/09/2019 4.4 3.5 -  5.1 mmol/L Final   06/03/2019 4.3 3.5 - 5.1 mmol/L Final              Passed - Na is between 130 and 148 and within 180 days     POC Sodium   Date Value Ref Range Status   02/11/2019 135 128 - 145 mmol/L Final   02/07/2019 134 128 - 145 mmol/L Final     Sodium   Date Value Ref Range Status   01/20/2020 134 (L) 136 - 145 mmol/L Final   12/09/2019 138 136 - 145 mmol/L Final   06/03/2019 136 136 - 145 mmol/L Final              Passed - Cr is 1.3 or below and within 180 days     Creatinine   Date Value Ref Range Status   01/20/2020 1.0 0.5 - 1.4 mg/dL Final   12/09/2019 0.9 0.5 - 1.4 mg/dL Final   06/03/2019 1.0 0.5 - 1.4 mg/dL Final     POC Creatinine   Date Value Ref Range Status   02/11/2019 0.8 0.6 - 1.2 mg/dL Final   02/07/2019 1.1 0.6 - 1.2 mg/dL Final              Passed - Ca in normal range and within 360 days     Calcium   Date Value Ref Range Status   01/20/2020 9.2 8.7 - 10.5 mg/dL Final   12/09/2019 9.1 8.7 - 10.5 mg/dL Final   06/03/2019 9.2 8.7 - 10.5 mg/dL Final     Calcium, POC   Date Value Ref Range Status   02/11/2019 9.2 8.0 - 10.3 mg/dL Final   02/07/2019 9.6 8.0 - 10.3 mg/dL Final              Passed - eGFR within 180 days     POC eGFR   Date Value Ref Range Status   02/11/2019 >60 61 - 2,000 mL/min Final   02/07/2019 >60 61 - 2,000 mL/min Final     eGFR if non    Date Value Ref Range Status   01/20/2020 >60 >60 mL/min/1.73 m^2 Final     Comment:     Calculation used to obtain the estimated glomerular filtration  rate (eGFR) is the CKD-EPI equation.      12/09/2019 >60.0 >60 mL/min/1.73 m^2 Final     Comment:     Calculation used to obtain the estimated glomerular filtration  rate (eGFR) is the CKD-EPI equation.      06/03/2019 >60 >60 mL/min/1.73 m^2 Final     Comment:     Calculation used to obtain the estimated glomerular filtration  rate (eGFR) is the CKD-EPI equation.        eGFR if    Date Value Ref Range Status   01/20/2020 >60 >60 mL/min/1.73 m^2 Final    12/09/2019 >60.0 >60 mL/min/1.73 m^2 Final   06/03/2019 >60 >60 mL/min/1.73 m^2 Final             tamsulosin (FLOMAX) 0.4 mg Cap 180 capsule 0     Sig: Take 2 capsules (0.8 mg total) by mouth once daily.       Urology: Alpha-Adrenergic Blocker Passed - 3/16/2020  5:03 PM        Passed - Patient is at least 18 years old        Passed - Last BP in normal range within 360 days.     BP Readings from Last 3 Encounters:   03/02/20 130/80   02/13/20 (!) 150/78   01/27/20 124/76              Passed - Office visit in past 12 months or future 90 days.     Recent Outpatient Visits            2 weeks ago Chronic obstructive pulmonary disease, unspecified COPD type    [facility] Ottoniel Fung MD    1 month ago Cough    [facility] MARY Caal    1 month ago CLL (chronic lymphocytic leukemia)    [facility] Edd Siegel NP    3 months ago Preventative health care    [facility] Robbie Orr MD    5 months ago Peroneal tendonitis of right lower extremity    [facility] Robert Osborn, DPM          Future Appointments              In 2 weeks [facility] CT1 LIMIT 500 LBS [facility]                Passed - Prostate Cancer is not on problem list         Appointments  past 12m or future 3m with PCP    Date Provider   Last Visit   12/16/2019 Robbie Orr MD   Next Visit   Visit date not found Robbie Orr MD   .  ED visits in past 90 days: 1       Note composed:5:03 PM 03/16/2020

## 2020-03-17 ENCOUNTER — PATIENT OUTREACH (OUTPATIENT)
Dept: OTHER | Facility: OTHER | Age: 70
End: 2020-03-17

## 2020-03-17 RX ORDER — LOSARTAN POTASSIUM AND HYDROCHLOROTHIAZIDE 12.5; 1 MG/1; MG/1
1 TABLET ORAL EVERY MORNING
Qty: 90 TABLET | Refills: 3 | Status: SHIPPED | OUTPATIENT
Start: 2020-03-17 | End: 2020-12-23

## 2020-03-17 RX ORDER — TAMSULOSIN HYDROCHLORIDE 0.4 MG/1
0.8 CAPSULE ORAL DAILY
Qty: 180 CAPSULE | Refills: 3 | Status: SHIPPED | OUTPATIENT
Start: 2020-03-17 | End: 2021-02-23

## 2020-03-17 NOTE — TELEPHONE ENCOUNTER
Please see the following assessment. This refill request still requires some action on your part. Pt visited ED 1/20 for URI. Also visited urgent care 2/20 for sinus problem. Defer to you. Thank you.

## 2020-03-17 NOTE — PROGRESS NOTES
Digital Medicine: Clinician Follow-Up    Called patient for BP follow up. He is doing well today with no complaints. He says that he has been manually entering BP readings. He continues to do well on losartan/ HCTZ.     The history is provided by the patient.     Follow Up  Follow-up reason(s): reading review and routine education      Routine Education Topics: eating patterns and physical activity  Patient's 30-day BP average is above goal of <130/<80 mmHg.       INTERVENTION(S)  reviewed appropriate dose schedule, recommended diet modifications and recommended physical activity    PLAN  patient verbalizes understanding and continue monitoring    Continue current medications.       There are no preventive care reminders to display for this patient.    Last 5 Patient Entered Readings                                      Current 30 Day Average: 130/75     Recent Readings 3/16/2020 3/11/2020 3/11/2020 3/1/2020 12/16/2019    SBP (mmHg) 135 132 132 123 143    DBP (mmHg) 76 83 78 63 72    Pulse - 84 - 84 80        Hypertension Medications     losartan-hydrochlorothiazide 100-12.5 mg (HYZAAR) 100-12.5 mg Tab Take 1 tablet by mouth every morning.                 Screenings

## 2020-04-06 ENCOUNTER — TELEPHONE (OUTPATIENT)
Dept: FAMILY MEDICINE | Facility: CLINIC | Age: 70
End: 2020-04-06

## 2020-04-06 NOTE — TELEPHONE ENCOUNTER
----- Message from Princess DONNY Salomon sent at 4/6/2020  4:36 PM CDT -----  Contact: pt  Type:  RX Refill Request    Who Called:  Patient  Refill or New Rx:  New  RX Name and Strength:  Hydroxy-chloquine  Preferred Pharmacy with phone number:    [id number] [place] [facility] [address]  [address] [facility] [place] [address]  Phone: [phone] Fax: [phone]  Local or Mail Order:  Local  Ordering Provider:  Dr. Dominga Colvin Call Back Number:    Additional Information:  Requesting a call in regards to patient would like the provider to prescribe the rx above

## 2020-04-06 NOTE — TELEPHONE ENCOUNTER
Pt returned call, he was watching governor in a news conference and they all were talking about the medication plaquenil and how it is being given to patients with covid19. He is wanting to know your opinion on him taking it as a preventative? Please advise

## 2020-04-07 NOTE — TELEPHONE ENCOUNTER
It is not available for outpatient use in Covid-19 yet. At this time it is only available to be used experimentally in hospitalized patients.

## 2020-04-25 ENCOUNTER — PATIENT MESSAGE (OUTPATIENT)
Dept: ADMINISTRATIVE | Facility: OTHER | Age: 70
End: 2020-04-25

## 2020-04-29 ENCOUNTER — OFFICE VISIT (OUTPATIENT)
Dept: FAMILY MEDICINE | Facility: CLINIC | Age: 70
End: 2020-04-29
Payer: MEDICARE

## 2020-04-29 DIAGNOSIS — R53.83 FATIGUE, UNSPECIFIED TYPE: Primary | ICD-10-CM

## 2020-04-29 DIAGNOSIS — B34.9 VIRAL SYNDROME: ICD-10-CM

## 2020-04-29 PROCEDURE — 99441 PR PHYSICIAN TELEPHONE EVALUATION 5-10 MIN: ICD-10-PCS | Mod: 95,,, | Performed by: FAMILY MEDICINE

## 2020-04-29 PROCEDURE — 99441 PR PHYSICIAN TELEPHONE EVALUATION 5-10 MIN: CPT | Mod: 95,,, | Performed by: FAMILY MEDICINE

## 2020-04-29 PROCEDURE — U0002 COVID-19 LAB TEST NON-CDC: HCPCS

## 2020-04-29 NOTE — LETTER
April 29, 2020    Saurav Bahena  [address] [place] [address]         [facility]  [address]  [facility] [place] [facility] [address]  Phone: [phone]  Fax: [phone] April 29, 2020     Patient: Saurav Bahena   YOB: 1950   Date of Visit: 4/29/2020       To Whom It May Concern:    It is my medical opinion that Saurav Bahena should be allowed to continue to work from home until the governor's stay-at-home order is lifted due to his chronic medication conditions and increased risk related to Covid 19.    If you have any questions or concerns, please don't hesitate to call.    Sincerely,          Robbie Orr MD

## 2020-04-29 NOTE — PROGRESS NOTES
Established Patient - Audio Only Telehealth Visit     The patient location is: home  The chief complaint leading to consultation is: fatigue  Visit type: Virtual visit with audio only (telephone)     The reason for the audio only service rather than synchronous audio and video virtual visit was related to technical difficulties or patient preference/necessity.     Each patient to whom I provide medical services by telemedicine is:  (1) informed of the relationship between the physician and patient and the respective role of any other health care provider with respect to management of the patient; and (2) notified that they may decline to receive medical services by telemedicine and may withdraw from such care at any time. Patient verbally consented to receive this service via voice-only telephone call.       HPI:   C/o fatigue, lethargic, achy, myalgias, rhinorrhea for the past week  Onset yesterday PM with low-grade fever of 100.5  He did take aspirin and this was affective to lower the fever    BP has been 130/70s     Fatigue, unspecified type    Viral syndrome  -     COVID-19 Routine Screening     covid 19 screen  Patient advised to pursue rest, increase fluids, take OTC multi-symptom cold relief medication of choice, and exercise contact precautions.  Reassurance regarding likely viral symptoms  Letter to allow him to continue to work from home until stay-at-home order is lifted due to his increased risk related to underlying health issues.                        This service was not originating from a related E/M service provided within the previous 7 days nor will  to an E/M service or procedure within the next 24 hours or my soonest available appointment.  Prevailing standard of care was able to be met in this audio-only visit.

## 2020-04-30 ENCOUNTER — LAB VISIT (OUTPATIENT)
Dept: FAMILY MEDICINE | Facility: CLINIC | Age: 70
End: 2020-04-30
Payer: MEDICARE

## 2020-05-01 ENCOUNTER — TELEPHONE (OUTPATIENT)
Dept: FAMILY MEDICINE | Facility: CLINIC | Age: 70
End: 2020-05-01

## 2020-05-01 ENCOUNTER — PATIENT MESSAGE (OUTPATIENT)
Dept: FAMILY MEDICINE | Facility: CLINIC | Age: 70
End: 2020-05-01

## 2020-05-01 LAB — SARS-COV-2 RNA RESP QL NAA+PROBE: NOT DETECTED

## 2020-05-01 NOTE — TELEPHONE ENCOUNTER
Please inform patient his Covid 19 test was negative.  Patient advised to pursue rest, increase fluids, take OTC multi-symptom cold relief medication of choice, and exercise contact precautions.

## 2020-05-05 ENCOUNTER — PATIENT MESSAGE (OUTPATIENT)
Dept: ADMINISTRATIVE | Facility: HOSPITAL | Age: 70
End: 2020-05-05

## 2020-05-07 ENCOUNTER — HOSPITAL ENCOUNTER (OUTPATIENT)
Dept: RADIOLOGY | Facility: HOSPITAL | Age: 70
Discharge: HOME | End: 2020-05-07
Attending: FAMILY MEDICINE
Payer: MEDICARE

## 2020-05-07 DIAGNOSIS — Z12.9 SCREENING FOR CANCER: ICD-10-CM

## 2020-05-07 PROCEDURE — 71250 CT THORAX DX C-: CPT | Mod: TC,PO

## 2020-05-07 PROCEDURE — 71250 CT CHEST WITHOUT CONTRAST: ICD-10-PCS | Mod: 26,,, | Performed by: RADIOLOGY

## 2020-05-07 PROCEDURE — 71250 CT THORAX DX C-: CPT | Mod: 26,,, | Performed by: RADIOLOGY

## 2020-05-14 DIAGNOSIS — G47.00 INSOMNIA, UNSPECIFIED TYPE: ICD-10-CM

## 2020-05-14 RX ORDER — ZOLPIDEM TARTRATE 5 MG/1
TABLET ORAL
Qty: 90 TABLET | Refills: 1 | Status: SHIPPED | OUTPATIENT
Start: 2020-05-14 | End: 2020-08-06 | Stop reason: SDUPTHER

## 2020-05-14 NOTE — TELEPHONE ENCOUNTER
----- Message from Lu Sanchez sent at 5/14/2020 10:52 AM CDT -----  Contact: pt  Type:  RX Refill Request    Who Called:  pt  Refill or New Rx:  New   RX Name and Strength:  zolpidem (AMBIEN) 5 MG Tab  How is the patient currently taking it? (ex. 1XDay):  1 x day  Is this a 30 day or 90 day RX:  90  Preferred Pharmacy with phone number:        [facility] - [address]  [address]  [facility] [address]  Phone: [phone] Fax: [phone]      Local or Mail Order: Mail  Ordering Provider:  Dr Dominga Colvin Call Back Number:  [phone] (home)     Additional Information:  Pt states medication zolpidem (AMBIEN) 5 MG Tab needs to be called into his pharmacy because this is a new script

## 2020-05-18 ENCOUNTER — PATIENT OUTREACH (OUTPATIENT)
Dept: OTHER | Facility: OTHER | Age: 70
End: 2020-05-18

## 2020-05-18 NOTE — PROGRESS NOTES
Digital Medicine: Health  Follow-Up        Follow Up  Follow-up reason(s): reading review      Readings are trending down due to improved technique.  Patient continues to enter in his BP readings manually. Patient has adjusted his technique from his arm in his lap to his arm at heart level. He also feels that he has been relaxing more before taking readings as well.     Patient has started back at work this week.       INTERVENTION(S)  encouragement/support    PLAN  continue monitoring      There are no preventive care reminders to display for this patient.    Last 5 Patient Entered Readings                                      Current 30 Day Average: 122/64     Recent Readings 5/17/2020 5/17/2020 5/10/2020 5/10/2020 5/6/2020    SBP (mmHg) 116 116 126 126 129    DBP (mmHg) 55 55 66 66 70    Pulse 86 86 80 80 68                      Diet Screening       Patient purchases bagged salads and frozen meals. Occasionally eats hamburgers. Patient avoids salt when he can, and uses no-salt [employer]    Intervention(s): reducing sodium intake    Physical Activity Screening   When asked if exercising, patient responded: yes    Patient participates in the following activities: body weight exercises and biking    He identified the following barriers to physical activity: lack of time    Patient has been doing push ups, and rides his bike. His exercise schedule often depends on his work schedule and weather.       SDOH

## 2020-06-20 ENCOUNTER — OFFICE VISIT (OUTPATIENT)
Dept: UROLOGY | Facility: CLINIC | Age: 70
End: 2020-06-20
Payer: MEDICARE

## 2020-06-20 VITALS
WEIGHT: 169 LBS | HEART RATE: 69 BPM | HEIGHT: 70 IN | SYSTOLIC BLOOD PRESSURE: 132 MMHG | BODY MASS INDEX: 24.2 KG/M2 | DIASTOLIC BLOOD PRESSURE: 70 MMHG

## 2020-06-20 DIAGNOSIS — N23 RENAL COLIC: Primary | ICD-10-CM

## 2020-06-20 DIAGNOSIS — N13.8 BPH WITH OBSTRUCTION/LOWER URINARY TRACT SYMPTOMS: ICD-10-CM

## 2020-06-20 DIAGNOSIS — N13.30 HYDRONEPHROSIS, UNSPECIFIED HYDRONEPHROSIS TYPE: ICD-10-CM

## 2020-06-20 DIAGNOSIS — N40.1 BPH WITH OBSTRUCTION/LOWER URINARY TRACT SYMPTOMS: ICD-10-CM

## 2020-06-20 PROCEDURE — 3078F DIAST BP <80 MM HG: CPT | Mod: CPTII,S$GLB,, | Performed by: UROLOGY

## 2020-06-20 PROCEDURE — 1126F AMNT PAIN NOTED NONE PRSNT: CPT | Mod: S$GLB,,, | Performed by: UROLOGY

## 2020-06-20 PROCEDURE — 3078F PR MOST RECENT DIASTOLIC BLOOD PRESSURE < 80 MM HG: ICD-10-PCS | Mod: CPTII,S$GLB,, | Performed by: UROLOGY

## 2020-06-20 PROCEDURE — 99999 PR PBB SHADOW E&M-EST. PATIENT-LVL IV: CPT | Mod: PBBFAC,,, | Performed by: UROLOGY

## 2020-06-20 PROCEDURE — 3075F PR MOST RECENT SYSTOLIC BLOOD PRESS GE 130-139MM HG: ICD-10-PCS | Mod: CPTII,S$GLB,, | Performed by: UROLOGY

## 2020-06-20 PROCEDURE — 1159F MED LIST DOCD IN RCRD: CPT | Mod: S$GLB,,, | Performed by: UROLOGY

## 2020-06-20 PROCEDURE — 99999 PR PBB SHADOW E&M-EST. PATIENT-LVL IV: ICD-10-PCS | Mod: PBBFAC,,, | Performed by: UROLOGY

## 2020-06-20 PROCEDURE — 99214 PR OFFICE/OUTPT VISIT, EST, LEVL IV, 30-39 MIN: ICD-10-PCS | Mod: S$GLB,,, | Performed by: UROLOGY

## 2020-06-20 PROCEDURE — 1159F PR MEDICATION LIST DOCUMENTED IN MEDICAL RECORD: ICD-10-PCS | Mod: S$GLB,,, | Performed by: UROLOGY

## 2020-06-20 PROCEDURE — 1101F PT FALLS ASSESS-DOCD LE1/YR: CPT | Mod: CPTII,S$GLB,, | Performed by: UROLOGY

## 2020-06-20 PROCEDURE — 99214 OFFICE O/P EST MOD 30 MIN: CPT | Mod: S$GLB,,, | Performed by: UROLOGY

## 2020-06-20 PROCEDURE — 1101F PR PT FALLS ASSESS DOC 0-1 FALLS W/OUT INJ PAST YR: ICD-10-PCS | Mod: CPTII,S$GLB,, | Performed by: UROLOGY

## 2020-06-20 PROCEDURE — 1126F PR PAIN SEVERITY QUANTIFIED, NO PAIN PRESENT: ICD-10-PCS | Mod: S$GLB,,, | Performed by: UROLOGY

## 2020-06-20 PROCEDURE — 3075F SYST BP GE 130 - 139MM HG: CPT | Mod: CPTII,S$GLB,, | Performed by: UROLOGY

## 2020-06-20 NOTE — PROGRESS NOTES
Subjective:       Patient ID: Saurav Bahena is a 69 y.o. male.    Chief Complaint: Flank Pain    HPI  Past Medical History:   Diagnosis Date    Anticoagulant long-term use     aspirin    Atrophic kidney     right    BPH (benign prostatic hyperplasia)     Cat bite of left forearm with infection 2019    CLL (chronic lymphocytic leukemia)     sees Dr. Solares    CTS (carpal tunnel syndrome)     Current smoker on some days     using e cig, regular cigarettes sometimes    DJD (degenerative joint disease) of knee     left    DJD (degenerative joint disease) of knee     bilateral/ LT more than RT    Emphysema lung     HLD (hyperlipidemia)     no current meds    HTN (hypertension)     Insomnia     Nocturia       Past Surgical History:   Procedure Laterality Date    COLONOSCOPY  2010    Dr. Aguilar, in legacy: int. hemorrhoids, repeat in 10 years for screening    ELBOW SURGERY      left elbow, bursitis    KNEE SURGERY  ?    left    MULTIPLE TOOTH EXTRACTIONS      TONSILLECTOMY      TRIGGER FINGER RELEASE Left      Social History     Socioeconomic History    Marital status: Single     Spouse name: Not on file    Number of children: Not on file    Years of education: Not on file    Highest education level: Not on file   Occupational History    Occupation:    Social Needs    Financial resource strain: Somewhat hard    Food insecurity     Worry: Never true     Inability: Never true    Transportation needs     Medical: No     Non-medical: No   Tobacco Use    Smoking status: Former Smoker     Packs/day: 0.30     Years: 20.00     Pack years: 6.00     Quit date: 2015     Years since quittin.4    Smokeless tobacco: Never Used    Tobacco comment: nicorette, vapor cigs/ 3 cigarettes   Substance and Sexual Activity    Alcohol use: Yes     Alcohol/week: 2.0 - 3.0 standard drinks     Types: 2 - 3 Shots of liquor per week     Frequency: 2-4 times a month      Drinks per session: 1 or 2     Binge frequency: Never     Comment: 2-3/day on weekends    Drug use: No    Sexual activity: Not on file   Lifestyle    Physical activity     Days per week: 2 days     Minutes per session: 30 min    Stress: Not on file   Relationships    Social connections     Talks on phone: Twice a week     Gets together: Three times a week     Attends Islam service: Not on file     Active member of club or organization: No     Attends meetings of clubs or organizations: Never     Relationship status: Never    Other Topics Concern    Not on file   Social History Narrative    Not on file       Family History   Problem Relation Age of Onset    Stroke Mother     Diabetes Mother     Heart disease Father 65        MI    Arthritis Father         RA    Diabetes Brother     Colon cancer Neg Hx     Crohn's disease Neg Hx     Ulcerative colitis Neg Hx     Stomach cancer Neg Hx     Esophageal cancer Neg Hx       Review of patient's allergies indicates:   Allergen Reactions    No known drug allergies      Medication List with Changes/Refills   Current Medications    ALBUTEROL (PROVENTIL HFA) 90 MCG/ACTUATION INHALER    Inhale 2 puffs into the lungs every 6 (six) hours as needed for Wheezing. Rescue    ASCORBIC ACID (VITAMIN C) 500 MG TABLET    Take 500 mg by mouth once daily.     ASPIRIN (ASPIRIN LOW DOSE) 81 MG EC TABLET    Take 81 mg by mouth once daily. Every day    AZELASTINE (ASTELIN) 137 MCG (0.1 %) NASAL SPRAY    1 spray (137 mcg total) by Nasal route 2 (two) times daily.    AZITHROMYCIN (Z-LUZ) 250 MG TABLET    Take 1 tablet (250 mg total) by mouth once daily. Take first 2 tablets together, then 1 every day until finished.    ERGOCALCIFEROL, VITAMIN D2, (VITAMIN D ORAL)    Take 5,000 Units by mouth once daily.    FISH OIL-OMEGA-3 FATTY ACIDS 300-1,000 MG CAPSULE    Take 4 capsules by mouth once daily.     FLUTICASONE FUROATE-VILANTEROL (BREO ELLIPTA) 100-25 MCG/DOSE DISKUS  INHALER    Inhale 1 puff into the lungs once daily. Controller    FLUTICASONE PROPIONATE (FLONASE ALLERGY RELIEF) 50 MCG/ACTUATION NASAL SPRAY    1 spray (50 mcg total) by Each Nostril route 2 (two) times daily.    LACTOBAC NO.41/BIFIDOBACT NO.7 (PROBIOTIC-10 ORAL)    Take by mouth once daily.    LOSARTAN-HYDROCHLOROTHIAZIDE 100-12.5 MG (HYZAAR) 100-12.5 MG TAB    Take 1 tablet by mouth every morning.    MULTIVITAMIN CAPSULE    Every day    NAPROXEN (NAPROSYN) 500 MG TABLET    Take 1 tablet (500 mg total) by mouth 2 (two) times daily as needed.    SAW PALMETTO XTR/ZINC PICOLIN (SAW PALMETTO EXTRACT ORAL)    Take by mouth.    SOD CHLOR-BICARB-SQUEEZ BOTTLE (NEILMED SINUS RINSE COMPLETE) PKDV    1 packet by Nasal route 4 (four) times daily.    TAMSULOSIN (FLOMAX) 0.4 MG CAP    Take 2 capsules (0.8 mg total) by mouth once daily.    TRAMADOL-ACETAMINOPHEN 37.5-325 MG (ULTRACET) 37.5-325 MG TAB    Take 1 tablet by mouth every 4 (four) hours as needed for Pain (SEVERE).    ZOLPIDEM (AMBIEN) 5 MG TAB    TAKE 1 TABLET BY MOUTH ONCE DAILY AT BEDTIME AS NEEDED   Discontinued Medications    BENZONATATE (TESSALON PERLES) 100 MG CAPSULE    Take 1 capsule (100 mg total) by mouth every 6 (six) hours as needed.    BENZONATATE (TESSALON) 100 MG CAPSULE    TAKE 1 CAPSULE BY MOUTH THREE TIMES DAILY AS NEEDED FOR COUGH      Review of Systems   Constitutional: Positive for appetite change. Negative for fatigue, fever and unexpected weight change.   HENT: Negative for congestion.    Eyes: Negative for visual disturbance.   Respiratory: Negative for cough, choking and shortness of breath.    Cardiovascular: Negative for chest pain and leg swelling.   Gastrointestinal: Negative for abdominal distention, constipation, diarrhea, nausea and vomiting.   Endocrine: Negative for polyuria.   Genitourinary: Positive for difficulty urinating, flank pain, frequency and urgency. Negative for hematuria and testicular pain.        Incomplete bladder  emptying   Musculoskeletal: Negative for back pain.   Skin: Negative for color change and wound.   Allergic/Immunologic: Negative for immunocompromised state.   Neurological: Negative for weakness.   Hematological: Negative for adenopathy. Does not bruise/bleed easily.   Psychiatric/Behavioral: Negative for confusion.       Objective:      Physical Exam   Nursing note and vitals reviewed.  Constitutional: He is oriented to person, place, and time. He appears well-developed. No distress.   HENT:   Head: Normocephalic and atraumatic.   Eyes: Pupils are equal, round, and reactive to light.   Neck: Neck supple.   Cardiovascular: Normal rate.    Pulmonary/Chest: Effort normal. No respiratory distress. He has no wheezes.   Abdominal: Soft. He exhibits no distension and no mass. There is no abdominal tenderness. There is no rebound and no guarding. Hernia confirmed negative in the right inguinal area and confirmed negative in the left inguinal area.   Genitourinary:    Prostate, testes and penis normal.   Rectum:      No rectal mass, tenderness, external hemorrhoid or internal hemorrhoid.   Prostate is not tender. Right testis shows no swelling and no tenderness. Left testis shows no swelling and no tenderness. Circumcised. No penile tenderness.    Genitourinary Comments: Prostate smooth 40 grams  Mild right flank tenderness     Neurological: He is alert and oriented to person, place, and time.   Skin: Skin is warm. He is not diaphoretic.           Sodium   Date Value Ref Range Status   01/20/2020 134 (L) 136 - 145 mmol/L Final     Potassium   Date Value Ref Range Status   01/20/2020 4.2 3.5 - 5.1 mmol/L Final     Chloride   Date Value Ref Range Status   01/20/2020 97 95 - 110 mmol/L Final     CO2   Date Value Ref Range Status   01/20/2020 28 23 - 29 mmol/L Final     Creatinine   Date Value Ref Range Status   01/20/2020 1.0 0.5 - 1.4 mg/dL Final     Glucose   Date Value Ref Range Status   01/20/2020 123 (H) 70 - 110 mg/dL  Final     AST ([place])   Date Value Ref Range Status   04/04/2016 16 (L) 17 - 59 U/L Final     AST   Date Value Ref Range Status   01/20/2020 17 10 - 40 U/L Final     ALT   Date Value Ref Range Status   01/20/2020 23 10 - 44 U/L Final     WBC   Date Value Ref Range Status   01/20/2020 13.14 (H) 3.90 - 12.70 K/uL Final     Hemoglobin   Date Value Ref Range Status   01/20/2020 11.9 (L) 14.0 - 18.0 g/dL Final     Hematocrit   Date Value Ref Range Status   01/20/2020 37.6 (L) 40.0 - 54.0 % Final     Platelets   Date Value Ref Range Status   01/20/2020 212 150 - 350 K/uL Final     PSA, SCREEN   Date Value Ref Range Status   12/09/2019 0.70 0.00 - 4.00 ng/mL Final     Comment:     PSA Expected levels:  Hormonal Therapy: <0.05 ng/ml  Prostatectomy: <0.01 ng/ml  Radiation Therapy: <1.00 ng/ml            Assessment/Plan: Right atropic kidney with associated hydronephrosis and intermittent                                       flank pain - Schedule cystoscopy/RPG                                  BPH/LUTS refractory to medical therapy - Will evaluate with ultrasound       Problem List Items Addressed This Visit     None

## 2020-06-24 ENCOUNTER — PATIENT MESSAGE (OUTPATIENT)
Dept: UROLOGY | Facility: CLINIC | Age: 70
End: 2020-06-24

## 2020-06-24 DIAGNOSIS — N13.8 BPH WITH OBSTRUCTION/LOWER URINARY TRACT SYMPTOMS: ICD-10-CM

## 2020-06-24 DIAGNOSIS — N40.1 BPH WITH OBSTRUCTION/LOWER URINARY TRACT SYMPTOMS: ICD-10-CM

## 2020-06-24 DIAGNOSIS — Z01.818 PRE-OP EXAM: Primary | ICD-10-CM

## 2020-06-24 DIAGNOSIS — N13.30 HYDRONEPHROSIS, UNSPECIFIED HYDRONEPHROSIS TYPE: ICD-10-CM

## 2020-06-24 DIAGNOSIS — N40.1 ENLARGED PROSTATE WITH URINARY OBSTRUCTION: ICD-10-CM

## 2020-06-24 DIAGNOSIS — N13.8 ENLARGED PROSTATE WITH URINARY OBSTRUCTION: ICD-10-CM

## 2020-06-25 ENCOUNTER — PATIENT MESSAGE (OUTPATIENT)
Dept: UROLOGY | Facility: CLINIC | Age: 70
End: 2020-06-25

## 2020-06-29 ENCOUNTER — PATIENT MESSAGE (OUTPATIENT)
Dept: FAMILY MEDICINE | Facility: CLINIC | Age: 70
End: 2020-06-29

## 2020-06-30 ENCOUNTER — LAB VISIT (OUTPATIENT)
Dept: FAMILY MEDICINE | Facility: CLINIC | Age: 70
End: 2020-06-30
Payer: MEDICARE

## 2020-06-30 DIAGNOSIS — Z01.818 PRE-OP EXAM: ICD-10-CM

## 2020-06-30 PROCEDURE — U0003 INFECTIOUS AGENT DETECTION BY NUCLEIC ACID (DNA OR RNA); SEVERE ACUTE RESPIRATORY SYNDROME CORONAVIRUS 2 (SARS-COV-2) (CORONAVIRUS DISEASE [COVID-19]), AMPLIFIED PROBE TECHNIQUE, MAKING USE OF HIGH THROUGHPUT TECHNOLOGIES AS DESCRIBED BY CMS-2020-01-R: HCPCS

## 2020-07-01 ENCOUNTER — ANESTHESIA EVENT (OUTPATIENT)
Dept: SURGERY | Facility: HOSPITAL | Age: 70
End: 2020-07-01
Payer: MEDICARE

## 2020-07-01 LAB — SARS-COV-2 RNA RESP QL NAA+PROBE: NOT DETECTED

## 2020-07-02 ENCOUNTER — HOSPITAL ENCOUNTER (OUTPATIENT)
Dept: RADIOLOGY | Facility: HOSPITAL | Age: 70
Discharge: HOME | End: 2020-07-02
Attending: UROLOGY | Admitting: UROLOGY
Payer: MEDICARE

## 2020-07-02 ENCOUNTER — HOSPITAL ENCOUNTER (OUTPATIENT)
Facility: HOSPITAL | Age: 70
Discharge: HOME | End: 2020-07-02
Attending: UROLOGY | Admitting: UROLOGY
Payer: MEDICARE

## 2020-07-02 ENCOUNTER — PATIENT MESSAGE (OUTPATIENT)
Dept: SURGERY | Facility: HOSPITAL | Age: 70
End: 2020-07-02

## 2020-07-02 ENCOUNTER — ANESTHESIA (OUTPATIENT)
Dept: SURGERY | Facility: HOSPITAL | Age: 70
End: 2020-07-02
Payer: MEDICARE

## 2020-07-02 VITALS
TEMPERATURE: 98 F | HEART RATE: 74 BPM | OXYGEN SATURATION: 98 % | HEIGHT: 70 IN | BODY MASS INDEX: 23.91 KG/M2 | DIASTOLIC BLOOD PRESSURE: 68 MMHG | RESPIRATION RATE: 16 BRPM | SYSTOLIC BLOOD PRESSURE: 108 MMHG | WEIGHT: 167 LBS

## 2020-07-02 DIAGNOSIS — N40.1 BPH WITH OBSTRUCTION/LOWER URINARY TRACT SYMPTOMS: Primary | ICD-10-CM

## 2020-07-02 DIAGNOSIS — N13.2 HYDRONEPHROSIS WITH URINARY OBSTRUCTION DUE TO URETERAL CALCULUS: ICD-10-CM

## 2020-07-02 DIAGNOSIS — N23 RENAL COLIC: ICD-10-CM

## 2020-07-02 DIAGNOSIS — N13.8 BPH WITH OBSTRUCTION/LOWER URINARY TRACT SYMPTOMS: Primary | ICD-10-CM

## 2020-07-02 PROBLEM — N13.30 HYDRONEPHROSIS: Status: ACTIVE | Noted: 2020-07-02

## 2020-07-02 PROCEDURE — 76000 FLUOROSCOPY <1 HR PHYS/QHP: CPT | Mod: TC,PO

## 2020-07-02 PROCEDURE — D9220A PRA ANESTHESIA: ICD-10-PCS | Mod: CRNA,,, | Performed by: NURSE ANESTHETIST, CERTIFIED REGISTERED

## 2020-07-02 PROCEDURE — D9220A PRA ANESTHESIA: Mod: CRNA,,, | Performed by: NURSE ANESTHETIST, CERTIFIED REGISTERED

## 2020-07-02 PROCEDURE — 25000003 PHARM REV CODE 250: Mod: PO | Performed by: NURSE ANESTHETIST, CERTIFIED REGISTERED

## 2020-07-02 PROCEDURE — 37000008 HC ANESTHESIA 1ST 15 MINUTES: Mod: PO | Performed by: UROLOGY

## 2020-07-02 PROCEDURE — 37000009 HC ANESTHESIA EA ADD 15 MINS: Mod: PO | Performed by: UROLOGY

## 2020-07-02 PROCEDURE — D9220A PRA ANESTHESIA: ICD-10-PCS | Mod: ANES,,, | Performed by: ANESTHESIOLOGY

## 2020-07-02 PROCEDURE — 36000706: Mod: PO | Performed by: UROLOGY

## 2020-07-02 PROCEDURE — 63600175 PHARM REV CODE 636 W HCPCS: Mod: PO | Performed by: ANESTHESIOLOGY

## 2020-07-02 PROCEDURE — 63600175 PHARM REV CODE 636 W HCPCS: Mod: PO | Performed by: NURSE ANESTHETIST, CERTIFIED REGISTERED

## 2020-07-02 PROCEDURE — 25500020 PHARM REV CODE 255: Mod: PO | Performed by: UROLOGY

## 2020-07-02 PROCEDURE — D9220A PRA ANESTHESIA: Mod: ANES,,, | Performed by: ANESTHESIOLOGY

## 2020-07-02 PROCEDURE — 36000707: Mod: PO | Performed by: UROLOGY

## 2020-07-02 PROCEDURE — 71000033 HC RECOVERY, INTIAL HOUR: Mod: PO | Performed by: UROLOGY

## 2020-07-02 PROCEDURE — 71000015 HC POSTOP RECOV 1ST HR: Mod: PO | Performed by: UROLOGY

## 2020-07-02 PROCEDURE — C1769 GUIDE WIRE: HCPCS | Mod: PO | Performed by: UROLOGY

## 2020-07-02 RX ORDER — FENTANYL CITRATE 50 UG/ML
INJECTION, SOLUTION INTRAMUSCULAR; INTRAVENOUS
Status: DISCONTINUED | OUTPATIENT
Start: 2020-07-02 | End: 2020-07-02

## 2020-07-02 RX ORDER — LIDOCAINE HYDROCHLORIDE 10 MG/ML
1 INJECTION, SOLUTION EPIDURAL; INFILTRATION; INTRACAUDAL; PERINEURAL ONCE
Status: ACTIVE | OUTPATIENT
Start: 2020-07-02

## 2020-07-02 RX ORDER — ONDANSETRON 2 MG/ML
INJECTION INTRAMUSCULAR; INTRAVENOUS
Status: DISCONTINUED | OUTPATIENT
Start: 2020-07-02 | End: 2020-07-02

## 2020-07-02 RX ORDER — FENTANYL CITRATE 50 UG/ML
25 INJECTION, SOLUTION INTRAMUSCULAR; INTRAVENOUS EVERY 5 MIN PRN
Status: DISCONTINUED | OUTPATIENT
Start: 2020-07-02 | End: 2020-07-02 | Stop reason: HOSPADM

## 2020-07-02 RX ORDER — FINASTERIDE 5 MG/1
5 TABLET, FILM COATED ORAL DAILY
Qty: 30 TABLET | Refills: 11 | Status: SHIPPED | OUTPATIENT
Start: 2020-07-02 | End: 2020-08-21 | Stop reason: SDUPTHER

## 2020-07-02 RX ORDER — MIDAZOLAM HYDROCHLORIDE 1 MG/ML
INJECTION, SOLUTION INTRAMUSCULAR; INTRAVENOUS
Status: DISCONTINUED | OUTPATIENT
Start: 2020-07-02 | End: 2020-07-02

## 2020-07-02 RX ORDER — HYDROCODONE BITARTRATE AND ACETAMINOPHEN 5; 325 MG/1; MG/1
1 TABLET ORAL EVERY 4 HOURS PRN
Status: DISCONTINUED | OUTPATIENT
Start: 2020-07-02 | End: 2020-07-02 | Stop reason: HOSPADM

## 2020-07-02 RX ORDER — ACETAMINOPHEN 325 MG/1
650 TABLET ORAL EVERY 4 HOURS PRN
Status: DISCONTINUED | OUTPATIENT
Start: 2020-07-02 | End: 2020-07-02 | Stop reason: HOSPADM

## 2020-07-02 RX ORDER — OXYCODONE HYDROCHLORIDE 5 MG/1
5 TABLET ORAL
Status: DISCONTINUED | OUTPATIENT
Start: 2020-07-02 | End: 2020-07-02 | Stop reason: HOSPADM

## 2020-07-02 RX ORDER — KETAMINE HCL IN 0.9 % NACL 50 MG/5 ML
SYRINGE (ML) INTRAVENOUS
Status: DISCONTINUED | OUTPATIENT
Start: 2020-07-02 | End: 2020-07-02

## 2020-07-02 RX ORDER — HYDROCODONE BITARTRATE AND ACETAMINOPHEN 10; 325 MG/1; MG/1
1 TABLET ORAL EVERY 4 HOURS PRN
Status: DISCONTINUED | OUTPATIENT
Start: 2020-07-02 | End: 2020-07-02 | Stop reason: HOSPADM

## 2020-07-02 RX ORDER — LIDOCAINE HYDROCHLORIDE 20 MG/ML
INJECTION INTRAVENOUS
Status: DISCONTINUED | OUTPATIENT
Start: 2020-07-02 | End: 2020-07-02

## 2020-07-02 RX ORDER — SODIUM CHLORIDE, SODIUM LACTATE, POTASSIUM CHLORIDE, CALCIUM CHLORIDE 600; 310; 30; 20 MG/100ML; MG/100ML; MG/100ML; MG/100ML
INJECTION, SOLUTION INTRAVENOUS CONTINUOUS
Status: DISPENSED | OUTPATIENT
Start: 2020-07-02

## 2020-07-02 RX ORDER — PROPOFOL 10 MG/ML
VIAL (ML) INTRAVENOUS CONTINUOUS PRN
Status: DISCONTINUED | OUTPATIENT
Start: 2020-07-02 | End: 2020-07-02

## 2020-07-02 RX ORDER — PROPOFOL 10 MG/ML
VIAL (ML) INTRAVENOUS
Status: DISCONTINUED | OUTPATIENT
Start: 2020-07-02 | End: 2020-07-02

## 2020-07-02 RX ORDER — CEFAZOLIN SODIUM 1 G/3ML
INJECTION, POWDER, FOR SOLUTION INTRAMUSCULAR; INTRAVENOUS
Status: DISCONTINUED | OUTPATIENT
Start: 2020-07-02 | End: 2020-07-02

## 2020-07-02 RX ADMIN — MIDAZOLAM HYDROCHLORIDE 2 MG: 1 INJECTION, SOLUTION INTRAMUSCULAR; INTRAVENOUS at 07:07

## 2020-07-02 RX ADMIN — SODIUM CHLORIDE, SODIUM LACTATE, POTASSIUM CHLORIDE, AND CALCIUM CHLORIDE: .6; .31; .03; .02 INJECTION, SOLUTION INTRAVENOUS at 06:07

## 2020-07-02 RX ADMIN — FENTANYL CITRATE 50 MCG: 50 INJECTION, SOLUTION INTRAMUSCULAR; INTRAVENOUS at 07:07

## 2020-07-02 RX ADMIN — Medication 25 MG: at 07:07

## 2020-07-02 RX ADMIN — PROPOFOL 50 MCG/KG/MIN: 10 INJECTION, EMULSION INTRAVENOUS at 07:07

## 2020-07-02 RX ADMIN — FENTANYL CITRATE 25 MCG: 50 INJECTION, SOLUTION INTRAMUSCULAR; INTRAVENOUS at 07:07

## 2020-07-02 RX ADMIN — ONDANSETRON 4 MG: 2 INJECTION, SOLUTION INTRAMUSCULAR; INTRAVENOUS at 07:07

## 2020-07-02 RX ADMIN — CEFAZOLIN 2 G: 1 INJECTION, POWDER, FOR SOLUTION INTRAVENOUS at 07:07

## 2020-07-02 RX ADMIN — LIDOCAINE HYDROCHLORIDE 100 MG: 20 INJECTION, SOLUTION INTRAVENOUS at 07:07

## 2020-07-02 RX ADMIN — PROPOFOL 40 MG: 10 INJECTION, EMULSION INTRAVENOUS at 07:07

## 2020-07-02 NOTE — DISCHARGE INSTRUCTIONS
CYSTOSCOPE    DO'S   Minimal activity for first 24 hours.   Eat light meals for the first 24 hours.   Drinks lots of fluids for 7 days.   Burning and blood tinged urine is normal day of procedure.     DON'T   No driving for next 24 hours or while taking narcotic medication.   No tub baths, shower only for 7 days.  DO NOT TAKE ANY ADDITIONAL TYLENOL/ACETAMINOPHEN WHILE TAKING NARCOTIC PAIN MEDICATION.      CALL PHYSICIAN FOR:   Unable to urinate within 6 hours after procedure.   Fever greater than 101.   Persistent pain not relieved by pain meds.   Blood in urine with clots lasting greater than 24 hours.    RETURN APPOINTMENT AS INSTRUCTED  CALL [phone] for doctor.

## 2020-07-02 NOTE — ANESTHESIA PREPROCEDURE EVALUATION
07/02/2020  Saurav Bahena is a 69 y.o., male.    Anesthesia Evaluation    I have reviewed the Patient Summary Reports.    I have reviewed the Nursing Notes. I have reviewed the NPO Status.   I have reviewed the Medications.     Review of Systems  Hematology/Oncology:        Hematology Comments: CLL   Cardiovascular:   Hypertension    Pulmonary:   COPD, mild    Renal/:   Chronic Renal Disease BPH Atrophic kidney   Musculoskeletal:   Arthritis     Neurological:   Neuromuscular Disease,    Endocrine:  Endocrine Normal        Physical Exam  General:  Well nourished    Airway/Jaw/Neck:  Airway Findings: Mouth Opening: Normal Tongue: Normal  General Airway Assessment: Adult  Mallampati: III  Improves to II with phonation.      Dental:  Dental Findings: In tactcaps   Chest/Lungs:  Chest/Lungs Findings: Clear to auscultation, Normal Respiratory Rate         Mental Status:  Mental Status Findings:  Cooperative, Alert and Oriented         Anesthesia Plan  Type of Anesthesia, risks & benefits discussed:  Anesthesia Type:  general  Patient's Preference:   Intra-op Monitoring Plan: standard ASA monitors  Intra-op Monitoring Plan Comments:   Post Op Pain Control Plan:   Post Op Pain Control Plan Comments:   Induction:   IV and Inhalation  Beta Blocker:  Patient is not currently on a Beta-Blocker (No further documentation required).       Informed Consent: Patient understands risks and agrees with Anesthesia plan.  Questions answered. Anesthesia consent signed with patient.  ASA Score: 3     Day of Surgery Review of History & Physical:            Ready For Surgery From Anesthesia Perspective.

## 2020-07-02 NOTE — BRIEF OP NOTE
[facility]  Brief Operative Note    Surgery Date: 7/2/2020     Surgeon(s) and Role:     * Andrea Tam MD - Primary    Assisting Surgeon: None    Pre-op Diagnosis:  Hydronephrosis, unspecified hydronephrosis type [N13.30]  BPH with obstruction/lower urinary tract symptoms [N40.1, N13.8]  Enlarged prostate with urinary obstruction [N40.1, N13.8]    Post-op Diagnosis:  Post-Op Diagnosis Codes:     * Hydronephrosis, unspecified hydronephrosis type [N13.30]     * BPH with obstruction/lower urinary tract symptoms [N40.1, N13.8]     * Enlarged prostate with urinary obstruction [N40.1, N13.8]    Procedure(s) (LRB):  CYSTOSCOPY, WITH RETROGRADE PYELOGRAM (Bilateral)  ULTRASOUND, RECTAL APPROACH (N/A)    Anesthesia: Choice    Description of the findings of the procedure(s): Dictated    Estimated Blood Loss: * No values recorded between 7/2/2020  7:20 AM and 7/2/2020  7:36 AM *         Specimens:   Specimen (12h ago, onward)    None            Discharge Note    OUTCOME: Patient tolerated treatment/procedure well without complication and is now ready for discharge.    DISPOSITION: Home or Self Care    FINAL DIAGNOSIS:  BPH with obstruction/lower urinary tract symptoms    FOLLOWUP: In clinic    DISCHARGE INSTRUCTIONS:    Discharge Procedure Orders   Diet general     Call MD for:  temperature >100.4     Call MD for:  persistent nausea and vomiting     Call MD for:  severe uncontrolled pain     Call MD for:  difficulty breathing, headache or visual disturbances     No dressing needed

## 2020-07-02 NOTE — TRANSFER OF CARE
"Anesthesia Transfer of Care Note    Patient: Saurav Bahena    Procedure(s) Performed: Procedure(s) (LRB):  CYSTOSCOPY, WITH RETROGRADE PYELOGRAM (Bilateral)  ULTRASOUND, RECTAL APPROACH (N/A)    Patient location: PACU    Anesthesia Type: MAC    Transport from OR: Transported from OR on room air with adequate spontaneous ventilation    Post pain: adequate analgesia    Post assessment: no apparent anesthetic complications and tolerated procedure well    Post vital signs: stable    Level of consciousness: awake    Nausea/Vomiting: no nausea/vomiting    Complications: none    Transfer of care protocol was followed      Last vitals:   Visit Vitals  BP (!) 147/70   Pulse 78   Temp 36.4 °C (97.5 °F) (Oral)   Resp 18   Ht 5' 10" (1.778 m)   Wt 75.8 kg (167 lb)   SpO2 98%   BMI 23.96 kg/m²     "

## 2020-07-02 NOTE — ANESTHESIA POSTPROCEDURE EVALUATION
Anesthesia Post Evaluation    Patient: Saurav Bahena    Procedure(s) Performed: Procedure(s) (LRB):  CYSTOSCOPY, WITH RETROGRADE PYELOGRAM (Bilateral)  ULTRASOUND, RECTAL APPROACH (N/A)    Final Anesthesia Type: general    Patient location during evaluation: PACU  Patient participation: Yes- Able to Participate  Level of consciousness: sedated and awake  Post-procedure vital signs: reviewed and stable  Pain management: adequate  Airway patency: patent    PONV status at discharge: No PONV  Anesthetic complications: no      Cardiovascular status: blood pressure returned to baseline  Respiratory status: spontaneous ventilation  Hydration status: euvolemic  Follow-up not needed.          Vitals Value Taken Time   /68 07/02/20 0814   Temp 36.6 °C (97.8 °F) 07/02/20 0742   Pulse 74 07/02/20 0814   Resp 16 07/02/20 0814   SpO2 98 % 07/02/20 0814         Event Time   Out of Recovery 08:03:12         Pain/Jayy Score: Jayy Score: 10 (7/2/2020  8:03 AM)

## 2020-07-02 NOTE — H&P
[facility]  Urology  History & Physical    Patient Name: Saurav Bahena  MRN: [id number]  Admission Date: 2020  Code Status: Prior   Attending Provider: Andrea Tam MD   Primary Care Physician: Robbie Orr MD  Principal Problem:<principal problem not specified>    Subjective:     HPI: Right flank pain with history BPH/LUTS    Past Medical History:   Diagnosis Date    Anticoagulant long-term use     aspirin    Atrophic kidney     right    BPH (benign prostatic hyperplasia)     Cat bite of left forearm with infection 2019    CLL (chronic lymphocytic leukemia)     sees Dr. Solares    CTS (carpal tunnel syndrome)     Current smoker on some days     using e cig, regular cigarettes sometimes    DJD (degenerative joint disease) of knee     left    DJD (degenerative joint disease) of knee     bilateral/ LT more than RT    Emphysema lung     HLD (hyperlipidemia)     no current meds    HTN (hypertension)     Insomnia     Nocturia        Past Surgical History:   Procedure Laterality Date    COLONOSCOPY  2010    Dr. Aguilar, in legacy: int. hemorrhoids, repeat in 10 years for screening    ELBOW SURGERY      left elbow, bursitis    KNEE SURGERY  ?    left    MULTIPLE TOOTH EXTRACTIONS      TONSILLECTOMY      TRIGGER FINGER RELEASE Left        Review of patient's allergies indicates:   Allergen Reactions    No known drug allergies        Family History     Problem Relation (Age of Onset)    Arthritis Father    Diabetes Mother, Brother    Heart disease Father (65)    Stroke Mother          Tobacco Use    Smoking status: Former Smoker     Packs/day: 0.30     Years: 20.00     Pack years: 6.00     Quit date: 2015     Years since quittin.5    Smokeless tobacco: Never Used    Tobacco comment: nicorette, vapor cigs/ 3 cigarettes   Substance and Sexual Activity    Alcohol use: Yes     Alcohol/week: 2.0 - 3.0 standard drinks     Types: 2 - 3 Shots of  liquor per week     Frequency: 2-4 times a month     Drinks per session: 1 or 2     Binge frequency: Never     Comment: 2-3/day on weekends    Drug use: No    Sexual activity: Not on file       Review of Systems   Constitutional: Negative.    HENT: Negative.    Eyes: Negative.    Respiratory: Negative.    Cardiovascular: Negative.    Gastrointestinal: Negative.    Genitourinary: Positive for difficulty urinating, flank pain, frequency and urgency.   Musculoskeletal: Positive for back pain.   Skin: Negative.    Neurological: Negative.    Psychiatric/Behavioral: Negative.    All other systems reviewed and are negative.      Objective:     Temp:  [97.5 °F (36.4 °C)] 97.5 °F (36.4 °C)  Pulse:  [78] 78  Resp:  [18] 18  SpO2:  [98 %] 98 %  BP: (147)/(70) 147/70     Body mass index is 23.96 kg/m².    No intake/output data recorded.       Lines/Drains/Airways     Peripheral Intravenous Line                 Peripheral IV - Single Lumen 07/02/20 0642 20 G Right Forearm less than 1 day                Physical Exam   Nursing note and vitals reviewed.  Constitutional: He is oriented to person, place, and time. He appears well-developed. No distress.   HENT:   Head: Normocephalic and atraumatic.   Eyes: Pupils are equal, round, and reactive to light.   Neck: Neck supple.   Cardiovascular: Normal rate.    Pulmonary/Chest: Effort normal. No respiratory distress. He has no wheezes.   Abdominal: Soft. He exhibits no distension and no mass. There is no abdominal tenderness. There is no rebound and no guarding. Hernia confirmed negative in the right inguinal area and confirmed negative in the left inguinal area.   Genitourinary: Rectum:      No rectal mass, tenderness, external hemorrhoid or internal hemorrhoid.   Prostate is not tender. Right testis shows no swelling and no tenderness. Left testis shows no swelling and no tenderness. Circumcised. No penile tenderness.    Genitourinary Comments: Prostate smooth     Neurological: He  is alert and oriented to person, place, and time.   Skin: Skin is warm. He is not diaphoretic.         Significant Labs:  BMP:  No results for input(s): NA, K, CL, CO2, BUN, CREATININE, LABGLOM, GLUCOSE, CALCIUM in the last 168 hours.    CBC:  No results for input(s): WBC, HGB, HCT, PLT in the last 168 hours.    Recent Lab Results     None          Significant Imaging:  All pertinent imaging results/findings from the past 24 hours have been reviewed.    Assessment and Plan:     There are no hospital problems to display for this patient.      VTE Risk Mitigation (From admission, onward)    None        Cystoscopy/TRUS    Andrea Tam MD  Urology  [facility]

## 2020-07-02 NOTE — PROGRESS NOTES
[facility]  Urology  Progress Note    Patient Name: Saurav Bahena  MRN: [id number]  Admission Date: 7/2/2020  Hospital Length of Stay: 0 days  Code Status: Prior   Attending Provider: Andrea Tam MD   Primary Care Physician: Robbie Orr MD    Subjective:       Interval History: Flank pain with atrophic kidney    Review of Systems   Constitutional: Negative.    HENT: Negative.    Eyes: Negative.    Respiratory: Negative.    Cardiovascular: Negative.    Gastrointestinal: Negative.    Genitourinary: Positive for flank pain.   Skin: Negative.    Neurological: Negative.    Psychiatric/Behavioral: Negative.      Objective:     Temp:  [97.5 °F (36.4 °C)] 97.5 °F (36.4 °C)  Pulse:  [78] 78  Resp:  [18] 18  SpO2:  [98 %] 98 %  BP: (147)/(70) 147/70     Body mass index is 23.96 kg/m².           Lines/Drains/Airways     Peripheral Intravenous Line                 Peripheral IV - Single Lumen 07/02/20 0642 20 G Right Forearm less than 1 day                Physical Exam   Nursing note and vitals reviewed.  Constitutional: He is oriented to person, place, and time.   HENT:   Nose: Nose normal.   Mouth/Throat: Mucous membranes are moist.   Eyes: Pupils are equal, round, and reactive to light.   Neck: Neck supple.   Cardiovascular: Normal rate and regular rhythm.    Abdominal: Normal appearance.   Genitourinary:    Penis normal.     Musculoskeletal: Normal range of motion.   Neurological: He is alert and oriented to person, place, and time.   Skin: Skin is warm and dry.     Psychiatric: Mood, judgment and thought content normal.       Significant Labs:  BMP:  No results for input(s): NA, K, CL, CO2, BUN, CREATININE, LABGLOM, GLUCOSE, CALCIUM in the last 168 hours.    CBC:  No results for input(s): WBC, HGB, HCT, PLT in the last 168 hours.    Recent Lab Results     None          Significant Imaging:  All pertinent imaging results/findings from the past 24 hours have been  reviewed.    Assessment/Plan:     There are no hospital problems to display for this patient.      VTE Risk Mitigation (From admission, onward)    None        Cystoscopy/RPG    Andrea Tam MD  Urology  [facility]

## 2020-07-02 NOTE — PLAN OF CARE
Pt discharged to home.  Discharge instructions given, pt stated understanding.   IV removed.  Pt left via wheelchair with brother to home.

## 2020-07-14 ENCOUNTER — PATIENT OUTREACH (OUTPATIENT)
Dept: OTHER | Facility: OTHER | Age: 70
End: 2020-07-14

## 2020-07-22 ENCOUNTER — OFFICE VISIT (OUTPATIENT)
Dept: UROLOGY | Facility: CLINIC | Age: 70
End: 2020-07-22
Payer: MEDICARE

## 2020-07-22 VITALS — BODY MASS INDEX: 24.18 KG/M2 | WEIGHT: 168.88 LBS | HEIGHT: 70 IN

## 2020-07-22 DIAGNOSIS — N40.1 BPH WITH OBSTRUCTION/LOWER URINARY TRACT SYMPTOMS: Primary | ICD-10-CM

## 2020-07-22 DIAGNOSIS — N13.8 BPH WITH OBSTRUCTION/LOWER URINARY TRACT SYMPTOMS: Primary | ICD-10-CM

## 2020-07-22 DIAGNOSIS — N26.1 ATROPHIC KIDNEY: ICD-10-CM

## 2020-07-22 PROCEDURE — 1159F MED LIST DOCD IN RCRD: CPT | Mod: S$GLB,,, | Performed by: UROLOGY

## 2020-07-22 PROCEDURE — 3008F PR BODY MASS INDEX (BMI) DOCUMENTED: ICD-10-PCS | Mod: CPTII,S$GLB,, | Performed by: UROLOGY

## 2020-07-22 PROCEDURE — 1159F PR MEDICATION LIST DOCUMENTED IN MEDICAL RECORD: ICD-10-PCS | Mod: S$GLB,,, | Performed by: UROLOGY

## 2020-07-22 PROCEDURE — 1101F PR PT FALLS ASSESS DOC 0-1 FALLS W/OUT INJ PAST YR: ICD-10-PCS | Mod: CPTII,S$GLB,, | Performed by: UROLOGY

## 2020-07-22 PROCEDURE — 99214 PR OFFICE/OUTPT VISIT, EST, LEVL IV, 30-39 MIN: ICD-10-PCS | Mod: S$GLB,,, | Performed by: UROLOGY

## 2020-07-22 PROCEDURE — 99999 PR PBB SHADOW E&M-EST. PATIENT-LVL III: CPT | Mod: PBBFAC,,, | Performed by: UROLOGY

## 2020-07-22 PROCEDURE — 99214 OFFICE O/P EST MOD 30 MIN: CPT | Mod: S$GLB,,, | Performed by: UROLOGY

## 2020-07-22 PROCEDURE — 3008F BODY MASS INDEX DOCD: CPT | Mod: CPTII,S$GLB,, | Performed by: UROLOGY

## 2020-07-22 PROCEDURE — 1101F PT FALLS ASSESS-DOCD LE1/YR: CPT | Mod: CPTII,S$GLB,, | Performed by: UROLOGY

## 2020-07-22 PROCEDURE — 1126F AMNT PAIN NOTED NONE PRSNT: CPT | Mod: S$GLB,,, | Performed by: UROLOGY

## 2020-07-22 PROCEDURE — 99999 PR PBB SHADOW E&M-EST. PATIENT-LVL III: ICD-10-PCS | Mod: PBBFAC,,, | Performed by: UROLOGY

## 2020-07-22 PROCEDURE — 1126F PR PAIN SEVERITY QUANTIFIED, NO PAIN PRESENT: ICD-10-PCS | Mod: S$GLB,,, | Performed by: UROLOGY

## 2020-07-22 RX ORDER — FINASTERIDE 5 MG/1
TABLET, FILM COATED ORAL
COMMUNITY
Start: 2020-07-02 | End: 2020-07-22

## 2020-07-22 NOTE — OP NOTE
PATIENT NAME: Saurav Bahena    YOB: 1950    DATE OF OPERATION: 07/02/2020    PREOPERATIVE DIAGNOSIS: 1. Right Ureteral dilation involving atrophic right kidney.      2. Benign Prostate Hyperplasia        POSTOPERATIVE DIAGNOSIS:    1. Right Ureteral dilation involving atrophic right kidney.      2. Benign Prostate Hyperplasia    SURGEON:    Andrea Tam M.D.    ANESTHESIA:  General.    COMPLICATIONS:  None.    ESTIMATED BLOOD LOSS: None.    PROCEDURE/OPERATION:    1. Cystourethroscopy with bilateral retrograde pyelogram.        2. Transrectal ultrasound of prostate.     BRIEF HISTORY:  This 69-year-old male was scheduled for the above procedure.  Risk and benefits were explained.  Appropriate consents were signed.    PROCEDURE IN DETAIL:  The patient was taken to the OR and laid in supine position.  Appropriate anesthesia was administered.  The patient was then placed in dorsolithotomy position.  The genitalia were prepped and draped in a standard surgical fashion.  The urethra was calibrated to #18-Singaporean and dilated to the size #24-Singaporean.  A rigid cystoscope was placed into the urethra.  No obvious urethral lesions were noted. The prostate revealed significant trilobar hyperplasia.  Upon inspection of the bladder, left ureteral orifices appeared to have clear efflux. There was no efflux noted from right ureteral orifice. There was no evidence of any bladder tumors or lesions. Bladder capacity was adequate with 1+ trabeculations. A #5-Singaporean cone tipped catheter was inserted into both ureteral orifices respectively.  The left ureter was normal along its courses without any filling defect.  The upper collecting system was normal as well and the calices were sharp.  Adequate drainage was noted. The right ureter was obstructed at UVJ level. The bladder was drained and reinspected again.  No other lesions were evident.  The patient was placed in a left lateral decubitus position. A rectal exam  revealed a large prostate. No obvious nodules were encountered.  The 8 MHz ultrasound probe was inserted.  The prostate was thoroughly inspected.  Multiple calcifications were noted throughout the prostate.  A moderate sized median lobe was noted.  Seminal vesicles appeared to be normal.  The overall volume of the prostate was 174 cm3.  The height was 5.25 cm, the width 5.24 cm, and the length 6.33 cm. The patient tolerated the procedure well and was transferred to the recovery room in stable condition.        _____________________________  Andrea Tam M.D. F.A.C.S.

## 2020-07-22 NOTE — PROGRESS NOTES
Subjective:       Patient ID: Saurva Bahena is a 70 y.o. male.    Chief Complaint: Post-op Evaluation    S/P cystoscopy/trus  He has started on finasteride along with the tamsulosin  No new complaints  He has chronic right hydronephrosis and is without complaints    Past Medical History:   Diagnosis Date    Anticoagulant long-term use     aspirin    Atrophic kidney     right    BPH (benign prostatic hyperplasia)     Cat bite of left forearm with infection 2/11/2019    CLL (chronic lymphocytic leukemia)     sees Dr. Solares    CTS (carpal tunnel syndrome)     Current smoker on some days     using e cig, regular cigarettes sometimes    DJD (degenerative joint disease) of knee     left    DJD (degenerative joint disease) of knee     bilateral/ LT more than RT    Emphysema lung     HLD (hyperlipidemia)     no current meds    HTN (hypertension)     Insomnia     Nocturia       Past Surgical History:   Procedure Laterality Date    COLONOSCOPY  09/01/2010    Dr. Aguilar, in legacy: int. hemorrhoids, repeat in 10 years for screening    CYSTOSCOPY W/ RETROGRADES Bilateral 7/2/2020    Procedure: CYSTOSCOPY, WITH RETROGRADE PYELOGRAM;  Surgeon: Andrea Tam MD;  Location: [facility] OR;  Service: Urology;  Laterality: Bilateral;    ELBOW SURGERY      left elbow, bursitis    KNEE SURGERY  1981?    left    MULTIPLE TOOTH EXTRACTIONS      TONSILLECTOMY      TRANSRECTAL ULTRASOUND EXAMINATION N/A 7/2/2020    Procedure: ULTRASOUND, RECTAL APPROACH;  Surgeon: Andrea Tam MD;  Location: [facility] OR;  Service: Urology;  Laterality: N/A;    TRIGGER FINGER RELEASE Left      Social History     Socioeconomic History    Marital status: Single     Spouse name: Not on file    Number of children: Not on file    Years of education: Not on file    Highest education level: Not on file   Occupational History    Occupation:    Social Needs    Financial resource strain: Somewhat hard    Food  insecurity     Worry: Never true     Inability: Never true    Transportation needs     Medical: No     Non-medical: No   Tobacco Use    Smoking status: Former Smoker     Packs/day: 0.30     Years: 20.00     Pack years: 6.00     Quit date: 2015     Years since quittin.5    Smokeless tobacco: Never Used    Tobacco comment: nicorette, vapor cigs/ 3 cigarettes   Substance and Sexual Activity    Alcohol use: Yes     Alcohol/week: 2.0 - 3.0 standard drinks     Types: 2 - 3 Shots of liquor per week     Frequency: 2-4 times a month     Drinks per session: 1 or 2     Binge frequency: Never     Comment: 2-3/day on weekends    Drug use: No    Sexual activity: Not on file   Lifestyle    Physical activity     Days per week: 2 days     Minutes per session: 30 min    Stress: Not on file   Relationships    Social connections     Talks on phone: Twice a week     Gets together: Three times a week     Attends Taoist service: Not on file     Active member of club or organization: No     Attends meetings of clubs or organizations: Never     Relationship status: Never    Other Topics Concern    Not on file   Social History Narrative    Not on file       Family History   Problem Relation Age of Onset    Stroke Mother     Diabetes Mother     Heart disease Father 65        MI    Arthritis Father         RA    Diabetes Brother     Colon cancer Neg Hx     Crohn's disease Neg Hx     Ulcerative colitis Neg Hx     Stomach cancer Neg Hx     Esophageal cancer Neg Hx       Review of patient's allergies indicates:   Allergen Reactions    No known drug allergies      Medication List with Changes/Refills   Current Medications    ALBUTEROL (PROVENTIL HFA) 90 MCG/ACTUATION INHALER    Inhale 2 puffs into the lungs every 6 (six) hours as needed for Wheezing. Rescue    ASCORBIC ACID (VITAMIN C) 500 MG TABLET    Take 500 mg by mouth once daily.     ASPIRIN (ASPIRIN LOW DOSE) 81 MG EC TABLET    Take 81 mg by mouth  once daily. Every day    AZITHROMYCIN (Z-LUZ) 250 MG TABLET    Take 1 tablet (250 mg total) by mouth once daily. Take first 2 tablets together, then 1 every day until finished.    ERGOCALCIFEROL, VITAMIN D2, (VITAMIN D ORAL)    Take 5,000 Units by mouth once daily.    FINASTERIDE (PROSCAR) 5 MG TABLET    Take 1 tablet (5 mg total) by mouth once daily.    FISH OIL-OMEGA-3 FATTY ACIDS 300-1,000 MG CAPSULE    Take 4 capsules by mouth once daily.     FLUTICASONE FUROATE-VILANTEROL (BREO ELLIPTA) 100-25 MCG/DOSE DISKUS INHALER    Inhale 1 puff into the lungs once daily. Controller    LACTOBAC NO.41/BIFIDOBACT NO.7 (PROBIOTIC-10 ORAL)    Take by mouth once daily.    LOSARTAN-HYDROCHLOROTHIAZIDE 100-12.5 MG (HYZAAR) 100-12.5 MG TAB    Take 1 tablet by mouth every morning.    MULTIVITAMIN CAPSULE    Every day    NAPROXEN (NAPROSYN) 500 MG TABLET    Take 1 tablet (500 mg total) by mouth 2 (two) times daily as needed.    SOD CHLOR-BICARB-SQUEEZ BOTTLE (NEILMED SINUS RINSE COMPLETE) PKDV    1 packet by Nasal route 4 (four) times daily.    TAMSULOSIN (FLOMAX) 0.4 MG CAP    Take 2 capsules (0.8 mg total) by mouth once daily.    TRAMADOL-ACETAMINOPHEN 37.5-325 MG (ULTRACET) 37.5-325 MG TAB    Take 1 tablet by mouth every 4 (four) hours as needed for Pain (SEVERE).    ZOLPIDEM (AMBIEN) 5 MG TAB    TAKE 1 TABLET BY MOUTH ONCE DAILY AT BEDTIME AS NEEDED   Discontinued Medications    AZELASTINE (ASTELIN) 137 MCG (0.1 %) NASAL SPRAY    1 spray (137 mcg total) by Nasal route 2 (two) times daily.    FINASTERIDE (PROSCAR) 5 MG TABLET        FLUTICASONE PROPIONATE (FLONASE ALLERGY RELIEF) 50 MCG/ACTUATION NASAL SPRAY    1 spray (50 mcg total) by Each Nostril route 2 (two) times daily.    SAW PALMETTO XTR/ZINC PICOLIN (SAW PALMETTO EXTRACT ORAL)    Take by mouth.      Review of Systems   Constitutional: Negative.    HENT: Negative.    Eyes: Negative.    Respiratory: Negative.    Cardiovascular: Negative.    Gastrointestinal: Negative.     Endocrine: Negative.    Genitourinary: Positive for frequency and urgency.   Musculoskeletal: Negative.    Allergic/Immunologic: Negative.    Neurological: Negative.    Hematological: Negative.    Psychiatric/Behavioral: Negative.        Objective:      Physical Exam   Nursing note and vitals reviewed.  Constitutional: He is oriented to person, place, and time. He appears well-developed. No distress.   HENT:   Head: Normocephalic and atraumatic.   Eyes: Pupils are equal, round, and reactive to light.   Neck: Neck supple.   Cardiovascular: Normal rate.    Pulmonary/Chest: Effort normal. No respiratory distress. He has no wheezes.   Abdominal: Soft. He exhibits no distension and no mass. There is no abdominal tenderness. There is no rebound and no guarding. Hernia confirmed negative in the right inguinal area and confirmed negative in the left inguinal area.   Genitourinary: Rectum:      No rectal mass, tenderness, external hemorrhoid or internal hemorrhoid.   Prostate is not tender. Right testis shows no swelling and no tenderness. Left testis shows no swelling and no tenderness. Circumcised. No penile tenderness.   Neurological: He is alert and oriented to person, place, and time.   Skin: Skin is warm. He is not diaphoretic.           Sodium   Date Value Ref Range Status   01/20/2020 134 (L) 136 - 145 mmol/L Final     Potassium   Date Value Ref Range Status   01/20/2020 4.2 3.5 - 5.1 mmol/L Final     Chloride   Date Value Ref Range Status   01/20/2020 97 95 - 110 mmol/L Final     CO2   Date Value Ref Range Status   01/20/2020 28 23 - 29 mmol/L Final     Creatinine   Date Value Ref Range Status   01/20/2020 1.0 0.5 - 1.4 mg/dL Final     Glucose   Date Value Ref Range Status   01/20/2020 123 (H) 70 - 110 mg/dL Final     AST ([place])   Date Value Ref Range Status   04/04/2016 16 (L) 17 - 59 U/L Final     AST   Date Value Ref Range Status   01/20/2020 17 10 - 40 U/L Final     ALT   Date Value Ref Range Status    01/20/2020 23 10 - 44 U/L Final     WBC   Date Value Ref Range Status   01/20/2020 13.14 (H) 3.90 - 12.70 K/uL Final     Hemoglobin   Date Value Ref Range Status   01/20/2020 11.9 (L) 14.0 - 18.0 g/dL Final     Hematocrit   Date Value Ref Range Status   01/20/2020 37.6 (L) 40.0 - 54.0 % Final     Platelets   Date Value Ref Range Status   01/20/2020 212 150 - 350 K/uL Final     PSA, SCREEN   Date Value Ref Range Status   12/09/2019 0.70 0.00 - 4.00 ng/mL Final     Comment:     PSA Expected levels:  Hormonal Therapy: <0.05 ng/ml  Prostatectomy: <0.01 ng/ml  Radiation Therapy: <1.00 ng/ml            Assessment/Plan: BPH/LUTS - Continue tamsulosin/finasteride                                                      - UFS/PVR with f/u 4 months                                    Atrophic right kidney with chronic hydronephrosis                                                     - Patient is asymptomatic        Problem List Items Addressed This Visit     None

## 2020-07-29 ENCOUNTER — OFFICE VISIT (OUTPATIENT)
Dept: FAMILY MEDICINE | Facility: CLINIC | Age: 70
End: 2020-07-29
Payer: MEDICARE

## 2020-07-29 ENCOUNTER — LAB VISIT (OUTPATIENT)
Dept: LAB | Facility: HOSPITAL | Age: 70
End: 2020-07-29
Attending: NURSE PRACTITIONER
Payer: MEDICARE

## 2020-07-29 VITALS
HEART RATE: 78 BPM | SYSTOLIC BLOOD PRESSURE: 130 MMHG | HEIGHT: 70 IN | OXYGEN SATURATION: 95 % | WEIGHT: 168.19 LBS | BODY MASS INDEX: 24.08 KG/M2 | DIASTOLIC BLOOD PRESSURE: 70 MMHG | TEMPERATURE: 98 F

## 2020-07-29 DIAGNOSIS — G47.30 SLEEP APNEA, UNSPECIFIED TYPE: ICD-10-CM

## 2020-07-29 DIAGNOSIS — Z79.899 OTHER LONG TERM (CURRENT) DRUG THERAPY: ICD-10-CM

## 2020-07-29 DIAGNOSIS — R79.9 ABNORMAL FINDING OF BLOOD CHEMISTRY, UNSPECIFIED: ICD-10-CM

## 2020-07-29 DIAGNOSIS — R53.83 FATIGUE, UNSPECIFIED TYPE: Primary | ICD-10-CM

## 2020-07-29 DIAGNOSIS — R20.9 UNSPECIFIED DISTURBANCES OF SKIN SENSATION: ICD-10-CM

## 2020-07-29 DIAGNOSIS — R53.83 FATIGUE, UNSPECIFIED TYPE: ICD-10-CM

## 2020-07-29 LAB
25(OH)D3+25(OH)D2 SERPL-MCNC: 77 NG/ML (ref 30–96)
ESTIMATED AVG GLUCOSE: 100 MG/DL (ref 68–131)
HBA1C MFR BLD HPLC: 5.1 % (ref 4–5.6)
IRON SERPL-MCNC: 22 UG/DL (ref 45–160)
SATURATED IRON: 7 % (ref 20–50)
T4 FREE SERPL-MCNC: 0.95 NG/DL (ref 0.71–1.51)
TOTAL IRON BINDING CAPACITY: 303 UG/DL (ref 250–450)
TRANSFERRIN SERPL-MCNC: 205 MG/DL (ref 200–375)
TSH SERPL DL<=0.005 MIU/L-ACNC: 1.26 UIU/ML (ref 0.4–4)
VIT B12 SERPL-MCNC: 1166 PG/ML (ref 210–950)

## 2020-07-29 PROCEDURE — 3075F PR MOST RECENT SYSTOLIC BLOOD PRESS GE 130-139MM HG: ICD-10-PCS | Mod: CPTII,S$GLB,, | Performed by: NURSE PRACTITIONER

## 2020-07-29 PROCEDURE — 1101F PT FALLS ASSESS-DOCD LE1/YR: CPT | Mod: CPTII,S$GLB,, | Performed by: NURSE PRACTITIONER

## 2020-07-29 PROCEDURE — 84439 ASSAY OF FREE THYROXINE: CPT

## 2020-07-29 PROCEDURE — 83036 HEMOGLOBIN GLYCOSYLATED A1C: CPT

## 2020-07-29 PROCEDURE — 1159F PR MEDICATION LIST DOCUMENTED IN MEDICAL RECORD: ICD-10-PCS | Mod: S$GLB,,, | Performed by: NURSE PRACTITIONER

## 2020-07-29 PROCEDURE — 84403 ASSAY OF TOTAL TESTOSTERONE: CPT

## 2020-07-29 PROCEDURE — 99214 PR OFFICE/OUTPT VISIT, EST, LEVL IV, 30-39 MIN: ICD-10-PCS | Mod: S$GLB,,, | Performed by: NURSE PRACTITIONER

## 2020-07-29 PROCEDURE — 36415 COLL VENOUS BLD VENIPUNCTURE: CPT | Mod: PO

## 2020-07-29 PROCEDURE — 3008F BODY MASS INDEX DOCD: CPT | Mod: CPTII,S$GLB,, | Performed by: NURSE PRACTITIONER

## 2020-07-29 PROCEDURE — 1159F MED LIST DOCD IN RCRD: CPT | Mod: S$GLB,,, | Performed by: NURSE PRACTITIONER

## 2020-07-29 PROCEDURE — 1126F AMNT PAIN NOTED NONE PRSNT: CPT | Mod: S$GLB,,, | Performed by: NURSE PRACTITIONER

## 2020-07-29 PROCEDURE — 99999 PR PBB SHADOW E&M-EST. PATIENT-LVL V: ICD-10-PCS | Mod: PBBFAC,,, | Performed by: NURSE PRACTITIONER

## 2020-07-29 PROCEDURE — 1101F PR PT FALLS ASSESS DOC 0-1 FALLS W/OUT INJ PAST YR: ICD-10-PCS | Mod: CPTII,S$GLB,, | Performed by: NURSE PRACTITIONER

## 2020-07-29 PROCEDURE — 82607 VITAMIN B-12: CPT

## 2020-07-29 PROCEDURE — 3078F DIAST BP <80 MM HG: CPT | Mod: CPTII,S$GLB,, | Performed by: NURSE PRACTITIONER

## 2020-07-29 PROCEDURE — 82306 VITAMIN D 25 HYDROXY: CPT

## 2020-07-29 PROCEDURE — 84443 ASSAY THYROID STIM HORMONE: CPT

## 2020-07-29 PROCEDURE — 3078F PR MOST RECENT DIASTOLIC BLOOD PRESSURE < 80 MM HG: ICD-10-PCS | Mod: CPTII,S$GLB,, | Performed by: NURSE PRACTITIONER

## 2020-07-29 PROCEDURE — 3075F SYST BP GE 130 - 139MM HG: CPT | Mod: CPTII,S$GLB,, | Performed by: NURSE PRACTITIONER

## 2020-07-29 PROCEDURE — 83540 ASSAY OF IRON: CPT

## 2020-07-29 PROCEDURE — 3008F PR BODY MASS INDEX (BMI) DOCUMENTED: ICD-10-PCS | Mod: CPTII,S$GLB,, | Performed by: NURSE PRACTITIONER

## 2020-07-29 PROCEDURE — 99214 OFFICE O/P EST MOD 30 MIN: CPT | Mod: S$GLB,,, | Performed by: NURSE PRACTITIONER

## 2020-07-29 PROCEDURE — 99999 PR PBB SHADOW E&M-EST. PATIENT-LVL V: CPT | Mod: PBBFAC,,, | Performed by: NURSE PRACTITIONER

## 2020-07-29 PROCEDURE — 1126F PR PAIN SEVERITY QUANTIFIED, NO PAIN PRESENT: ICD-10-PCS | Mod: S$GLB,,, | Performed by: NURSE PRACTITIONER

## 2020-07-29 NOTE — PROGRESS NOTES
Subjective:       Patient ID: Saurav Bahena is a 70 y.o. male.    Chief Complaint: Fatigue    Patient complains of progressive gradually worsening fatigue for weeks. He would like to have some blood work done to evaluate this. No recent medications changed. No significant change in life stress.  Patient does snore, has not been evaluated for sleep apnea, he does sometimes wake gasping for breath.   He does occasionally notice numbness in extremities.     Past Medical History:  No date: Anticoagulant long-term use      Comment:  aspirin  No date: Atrophic kidney      Comment:  right  No date: BPH (benign prostatic hyperplasia)  2/11/2019: Cat bite of left forearm with infection  No date: CLL (chronic lymphocytic leukemia)      Comment:  sees Dr. Solares  No date: CTS (carpal tunnel syndrome)  No date: Current smoker on some days      Comment:  using e cig, regular cigarettes sometimes  No date: DJD (degenerative joint disease) of knee      Comment:  left  No date: DJD (degenerative joint disease) of knee      Comment:  bilateral/ LT more than RT  No date: Emphysema lung  No date: HLD (hyperlipidemia)      Comment:  no current meds  No date: HTN (hypertension)  No date: Insomnia  No date: Nocturia    Past Surgical History:  09/01/2010: COLONOSCOPY      Comment:  Dr. Aguilar, in legacy: int. hemorrhoids, repeat in 10                years for screening  7/2/2020: CYSTOSCOPY W/ RETROGRADES; Bilateral      Comment:  Procedure: CYSTOSCOPY, WITH RETROGRADE PYELOGRAM;                 Surgeon: Andrea Tam MD;  Location: [facility] OR;                 Service: Urology;  Laterality: Bilateral;  No date: ELBOW SURGERY      Comment:  left elbow, bursitis  1981?: KNEE SURGERY      Comment:  left  No date: MULTIPLE TOOTH EXTRACTIONS  No date: TONSILLECTOMY  7/2/2020: TRANSRECTAL ULTRASOUND EXAMINATION; N/A      Comment:  Procedure: ULTRASOUND, RECTAL APPROACH;  Surgeon: Andrea Tam MD;  Location: [facility]  OR;  Service: Urology;                 Laterality: N/A;  No date: TRIGGER FINGER RELEASE; Left    Review of patient's family history indicates:  Problem: Stroke      Relation: Mother          Age of Onset: (Not Specified)  Problem: Diabetes      Relation: Mother          Age of Onset: (Not Specified)  Problem: Heart disease      Relation: Father          Age of Onset: 65          Comment: MI  Problem: Arthritis      Relation: Father          Age of Onset: (Not Specified)          Comment: RA  Problem: Diabetes      Relation: Brother          Age of Onset: (Not Specified)  Problem: Colon cancer      Relation: Neg Hx          Age of Onset: (Not Specified)  Problem: Crohn's disease      Relation: Neg Hx          Age of Onset: (Not Specified)  Problem: Ulcerative colitis      Relation: Neg Hx          Age of Onset: (Not Specified)  Problem: Stomach cancer      Relation: Neg Hx          Age of Onset: (Not Specified)  Problem: Esophageal cancer      Relation: Neg Hx          Age of Onset: (Not Specified)      Social History    Socioeconomic History      Marital status: Single      Spouse name: Not on file      Number of children: Not on file      Years of education: Not on file      Highest education level: Not on file    Occupational History      Occupation:     Social Needs      Financial resource strain: Somewhat hard      Food insecurity        Worry: Never true        Inability: Never true      Transportation needs        Medical: No        Non-medical: No    Tobacco Use      Smoking status: Former Smoker        Packs/day: 0.30        Years: 20.00        Pack years: 6        Quit date: 2015        Years since quittin.6      Smokeless tobacco: Never Used      Tobacco comment: nicorette, vapor cigs/ 3 cigarettes    Substance and Sexual Activity      Alcohol use: Yes        Alcohol/week: 2.0 - 3.0 standard drinks        Types: 2 - 3 Shots of liquor per week        Frequency: 2-4 times a  month        Drinks per session: 1 or 2        Binge frequency: Never        Comment: 2-3/day on weekends      Drug use: No      Sexual activity: Not on file    Lifestyle      Physical activity        Days per week: 2 days        Minutes per session: 30 min      Stress: Not on file    Relationships      Social connections        Talks on phone: Twice a week        Gets together: Three times a week        Attends Episcopalian service: Not on file        Active member of club or organization: No        Attends meetings of clubs or organizations: Never        Relationship status: Never     Other Topics      Concerns:        Not on file    Social History Narrative      Not on file      Current Outpatient Medications:  albuterol (PROVENTIL HFA) 90 mcg/actuation inhaler, Inhale 2 puffs into the lungs every 6 (six) hours as needed for Wheezing. Rescue, Disp: 18 g, Rfl: 0  ascorbic acid (VITAMIN C) 500 MG tablet, Take 500 mg by mouth once daily. , Disp: , Rfl:   aspirin (ASPIRIN LOW DOSE) 81 MG EC tablet, Take 81 mg by mouth once daily. Every day, Disp: , Rfl:   azithromycin (Z-LUZ) 250 MG tablet, Take 1 tablet (250 mg total) by mouth once daily. Take first 2 tablets together, then 1 every day until finished., Disp: 6 tablet, Rfl: 0  ERGOCALCIFEROL, VITAMIN D2, (VITAMIN D ORAL), Take 5,000 Units by mouth once daily., Disp: , Rfl:   finasteride (PROSCAR) 5 mg tablet, Take 1 tablet (5 mg total) by mouth once daily., Disp: 30 tablet, Rfl: 11  fish oil-omega-3 fatty acids 300-1,000 mg capsule, Take 4 capsules by mouth once daily. , Disp: , Rfl:   fluticasone furoate-vilanterol (BREO ELLIPTA) 100-25 mcg/dose diskus inhaler, Inhale 1 puff into the lungs once daily. Controller (Patient not taking: Reported on 7/22/2020), Disp: 1 each, Rfl: 11  Lactobac no.41/Bifidobact no.7 (PROBIOTIC-10 ORAL), Take by mouth once daily., Disp: , Rfl:   losartan-hydrochlorothiazide 100-12.5 mg (HYZAAR) 100-12.5 mg Tab, Take 1 tablet by mouth  every morning., Disp: 90 tablet, Rfl: 3  multivitamin capsule, Every day, Disp: , Rfl:   naproxen (NAPROSYN) 500 MG tablet, Take 1 tablet (500 mg total) by mouth 2 (two) times daily as needed. (Patient not taking: Reported on 7/22/2020), Disp: 60 tablet, Rfl: 5  sod chlor-bicarb-squeez bottle (NEILMED SINUS RINSE COMPLETE) pkdv, 1 packet by Nasal route 4 (four) times daily., Disp: 25 each, Rfl:   tamsulosin (FLOMAX) 0.4 mg Cap, Take 2 capsules (0.8 mg total) by mouth once daily., Disp: 180 capsule, Rfl: 3  tramadol-acetaminophen 37.5-325 mg (ULTRACET) 37.5-325 mg Tab, Take 1 tablet by mouth every 4 (four) hours as needed for Pain (SEVERE). (Patient not taking: Reported on 7/22/2020), Disp: 8 tablet, Rfl: 0  zolpidem (AMBIEN) 5 MG Tab, TAKE 1 TABLET BY MOUTH ONCE DAILY AT BEDTIME AS NEEDED, Disp: 90 tablet, Rfl: 1    No current facility-administered medications for this visit.   Facility-Administered Medications Ordered in Other Visits:  lactated ringers infusion, , Intravenous, Continuous, Mateo Cheng MD, Stopped at 07/02/20 0824  lidocaine (PF) 10 mg/ml (1%) injection 10 mg, 1 mL, Intradermal, Once, Mateo Cheng MD        Review of patient's allergies indicates:   -- No known drug allergies     Review of Systems   Constitutional: Positive for fatigue. Negative for activity change, chills, diaphoresis and unexpected weight change.   HENT: Positive for rhinorrhea. Negative for hearing loss and trouble swallowing.    Eyes: Negative for discharge and visual disturbance.   Respiratory: Negative for cough, chest tightness, shortness of breath and wheezing.    Cardiovascular: Negative for chest pain and palpitations.   Gastrointestinal: Negative for blood in stool, constipation, diarrhea and vomiting.   Endocrine: Negative for polydipsia and polyuria.   Genitourinary: Positive for difficulty urinating and urgency. Negative for hematuria.   Musculoskeletal: Negative for arthralgias, joint swelling and neck pain.    Neurological: Positive for weakness and numbness. Negative for tremors, speech difficulty and headaches.   Psychiatric/Behavioral: Negative for confusion, dysphoric mood and sleep disturbance. The patient is not nervous/anxious.          Objective:      Physical Exam  Constitutional:       Appearance: He is normal weight.   HENT:      Head: Normocephalic and atraumatic.   Cardiovascular:      Rate and Rhythm: Normal rate and regular rhythm.      Heart sounds: No murmur.   Pulmonary:      Effort: Pulmonary effort is normal. No respiratory distress.      Breath sounds: Normal breath sounds.   Abdominal:      General: Bowel sounds are normal.      Tenderness: There is no abdominal tenderness.   Skin:     General: Skin is warm and dry.      Findings: No rash.   Neurological:      General: No focal deficit present.      Mental Status: He is alert and oriented to person, place, and time.      Cranial Nerves: No cranial nerve deficit.   Psychiatric:         Mood and Affect: Mood normal.         Behavior: Behavior normal.         Thought Content: Thought content normal.         Judgment: Judgment normal.         Assessment:       1. Fatigue, unspecified type    2. Sleep apnea, unspecified type    3. Unspecified disturbances of skin sensation     4. Other long term (current) drug therapy     5. Abnormal finding of blood chemistry, unspecified         Plan:     Discussed need to rule out sleep apnea, labs ordered and scheduled. Follow up with PCP to review once results are received.   1. Fatigue, unspecified type    - TSH; Future  - T4, free; Future  - Vitamin B12; Future  - Vitamin D; Future  - Iron and TIBC; Future  - Testosterone; Future  - Hemoglobin A1C; Future    2. Sleep apnea, unspecified type    - Home Sleep Studies; Future    3. Unspecified disturbances of skin sensation     - Vitamin B12; Future    4. Other long term (current) drug therapy     - Vitamin D; Future    5. Abnormal finding of blood chemistry,  unspecified     - Iron and TIBC; Future  - Hemoglobin A1C; Future

## 2020-07-29 NOTE — PROGRESS NOTES
Answers for HPI/ROS submitted by the patient on 7/27/2020   activity change: No  unexpected weight change: No  neck pain: No  hearing loss: No  rhinorrhea: Yes  trouble swallowing: No  eye discharge: No  visual disturbance: No  chest tightness: No  wheezing: No  chest pain: No  palpitations: No  blood in stool: No  constipation: No  vomiting: No  diarrhea: No  polydipsia: No  polyuria: No  difficulty urinating: Yes  urgency: Yes  hematuria: No  joint swelling: No  arthralgias: No  headaches: No  weakness: Yes  confusion: No  dysphoric mood: No

## 2020-07-30 LAB — TESTOST SERPL-MCNC: 512 NG/DL (ref 304–1227)

## 2020-08-06 DIAGNOSIS — G47.00 INSOMNIA, UNSPECIFIED TYPE: ICD-10-CM

## 2020-08-07 RX ORDER — ZOLPIDEM TARTRATE 5 MG/1
TABLET ORAL
Qty: 90 TABLET | Refills: 1 | Status: SHIPPED | OUTPATIENT
Start: 2020-08-07 | End: 2021-02-25

## 2020-08-10 RX ORDER — MELOXICAM 15 MG/1
15 TABLET ORAL DAILY
Qty: 30 TABLET | Refills: 0 | Status: SHIPPED | OUTPATIENT
Start: 2020-08-10 | End: 2020-09-28 | Stop reason: SDUPTHER

## 2020-08-17 ENCOUNTER — PATIENT OUTREACH (OUTPATIENT)
Dept: OTHER | Facility: OTHER | Age: 70
End: 2020-08-17

## 2020-08-17 ENCOUNTER — PATIENT MESSAGE (OUTPATIENT)
Dept: FAMILY MEDICINE | Facility: CLINIC | Age: 70
End: 2020-08-17

## 2020-08-19 DIAGNOSIS — G47.33 MODERATE OBSTRUCTIVE SLEEP APNEA: Primary | ICD-10-CM

## 2020-08-20 ENCOUNTER — TELEPHONE (OUTPATIENT)
Dept: PODIATRY | Facility: CLINIC | Age: 70
End: 2020-08-20

## 2020-08-21 DIAGNOSIS — N40.1 BPH WITH OBSTRUCTION/LOWER URINARY TRACT SYMPTOMS: Primary | ICD-10-CM

## 2020-08-21 DIAGNOSIS — N13.8 BPH WITH OBSTRUCTION/LOWER URINARY TRACT SYMPTOMS: Primary | ICD-10-CM

## 2020-08-21 RX ORDER — FINASTERIDE 5 MG/1
5 TABLET, FILM COATED ORAL DAILY
Qty: 90 TABLET | Refills: 3 | Status: SHIPPED | OUTPATIENT
Start: 2020-08-21 | End: 2021-06-18

## 2020-08-21 NOTE — TELEPHONE ENCOUNTER
----- Message from Kay Quiroga sent at 8/21/2020  1:50 PM CDT -----  Type: Needs Medical Advice  Who Called:  Patient  Pharmacy name and phone #:  [facility]  Best Call Back Number: [phone] (home)   Additional Information: the patient would like the Rx Finasteride sent to the pharm to be refilled today

## 2020-09-28 ENCOUNTER — OFFICE VISIT (OUTPATIENT)
Dept: FAMILY MEDICINE | Facility: CLINIC | Age: 70
End: 2020-09-28
Payer: MEDICARE

## 2020-09-28 ENCOUNTER — HOSPITAL ENCOUNTER (OUTPATIENT)
Dept: RADIOLOGY | Facility: HOSPITAL | Age: 70
Discharge: HOME | End: 2020-09-28
Attending: FAMILY MEDICINE
Payer: MEDICARE

## 2020-09-28 VITALS
OXYGEN SATURATION: 97 % | SYSTOLIC BLOOD PRESSURE: 136 MMHG | BODY MASS INDEX: 24.46 KG/M2 | TEMPERATURE: 98 F | HEART RATE: 72 BPM | DIASTOLIC BLOOD PRESSURE: 66 MMHG | HEIGHT: 70 IN | WEIGHT: 170.88 LBS

## 2020-09-28 DIAGNOSIS — G47.33 MODERATE OBSTRUCTIVE SLEEP APNEA: ICD-10-CM

## 2020-09-28 DIAGNOSIS — M17.11 OSTEOARTHRITIS OF RIGHT KNEE, UNSPECIFIED OSTEOARTHRITIS TYPE: Primary | ICD-10-CM

## 2020-09-28 DIAGNOSIS — G47.30 SLEEP APNEA, UNSPECIFIED TYPE: ICD-10-CM

## 2020-09-28 DIAGNOSIS — M17.11 OSTEOARTHRITIS OF RIGHT KNEE, UNSPECIFIED OSTEOARTHRITIS TYPE: ICD-10-CM

## 2020-09-28 DIAGNOSIS — I10 ESSENTIAL HYPERTENSION: ICD-10-CM

## 2020-09-28 DIAGNOSIS — G47.00 INSOMNIA, UNSPECIFIED TYPE: ICD-10-CM

## 2020-09-28 DIAGNOSIS — C91.10 CLL (CHRONIC LYMPHOCYTIC LEUKEMIA): ICD-10-CM

## 2020-09-28 PROCEDURE — 3075F PR MOST RECENT SYSTOLIC BLOOD PRESS GE 130-139MM HG: ICD-10-PCS | Mod: CPTII,S$GLB,, | Performed by: FAMILY MEDICINE

## 2020-09-28 PROCEDURE — 3008F PR BODY MASS INDEX (BMI) DOCUMENTED: ICD-10-PCS | Mod: CPTII,S$GLB,, | Performed by: FAMILY MEDICINE

## 2020-09-28 PROCEDURE — 3075F SYST BP GE 130 - 139MM HG: CPT | Mod: CPTII,S$GLB,, | Performed by: FAMILY MEDICINE

## 2020-09-28 PROCEDURE — 1159F MED LIST DOCD IN RCRD: CPT | Mod: S$GLB,,, | Performed by: FAMILY MEDICINE

## 2020-09-28 PROCEDURE — 99214 OFFICE O/P EST MOD 30 MIN: CPT | Mod: S$GLB,,, | Performed by: FAMILY MEDICINE

## 2020-09-28 PROCEDURE — 1126F AMNT PAIN NOTED NONE PRSNT: CPT | Mod: S$GLB,,, | Performed by: FAMILY MEDICINE

## 2020-09-28 PROCEDURE — 1126F PR PAIN SEVERITY QUANTIFIED, NO PAIN PRESENT: ICD-10-PCS | Mod: S$GLB,,, | Performed by: FAMILY MEDICINE

## 2020-09-28 PROCEDURE — 99999 PR PBB SHADOW E&M-EST. PATIENT-LVL V: ICD-10-PCS | Mod: PBBFAC,,, | Performed by: FAMILY MEDICINE

## 2020-09-28 PROCEDURE — 3078F DIAST BP <80 MM HG: CPT | Mod: CPTII,S$GLB,, | Performed by: FAMILY MEDICINE

## 2020-09-28 PROCEDURE — 99214 PR OFFICE/OUTPT VISIT, EST, LEVL IV, 30-39 MIN: ICD-10-PCS | Mod: S$GLB,,, | Performed by: FAMILY MEDICINE

## 2020-09-28 PROCEDURE — 1101F PR PT FALLS ASSESS DOC 0-1 FALLS W/OUT INJ PAST YR: ICD-10-PCS | Mod: CPTII,S$GLB,, | Performed by: FAMILY MEDICINE

## 2020-09-28 PROCEDURE — 1159F PR MEDICATION LIST DOCUMENTED IN MEDICAL RECORD: ICD-10-PCS | Mod: S$GLB,,, | Performed by: FAMILY MEDICINE

## 2020-09-28 PROCEDURE — 1101F PT FALLS ASSESS-DOCD LE1/YR: CPT | Mod: CPTII,S$GLB,, | Performed by: FAMILY MEDICINE

## 2020-09-28 PROCEDURE — 73562 XR KNEE ORTHO BILAT: ICD-10-PCS | Mod: 26,50,, | Performed by: RADIOLOGY

## 2020-09-28 PROCEDURE — 73562 X-RAY EXAM OF KNEE 3: CPT | Mod: TC,50,FY,PO

## 2020-09-28 PROCEDURE — 3078F PR MOST RECENT DIASTOLIC BLOOD PRESSURE < 80 MM HG: ICD-10-PCS | Mod: CPTII,S$GLB,, | Performed by: FAMILY MEDICINE

## 2020-09-28 PROCEDURE — 3008F BODY MASS INDEX DOCD: CPT | Mod: CPTII,S$GLB,, | Performed by: FAMILY MEDICINE

## 2020-09-28 PROCEDURE — 73562 X-RAY EXAM OF KNEE 3: CPT | Mod: 26,50,, | Performed by: RADIOLOGY

## 2020-09-28 PROCEDURE — 99999 PR PBB SHADOW E&M-EST. PATIENT-LVL V: CPT | Mod: PBBFAC,,, | Performed by: FAMILY MEDICINE

## 2020-09-28 RX ORDER — MELOXICAM 15 MG/1
15 TABLET ORAL DAILY
Qty: 90 TABLET | Refills: 3 | Status: SHIPPED | OUTPATIENT
Start: 2020-09-28 | End: 2021-06-16 | Stop reason: ALTCHOICE

## 2020-09-28 NOTE — PROGRESS NOTES
Subjective:       Patient ID: Saurav Bahena is a 70 y.o. male.    Chief Complaint: Fatigue (not sleeping well)        C/o some progressive right knee pains and regular swelling with activity.  He had surgery on this in the past.  Using meloxicam 15mg PRN which does provide some relief.  He has stopped walking and riding back due to recurrent pain and swelling.    HTN -  tolerating the Losartan HCT  BPH -  tolerating the Flomax daily and finesteride and saw palmetto; following with urology  atropic right kidney with hydronephrosis - stable per recent ultrasound  HLD - following low fat diet  FH CAD - tolerating ASA 81mg daily  Insomnia - taking Ambien 5mg QHS about 2-3 nights a week  COPD - following with pulmonology; Smoking 5 cigarettes daily for over 30 years  CLL, iron deficiency - following with hematology; taking iron supplement  Moderate sleep apnea - about to begin CPAP         Past Medical History:   Diagnosis Date    Anticoagulant long-term use     aspirin    Atrophic kidney     right    BPH (benign prostatic hyperplasia)     Cat bite of left forearm with infection 2/11/2019    CLL (chronic lymphocytic leukemia)     sees Dr. Solares    CTS (carpal tunnel syndrome)     Current smoker on some days     using e cig, regular cigarettes sometimes    DJD (degenerative joint disease) of knee     left    DJD (degenerative joint disease) of knee     bilateral/ LT more than RT    Emphysema lung     HLD (hyperlipidemia)     no current meds    HTN (hypertension)     Insomnia     Nocturia        Past Surgical History:   Procedure Laterality Date    COLONOSCOPY  09/01/2010    Dr. Aguilar, in legacy: int. hemorrhoids, repeat in 10 years for screening    CYSTOSCOPY W/ RETROGRADES Bilateral 7/2/2020    Procedure: CYSTOSCOPY, WITH RETROGRADE PYELOGRAM;  Surgeon: Andrea Tam MD;  Location: [facility] OR;  Service: Urology;  Laterality: Bilateral;    ELBOW SURGERY      left elbow, bursitis    KNEE  SURGERY  1981?    right    MULTIPLE TOOTH EXTRACTIONS      TONSILLECTOMY      TRANSRECTAL ULTRASOUND EXAMINATION N/A 2020    Procedure: ULTRASOUND, RECTAL APPROACH;  Surgeon: Andrea Tam MD;  Location: [facility];  Service: Urology;  Laterality: N/A;    TRIGGER FINGER RELEASE Left        Review of patient's allergies indicates:   Allergen Reactions    No known drug allergies        Social History     Socioeconomic History    Marital status: Single     Spouse name: Not on file    Number of children: Not on file    Years of education: Not on file    Highest education level: Not on file   Occupational History    Occupation:    Social Needs    Financial resource strain: Somewhat hard    Food insecurity     Worry: Never true     Inability: Never true    Transportation needs     Medical: No     Non-medical: No   Tobacco Use    Smoking status: Former Smoker     Packs/day: 0.30     Years: 20.00     Pack years: 6.00     Quit date: 2015     Years since quittin.7    Smokeless tobacco: Never Used    Tobacco comment: nicorette, vapor cigs/ 3 cigarettes   Substance and Sexual Activity    Alcohol use: Yes     Alcohol/week: 2.0 - 3.0 standard drinks     Types: 2 - 3 Shots of liquor per week     Frequency: 2-4 times a month     Drinks per session: 1 or 2     Binge frequency: Never     Comment: 2-3/day on weekends    Drug use: No    Sexual activity: Not on file   Lifestyle    Physical activity     Days per week: 1 day     Minutes per session: 30 min    Stress: To some extent   Relationships    Social connections     Talks on phone: Twice a week     Gets together: Once a week     Attends Catholic service: Not on file     Active member of club or organization: No     Attends meetings of clubs or organizations: Never     Relationship status: Never    Other Topics Concern    Not on file   Social History Narrative    Not on file       Current Outpatient Medications on  File Prior to Visit   Medication Sig Dispense Refill    albuterol (PROVENTIL HFA) 90 mcg/actuation inhaler Inhale 2 puffs into the lungs every 6 (six) hours as needed for Wheezing. Rescue 18 g 0    ascorbic acid (VITAMIN C) 500 MG tablet Take 500 mg by mouth once daily.       aspirin (ASPIRIN LOW DOSE) 81 MG EC tablet Take 81 mg by mouth once daily. Every day      azithromycin (Z-LUZ) 250 MG tablet Take 1 tablet (250 mg total) by mouth once daily. Take first 2 tablets together, then 1 every day until finished. 6 tablet 0    ERGOCALCIFEROL, VITAMIN D2, (VITAMIN D ORAL) Take 5,000 Units by mouth once daily.      finasteride (PROSCAR) 5 mg tablet Take 1 tablet (5 mg total) by mouth once daily. 90 tablet 3    fish oil-omega-3 fatty acids 300-1,000 mg capsule Take 4 capsules by mouth once daily.       fluticasone furoate-vilanterol (BREO ELLIPTA) 100-25 mcg/dose diskus inhaler Inhale 1 puff into the lungs once daily. Controller 1 each 11    Lactobac no.41/Bifidobact no.7 (PROBIOTIC-10 ORAL) Take by mouth once daily.      losartan-hydrochlorothiazide 100-12.5 mg (HYZAAR) 100-12.5 mg Tab Take 1 tablet by mouth every morning. 90 tablet 3    multivitamin capsule Every day      sod chlor-bicarb-squeez bottle (NEILMED SINUS RINSE COMPLETE) pkdv 1 packet by Nasal route 4 (four) times daily. 25 each     tamsulosin (FLOMAX) 0.4 mg Cap Take 2 capsules (0.8 mg total) by mouth once daily. 180 capsule 3    tramadol-acetaminophen 37.5-325 mg (ULTRACET) 37.5-325 mg Tab Take 1 tablet by mouth every 4 (four) hours as needed for Pain (SEVERE). 8 tablet 0    zolpidem (AMBIEN) 5 MG Tab TAKE 1 TABLET BY MOUTH ONCE DAILY AT BEDTIME AS NEEDED 90 tablet 1     Current Facility-Administered Medications on File Prior to Visit   Medication Dose Route Frequency Provider Last Rate Last Dose    lactated ringers infusion   Intravenous Continuous Mateo Cheng MD   Stopped at 07/02/20 0824    lidocaine (PF) 10 mg/ml (1%) injection 10  "mg  1 mL Intradermal Once Mateo Cheng MD           Family History   Problem Relation Age of Onset    Stroke Mother     Diabetes Mother     Heart disease Father 65        MI    Arthritis Father         RA    Diabetes Brother     Colon cancer Neg Hx     Crohn's disease Neg Hx     Ulcerative colitis Neg Hx     Stomach cancer Neg Hx     Esophageal cancer Neg Hx        Review of Systems   Constitutional: Positive for activity change and fatigue. Negative for appetite change, chills, fever and unexpected weight change.   HENT: Negative for hearing loss, rhinorrhea, sore throat and trouble swallowing.    Eyes: Negative for pain, discharge and visual disturbance.   Respiratory: Negative for cough, chest tightness, shortness of breath and wheezing.    Cardiovascular: Negative for chest pain, palpitations and leg swelling.   Gastrointestinal: Negative for abdominal pain, blood in stool, constipation, diarrhea, nausea and vomiting.   Endocrine: Positive for polyuria. Negative for polydipsia.   Genitourinary: Positive for difficulty urinating and urgency. Negative for dysuria and hematuria.   Musculoskeletal: Positive for arthralgias and joint swelling. Negative for gait problem and neck pain.   Skin: Negative for rash and wound.   Neurological: Negative for dizziness, weakness, light-headedness and headaches.   Hematological: Negative for adenopathy.   Psychiatric/Behavioral: Negative for confusion and dysphoric mood.       Objective:      /66 (BP Location: Right arm, Patient Position: Sitting)   Pulse 72   Temp 98.1 °F (36.7 °C) (Oral)   Ht 5' 10" (1.778 m)   Wt 77.5 kg (170 lb 13.7 oz)   SpO2 97%   BMI 24.52 kg/m²   Physical Exam  Constitutional:       General: He is not in acute distress.     Appearance: He is well-developed.   HENT:      Head: Normocephalic and atraumatic.      Right Ear: External ear normal.      Left Ear: External ear normal.      Mouth/Throat:      Pharynx: Uvula midline. No " oropharyngeal exudate.   Eyes:      General: Lids are normal.      Conjunctiva/sclera: Conjunctivae normal.      Pupils: Pupils are equal, round, and reactive to light.   Neck:      Musculoskeletal: Full passive range of motion without pain, normal range of motion and neck supple.      Thyroid: No thyroid mass or thyromegaly.      Trachea: Phonation normal.   Cardiovascular:      Rate and Rhythm: Normal rate and regular rhythm.      Heart sounds: Normal heart sounds. No murmur. No friction rub. No gallop.    Pulmonary:      Effort: Pulmonary effort is normal. No respiratory distress.      Breath sounds: Normal breath sounds. No wheezing or rales.   Musculoskeletal: Normal range of motion.      Right knee: He exhibits normal range of motion and no effusion. Tenderness found. Medial joint line and lateral joint line tenderness noted.   Lymphadenopathy:      Cervical: No cervical adenopathy.   Skin:     General: Skin is warm and dry.   Neurological:      Mental Status: He is alert and oriented to person, place, and time.      Cranial Nerves: No cranial nerve deficit.      Coordination: Coordination normal.   Psychiatric:         Speech: Speech normal.         Behavior: Behavior normal.         Thought Content: Thought content normal.         Judgment: Judgment normal.         Results for orders placed or performed in visit on 07/29/20   TSH   Result Value Ref Range    TSH 1.259 0.400 - 4.000 uIU/mL   T4, free   Result Value Ref Range    Free T4 0.95 0.71 - 1.51 ng/dL   Vitamin B12   Result Value Ref Range    Vitamin B-12 1166 (H) 210 - 950 pg/mL   Vitamin D   Result Value Ref Range    Vit D, 25-Hydroxy 77 30 - 96 ng/mL   Iron and TIBC   Result Value Ref Range    Iron 22 (L) 45 - 160 ug/dL    Transferrin 205 200 - 375 mg/dL    TIBC 303 250 - 450 ug/dL    Saturated Iron 7 (L) 20 - 50 %   Testosterone   Result Value Ref Range    Testosterone, Total 512 304 - 1227 ng/dL   Hemoglobin A1C   Result Value Ref Range     Hemoglobin A1C 5.1 4.0 - 5.6 %    Estimated Avg Glucose 100 68 - 131 mg/dL       Assessment:       1. Osteoarthritis of right knee, unspecified osteoarthritis type    2. Essential hypertension    3. Insomnia, unspecified type    4. Moderate obstructive sleep apnea    5. Sleep apnea, unspecified type    6. CLL (chronic lymphocytic leukemia)        Plan:       Osteoarthritis of right knee, unspecified osteoarthritis type  -     X-ray Knee Ortho Bilateral; Future; Expected date: 09/28/2020  -     Ambulatory referral/consult to Orthopedics; Future; Expected date: 10/05/2020  -     meloxicam (MOBIC) 15 MG tablet; Take 1 tablet (15 mg total) by mouth once daily.  Dispense: 90 tablet; Refill: 3    Essential hypertension    Insomnia, unspecified type    Moderate obstructive sleep apnea    Sleep apnea, unspecified type    CLL (chronic lymphocytic leukemia)        Knee stretching and trial of meloxican  continue other present meds  Counseled on regular exercise, maintenance of a healthy weight, balanced diet rich in fruits/vegetables and lean protein, and avoidance of unhealthy habits like smoking and excessive alcohol intake.

## 2020-10-06 ENCOUNTER — PATIENT MESSAGE (OUTPATIENT)
Dept: UROLOGY | Facility: CLINIC | Age: 70
End: 2020-10-06

## 2020-10-10 ENCOUNTER — PATIENT MESSAGE (OUTPATIENT)
Dept: FAMILY MEDICINE | Facility: CLINIC | Age: 70
End: 2020-10-10

## 2020-10-10 ENCOUNTER — PATIENT MESSAGE (OUTPATIENT)
Dept: UROLOGY | Facility: CLINIC | Age: 70
End: 2020-10-10

## 2020-10-10 DIAGNOSIS — M17.11 OSTEOARTHRITIS OF RIGHT KNEE, UNSPECIFIED OSTEOARTHRITIS TYPE: Primary | ICD-10-CM

## 2020-10-12 NOTE — TELEPHONE ENCOUNTER
Pt canceled last appt. Does he need a new referral? It will not let me place the appt. Please advise.

## 2020-10-13 NOTE — PROGRESS NOTES
[facility]: Health  Follow-Up    The history is provided by the patient.             Reason for review: Blood pressure at goal        Topics Covered on Call: physical activity    Additional Follow-up details: Patient has been resting more before readings, and has been focusing more on deep breathing. He feels that this has been beneficial            Diet-Not assessed          Physical Activity-Change      Additional physical activity details: Patient has been having knee problems, but is trying to maintain his activity. Typically does about 2 days per week, but would like to be doing 3 days per week.       Medication Adherence-Medication Adherence not addressed.      Substance, Sleep, Stress-Not assessed      Continue current diet/physical activity routine.       Addressed patient questions and patient has my contact information if needed prior to next outreach.   Explained the importance of self-monitoring and medication adherence. Encouraged the patient to communicate with their health  for lifestyle modifications to help improve or maintain a healthy lifestyle.               There are no preventive care reminders to display for this patient.      Last 5 Patient Entered Readings                                      Current 30 Day Average: 125/66     Recent Readings 10/10/2020 10/10/2020 10/5/2020 10/5/2020 10/2/2020    SBP (mmHg) 118 118 133 133 125    DBP (mmHg) 65 65 73 73 67    Pulse 74 74 69 69 81

## 2020-10-19 ENCOUNTER — OFFICE VISIT (OUTPATIENT)
Dept: ORTHOPEDICS | Facility: CLINIC | Age: 70
End: 2020-10-19
Payer: MEDICARE

## 2020-10-19 VITALS — WEIGHT: 170 LBS | HEIGHT: 70 IN | BODY MASS INDEX: 24.34 KG/M2

## 2020-10-19 DIAGNOSIS — M17.11 OSTEOARTHRITIS OF RIGHT KNEE, UNSPECIFIED OSTEOARTHRITIS TYPE: ICD-10-CM

## 2020-10-19 PROCEDURE — 1159F PR MEDICATION LIST DOCUMENTED IN MEDICAL RECORD: ICD-10-PCS | Mod: S$GLB,,, | Performed by: ORTHOPAEDIC SURGERY

## 2020-10-19 PROCEDURE — 1125F PR PAIN SEVERITY QUANTIFIED, PAIN PRESENT: ICD-10-PCS | Mod: S$GLB,,, | Performed by: ORTHOPAEDIC SURGERY

## 2020-10-19 PROCEDURE — 1101F PT FALLS ASSESS-DOCD LE1/YR: CPT | Mod: CPTII,S$GLB,, | Performed by: ORTHOPAEDIC SURGERY

## 2020-10-19 PROCEDURE — 1101F PR PT FALLS ASSESS DOC 0-1 FALLS W/OUT INJ PAST YR: ICD-10-PCS | Mod: CPTII,S$GLB,, | Performed by: ORTHOPAEDIC SURGERY

## 2020-10-19 PROCEDURE — 99999 PR PBB SHADOW E&M-EST. PATIENT-LVL IV: ICD-10-PCS | Mod: PBBFAC,,, | Performed by: ORTHOPAEDIC SURGERY

## 2020-10-19 PROCEDURE — 1125F AMNT PAIN NOTED PAIN PRSNT: CPT | Mod: S$GLB,,, | Performed by: ORTHOPAEDIC SURGERY

## 2020-10-19 PROCEDURE — 99203 PR OFFICE/OUTPT VISIT, NEW, LEVL III, 30-44 MIN: ICD-10-PCS | Mod: 25,S$GLB,, | Performed by: ORTHOPAEDIC SURGERY

## 2020-10-19 PROCEDURE — 3008F PR BODY MASS INDEX (BMI) DOCUMENTED: ICD-10-PCS | Mod: CPTII,S$GLB,, | Performed by: ORTHOPAEDIC SURGERY

## 2020-10-19 PROCEDURE — 97760 PR ORTHOTIC MGMT&TRAINJ INITIAL ENC EA 15 MINS: ICD-10-PCS | Mod: GP,S$GLB,, | Performed by: ORTHOPAEDIC SURGERY

## 2020-10-19 PROCEDURE — 1159F MED LIST DOCD IN RCRD: CPT | Mod: S$GLB,,, | Performed by: ORTHOPAEDIC SURGERY

## 2020-10-19 PROCEDURE — 99203 OFFICE O/P NEW LOW 30 MIN: CPT | Mod: 25,S$GLB,, | Performed by: ORTHOPAEDIC SURGERY

## 2020-10-19 PROCEDURE — 3008F BODY MASS INDEX DOCD: CPT | Mod: CPTII,S$GLB,, | Performed by: ORTHOPAEDIC SURGERY

## 2020-10-19 PROCEDURE — 99999 PR PBB SHADOW E&M-EST. PATIENT-LVL IV: CPT | Mod: PBBFAC,,, | Performed by: ORTHOPAEDIC SURGERY

## 2020-10-19 PROCEDURE — 97760 ORTHOTIC MGMT&TRAING 1ST ENC: CPT | Mod: GP,S$GLB,, | Performed by: ORTHOPAEDIC SURGERY

## 2020-10-19 NOTE — PROGRESS NOTES
70 years old complaining pain in the right knee which has had now for about 6 months time.  No one time traumatic event.  Exercise made it worse.  To in good days 6 on bad days.  Oral medications provide relief.  Patient does report a history of surgery on the knee in 1980s he has medial and lateral scars from that    Exam shows nicely healed scars medial laterally without signs infection, does have tenderness joint line without signs infection    X-rays show arthritic changes    Assessment:  Right knee arthrosis    Plan:  We offered injection, wants to hold off, will get him fitted with a knee brace and home exercise program, follow-up as needed          We performed a custom orthotic/brace fitting, adjusting and training with the patient. The patient demonstrated understanding and proper care. This was performed for 15 minutes.    Further History  Aching pain  Worse with activity  Relieved with rest  No other associated symptoms  No other radiation    Further Exam  Alert and oriented  Pleasant  Contralateral limb has appropriate range of motion for age and condition  Contralateral limb has appropriate strength for age and condition  Contralateral limb has appropriate stability  for age and condition  No adenopathy  Pulses are appropriate for current condition  Skin is intact        Chief Complaint    Chief Complaint   Patient presents with    Right Knee - Pain       HPI  Saurav Bahena is a 70 y.o.  male who presents with       Past Medical History  Past Medical History:   Diagnosis Date    Anticoagulant long-term use     aspirin    Atrophic kidney     right    BPH (benign prostatic hyperplasia)     Cat bite of left forearm with infection 2/11/2019    CLL (chronic lymphocytic leukemia)     sees Dr. Solares    CTS (carpal tunnel syndrome)     Current smoker on some days     using e cig, regular cigarettes sometimes    DJD (degenerative joint disease) of knee     left    DJD (degenerative joint  disease) of knee     bilateral/ LT more than RT    Emphysema lung     HLD (hyperlipidemia)     no current meds    HTN (hypertension)     Insomnia     Nocturia        Past Surgical History  Past Surgical History:   Procedure Laterality Date    COLONOSCOPY  09/01/2010    Dr. Aguilar, in legacy: int. hemorrhoids, repeat in 10 years for screening    CYSTOSCOPY W/ RETROGRADES Bilateral 7/2/2020    Procedure: CYSTOSCOPY, WITH RETROGRADE PYELOGRAM;  Surgeon: Andrea Tam MD;  Location: [facility] OR;  Service: Urology;  Laterality: Bilateral;    ELBOW SURGERY      left elbow, bursitis    KNEE SURGERY  1981?    right    MULTIPLE TOOTH EXTRACTIONS      TONSILLECTOMY      TRANSRECTAL ULTRASOUND EXAMINATION N/A 7/2/2020    Procedure: ULTRASOUND, RECTAL APPROACH;  Surgeon: Andrea Tam MD;  Location: [facility] OR;  Service: Urology;  Laterality: N/A;    TRIGGER FINGER RELEASE Left        Medications  Current Outpatient Medications   Medication Sig    albuterol (PROVENTIL HFA) 90 mcg/actuation inhaler Inhale 2 puffs into the lungs every 6 (six) hours as needed for Wheezing. Rescue    ascorbic acid (VITAMIN C) 500 MG tablet Take 500 mg by mouth once daily.     aspirin (ASPIRIN LOW DOSE) 81 MG EC tablet Take 81 mg by mouth once daily. Every day    azithromycin (Z-LUZ) 250 MG tablet Take 1 tablet (250 mg total) by mouth once daily. Take first 2 tablets together, then 1 every day until finished.    ERGOCALCIFEROL, VITAMIN D2, (VITAMIN D ORAL) Take 5,000 Units by mouth once daily.    finasteride (PROSCAR) 5 mg tablet Take 1 tablet (5 mg total) by mouth once daily.    fish oil-omega-3 fatty acids 300-1,000 mg capsule Take 4 capsules by mouth once daily.     fluticasone furoate-vilanterol (BREO ELLIPTA) 100-25 mcg/dose diskus inhaler Inhale 1 puff into the lungs once daily. Controller    Lactobac no.41/Bifidobact no.7 (PROBIOTIC-10 ORAL) Take by mouth once daily.    losartan-hydrochlorothiazide 100-12.5 mg  (HYZAAR) 100-12.5 mg Tab Take 1 tablet by mouth every morning.    meloxicam (MOBIC) 15 MG tablet Take 1 tablet (15 mg total) by mouth once daily.    multivitamin capsule Every day    sod chlor-bicarb-squeez bottle (NEILMED SINUS RINSE COMPLETE) pkdv 1 packet by Nasal route 4 (four) times daily.    tamsulosin (FLOMAX) 0.4 mg Cap Take 2 capsules (0.8 mg total) by mouth once daily.    tramadol-acetaminophen 37.5-325 mg (ULTRACET) 37.5-325 mg Tab Take 1 tablet by mouth every 4 (four) hours as needed for Pain (SEVERE).    zolpidem (AMBIEN) 5 MG Tab TAKE 1 TABLET BY MOUTH ONCE DAILY AT BEDTIME AS NEEDED     No current facility-administered medications for this visit.      Facility-Administered Medications Ordered in Other Visits   Medication    lactated ringers infusion    lidocaine (PF) 10 mg/ml (1%) injection 10 mg       Allergies  Review of patient's allergies indicates:   Allergen Reactions    No known drug allergies        Family History  Family History   Problem Relation Age of Onset    Stroke Mother     Diabetes Mother     Heart disease Father 65        MI    Arthritis Father         RA    Diabetes Brother     Colon cancer Neg Hx     Crohn's disease Neg Hx     Ulcerative colitis Neg Hx     Stomach cancer Neg Hx     Esophageal cancer Neg Hx        Social History  Social History     Socioeconomic History    Marital status: Single     Spouse name: Not on file    Number of children: Not on file    Years of education: Not on file    Highest education level: Not on file   Occupational History    Occupation:    Social Needs    Financial resource strain: Somewhat hard    Food insecurity     Worry: Never true     Inability: Never true    Transportation needs     Medical: No     Non-medical: No   Tobacco Use    Smoking status: Former Smoker     Packs/day: 0.30     Years: 20.00     Pack years: 6.00     Quit date: 2015     Years since quittin.8    Smokeless tobacco:  Never Used    Tobacco comment: nicorette, vapor cigs/ 3 cigarettes   Substance and Sexual Activity    Alcohol use: Yes     Alcohol/week: 2.0 - 3.0 standard drinks     Types: 2 - 3 Shots of liquor per week     Frequency: 2-4 times a month     Drinks per session: 1 or 2     Binge frequency: Never     Comment: 2-3/day on weekends    Drug use: No    Sexual activity: Not on file   Lifestyle    Physical activity     Days per week: 1 day     Minutes per session: 30 min    Stress: To some extent   Relationships    Social connections     Talks on phone: Twice a week     Gets together: Once a week     Attends Hindu service: Not on file     Active member of club or organization: No     Attends meetings of clubs or organizations: Never     Relationship status: Never    Other Topics Concern    Not on file   Social History Narrative    Not on file               Review of Systems     Constitutional: Negative    HENT: Negative  Eyes: Negative  Respiratory: Negative  Cardiovascular: Negative  Musculoskeletal: HPI  Skin: Negative  Neurological: Negative  Hematological: Negative  Endocrine: Negative                 Physical Exam    There were no vitals filed for this visit.  Body mass index is 24.39 kg/m².  Physical Examination:     General appearance -  well appearing, and in no distress  Mental status - awake  Neck - supple  Chest -  symmetric air entry  Heart - normal rate   Abdomen - soft      Assessment     1. Osteoarthritis of right knee, unspecified osteoarthritis type          Plan

## 2020-10-19 NOTE — LETTER
October 19, 2020      Robbie Orr MD  [address] [facility] [address] [place] [address]           [facility] [place]  [address] [place] [address]  Phone: [phone]          Patient: Saurav Bahena   MR Number: [id number]   YOB: 1950   Date of Visit: 10/19/2020       Dear Dr. Robbie Orr:    Thank you for referring Saurav Bahena to me for evaluation. Attached you will find relevant portions of my assessment and plan of care.    If you have questions, please do not hesitate to call me. I look forward to following Saurav Bahena along with you.    Sincerely,    Gregory Avila MD    Enclosure  CC:  No Recipients    If you would like to receive this communication electronically, please contact [email or web address] or [phone] to request more information on [facility] access.    For providers and/or their staff who would like to refer a patient to [facility], please contact us through our one-stop-shop provider referral line, [facility], at [phone].    If you feel you have received this communication in error or would no longer like to receive these types of communications, please e-mail [email or web address]

## 2020-11-16 ENCOUNTER — PATIENT MESSAGE (OUTPATIENT)
Dept: FAMILY MEDICINE | Facility: CLINIC | Age: 70
End: 2020-11-16

## 2020-11-16 DIAGNOSIS — Z12.11 COLON CANCER SCREENING: Primary | ICD-10-CM

## 2020-11-16 NOTE — TELEPHONE ENCOUNTER
I do not see where pt has had Colorguard in the past. I do see his last colonoscopy in 2010. Please advise if you are ok with pt getting fitkit. If so, will enter per [facility] and schedule pt for nurse visit. Thanks

## 2020-11-21 ENCOUNTER — LAB VISIT (OUTPATIENT)
Dept: LAB | Facility: HOSPITAL | Age: 70
End: 2020-11-21
Attending: FAMILY MEDICINE
Payer: MEDICARE

## 2020-11-21 DIAGNOSIS — Z12.11 COLON CANCER SCREENING: ICD-10-CM

## 2020-11-21 PROCEDURE — 82274 ASSAY TEST FOR BLOOD FECAL: CPT

## 2020-12-02 LAB — HEMOCCULT STL QL IA: NEGATIVE

## 2020-12-07 ENCOUNTER — TELEPHONE (OUTPATIENT)
Dept: HEMATOLOGY/ONCOLOGY | Facility: CLINIC | Age: 70
End: 2020-12-07

## 2020-12-08 ENCOUNTER — TELEPHONE (OUTPATIENT)
Dept: SURGERY | Facility: CLINIC | Age: 70
End: 2020-12-08

## 2020-12-11 ENCOUNTER — LAB VISIT (OUTPATIENT)
Dept: LAB | Facility: HOSPITAL | Age: 70
End: 2020-12-11
Attending: INTERNAL MEDICINE
Payer: MEDICARE

## 2020-12-11 DIAGNOSIS — C91.10 CLL (CHRONIC LYMPHOCYTIC LEUKEMIA): ICD-10-CM

## 2020-12-11 LAB
ALBUMIN SERPL BCP-MCNC: 3.4 G/DL (ref 3.5–5.2)
ALP SERPL-CCNC: 71 U/L (ref 55–135)
ALT SERPL W/O P-5'-P-CCNC: 11 U/L (ref 10–44)
ANION GAP SERPL CALC-SCNC: 9 MMOL/L (ref 8–16)
ANISOCYTOSIS BLD QL SMEAR: SLIGHT
AST SERPL-CCNC: 14 U/L (ref 10–40)
BASOPHILS # BLD AUTO: ABNORMAL K/UL (ref 0–0.2)
BASOPHILS NFR BLD: 1 % (ref 0–1.9)
BILIRUB SERPL-MCNC: 0.4 MG/DL (ref 0.1–1)
BUN SERPL-MCNC: 14 MG/DL (ref 8–23)
CALCIUM SERPL-MCNC: 8.7 MG/DL (ref 8.7–10.5)
CHLORIDE SERPL-SCNC: 99 MMOL/L (ref 95–110)
CO2 SERPL-SCNC: 26 MMOL/L (ref 23–29)
CREAT SERPL-MCNC: 1 MG/DL (ref 0.5–1.4)
DIFFERENTIAL METHOD: ABNORMAL
EOSINOPHIL # BLD AUTO: ABNORMAL K/UL (ref 0–0.5)
EOSINOPHIL NFR BLD: 1 % (ref 0–8)
ERYTHROCYTE [DISTWIDTH] IN BLOOD BY AUTOMATED COUNT: 14.9 % (ref 11.5–14.5)
EST. GFR  (AFRICAN AMERICAN): >60 ML/MIN/1.73 M^2
EST. GFR  (NON AFRICAN AMERICAN): >60 ML/MIN/1.73 M^2
GLUCOSE SERPL-MCNC: 105 MG/DL (ref 70–110)
HCT VFR BLD AUTO: 33.8 % (ref 40–54)
HGB BLD-MCNC: 10.5 G/DL (ref 14–18)
IMM GRANULOCYTES # BLD AUTO: ABNORMAL K/UL (ref 0–0.04)
IMM GRANULOCYTES NFR BLD AUTO: ABNORMAL % (ref 0–0.5)
LDH SERPL L TO P-CCNC: 140 U/L (ref 110–260)
LYMPHOCYTES # BLD AUTO: ABNORMAL K/UL (ref 1–4.8)
LYMPHOCYTES NFR BLD: 78 % (ref 18–48)
MCH RBC QN AUTO: 26.9 PG (ref 27–31)
MCHC RBC AUTO-ENTMCNC: 31.1 G/DL (ref 32–36)
MCV RBC AUTO: 86 FL (ref 82–98)
MONOCYTES # BLD AUTO: ABNORMAL K/UL (ref 0.3–1)
MONOCYTES NFR BLD: 2 % (ref 4–15)
NEUTROPHILS NFR BLD: 18 % (ref 38–73)
NRBC BLD-RTO: 0 /100 WBC
PLATELET # BLD AUTO: 145 K/UL (ref 150–350)
PLATELET BLD QL SMEAR: ABNORMAL
PMV BLD AUTO: 9.4 FL (ref 9.2–12.9)
POTASSIUM SERPL-SCNC: 4.4 MMOL/L (ref 3.5–5.1)
PROT SERPL-MCNC: 7.4 G/DL (ref 6–8.4)
RBC # BLD AUTO: 3.91 M/UL (ref 4.6–6.2)
SODIUM SERPL-SCNC: 134 MMOL/L (ref 136–145)
WBC # BLD AUTO: 12.11 K/UL (ref 3.9–12.7)

## 2020-12-11 PROCEDURE — 80053 COMPREHEN METABOLIC PANEL: CPT | Mod: PO

## 2020-12-11 PROCEDURE — 82232 ASSAY OF BETA-2 PROTEIN: CPT

## 2020-12-11 PROCEDURE — 83615 LACTATE (LD) (LDH) ENZYME: CPT | Mod: PO

## 2020-12-11 PROCEDURE — 36415 COLL VENOUS BLD VENIPUNCTURE: CPT | Mod: PN

## 2020-12-11 PROCEDURE — 85027 COMPLETE CBC AUTOMATED: CPT | Mod: PO

## 2020-12-11 PROCEDURE — 85007 BL SMEAR W/DIFF WBC COUNT: CPT | Mod: PO

## 2020-12-12 LAB — B2 MICROGLOB SERPL-MCNC: 5.2 UG/ML (ref 0–2.5)

## 2020-12-14 ENCOUNTER — OFFICE VISIT (OUTPATIENT)
Dept: HEMATOLOGY/ONCOLOGY | Facility: CLINIC | Age: 70
End: 2020-12-14
Payer: MEDICARE

## 2020-12-14 ENCOUNTER — DOCUMENTATION ONLY (OUTPATIENT)
Dept: INFUSION THERAPY | Facility: HOSPITAL | Age: 70
End: 2020-12-14

## 2020-12-14 VITALS
RESPIRATION RATE: 18 BRPM | BODY MASS INDEX: 25.56 KG/M2 | HEIGHT: 70 IN | WEIGHT: 178.56 LBS | DIASTOLIC BLOOD PRESSURE: 74 MMHG | TEMPERATURE: 98 F | OXYGEN SATURATION: 99 % | HEART RATE: 78 BPM | SYSTOLIC BLOOD PRESSURE: 151 MMHG

## 2020-12-14 DIAGNOSIS — D69.6 THROMBOCYTOPENIA: ICD-10-CM

## 2020-12-14 DIAGNOSIS — D50.9 IRON DEFICIENCY ANEMIA, UNSPECIFIED IRON DEFICIENCY ANEMIA TYPE: ICD-10-CM

## 2020-12-14 DIAGNOSIS — C91.10 CLL (CHRONIC LYMPHOCYTIC LEUKEMIA): Primary | ICD-10-CM

## 2020-12-14 PROCEDURE — 1126F AMNT PAIN NOTED NONE PRSNT: CPT | Mod: S$GLB,,, | Performed by: INTERNAL MEDICINE

## 2020-12-14 PROCEDURE — 99214 PR OFFICE/OUTPT VISIT, EST, LEVL IV, 30-39 MIN: ICD-10-PCS | Mod: S$GLB,,, | Performed by: INTERNAL MEDICINE

## 2020-12-14 PROCEDURE — 1101F PT FALLS ASSESS-DOCD LE1/YR: CPT | Mod: CPTII,S$GLB,, | Performed by: INTERNAL MEDICINE

## 2020-12-14 PROCEDURE — 3008F PR BODY MASS INDEX (BMI) DOCUMENTED: ICD-10-PCS | Mod: CPTII,S$GLB,, | Performed by: INTERNAL MEDICINE

## 2020-12-14 PROCEDURE — 99999 PR PBB SHADOW E&M-EST. PATIENT-LVL IV: ICD-10-PCS | Mod: PBBFAC,,, | Performed by: INTERNAL MEDICINE

## 2020-12-14 PROCEDURE — 3008F BODY MASS INDEX DOCD: CPT | Mod: CPTII,S$GLB,, | Performed by: INTERNAL MEDICINE

## 2020-12-14 PROCEDURE — 1101F PR PT FALLS ASSESS DOC 0-1 FALLS W/OUT INJ PAST YR: ICD-10-PCS | Mod: CPTII,S$GLB,, | Performed by: INTERNAL MEDICINE

## 2020-12-14 PROCEDURE — 3078F DIAST BP <80 MM HG: CPT | Mod: CPTII,S$GLB,, | Performed by: INTERNAL MEDICINE

## 2020-12-14 PROCEDURE — 3288F FALL RISK ASSESSMENT DOCD: CPT | Mod: CPTII,S$GLB,, | Performed by: INTERNAL MEDICINE

## 2020-12-14 PROCEDURE — 1159F PR MEDICATION LIST DOCUMENTED IN MEDICAL RECORD: ICD-10-PCS | Mod: S$GLB,,, | Performed by: INTERNAL MEDICINE

## 2020-12-14 PROCEDURE — 99999 PR PBB SHADOW E&M-EST. PATIENT-LVL IV: CPT | Mod: PBBFAC,,, | Performed by: INTERNAL MEDICINE

## 2020-12-14 PROCEDURE — 3077F SYST BP >= 140 MM HG: CPT | Mod: CPTII,S$GLB,, | Performed by: INTERNAL MEDICINE

## 2020-12-14 PROCEDURE — 1126F PR PAIN SEVERITY QUANTIFIED, NO PAIN PRESENT: ICD-10-PCS | Mod: S$GLB,,, | Performed by: INTERNAL MEDICINE

## 2020-12-14 PROCEDURE — 3078F PR MOST RECENT DIASTOLIC BLOOD PRESSURE < 80 MM HG: ICD-10-PCS | Mod: CPTII,S$GLB,, | Performed by: INTERNAL MEDICINE

## 2020-12-14 PROCEDURE — 3288F PR FALLS RISK ASSESSMENT DOCUMENTED: ICD-10-PCS | Mod: CPTII,S$GLB,, | Performed by: INTERNAL MEDICINE

## 2020-12-14 PROCEDURE — 99214 OFFICE O/P EST MOD 30 MIN: CPT | Mod: S$GLB,,, | Performed by: INTERNAL MEDICINE

## 2020-12-14 PROCEDURE — 3077F PR MOST RECENT SYSTOLIC BLOOD PRESSURE >= 140 MM HG: ICD-10-PCS | Mod: CPTII,S$GLB,, | Performed by: INTERNAL MEDICINE

## 2020-12-14 PROCEDURE — 1159F MED LIST DOCD IN RCRD: CPT | Mod: S$GLB,,, | Performed by: INTERNAL MEDICINE

## 2020-12-14 RX ORDER — DEXAMETHASONE SODIUM PHOSPHATE 4 MG/ML
4 INJECTION, SOLUTION INTRA-ARTICULAR; INTRALESIONAL; INTRAMUSCULAR; INTRAVENOUS; SOFT TISSUE
Status: CANCELLED
Start: 2021-01-08

## 2020-12-14 RX ORDER — DIPHENHYDRAMINE HYDROCHLORIDE 50 MG/ML
25 INJECTION INTRAMUSCULAR; INTRAVENOUS ONCE
Status: CANCELLED
Start: 2021-01-08

## 2020-12-14 RX ORDER — SODIUM CHLORIDE 0.9 % (FLUSH) 0.9 %
10 SYRINGE (ML) INJECTION
Status: CANCELLED | OUTPATIENT
Start: 2021-01-08

## 2020-12-14 RX ORDER — HEPARIN 100 UNIT/ML
500 SYRINGE INTRAVENOUS
Status: CANCELLED | OUTPATIENT
Start: 2021-01-08

## 2020-12-14 RX ORDER — FAMOTIDINE 10 MG/ML
20 INJECTION INTRAVENOUS DAILY
Status: CANCELLED
Start: 2021-01-08

## 2020-12-14 NOTE — PROGRESS NOTES
?[10:42 AM] Krystyna lofton [facility] dates on [place] [phone], a Friday and a Monday, please    ?[11:02 AM] Brittany Dee      230 1/8 and 1/11

## 2020-12-14 NOTE — PROGRESS NOTES
History of present illness:  The patient is a 70-year-old white gentleman well known to me for chronic phase chronic lymphocytic leukemia as well as a history of iron deficiency anemia who returns to review interval lab.  Patient remains without fevers, chills, painful lymphadenopathy, night sweats, and unexplained weight loss.  Patient has recently started taking his own oral iron replacement due to falling hemoglobin and hematocrit in the setting of previously documented deficient iron stores.  Patient has been doing so since late July and has no difficulty with tolerating replacement therapy.  No other new complaints pertinent findings on a 14 point review systems.    Physical examination:  Well-developed, well-nourished, white gentleman in no acute distress, who has a weight of 178.5 lb.  VITAL SIGNS: Documented in EMR and reviewed   HEENT: Normocephalic, atraumatic. Oral mucosa pink and moist. Lips without   lesions. Tongue midline. Oropharynx clear. Nonicteric sclerae.   NECK: Supple, no adenopathy.  HEART: Regular rate and rhythm without murmur, gallop or rub.   LUNGS: Clear to auscultation bilaterally.  ABDOMEN: Soft, nontender, nondistended with positive normoactive bowel sounds,   no hepatosplenomegaly.   EXTREMITIES: No cyanosis, clubbing or edema. Distal pulses are intact.   AXILLA/GROIN:  No palpable pathologic lymphadenopathy is appreciated.    Laboratory:  Iron studies obtained 07/29/2020.  Serum iron 22, TIBC 303, saturated iron 7%.  White count 12.1, hemoglobin 10.5, hematocrit 33.8, MCV 86, platelets 145, absolute neutrophil count is 2180.  Differential is remarkable for 18% granulocytes, 78% lymphocytes, 2% monocytes, 1% eosinophils, and 1% basophils.  Sodium 134, potassium 4.4, chloride 99, CO2 26, BUN 14, creatinine 1, glucose 105, calcium 8.7, liver function test within normal limits, , GFR is greater than 60.  Beta 2 microglobulin is 5.2.  Stool for occult blood is  negative.    Impression:  1.  Chronic lymphocytic leukemia-remains in chronic phase.  2.  Recurrent iron deficiency anemia-unresponsive oral replacement therapy.    Plan:  1.  Intravenous iron replacement with Feraheme at earliest convenience.  2.  Return to clinic 3 weeks following Feraheme with interval CBC to document response.  3.  Otherwise, return to clinic 6 months from now with interval CBC, CMP, LDH, beta 2, and vitamin-D level.    This note was created using voice recognition software and may contain grammatical errors.

## 2020-12-14 NOTE — Clinical Note
Need to have ferraheme dates please.  Rtc 3 weeks post ferraheme witht cbc  Rtc 6 mos from now with cbc cmp ldh beta  and vit d level.

## 2020-12-18 ENCOUNTER — TELEPHONE (OUTPATIENT)
Dept: INFUSION THERAPY | Facility: HOSPITAL | Age: 70
End: 2020-12-18

## 2020-12-18 NOTE — TELEPHONE ENCOUNTER
----- Message from Madeleine Mijares sent at 12/18/2020  2:31 PM CST -----  Regarding: reschedule  Contact: patient  Patient needs to reschedule infusion      [phone]

## 2020-12-18 NOTE — TELEPHONE ENCOUNTER
12/18/2020       3:09 pm  Regarding :Infussion apt   Spoke to patient R/S 1/8/2021@8:30 am                                    1/11/2021@8:30 am

## 2020-12-19 DIAGNOSIS — I10 HYPERTENSION, UNSPECIFIED TYPE: ICD-10-CM

## 2020-12-20 NOTE — TELEPHONE ENCOUNTER
No new care gaps identified.  Powered by [employer]. Reference number: [id number]. 12/19/2020 9:45:57 PM   CST

## 2020-12-21 ENCOUNTER — TELEPHONE (OUTPATIENT)
Dept: HEMATOLOGY/ONCOLOGY | Facility: CLINIC | Age: 70
End: 2020-12-21

## 2020-12-21 NOTE — TELEPHONE ENCOUNTER
----- Message from Linda Dudley sent at 12/21/2020  1:52 PM CST -----  Contact: pt  Patient called he is asking to have his labs done at the [facility] location.  He is asking to have it done on 2/1/21 at 8:45 a.m       This is the only location he would like to have his labs done.     Call back  [phone]

## 2020-12-21 NOTE — TELEPHONE ENCOUNTER
Returned call to patient, no answer, left message on voice mail that I have changed his lab location to [facility] per his request

## 2020-12-23 RX ORDER — LOSARTAN POTASSIUM AND HYDROCHLOROTHIAZIDE 12.5; 1 MG/1; MG/1
TABLET ORAL
Qty: 90 TABLET | Refills: 3 | Status: SHIPPED | OUTPATIENT
Start: 2020-12-23 | End: 2021-11-03

## 2020-12-23 NOTE — PROGRESS NOTES
Refill Routing Note   Medication(s) are not appropriate for processing by [facility] for the following reason(s):     - Required vitals are abnormal  [facility] action(s):  Defer     Medication Therapy Plan: [facility]  Medication reconciliation completed: No   Automatic Epic Generated Protocol Data:        Requested Prescriptions   Pending Prescriptions Disp Refills    losartan-hydrochlorothiazide 100-12.5 mg (HYZAAR) 100-12.5 mg Tab [Pharmacy Med Name: LOSARTAN/HCTZ 100-12.5MG TABLET] 90 tablet 3     Sig: TAKE 1 TABLET BY MOUTH IN  THE MORNING       Cardiovascular: ARB + Diuretic Combos Failed - 12/19/2020  9:45 PM        Failed - Last BP in normal range within 360 days.     BP Readings from Last 3 Encounters:   12/14/20 (!) 151/74   09/28/20 136/66   07/29/20 130/70              Passed - Patient is at least 18 years old        Passed - Office visit in past 12 months or future 90 days     Recent Outpatient Visits            1 week ago CLL (chronic lymphocytic leukemia)    [facility] Trent Solares MD    2 months ago Osteoarthritis of right knee, unspecified osteoarthritis type    [facility] [place] [facility] [place] Gregory Avila MD    2 months ago Osteoarthritis of right knee, unspecified osteoarthritis type    [facility] Robbie Orr MD    4 months ago Fatigue, unspecified type    [facility] Hallie Bowles, MARIS    5 months ago BPH with obstruction/lower urinary tract symptoms    [facility] Andrea Tam MD          Future Appointments              In 2 weeks CHAIR 09, [facility] INFUSION [facility], [facility] at [facility]    In 2 weeks CHAIR 25, [facility] INFUSION [facility], Infusion Services, [facility]    In 1 month [facility]    In 1 month Trent Solares MD [facility], [facility] at [facility]    In 5 months LAB, [facility] DRAW STATION  [facility] - Laboratory, [facility]    In 5 months Trent Solares MD [facility]                Passed - K in normal range and within 360 days     POC Potassium   Date Value Ref Range Status   02/11/2019 3.9 3.6 - 5.1 mmol/L Final   02/07/2019 4.0 3.6 - 5.1 mmol/L Final     Potassium   Date Value Ref Range Status   12/11/2020 4.4 3.5 - 5.1 mmol/L Final   01/20/2020 4.2 3.5 - 5.1 mmol/L Final   12/09/2019 4.4 3.5 - 5.1 mmol/L Final              Passed - Na is between 130 and 148 and within 360 days     POC Sodium   Date Value Ref Range Status   02/11/2019 135 128 - 145 mmol/L Final   02/07/2019 134 128 - 145 mmol/L Final     Sodium   Date Value Ref Range Status   12/11/2020 134 (L) 136 - 145 mmol/L Final   01/20/2020 134 (L) 136 - 145 mmol/L Final   12/09/2019 138 136 - 145 mmol/L Final              Passed - Cr is 1.4 or below and within 360 days     Creatinine   Date Value Ref Range Status   12/11/2020 1.0 0.5 - 1.4 mg/dL Final   01/20/2020 1.0 0.5 - 1.4 mg/dL Final   12/09/2019 0.9 0.5 - 1.4 mg/dL Final     POC Creatinine   Date Value Ref Range Status   02/11/2019 0.8 0.6 - 1.2 mg/dL Final   02/07/2019 1.1 0.6 - 1.2 mg/dL Final              Passed - eGFR within 360 days     POC eGFR   Date Value Ref Range Status   02/11/2019 >60 61 - 2,000 mL/min Final   02/07/2019 >60 61 - 2,000 mL/min Final     eGFR if non    Date Value Ref Range Status   12/11/2020 >60 >60 mL/min/1.73 m^2 Final     Comment:     Calculation used to obtain the estimated glomerular filtration  rate (eGFR) is the CKD-EPI equation.      01/20/2020 >60 >60 mL/min/1.73 m^2 Final     Comment:     Calculation used to obtain the estimated glomerular filtration  rate (eGFR) is the CKD-EPI equation.      12/09/2019 >60.0 >60 mL/min/1.73 m^2 Final     Comment:     Calculation used to obtain the estimated glomerular filtration  rate (eGFR) is the CKD-EPI equation.        eGFR if     Date Value Ref Range Status   12/11/2020 >60 >60 mL/min/1.73 m^2 Final   01/20/2020 >60 >60 mL/min/1.73 m^2 Final   12/09/2019 >60.0 >60 mL/min/1.73 m^2 Final                    Appointments  past 12m or future 3m with PCP    Date Provider   Last Visit   9/28/2020 Robbie Orr MD   Next Visit   Visit date not found Robbie Orr MD   ED visits in past 90 days: 0        Note composed:10:23 PM 12/22/2020

## 2020-12-30 ENCOUNTER — PATIENT OUTREACH (OUTPATIENT)
Dept: OTHER | Facility: OTHER | Age: 70
End: 2020-12-30

## 2021-01-05 ENCOUNTER — PATIENT MESSAGE (OUTPATIENT)
Dept: FAMILY MEDICINE | Facility: CLINIC | Age: 71
End: 2021-01-05

## 2021-01-08 ENCOUNTER — INFUSION (OUTPATIENT)
Dept: INFUSION THERAPY | Facility: HOSPITAL | Age: 71
End: 2021-01-08
Attending: INTERNAL MEDICINE
Payer: MEDICARE

## 2021-01-08 VITALS
DIASTOLIC BLOOD PRESSURE: 53 MMHG | TEMPERATURE: 98 F | HEART RATE: 68 BPM | BODY MASS INDEX: 24.46 KG/M2 | SYSTOLIC BLOOD PRESSURE: 114 MMHG | HEIGHT: 70 IN | RESPIRATION RATE: 16 BRPM | WEIGHT: 170.88 LBS

## 2021-01-08 DIAGNOSIS — D50.9 IRON DEFICIENCY ANEMIA, UNSPECIFIED IRON DEFICIENCY ANEMIA TYPE: Primary | ICD-10-CM

## 2021-01-08 PROCEDURE — 96376 TX/PRO/DX INJ SAME DRUG ADON: CPT | Mod: PN

## 2021-01-08 PROCEDURE — 63600175 PHARM REV CODE 636 W HCPCS: Mod: PN | Performed by: INTERNAL MEDICINE

## 2021-01-08 PROCEDURE — 96365 THER/PROPH/DIAG IV INF INIT: CPT | Mod: PN

## 2021-01-08 PROCEDURE — 25000003 PHARM REV CODE 250: Mod: PN | Performed by: INTERNAL MEDICINE

## 2021-01-08 RX ORDER — DIPHENHYDRAMINE HYDROCHLORIDE 50 MG/ML
25 INJECTION INTRAMUSCULAR; INTRAVENOUS ONCE
Status: COMPLETED | OUTPATIENT
Start: 2021-01-08 | End: 2021-01-08

## 2021-01-08 RX ORDER — HEPARIN 100 UNIT/ML
500 SYRINGE INTRAVENOUS
Status: CANCELLED | OUTPATIENT
Start: 2021-02-02

## 2021-01-08 RX ORDER — DEXAMETHASONE SODIUM PHOSPHATE 4 MG/ML
4 INJECTION, SOLUTION INTRA-ARTICULAR; INTRALESIONAL; INTRAMUSCULAR; INTRAVENOUS; SOFT TISSUE
Status: CANCELLED
Start: 2021-02-02

## 2021-01-08 RX ORDER — DEXAMETHASONE SODIUM PHOSPHATE 4 MG/ML
4 INJECTION, SOLUTION INTRA-ARTICULAR; INTRALESIONAL; INTRAMUSCULAR; INTRAVENOUS; SOFT TISSUE
Status: COMPLETED | OUTPATIENT
Start: 2021-01-08 | End: 2021-01-08

## 2021-01-08 RX ORDER — SODIUM CHLORIDE 0.9 % (FLUSH) 0.9 %
10 SYRINGE (ML) INJECTION
Status: CANCELLED | OUTPATIENT
Start: 2021-02-02

## 2021-01-08 RX ORDER — FAMOTIDINE 10 MG/ML
20 INJECTION INTRAVENOUS DAILY
Status: DISCONTINUED | OUTPATIENT
Start: 2021-01-08 | End: 2021-01-08 | Stop reason: HOSPADM

## 2021-01-08 RX ORDER — FAMOTIDINE 10 MG/ML
20 INJECTION INTRAVENOUS DAILY
Status: CANCELLED
Start: 2021-02-02

## 2021-01-08 RX ORDER — DIPHENHYDRAMINE HYDROCHLORIDE 50 MG/ML
25 INJECTION INTRAMUSCULAR; INTRAVENOUS ONCE
Status: CANCELLED
Start: 2021-02-02

## 2021-01-08 RX ADMIN — FERUMOXYTOL 510 MG: 510 INJECTION INTRAVENOUS at 09:01

## 2021-01-08 RX ADMIN — FAMOTIDINE 20 MG: 10 INJECTION INTRAVENOUS at 09:01

## 2021-01-08 RX ADMIN — DIPHENHYDRAMINE HYDROCHLORIDE 25 MG: 50 INJECTION INTRAMUSCULAR; INTRAVENOUS at 08:01

## 2021-01-08 RX ADMIN — SODIUM CHLORIDE: 9 INJECTION, SOLUTION INTRAVENOUS at 08:01

## 2021-01-08 RX ADMIN — DEXAMETHASONE SODIUM PHOSPHATE 4 MG: 4 INJECTION INTRA-ARTICULAR; INTRALESIONAL; INTRAMUSCULAR; INTRAVENOUS; SOFT TISSUE at 09:01

## 2021-01-11 ENCOUNTER — INFUSION (OUTPATIENT)
Dept: INFUSION THERAPY | Facility: HOSPITAL | Age: 71
End: 2021-01-11
Attending: INTERNAL MEDICINE
Payer: MEDICARE

## 2021-01-11 VITALS
DIASTOLIC BLOOD PRESSURE: 78 MMHG | HEIGHT: 70 IN | HEART RATE: 92 BPM | SYSTOLIC BLOOD PRESSURE: 141 MMHG | WEIGHT: 170.88 LBS | BODY MASS INDEX: 24.46 KG/M2 | RESPIRATION RATE: 16 BRPM | TEMPERATURE: 98 F

## 2021-01-11 DIAGNOSIS — D50.9 IRON DEFICIENCY ANEMIA, UNSPECIFIED IRON DEFICIENCY ANEMIA TYPE: Primary | ICD-10-CM

## 2021-01-11 PROCEDURE — 96375 TX/PRO/DX INJ NEW DRUG ADDON: CPT | Mod: PN

## 2021-01-11 PROCEDURE — 96365 THER/PROPH/DIAG IV INF INIT: CPT | Mod: PN

## 2021-01-11 PROCEDURE — 25000003 PHARM REV CODE 250: Mod: PN | Performed by: INTERNAL MEDICINE

## 2021-01-11 PROCEDURE — A4216 STERILE WATER/SALINE, 10 ML: HCPCS | Mod: PN | Performed by: INTERNAL MEDICINE

## 2021-01-11 PROCEDURE — 63600175 PHARM REV CODE 636 W HCPCS: Mod: PN | Performed by: INTERNAL MEDICINE

## 2021-01-11 RX ORDER — HEPARIN 100 UNIT/ML
500 SYRINGE INTRAVENOUS
Status: CANCELLED | OUTPATIENT
Start: 2021-02-02

## 2021-01-11 RX ORDER — SODIUM CHLORIDE 0.9 % (FLUSH) 0.9 %
10 SYRINGE (ML) INJECTION
Status: CANCELLED | OUTPATIENT
Start: 2021-02-02

## 2021-01-11 RX ORDER — DIPHENHYDRAMINE HYDROCHLORIDE 50 MG/ML
25 INJECTION INTRAMUSCULAR; INTRAVENOUS ONCE
Status: CANCELLED
Start: 2021-02-02

## 2021-01-11 RX ORDER — DEXAMETHASONE SODIUM PHOSPHATE 4 MG/ML
4 INJECTION, SOLUTION INTRA-ARTICULAR; INTRALESIONAL; INTRAMUSCULAR; INTRAVENOUS; SOFT TISSUE
Status: CANCELLED
Start: 2021-02-02

## 2021-01-11 RX ORDER — SODIUM CHLORIDE 0.9 % (FLUSH) 0.9 %
10 SYRINGE (ML) INJECTION
Status: DISCONTINUED | OUTPATIENT
Start: 2021-01-11 | End: 2021-01-11 | Stop reason: HOSPADM

## 2021-01-11 RX ORDER — DIPHENHYDRAMINE HYDROCHLORIDE 50 MG/ML
25 INJECTION INTRAMUSCULAR; INTRAVENOUS ONCE
Status: COMPLETED | OUTPATIENT
Start: 2021-01-11 | End: 2021-01-11

## 2021-01-11 RX ORDER — FAMOTIDINE 10 MG/ML
20 INJECTION INTRAVENOUS
Status: COMPLETED | OUTPATIENT
Start: 2021-01-11 | End: 2021-01-11

## 2021-01-11 RX ORDER — FAMOTIDINE 10 MG/ML
20 INJECTION INTRAVENOUS DAILY
Status: CANCELLED
Start: 2021-02-02

## 2021-01-11 RX ORDER — DEXAMETHASONE SODIUM PHOSPHATE 4 MG/ML
4 INJECTION, SOLUTION INTRA-ARTICULAR; INTRALESIONAL; INTRAMUSCULAR; INTRAVENOUS; SOFT TISSUE
Status: COMPLETED | OUTPATIENT
Start: 2021-01-11 | End: 2021-01-11

## 2021-01-11 RX ADMIN — FAMOTIDINE 20 MG: 10 INJECTION INTRAVENOUS at 08:01

## 2021-01-11 RX ADMIN — FERUMOXYTOL 510 MG: 510 INJECTION INTRAVENOUS at 09:01

## 2021-01-11 RX ADMIN — DIPHENHYDRAMINE HYDROCHLORIDE 25 MG: 50 INJECTION INTRAMUSCULAR; INTRAVENOUS at 08:01

## 2021-01-11 RX ADMIN — Medication 10 ML: at 08:01

## 2021-01-11 RX ADMIN — SODIUM CHLORIDE: 9 INJECTION, SOLUTION INTRAVENOUS at 08:01

## 2021-01-11 RX ADMIN — DEXAMETHASONE SODIUM PHOSPHATE 4 MG: 4 INJECTION INTRA-ARTICULAR; INTRALESIONAL; INTRAMUSCULAR; INTRAVENOUS; SOFT TISSUE at 08:01

## 2021-02-01 ENCOUNTER — LAB VISIT (OUTPATIENT)
Dept: LAB | Facility: HOSPITAL | Age: 71
End: 2021-02-01
Attending: INTERNAL MEDICINE
Payer: MEDICARE

## 2021-02-01 ENCOUNTER — TELEPHONE (OUTPATIENT)
Dept: HEMATOLOGY/ONCOLOGY | Facility: CLINIC | Age: 71
End: 2021-02-01

## 2021-02-01 ENCOUNTER — OFFICE VISIT (OUTPATIENT)
Dept: HEMATOLOGY/ONCOLOGY | Facility: CLINIC | Age: 71
End: 2021-02-01
Payer: MEDICARE

## 2021-02-01 VITALS
RESPIRATION RATE: 18 BRPM | SYSTOLIC BLOOD PRESSURE: 177 MMHG | HEIGHT: 70 IN | BODY MASS INDEX: 24.65 KG/M2 | HEART RATE: 79 BPM | OXYGEN SATURATION: 99 % | DIASTOLIC BLOOD PRESSURE: 82 MMHG | WEIGHT: 172.19 LBS | TEMPERATURE: 98 F

## 2021-02-01 DIAGNOSIS — C91.10 CLL (CHRONIC LYMPHOCYTIC LEUKEMIA): ICD-10-CM

## 2021-02-01 DIAGNOSIS — D50.9 IRON DEFICIENCY ANEMIA, UNSPECIFIED IRON DEFICIENCY ANEMIA TYPE: Primary | ICD-10-CM

## 2021-02-01 DIAGNOSIS — I10 ESSENTIAL HYPERTENSION: ICD-10-CM

## 2021-02-01 DIAGNOSIS — D50.9 IRON DEFICIENCY ANEMIA, UNSPECIFIED IRON DEFICIENCY ANEMIA TYPE: ICD-10-CM

## 2021-02-01 LAB
ANISOCYTOSIS BLD QL SMEAR: SLIGHT
BASOPHILS # BLD AUTO: 0.02 K/UL (ref 0–0.2)
BASOPHILS NFR BLD: 0.2 % (ref 0–1.9)
DIFFERENTIAL METHOD: ABNORMAL
EOSINOPHIL # BLD AUTO: 0 K/UL (ref 0–0.5)
EOSINOPHIL NFR BLD: 0.2 % (ref 0–8)
ERYTHROCYTE [DISTWIDTH] IN BLOOD BY AUTOMATED COUNT: 16.5 % (ref 11.5–14.5)
HCT VFR BLD AUTO: 36.5 % (ref 40–54)
HGB BLD-MCNC: 11.6 G/DL (ref 14–18)
IMM GRANULOCYTES # BLD AUTO: 0.02 K/UL (ref 0–0.04)
IMM GRANULOCYTES NFR BLD AUTO: 0.2 % (ref 0–0.5)
LYMPHOCYTES # BLD AUTO: 7.7 K/UL (ref 1–4.8)
LYMPHOCYTES NFR BLD: 68.1 % (ref 18–48)
MCH RBC QN AUTO: 28.4 PG (ref 27–31)
MCHC RBC AUTO-ENTMCNC: 31.8 G/DL (ref 32–36)
MCV RBC AUTO: 90 FL (ref 82–98)
MONOCYTES # BLD AUTO: 1.2 K/UL (ref 0.3–1)
MONOCYTES NFR BLD: 10.3 % (ref 4–15)
NEUTROPHILS # BLD AUTO: 2.4 K/UL (ref 1.8–7.7)
NEUTROPHILS NFR BLD: 21 % (ref 38–73)
NRBC BLD-RTO: 0 /100 WBC
PLATELET # BLD AUTO: 128 K/UL (ref 150–350)
PLATELET BLD QL SMEAR: ABNORMAL
PMV BLD AUTO: 8.7 FL (ref 9.2–12.9)
POLYCHROMASIA BLD QL SMEAR: ABNORMAL
RBC # BLD AUTO: 4.08 M/UL (ref 4.6–6.2)
WBC # BLD AUTO: 11.23 K/UL (ref 3.9–12.7)

## 2021-02-01 PROCEDURE — 99213 OFFICE O/P EST LOW 20 MIN: CPT | Mod: S$GLB,,, | Performed by: NURSE PRACTITIONER

## 2021-02-01 PROCEDURE — 99999 PR PBB SHADOW E&M-EST. PATIENT-LVL IV: CPT | Mod: PBBFAC,,, | Performed by: NURSE PRACTITIONER

## 2021-02-01 PROCEDURE — 1126F AMNT PAIN NOTED NONE PRSNT: CPT | Mod: S$GLB,,, | Performed by: NURSE PRACTITIONER

## 2021-02-01 PROCEDURE — 1101F PT FALLS ASSESS-DOCD LE1/YR: CPT | Mod: CPTII,S$GLB,, | Performed by: NURSE PRACTITIONER

## 2021-02-01 PROCEDURE — 99499 UNLISTED E&M SERVICE: CPT | Mod: S$GLB,,, | Performed by: NURSE PRACTITIONER

## 2021-02-01 PROCEDURE — 99213 PR OFFICE/OUTPT VISIT, EST, LEVL III, 20-29 MIN: ICD-10-PCS | Mod: S$GLB,,, | Performed by: NURSE PRACTITIONER

## 2021-02-01 PROCEDURE — 3288F FALL RISK ASSESSMENT DOCD: CPT | Mod: CPTII,S$GLB,, | Performed by: NURSE PRACTITIONER

## 2021-02-01 PROCEDURE — 1126F PR PAIN SEVERITY QUANTIFIED, NO PAIN PRESENT: ICD-10-PCS | Mod: S$GLB,,, | Performed by: NURSE PRACTITIONER

## 2021-02-01 PROCEDURE — 3008F PR BODY MASS INDEX (BMI) DOCUMENTED: ICD-10-PCS | Mod: CPTII,S$GLB,, | Performed by: NURSE PRACTITIONER

## 2021-02-01 PROCEDURE — 3079F PR MOST RECENT DIASTOLIC BLOOD PRESSURE 80-89 MM HG: ICD-10-PCS | Mod: CPTII,S$GLB,, | Performed by: NURSE PRACTITIONER

## 2021-02-01 PROCEDURE — 85025 COMPLETE CBC W/AUTO DIFF WBC: CPT

## 2021-02-01 PROCEDURE — 1159F MED LIST DOCD IN RCRD: CPT | Mod: S$GLB,,, | Performed by: NURSE PRACTITIONER

## 2021-02-01 PROCEDURE — 3288F PR FALLS RISK ASSESSMENT DOCUMENTED: ICD-10-PCS | Mod: CPTII,S$GLB,, | Performed by: NURSE PRACTITIONER

## 2021-02-01 PROCEDURE — 99499 RISK ADDL DX/OHS AUDIT: ICD-10-PCS | Mod: S$GLB,,, | Performed by: NURSE PRACTITIONER

## 2021-02-01 PROCEDURE — 1101F PR PT FALLS ASSESS DOC 0-1 FALLS W/OUT INJ PAST YR: ICD-10-PCS | Mod: CPTII,S$GLB,, | Performed by: NURSE PRACTITIONER

## 2021-02-01 PROCEDURE — 3077F PR MOST RECENT SYSTOLIC BLOOD PRESSURE >= 140 MM HG: ICD-10-PCS | Mod: CPTII,S$GLB,, | Performed by: NURSE PRACTITIONER

## 2021-02-01 PROCEDURE — 3008F BODY MASS INDEX DOCD: CPT | Mod: CPTII,S$GLB,, | Performed by: NURSE PRACTITIONER

## 2021-02-01 PROCEDURE — 3077F SYST BP >= 140 MM HG: CPT | Mod: CPTII,S$GLB,, | Performed by: NURSE PRACTITIONER

## 2021-02-01 PROCEDURE — 1159F PR MEDICATION LIST DOCUMENTED IN MEDICAL RECORD: ICD-10-PCS | Mod: S$GLB,,, | Performed by: NURSE PRACTITIONER

## 2021-02-01 PROCEDURE — 36415 COLL VENOUS BLD VENIPUNCTURE: CPT | Mod: PN

## 2021-02-01 PROCEDURE — 99999 PR PBB SHADOW E&M-EST. PATIENT-LVL IV: ICD-10-PCS | Mod: PBBFAC,,, | Performed by: NURSE PRACTITIONER

## 2021-02-01 PROCEDURE — 3079F DIAST BP 80-89 MM HG: CPT | Mod: CPTII,S$GLB,, | Performed by: NURSE PRACTITIONER

## 2021-02-01 PROCEDURE — 85025 COMPLETE CBC W/AUTO DIFF WBC: CPT | Mod: PN

## 2021-02-11 ENCOUNTER — PATIENT MESSAGE (OUTPATIENT)
Dept: FAMILY MEDICINE | Facility: CLINIC | Age: 71
End: 2021-02-11

## 2021-02-21 DIAGNOSIS — N40.0 BENIGN PROSTATIC HYPERPLASIA, UNSPECIFIED WHETHER LOWER URINARY TRACT SYMPTOMS PRESENT: ICD-10-CM

## 2021-02-23 RX ORDER — TAMSULOSIN HYDROCHLORIDE 0.4 MG/1
CAPSULE ORAL
Qty: 180 CAPSULE | Refills: 2 | Status: SHIPPED | OUTPATIENT
Start: 2021-02-23 | End: 2021-10-28

## 2021-02-25 DIAGNOSIS — G47.00 INSOMNIA, UNSPECIFIED TYPE: ICD-10-CM

## 2021-02-25 RX ORDER — ZOLPIDEM TARTRATE 5 MG/1
TABLET ORAL
Qty: 90 TABLET | Refills: 1 | Status: SHIPPED | OUTPATIENT
Start: 2021-02-25 | End: 2021-08-13

## 2021-03-12 ENCOUNTER — TELEPHONE (OUTPATIENT)
Dept: FAMILY MEDICINE | Facility: CLINIC | Age: 71
End: 2021-03-12

## 2021-04-12 ENCOUNTER — TELEPHONE (OUTPATIENT)
Dept: FAMILY MEDICINE | Facility: CLINIC | Age: 71
End: 2021-04-12

## 2021-04-12 DIAGNOSIS — G47.33 MODERATE OBSTRUCTIVE SLEEP APNEA: Primary | ICD-10-CM

## 2021-06-10 ENCOUNTER — TELEPHONE (OUTPATIENT)
Dept: HEMATOLOGY/ONCOLOGY | Facility: CLINIC | Age: 71
End: 2021-06-10

## 2021-06-14 ENCOUNTER — LAB VISIT (OUTPATIENT)
Dept: LAB | Facility: HOSPITAL | Age: 71
End: 2021-06-14
Attending: NURSE PRACTITIONER
Payer: MEDICARE

## 2021-06-14 DIAGNOSIS — C91.10 CLL (CHRONIC LYMPHOCYTIC LEUKEMIA): ICD-10-CM

## 2021-06-14 DIAGNOSIS — D50.9 IRON DEFICIENCY ANEMIA, UNSPECIFIED IRON DEFICIENCY ANEMIA TYPE: ICD-10-CM

## 2021-06-14 LAB
ALBUMIN SERPL BCP-MCNC: 3.3 G/DL (ref 3.5–5.2)
ALP SERPL-CCNC: 74 U/L (ref 55–135)
ALT SERPL W/O P-5'-P-CCNC: 10 U/L (ref 10–44)
ANION GAP SERPL CALC-SCNC: 11 MMOL/L (ref 8–16)
ANISOCYTOSIS BLD QL SMEAR: SLIGHT
AST SERPL-CCNC: 11 U/L (ref 10–40)
B2 MICROGLOB SERPL-MCNC: 4.4 UG/ML (ref 0–2.5)
BASOPHILS # BLD AUTO: ABNORMAL K/UL (ref 0–0.2)
BASOPHILS NFR BLD: 1 % (ref 0–1.9)
BILIRUB SERPL-MCNC: 0.4 MG/DL (ref 0.1–1)
BUN SERPL-MCNC: 19 MG/DL (ref 8–23)
CALCIUM SERPL-MCNC: 9.3 MG/DL (ref 8.7–10.5)
CHLORIDE SERPL-SCNC: 98 MMOL/L (ref 95–110)
CO2 SERPL-SCNC: 24 MMOL/L (ref 23–29)
CREAT SERPL-MCNC: 0.9 MG/DL (ref 0.5–1.4)
DIFFERENTIAL METHOD: ABNORMAL
EOSINOPHIL # BLD AUTO: ABNORMAL K/UL (ref 0–0.5)
EOSINOPHIL NFR BLD: 0 % (ref 0–8)
ERYTHROCYTE [DISTWIDTH] IN BLOOD BY AUTOMATED COUNT: 14.1 % (ref 11.5–14.5)
EST. GFR  (AFRICAN AMERICAN): >60 ML/MIN/1.73 M^2
EST. GFR  (NON AFRICAN AMERICAN): >60 ML/MIN/1.73 M^2
FERRITIN SERPL-MCNC: 226 NG/ML (ref 20–300)
GLUCOSE SERPL-MCNC: 74 MG/DL (ref 70–110)
HCT VFR BLD AUTO: 33.3 % (ref 40–54)
HGB BLD-MCNC: 10.8 G/DL (ref 14–18)
IMM GRANULOCYTES # BLD AUTO: ABNORMAL K/UL (ref 0–0.04)
IMM GRANULOCYTES NFR BLD AUTO: ABNORMAL % (ref 0–0.5)
IRON SERPL-MCNC: 26 UG/DL (ref 45–160)
LDH SERPL L TO P-CCNC: 123 U/L (ref 110–260)
LYMPHOCYTES # BLD AUTO: ABNORMAL K/UL (ref 1–4.8)
LYMPHOCYTES NFR BLD: 71 % (ref 18–48)
MCH RBC QN AUTO: 28.7 PG (ref 27–31)
MCHC RBC AUTO-ENTMCNC: 32.4 G/DL (ref 32–36)
MCV RBC AUTO: 89 FL (ref 82–98)
MONOCYTES # BLD AUTO: ABNORMAL K/UL (ref 0.3–1)
MONOCYTES NFR BLD: 8 % (ref 4–15)
NEUTROPHILS NFR BLD: 20 % (ref 38–73)
NRBC BLD-RTO: 0 /100 WBC
PLATELET # BLD AUTO: 140 K/UL (ref 150–450)
PLATELET BLD QL SMEAR: ABNORMAL
PMV BLD AUTO: 9.4 FL (ref 9.2–12.9)
POTASSIUM SERPL-SCNC: 4.4 MMOL/L (ref 3.5–5.1)
PROT SERPL-MCNC: 7.5 G/DL (ref 6–8.4)
RBC # BLD AUTO: 3.76 M/UL (ref 4.6–6.2)
SATURATED IRON: 10 % (ref 20–50)
SODIUM SERPL-SCNC: 133 MMOL/L (ref 136–145)
TOTAL IRON BINDING CAPACITY: 256 UG/DL (ref 250–450)
TRANSFERRIN SERPL-MCNC: 173 MG/DL (ref 200–375)
WBC # BLD AUTO: 10.48 K/UL (ref 3.9–12.7)

## 2021-06-14 PROCEDURE — 82232 ASSAY OF BETA-2 PROTEIN: CPT | Performed by: NURSE PRACTITIONER

## 2021-06-14 PROCEDURE — 85027 COMPLETE CBC AUTOMATED: CPT | Mod: PO | Performed by: NURSE PRACTITIONER

## 2021-06-14 PROCEDURE — 36415 COLL VENOUS BLD VENIPUNCTURE: CPT | Mod: PN | Performed by: NURSE PRACTITIONER

## 2021-06-14 PROCEDURE — 80053 COMPREHEN METABOLIC PANEL: CPT | Performed by: NURSE PRACTITIONER

## 2021-06-14 PROCEDURE — 82728 ASSAY OF FERRITIN: CPT | Performed by: NURSE PRACTITIONER

## 2021-06-14 PROCEDURE — 83540 ASSAY OF IRON: CPT | Performed by: NURSE PRACTITIONER

## 2021-06-14 PROCEDURE — 85007 BL SMEAR W/DIFF WBC COUNT: CPT | Mod: PO | Performed by: NURSE PRACTITIONER

## 2021-06-14 PROCEDURE — 83615 LACTATE (LD) (LDH) ENZYME: CPT | Performed by: NURSE PRACTITIONER

## 2021-06-16 ENCOUNTER — PATIENT MESSAGE (OUTPATIENT)
Dept: HEMATOLOGY/ONCOLOGY | Facility: CLINIC | Age: 71
End: 2021-06-16

## 2021-06-16 ENCOUNTER — OFFICE VISIT (OUTPATIENT)
Dept: HEMATOLOGY/ONCOLOGY | Facility: CLINIC | Age: 71
End: 2021-06-16
Payer: MEDICARE

## 2021-06-16 VITALS
HEIGHT: 70 IN | BODY MASS INDEX: 24.78 KG/M2 | OXYGEN SATURATION: 96 % | DIASTOLIC BLOOD PRESSURE: 66 MMHG | WEIGHT: 173.06 LBS | HEART RATE: 72 BPM | SYSTOLIC BLOOD PRESSURE: 118 MMHG

## 2021-06-16 DIAGNOSIS — C91.10 CLL (CHRONIC LYMPHOCYTIC LEUKEMIA): Primary | ICD-10-CM

## 2021-06-16 DIAGNOSIS — D69.6 THROMBOCYTOPENIA: ICD-10-CM

## 2021-06-16 DIAGNOSIS — D50.9 IRON DEFICIENCY ANEMIA, UNSPECIFIED IRON DEFICIENCY ANEMIA TYPE: ICD-10-CM

## 2021-06-16 PROCEDURE — 3288F PR FALLS RISK ASSESSMENT DOCUMENTED: ICD-10-PCS | Mod: CPTII,S$GLB,, | Performed by: INTERNAL MEDICINE

## 2021-06-16 PROCEDURE — 99214 PR OFFICE/OUTPT VISIT, EST, LEVL IV, 30-39 MIN: ICD-10-PCS | Mod: S$GLB,,, | Performed by: INTERNAL MEDICINE

## 2021-06-16 PROCEDURE — 1101F PR PT FALLS ASSESS DOC 0-1 FALLS W/OUT INJ PAST YR: ICD-10-PCS | Mod: CPTII,S$GLB,, | Performed by: INTERNAL MEDICINE

## 2021-06-16 PROCEDURE — 99999 PR PBB SHADOW E&M-EST. PATIENT-LVL III: CPT | Mod: PBBFAC,,, | Performed by: INTERNAL MEDICINE

## 2021-06-16 PROCEDURE — 99214 OFFICE O/P EST MOD 30 MIN: CPT | Mod: S$GLB,,, | Performed by: INTERNAL MEDICINE

## 2021-06-16 PROCEDURE — 1101F PT FALLS ASSESS-DOCD LE1/YR: CPT | Mod: CPTII,S$GLB,, | Performed by: INTERNAL MEDICINE

## 2021-06-16 PROCEDURE — 3288F FALL RISK ASSESSMENT DOCD: CPT | Mod: CPTII,S$GLB,, | Performed by: INTERNAL MEDICINE

## 2021-06-16 PROCEDURE — 99499 RISK ADDL DX/OHS AUDIT: ICD-10-PCS | Mod: S$GLB,,, | Performed by: INTERNAL MEDICINE

## 2021-06-16 PROCEDURE — 99999 PR PBB SHADOW E&M-EST. PATIENT-LVL III: ICD-10-PCS | Mod: PBBFAC,,, | Performed by: INTERNAL MEDICINE

## 2021-06-16 PROCEDURE — 1159F MED LIST DOCD IN RCRD: CPT | Mod: S$GLB,,, | Performed by: INTERNAL MEDICINE

## 2021-06-16 PROCEDURE — 3008F BODY MASS INDEX DOCD: CPT | Mod: CPTII,S$GLB,, | Performed by: INTERNAL MEDICINE

## 2021-06-16 PROCEDURE — 1126F PR PAIN SEVERITY QUANTIFIED, NO PAIN PRESENT: ICD-10-PCS | Mod: S$GLB,,, | Performed by: INTERNAL MEDICINE

## 2021-06-16 PROCEDURE — 1159F PR MEDICATION LIST DOCUMENTED IN MEDICAL RECORD: ICD-10-PCS | Mod: S$GLB,,, | Performed by: INTERNAL MEDICINE

## 2021-06-16 PROCEDURE — 99499 UNLISTED E&M SERVICE: CPT | Mod: S$GLB,,, | Performed by: INTERNAL MEDICINE

## 2021-06-16 PROCEDURE — 1126F AMNT PAIN NOTED NONE PRSNT: CPT | Mod: S$GLB,,, | Performed by: INTERNAL MEDICINE

## 2021-06-16 PROCEDURE — 3008F PR BODY MASS INDEX (BMI) DOCUMENTED: ICD-10-PCS | Mod: CPTII,S$GLB,, | Performed by: INTERNAL MEDICINE

## 2021-06-18 DIAGNOSIS — D50.9 IRON DEFICIENCY ANEMIA, UNSPECIFIED IRON DEFICIENCY ANEMIA TYPE: Primary | ICD-10-CM

## 2021-06-21 ENCOUNTER — PATIENT MESSAGE (OUTPATIENT)
Dept: GASTROENTEROLOGY | Facility: CLINIC | Age: 71
End: 2021-06-21

## 2021-06-28 ENCOUNTER — TELEPHONE (OUTPATIENT)
Dept: ENDOSCOPY | Facility: HOSPITAL | Age: 71
End: 2021-06-28

## 2021-07-06 ENCOUNTER — PATIENT MESSAGE (OUTPATIENT)
Dept: GASTROENTEROLOGY | Facility: CLINIC | Age: 71
End: 2021-07-06

## 2021-07-07 DIAGNOSIS — M17.11 OSTEOARTHRITIS OF RIGHT KNEE, UNSPECIFIED OSTEOARTHRITIS TYPE: ICD-10-CM

## 2021-07-11 RX ORDER — MELOXICAM 15 MG/1
TABLET ORAL
Qty: 90 TABLET | Refills: 3 | Status: SHIPPED | OUTPATIENT
Start: 2021-07-11 | End: 2022-04-27

## 2021-07-30 ENCOUNTER — HOSPITAL ENCOUNTER (OUTPATIENT)
Dept: RADIOLOGY | Facility: HOSPITAL | Age: 71
Discharge: HOME | End: 2021-07-30
Attending: ORTHOPAEDIC SURGERY
Payer: MEDICARE

## 2021-07-30 ENCOUNTER — OFFICE VISIT (OUTPATIENT)
Dept: ORTHOPEDICS | Facility: CLINIC | Age: 71
End: 2021-07-30
Payer: MEDICARE

## 2021-07-30 VITALS — BODY MASS INDEX: 24.78 KG/M2 | HEIGHT: 70 IN | WEIGHT: 173.06 LBS

## 2021-07-30 DIAGNOSIS — M79.622 PAIN IN LEFT UPPER ARM: ICD-10-CM

## 2021-07-30 DIAGNOSIS — M65.30 TRIGGER FINGER OF RIGHT HAND, UNSPECIFIED FINGER: Primary | ICD-10-CM

## 2021-07-30 DIAGNOSIS — M65.30 TRIGGER FINGER OF RIGHT HAND, UNSPECIFIED FINGER: ICD-10-CM

## 2021-07-30 DIAGNOSIS — M65.351 TRIGGER LITTLE FINGER OF RIGHT HAND: Primary | ICD-10-CM

## 2021-07-30 PROCEDURE — 1125F PR PAIN SEVERITY QUANTIFIED, PAIN PRESENT: ICD-10-PCS | Mod: CPTII,S$GLB,, | Performed by: ORTHOPAEDIC SURGERY

## 2021-07-30 PROCEDURE — 20550 TENDON SHEATH: ICD-10-PCS | Mod: F9,S$GLB,, | Performed by: ORTHOPAEDIC SURGERY

## 2021-07-30 PROCEDURE — 1159F PR MEDICATION LIST DOCUMENTED IN MEDICAL RECORD: ICD-10-PCS | Mod: CPTII,S$GLB,, | Performed by: ORTHOPAEDIC SURGERY

## 2021-07-30 PROCEDURE — 3008F PR BODY MASS INDEX (BMI) DOCUMENTED: ICD-10-PCS | Mod: CPTII,S$GLB,, | Performed by: ORTHOPAEDIC SURGERY

## 2021-07-30 PROCEDURE — 3008F BODY MASS INDEX DOCD: CPT | Mod: CPTII,S$GLB,, | Performed by: ORTHOPAEDIC SURGERY

## 2021-07-30 PROCEDURE — 99213 OFFICE O/P EST LOW 20 MIN: CPT | Mod: 25,S$GLB,, | Performed by: ORTHOPAEDIC SURGERY

## 2021-07-30 PROCEDURE — 1160F RVW MEDS BY RX/DR IN RCRD: CPT | Mod: CPTII,S$GLB,, | Performed by: ORTHOPAEDIC SURGERY

## 2021-07-30 PROCEDURE — 1159F MED LIST DOCD IN RCRD: CPT | Mod: CPTII,S$GLB,, | Performed by: ORTHOPAEDIC SURGERY

## 2021-07-30 PROCEDURE — 73130 XR HAND COMPLETE 3 VIEW RIGHT: ICD-10-PCS | Mod: 26,RT,, | Performed by: RADIOLOGY

## 2021-07-30 PROCEDURE — 1160F PR REVIEW ALL MEDS BY PRESCRIBER/CLIN PHARMACIST DOCUMENTED: ICD-10-PCS | Mod: CPTII,S$GLB,, | Performed by: ORTHOPAEDIC SURGERY

## 2021-07-30 PROCEDURE — 73130 X-RAY EXAM OF HAND: CPT | Mod: TC,PO,RT

## 2021-07-30 PROCEDURE — 3288F FALL RISK ASSESSMENT DOCD: CPT | Mod: CPTII,S$GLB,, | Performed by: ORTHOPAEDIC SURGERY

## 2021-07-30 PROCEDURE — 3288F PR FALLS RISK ASSESSMENT DOCUMENTED: ICD-10-PCS | Mod: CPTII,S$GLB,, | Performed by: ORTHOPAEDIC SURGERY

## 2021-07-30 PROCEDURE — 99999 PR PBB SHADOW E&M-EST. PATIENT-LVL III: CPT | Mod: PBBFAC,,, | Performed by: ORTHOPAEDIC SURGERY

## 2021-07-30 PROCEDURE — 73130 X-RAY EXAM OF HAND: CPT | Mod: 26,RT,, | Performed by: RADIOLOGY

## 2021-07-30 PROCEDURE — 20550 NJX 1 TENDON SHEATH/LIGAMENT: CPT | Mod: F9,S$GLB,, | Performed by: ORTHOPAEDIC SURGERY

## 2021-07-30 PROCEDURE — 1101F PR PT FALLS ASSESS DOC 0-1 FALLS W/OUT INJ PAST YR: ICD-10-PCS | Mod: CPTII,S$GLB,, | Performed by: ORTHOPAEDIC SURGERY

## 2021-07-30 PROCEDURE — 99213 PR OFFICE/OUTPT VISIT, EST, LEVL III, 20-29 MIN: ICD-10-PCS | Mod: 25,S$GLB,, | Performed by: ORTHOPAEDIC SURGERY

## 2021-07-30 PROCEDURE — 99999 PR PBB SHADOW E&M-EST. PATIENT-LVL III: ICD-10-PCS | Mod: PBBFAC,,, | Performed by: ORTHOPAEDIC SURGERY

## 2021-07-30 PROCEDURE — 1101F PT FALLS ASSESS-DOCD LE1/YR: CPT | Mod: CPTII,S$GLB,, | Performed by: ORTHOPAEDIC SURGERY

## 2021-07-30 PROCEDURE — 1125F AMNT PAIN NOTED PAIN PRSNT: CPT | Mod: CPTII,S$GLB,, | Performed by: ORTHOPAEDIC SURGERY

## 2021-07-30 RX ADMIN — TRIAMCINOLONE ACETONIDE 40 MG: 40 INJECTION, SUSPENSION INTRA-ARTICULAR; INTRAMUSCULAR at 09:07

## 2021-08-02 ENCOUNTER — TELEPHONE (OUTPATIENT)
Dept: GASTROENTEROLOGY | Facility: CLINIC | Age: 71
End: 2021-08-02

## 2021-08-02 ENCOUNTER — PATIENT MESSAGE (OUTPATIENT)
Dept: FAMILY MEDICINE | Facility: CLINIC | Age: 71
End: 2021-08-02

## 2021-08-05 RX ORDER — TRIAMCINOLONE ACETONIDE 40 MG/ML
40 INJECTION, SUSPENSION INTRA-ARTICULAR; INTRAMUSCULAR
Status: SHIPPED | OUTPATIENT
Start: 2021-07-30

## 2021-08-08 ENCOUNTER — PATIENT MESSAGE (OUTPATIENT)
Dept: FAMILY MEDICINE | Facility: CLINIC | Age: 71
End: 2021-08-08

## 2021-08-12 DIAGNOSIS — G47.00 INSOMNIA, UNSPECIFIED TYPE: ICD-10-CM

## 2021-08-13 RX ORDER — ZOLPIDEM TARTRATE 5 MG/1
TABLET ORAL
Qty: 90 TABLET | Refills: 1 | Status: SHIPPED | OUTPATIENT
Start: 2021-08-13 | End: 2022-01-05 | Stop reason: SDUPTHER

## 2021-08-19 ENCOUNTER — PATIENT MESSAGE (OUTPATIENT)
Dept: ADMINISTRATIVE | Facility: OTHER | Age: 71
End: 2021-08-19

## 2021-09-21 ENCOUNTER — PATIENT MESSAGE (OUTPATIENT)
Dept: FAMILY MEDICINE | Facility: CLINIC | Age: 71
End: 2021-09-21

## 2021-09-23 ENCOUNTER — TELEPHONE (OUTPATIENT)
Dept: FAMILY MEDICINE | Facility: CLINIC | Age: 71
End: 2021-09-23

## 2021-09-23 DIAGNOSIS — Z12.11 COLON CANCER SCREENING: Primary | ICD-10-CM

## 2021-09-30 ENCOUNTER — LAB VISIT (OUTPATIENT)
Dept: LAB | Facility: HOSPITAL | Age: 71
End: 2021-09-30
Attending: FAMILY MEDICINE
Payer: MEDICARE

## 2021-09-30 DIAGNOSIS — Z12.11 COLON CANCER SCREENING: ICD-10-CM

## 2021-09-30 PROCEDURE — 82274 ASSAY TEST FOR BLOOD FECAL: CPT | Performed by: FAMILY MEDICINE

## 2021-10-04 ENCOUNTER — TELEPHONE (OUTPATIENT)
Dept: FAMILY MEDICINE | Facility: CLINIC | Age: 71
End: 2021-10-04

## 2021-10-07 LAB — HEMOCCULT STL QL IA: NEGATIVE

## 2021-10-25 ENCOUNTER — HOSPITAL ENCOUNTER (OUTPATIENT)
Dept: RADIOLOGY | Facility: HOSPITAL | Age: 71
Discharge: HOME | End: 2021-10-25
Attending: INTERNAL MEDICINE
Payer: MEDICARE

## 2021-10-25 DIAGNOSIS — J44.9 CHRONIC OBSTRUCTIVE PULMONARY DISEASE, UNSPECIFIED COPD TYPE: ICD-10-CM

## 2021-10-25 PROCEDURE — 71046 X-RAY EXAM CHEST 2 VIEWS: CPT | Mod: 26,,, | Performed by: RADIOLOGY

## 2021-10-25 PROCEDURE — 71046 XR CHEST PA AND LATERAL: ICD-10-PCS | Mod: 26,,, | Performed by: RADIOLOGY

## 2021-10-25 PROCEDURE — 71046 X-RAY EXAM CHEST 2 VIEWS: CPT | Mod: TC,PN

## 2021-10-27 ENCOUNTER — PES CALL (OUTPATIENT)
Dept: ADMINISTRATIVE | Facility: CLINIC | Age: 71
End: 2021-10-27
Payer: MEDICARE

## 2021-11-03 DIAGNOSIS — I10 HYPERTENSION, UNSPECIFIED TYPE: ICD-10-CM

## 2021-11-03 RX ORDER — LOSARTAN POTASSIUM AND HYDROCHLOROTHIAZIDE 12.5; 1 MG/1; MG/1
TABLET ORAL
Qty: 90 TABLET | Refills: 0 | Status: SHIPPED | OUTPATIENT
Start: 2021-11-03 | End: 2022-01-05 | Stop reason: SDUPTHER

## 2021-11-08 ENCOUNTER — PATIENT MESSAGE (OUTPATIENT)
Dept: FAMILY MEDICINE | Facility: CLINIC | Age: 71
End: 2021-11-08
Payer: MEDICARE

## 2021-11-12 ENCOUNTER — PATIENT MESSAGE (OUTPATIENT)
Dept: FAMILY MEDICINE | Facility: CLINIC | Age: 71
End: 2021-11-12
Payer: MEDICARE

## 2021-12-03 ENCOUNTER — TELEPHONE (OUTPATIENT)
Dept: FAMILY MEDICINE | Facility: CLINIC | Age: 71
End: 2021-12-03
Payer: MEDICARE

## 2021-12-04 ENCOUNTER — PATIENT MESSAGE (OUTPATIENT)
Dept: FAMILY MEDICINE | Facility: CLINIC | Age: 71
End: 2021-12-04
Payer: MEDICARE

## 2021-12-06 RX ORDER — INFLUENZA A VIRUS A/VICTORIA/2570/2019 IVR-215 (H1N1) ANTIGEN (FORMALDEHYDE INACTIVATED), INFLUENZA A VIRUS A/TASMANIA/503/2020 IVR-221 (H3N2) ANTIGEN (FORMALDEHYDE INACTIVATED), INFLUENZA B VIRUS B/PHUKET/3073/2013 ANTIGEN (FORMALDEHYDE INACTIVATED), AND INFLUENZA B VIRUS B/WASHINGTON/02/2019 ANTIGEN (FORMALDEHYDE INACTIVATED) 60; 60; 60; 60 UG/.7ML; UG/.7ML; UG/.7ML; UG/.7ML
INJECTION, SUSPENSION INTRAMUSCULAR
COMMUNITY
Start: 2021-08-13 | End: 2023-03-24 | Stop reason: CLARIF

## 2021-12-13 ENCOUNTER — LAB VISIT (OUTPATIENT)
Dept: LAB | Facility: HOSPITAL | Age: 71
End: 2021-12-13
Attending: INTERNAL MEDICINE
Payer: MEDICARE

## 2021-12-13 DIAGNOSIS — C91.10 CLL (CHRONIC LYMPHOCYTIC LEUKEMIA): ICD-10-CM

## 2021-12-13 LAB
25(OH)D3+25(OH)D2 SERPL-MCNC: 59 NG/ML (ref 30–96)
ALBUMIN SERPL BCP-MCNC: 3.5 G/DL (ref 3.5–5.2)
ALP SERPL-CCNC: 71 U/L (ref 55–135)
ALT SERPL W/O P-5'-P-CCNC: 14 U/L (ref 10–44)
ANION GAP SERPL CALC-SCNC: 11 MMOL/L (ref 8–16)
ANISOCYTOSIS BLD QL SMEAR: SLIGHT
AST SERPL-CCNC: 13 U/L (ref 10–40)
B2 MICROGLOB SERPL-MCNC: 5.3 UG/ML (ref 0–2.5)
BASOPHILS # BLD AUTO: ABNORMAL K/UL (ref 0–0.2)
BASOPHILS NFR BLD: 0 % (ref 0–1.9)
BILIRUB SERPL-MCNC: 0.5 MG/DL (ref 0.1–1)
BUN SERPL-MCNC: 19 MG/DL (ref 8–23)
CALCIUM SERPL-MCNC: 9.7 MG/DL (ref 8.7–10.5)
CHLORIDE SERPL-SCNC: 94 MMOL/L (ref 95–110)
CO2 SERPL-SCNC: 26 MMOL/L (ref 23–29)
CREAT SERPL-MCNC: 0.9 MG/DL (ref 0.5–1.4)
DIFFERENTIAL METHOD: ABNORMAL
EOSINOPHIL # BLD AUTO: ABNORMAL K/UL (ref 0–0.5)
EOSINOPHIL NFR BLD: 0 % (ref 0–8)
ERYTHROCYTE [DISTWIDTH] IN BLOOD BY AUTOMATED COUNT: 14 % (ref 11.5–14.5)
EST. GFR  (AFRICAN AMERICAN): >60 ML/MIN/1.73 M^2
EST. GFR  (NON AFRICAN AMERICAN): >60 ML/MIN/1.73 M^2
GLUCOSE SERPL-MCNC: 102 MG/DL (ref 70–110)
HCT VFR BLD AUTO: 36.4 % (ref 40–54)
HGB BLD-MCNC: 11.4 G/DL (ref 14–18)
HYPOCHROMIA BLD QL SMEAR: ABNORMAL
IMM GRANULOCYTES # BLD AUTO: ABNORMAL K/UL (ref 0–0.04)
IMM GRANULOCYTES NFR BLD AUTO: ABNORMAL % (ref 0–0.5)
LDH SERPL L TO P-CCNC: 135 U/L (ref 110–260)
LYMPHOCYTES # BLD AUTO: ABNORMAL K/UL (ref 1–4.8)
LYMPHOCYTES NFR BLD: 81 % (ref 18–48)
MCH RBC QN AUTO: 27.5 PG (ref 27–31)
MCHC RBC AUTO-ENTMCNC: 31.3 G/DL (ref 32–36)
MCV RBC AUTO: 88 FL (ref 82–98)
MONOCYTES # BLD AUTO: ABNORMAL K/UL (ref 0.3–1)
MONOCYTES NFR BLD: 5 % (ref 4–15)
NEUTROPHILS NFR BLD: 14 % (ref 38–73)
NRBC BLD-RTO: 0 /100 WBC
PLATELET # BLD AUTO: 157 K/UL (ref 150–450)
PLATELET BLD QL SMEAR: ABNORMAL
PMV BLD AUTO: 9.3 FL (ref 9.2–12.9)
POTASSIUM SERPL-SCNC: 4.9 MMOL/L (ref 3.5–5.1)
PROT SERPL-MCNC: 7.8 G/DL (ref 6–8.4)
RBC # BLD AUTO: 4.14 M/UL (ref 4.6–6.2)
SODIUM SERPL-SCNC: 131 MMOL/L (ref 136–145)
WBC # BLD AUTO: 13.66 K/UL (ref 3.9–12.7)

## 2021-12-13 PROCEDURE — 85060 PATHOLOGIST REVIEW: ICD-10-PCS | Mod: ,,, | Performed by: PATHOLOGY

## 2021-12-13 PROCEDURE — 85060 BLOOD SMEAR INTERPRETATION: CPT | Mod: ,,, | Performed by: PATHOLOGY

## 2021-12-13 PROCEDURE — 85007 BL SMEAR W/DIFF WBC COUNT: CPT | Performed by: INTERNAL MEDICINE

## 2021-12-13 PROCEDURE — 83615 LACTATE (LD) (LDH) ENZYME: CPT | Performed by: INTERNAL MEDICINE

## 2021-12-13 PROCEDURE — 85027 COMPLETE CBC AUTOMATED: CPT | Performed by: INTERNAL MEDICINE

## 2021-12-13 PROCEDURE — 80053 COMPREHEN METABOLIC PANEL: CPT | Performed by: INTERNAL MEDICINE

## 2021-12-13 PROCEDURE — 36415 COLL VENOUS BLD VENIPUNCTURE: CPT | Mod: PN | Performed by: INTERNAL MEDICINE

## 2021-12-13 PROCEDURE — 82232 ASSAY OF BETA-2 PROTEIN: CPT | Performed by: INTERNAL MEDICINE

## 2021-12-13 PROCEDURE — 82306 VITAMIN D 25 HYDROXY: CPT | Performed by: INTERNAL MEDICINE

## 2021-12-14 LAB — PATH REV BLD -IMP: NORMAL

## 2021-12-17 ENCOUNTER — TELEPHONE (OUTPATIENT)
Dept: HEMATOLOGY/ONCOLOGY | Facility: CLINIC | Age: 71
End: 2021-12-17
Payer: MEDICARE

## 2021-12-29 ENCOUNTER — OFFICE VISIT (OUTPATIENT)
Dept: FAMILY MEDICINE | Facility: CLINIC | Age: 71
End: 2021-12-29
Payer: MEDICARE

## 2021-12-29 VITALS
HEART RATE: 86 BPM | DIASTOLIC BLOOD PRESSURE: 60 MMHG | HEIGHT: 70 IN | TEMPERATURE: 98 F | WEIGHT: 181 LBS | BODY MASS INDEX: 25.91 KG/M2 | SYSTOLIC BLOOD PRESSURE: 120 MMHG | OXYGEN SATURATION: 95 %

## 2021-12-29 DIAGNOSIS — Z12.5 PROSTATE CANCER SCREENING: ICD-10-CM

## 2021-12-29 DIAGNOSIS — Z00.00 PREVENTATIVE HEALTH CARE: Primary | ICD-10-CM

## 2021-12-29 DIAGNOSIS — I10 ESSENTIAL HYPERTENSION: ICD-10-CM

## 2021-12-29 DIAGNOSIS — I73.9 CLAUDICATION: ICD-10-CM

## 2021-12-29 PROCEDURE — 99499 RISK ADDL DX/OHS AUDIT: ICD-10-PCS | Mod: S$GLB,,, | Performed by: FAMILY MEDICINE

## 2021-12-29 PROCEDURE — 1159F MED LIST DOCD IN RCRD: CPT | Mod: CPTII,S$GLB,, | Performed by: FAMILY MEDICINE

## 2021-12-29 PROCEDURE — 99397 PR PREVENTIVE VISIT,EST,65 & OVER: ICD-10-PCS | Mod: S$GLB,,, | Performed by: FAMILY MEDICINE

## 2021-12-29 PROCEDURE — 3008F BODY MASS INDEX DOCD: CPT | Mod: CPTII,S$GLB,, | Performed by: FAMILY MEDICINE

## 2021-12-29 PROCEDURE — 1126F AMNT PAIN NOTED NONE PRSNT: CPT | Mod: CPTII,S$GLB,, | Performed by: FAMILY MEDICINE

## 2021-12-29 PROCEDURE — 3288F PR FALLS RISK ASSESSMENT DOCUMENTED: ICD-10-PCS | Mod: CPTII,S$GLB,, | Performed by: FAMILY MEDICINE

## 2021-12-29 PROCEDURE — 99999 PR PBB SHADOW E&M-EST. PATIENT-LVL IV: ICD-10-PCS | Mod: PBBFAC,,, | Performed by: FAMILY MEDICINE

## 2021-12-29 PROCEDURE — 1101F PR PT FALLS ASSESS DOC 0-1 FALLS W/OUT INJ PAST YR: ICD-10-PCS | Mod: CPTII,S$GLB,, | Performed by: FAMILY MEDICINE

## 2021-12-29 PROCEDURE — 1159F PR MEDICATION LIST DOCUMENTED IN MEDICAL RECORD: ICD-10-PCS | Mod: CPTII,S$GLB,, | Performed by: FAMILY MEDICINE

## 2021-12-29 PROCEDURE — 3078F PR MOST RECENT DIASTOLIC BLOOD PRESSURE < 80 MM HG: ICD-10-PCS | Mod: CPTII,S$GLB,, | Performed by: FAMILY MEDICINE

## 2021-12-29 PROCEDURE — 1126F PR PAIN SEVERITY QUANTIFIED, NO PAIN PRESENT: ICD-10-PCS | Mod: CPTII,S$GLB,, | Performed by: FAMILY MEDICINE

## 2021-12-29 PROCEDURE — 99499 UNLISTED E&M SERVICE: CPT | Mod: S$GLB,,, | Performed by: FAMILY MEDICINE

## 2021-12-29 PROCEDURE — 3074F SYST BP LT 130 MM HG: CPT | Mod: CPTII,S$GLB,, | Performed by: FAMILY MEDICINE

## 2021-12-29 PROCEDURE — 3008F PR BODY MASS INDEX (BMI) DOCUMENTED: ICD-10-PCS | Mod: CPTII,S$GLB,, | Performed by: FAMILY MEDICINE

## 2021-12-29 PROCEDURE — 1101F PT FALLS ASSESS-DOCD LE1/YR: CPT | Mod: CPTII,S$GLB,, | Performed by: FAMILY MEDICINE

## 2021-12-29 PROCEDURE — 3288F FALL RISK ASSESSMENT DOCD: CPT | Mod: CPTII,S$GLB,, | Performed by: FAMILY MEDICINE

## 2021-12-29 PROCEDURE — 99397 PER PM REEVAL EST PAT 65+ YR: CPT | Mod: S$GLB,,, | Performed by: FAMILY MEDICINE

## 2021-12-29 PROCEDURE — 3074F PR MOST RECENT SYSTOLIC BLOOD PRESSURE < 130 MM HG: ICD-10-PCS | Mod: CPTII,S$GLB,, | Performed by: FAMILY MEDICINE

## 2021-12-29 PROCEDURE — 3078F DIAST BP <80 MM HG: CPT | Mod: CPTII,S$GLB,, | Performed by: FAMILY MEDICINE

## 2021-12-29 PROCEDURE — 99999 PR PBB SHADOW E&M-EST. PATIENT-LVL IV: CPT | Mod: PBBFAC,,, | Performed by: FAMILY MEDICINE

## 2022-01-05 DIAGNOSIS — N40.1 BPH WITH OBSTRUCTION/LOWER URINARY TRACT SYMPTOMS: ICD-10-CM

## 2022-01-05 DIAGNOSIS — I10 HYPERTENSION, UNSPECIFIED TYPE: ICD-10-CM

## 2022-01-05 DIAGNOSIS — N13.8 BPH WITH OBSTRUCTION/LOWER URINARY TRACT SYMPTOMS: ICD-10-CM

## 2022-01-05 DIAGNOSIS — G47.00 INSOMNIA, UNSPECIFIED TYPE: ICD-10-CM

## 2022-01-05 RX ORDER — LOSARTAN POTASSIUM AND HYDROCHLOROTHIAZIDE 12.5; 1 MG/1; MG/1
1 TABLET ORAL EVERY MORNING
Qty: 90 TABLET | Refills: 3 | Status: SHIPPED | OUTPATIENT
Start: 2022-01-05 | End: 2022-01-24

## 2022-01-05 RX ORDER — ZOLPIDEM TARTRATE 5 MG/1
5 TABLET ORAL DAILY
Qty: 90 TABLET | Refills: 1 | Status: SHIPPED | OUTPATIENT
Start: 2022-01-05 | End: 2022-06-30 | Stop reason: SDUPTHER

## 2022-01-05 RX ORDER — FINASTERIDE 5 MG/1
5 TABLET, FILM COATED ORAL DAILY
Qty: 90 TABLET | Refills: 3 | Status: CANCELLED | OUTPATIENT
Start: 2022-01-05

## 2022-01-05 RX ORDER — FINASTERIDE 5 MG/1
5 TABLET, FILM COATED ORAL DAILY
Qty: 90 TABLET | Refills: 3 | Status: SHIPPED | OUTPATIENT
Start: 2022-01-05 | End: 2022-12-19 | Stop reason: SDUPTHER

## 2022-01-05 NOTE — TELEPHONE ENCOUNTER
No new care gaps identified.  Powered by [employer]. Reference number: [id number].   1/05/2022 8:32:06 AM CST

## 2022-01-13 ENCOUNTER — PES CALL (OUTPATIENT)
Dept: ADMINISTRATIVE | Facility: CLINIC | Age: 72
End: 2022-01-13
Payer: MEDICARE

## 2022-01-21 ENCOUNTER — LAB VISIT (OUTPATIENT)
Dept: LAB | Facility: HOSPITAL | Age: 72
End: 2022-01-21
Attending: FAMILY MEDICINE
Payer: MEDICARE

## 2022-01-21 DIAGNOSIS — Z12.5 PROSTATE CANCER SCREENING: ICD-10-CM

## 2022-01-21 DIAGNOSIS — I10 PRIMARY HYPERTENSION: ICD-10-CM

## 2022-01-21 DIAGNOSIS — I10 ESSENTIAL HYPERTENSION: ICD-10-CM

## 2022-01-21 LAB
ANION GAP SERPL CALC-SCNC: 8 MMOL/L (ref 8–16)
BUN SERPL-MCNC: 18 MG/DL (ref 8–23)
CALCIUM SERPL-MCNC: 9.9 MG/DL (ref 8.7–10.5)
CHLORIDE SERPL-SCNC: 94 MMOL/L (ref 95–110)
CHOLEST SERPL-MCNC: 138 MG/DL (ref 120–199)
CHOLEST/HDLC SERPL: 3.1 {RATIO} (ref 2–5)
CO2 SERPL-SCNC: 27 MMOL/L (ref 23–29)
COMPLEXED PSA SERPL-MCNC: 0.08 NG/ML (ref 0–4)
CREAT SERPL-MCNC: 1.1 MG/DL (ref 0.5–1.4)
EST. GFR  (AFRICAN AMERICAN): >60 ML/MIN/1.73 M^2
EST. GFR  (NON AFRICAN AMERICAN): >60 ML/MIN/1.73 M^2
GLUCOSE SERPL-MCNC: 128 MG/DL (ref 70–110)
HDLC SERPL-MCNC: 44 MG/DL (ref 40–75)
HDLC SERPL: 31.9 % (ref 20–50)
LDLC SERPL CALC-MCNC: 82.8 MG/DL (ref 63–159)
NONHDLC SERPL-MCNC: 94 MG/DL
POTASSIUM SERPL-SCNC: 4.7 MMOL/L (ref 3.5–5.1)
SODIUM SERPL-SCNC: 129 MMOL/L (ref 136–145)
TRIGL SERPL-MCNC: 56 MG/DL (ref 30–150)

## 2022-01-21 PROCEDURE — 80061 LIPID PANEL: CPT | Performed by: FAMILY MEDICINE

## 2022-01-21 PROCEDURE — 84153 ASSAY OF PSA TOTAL: CPT | Performed by: FAMILY MEDICINE

## 2022-01-21 PROCEDURE — 80048 BASIC METABOLIC PNL TOTAL CA: CPT | Performed by: FAMILY MEDICINE

## 2022-01-21 PROCEDURE — 36415 COLL VENOUS BLD VENIPUNCTURE: CPT | Mod: PN | Performed by: FAMILY MEDICINE

## 2022-01-26 ENCOUNTER — HOSPITAL ENCOUNTER (OUTPATIENT)
Dept: RADIOLOGY | Facility: HOSPITAL | Age: 72
Discharge: HOME | End: 2022-01-26
Attending: FAMILY MEDICINE
Payer: MEDICARE

## 2022-01-26 DIAGNOSIS — I73.9 CLAUDICATION: ICD-10-CM

## 2022-01-26 PROCEDURE — 93925 US ARTERIAL LOWER EXTREMITY BILAT WITH ABI (XPD): ICD-10-PCS | Mod: 26,,, | Performed by: RADIOLOGY

## 2022-01-26 PROCEDURE — 93925 LOWER EXTREMITY STUDY: CPT | Mod: 26,,, | Performed by: RADIOLOGY

## 2022-01-26 PROCEDURE — 93925 LOWER EXTREMITY STUDY: CPT | Mod: TC,PO

## 2022-01-26 PROCEDURE — 93922 UPR/L XTREMITY ART 2 LEVELS: CPT | Mod: 26,,, | Performed by: RADIOLOGY

## 2022-01-26 PROCEDURE — 93922 US ARTERIAL LOWER EXTREMITY BILAT WITH ABI (XPD): ICD-10-PCS | Mod: 26,,, | Performed by: RADIOLOGY

## 2022-01-31 ENCOUNTER — PATIENT MESSAGE (OUTPATIENT)
Dept: FAMILY MEDICINE | Facility: CLINIC | Age: 72
End: 2022-01-31
Payer: MEDICARE

## 2022-02-11 ENCOUNTER — PATIENT MESSAGE (OUTPATIENT)
Dept: ADMINISTRATIVE | Facility: OTHER | Age: 72
End: 2022-02-11
Payer: MEDICARE

## 2022-02-22 DIAGNOSIS — D84.9 IMMUNOSUPPRESSED STATUS: ICD-10-CM

## 2022-03-08 ENCOUNTER — TELEPHONE (OUTPATIENT)
Dept: FAMILY MEDICINE | Facility: CLINIC | Age: 72
End: 2022-03-08
Payer: MEDICARE

## 2022-03-08 DIAGNOSIS — I25.10 CORONARY ARTERY CALCIFICATION SEEN ON CAT SCAN: Primary | ICD-10-CM

## 2022-03-08 PROBLEM — Z72.821 POOR SLEEP HYGIENE: Status: ACTIVE | Noted: 2022-03-08

## 2022-03-24 ENCOUNTER — PATIENT OUTREACH (OUTPATIENT)
Dept: ADMINISTRATIVE | Facility: OTHER | Age: 72
End: 2022-03-24
Payer: MEDICARE

## 2022-03-24 NOTE — PROGRESS NOTES
LINKS immunization registry updated  Care Everywhere updated  Health Maintenance updated  Chart reviewed for overdue [facility] ([facility]) health maintenance testing (CRS, Breast Ca, Diabetic Eye Exam)   Orders entered:N/A

## 2022-04-27 ENCOUNTER — OFFICE VISIT (OUTPATIENT)
Dept: URGENT CARE | Facility: CLINIC | Age: 72
End: 2022-04-27
Payer: MEDICARE

## 2022-04-27 ENCOUNTER — TELEPHONE (OUTPATIENT)
Dept: FAMILY MEDICINE | Facility: CLINIC | Age: 72
End: 2022-04-27
Payer: MEDICARE

## 2022-04-27 ENCOUNTER — PATIENT MESSAGE (OUTPATIENT)
Dept: FAMILY MEDICINE | Facility: CLINIC | Age: 72
End: 2022-04-27
Payer: MEDICARE

## 2022-04-27 VITALS
HEART RATE: 79 BPM | DIASTOLIC BLOOD PRESSURE: 74 MMHG | TEMPERATURE: 99 F | WEIGHT: 177 LBS | HEIGHT: 70 IN | SYSTOLIC BLOOD PRESSURE: 126 MMHG | OXYGEN SATURATION: 94 % | BODY MASS INDEX: 25.34 KG/M2

## 2022-04-27 DIAGNOSIS — S22.41XA CLOSED FRACTURE OF MULTIPLE RIBS OF RIGHT SIDE, INITIAL ENCOUNTER: ICD-10-CM

## 2022-04-27 DIAGNOSIS — S22.41XS CLOSED FRACTURE OF MULTIPLE RIBS OF RIGHT SIDE, SEQUELA: Primary | ICD-10-CM

## 2022-04-27 DIAGNOSIS — R07.81 RIB PAIN ON RIGHT SIDE: Primary | ICD-10-CM

## 2022-04-27 PROBLEM — S22.39XA RIB FRACTURE: Status: ACTIVE | Noted: 2022-04-27

## 2022-04-27 PROCEDURE — 71101 XR RIBS MIN 3 VIEWS W/ PA CHEST RIGHT: ICD-10-PCS | Mod: RT,S$GLB,, | Performed by: RADIOLOGY

## 2022-04-27 PROCEDURE — 99214 OFFICE O/P EST MOD 30 MIN: CPT | Mod: S$GLB,,, | Performed by: INTERNAL MEDICINE

## 2022-04-27 PROCEDURE — 99214 PR OFFICE/OUTPT VISIT, EST, LEVL IV, 30-39 MIN: ICD-10-PCS | Mod: S$GLB,,, | Performed by: INTERNAL MEDICINE

## 2022-04-27 PROCEDURE — 71101 X-RAY EXAM UNILAT RIBS/CHEST: CPT | Mod: RT,S$GLB,, | Performed by: RADIOLOGY

## 2022-04-27 RX ORDER — HYDROCODONE BITARTRATE AND ACETAMINOPHEN 5; 325 MG/1; MG/1
1 TABLET ORAL EVERY 6 HOURS PRN
Qty: 15 TABLET | Refills: 0 | Status: SHIPPED | OUTPATIENT
Start: 2022-04-27 | End: 2023-03-12

## 2022-04-27 NOTE — TELEPHONE ENCOUNTER
----- Message from Marco Snider sent at 4/27/2022 10:09 AM CDT -----  Type: Needs Medical Advice  Who Called:  Patient  Symptoms (please be specific):  3 broken ribs  How long has patient had these symptoms:      Best Call Back Number: [phone]  Additional Information: Pt states that his Urgent Care MD advised him to get orders for a CT scan for Monday morning.  Please call to advise
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
## 2022-04-27 NOTE — PATIENT INSTRUCTIONS
If your condition worsens we recommend that you receive another evaluation at the emergency room immediately or contact your primary medical clinics after hours call service to discuss your concerns. You must understand that you've received an Urgent Care treatment only and that you may be released before all of your medical problems are known or treated. You, the patient, will arrange for follow up care as instructed.  Drink plenty of Fluids  Wash hands frequently using mild antibacterial soap lathering for at least 15 seconds then rinse  Get plenty of Rest  Follow up in 1-2 weeks with Primary Care physician if not significantly better.   If you are not allergic please take Tylenol every 4-6 hours as needed and/or Ibuprofen every 6-8 hours as needed, over the counter for pain or fever.

## 2022-05-06 ENCOUNTER — PES CALL (OUTPATIENT)
Dept: ADMINISTRATIVE | Facility: CLINIC | Age: 72
End: 2022-05-06
Payer: MEDICARE

## 2022-05-09 ENCOUNTER — PATIENT MESSAGE (OUTPATIENT)
Dept: SMOKING CESSATION | Facility: CLINIC | Age: 72
End: 2022-05-09
Payer: MEDICARE

## 2022-05-12 ENCOUNTER — HOSPITAL ENCOUNTER (OUTPATIENT)
Dept: RADIOLOGY | Facility: HOSPITAL | Age: 72
Discharge: HOME | End: 2022-05-12
Attending: FAMILY MEDICINE
Payer: MEDICARE

## 2022-05-12 DIAGNOSIS — S22.41XS CLOSED FRACTURE OF MULTIPLE RIBS OF RIGHT SIDE, SEQUELA: ICD-10-CM

## 2022-05-12 PROCEDURE — 71250 CT THORAX DX C-: CPT | Mod: TC,PO

## 2022-05-12 PROCEDURE — 71250 CT THORAX DX C-: CPT | Mod: 26,,, | Performed by: RADIOLOGY

## 2022-05-12 PROCEDURE — 71250 CT CHEST WITHOUT CONTRAST: ICD-10-PCS | Mod: 26,,, | Performed by: RADIOLOGY

## 2022-05-17 ENCOUNTER — TELEPHONE (OUTPATIENT)
Dept: FAMILY MEDICINE | Facility: CLINIC | Age: 72
End: 2022-05-17
Payer: MEDICARE

## 2022-05-17 NOTE — TELEPHONE ENCOUNTER
----- Message from Michelle Babb MA sent at 5/17/2022  9:34 AM CDT -----  Type: Needs Medical Advice  Who Called:  Saurav Colvin Call Back Number: [phone]  Additional Information: patient would like someone to go over results of CT scan.

## 2022-05-18 ENCOUNTER — PATIENT MESSAGE (OUTPATIENT)
Dept: FAMILY MEDICINE | Facility: CLINIC | Age: 72
End: 2022-05-18
Payer: MEDICARE
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
## 2022-05-18 NOTE — TELEPHONE ENCOUNTER
Pt recommendations were sent through a [employer] message. He also sent a message asking about his results through the portal.

## 2022-05-18 NOTE — TELEPHONE ENCOUNTER
Pt also sent message in a telephone encounter and you respond to. I will forward your response to him from the [employer] message

## 2022-05-19 ENCOUNTER — PATIENT MESSAGE (OUTPATIENT)
Dept: ADMINISTRATIVE | Facility: OTHER | Age: 72
End: 2022-05-19
Payer: MEDICARE

## 2022-06-10 ENCOUNTER — PES CALL (OUTPATIENT)
Dept: ADMINISTRATIVE | Facility: CLINIC | Age: 72
End: 2022-06-10
Payer: MEDICARE

## 2022-06-13 ENCOUNTER — PATIENT MESSAGE (OUTPATIENT)
Dept: ADMINISTRATIVE | Facility: HOSPITAL | Age: 72
End: 2022-06-13
Payer: MEDICARE

## 2022-06-13 ENCOUNTER — PATIENT OUTREACH (OUTPATIENT)
Dept: ADMINISTRATIVE | Facility: HOSPITAL | Age: 72
End: 2022-06-13
Payer: MEDICARE

## 2022-06-13 NOTE — PROGRESS NOTES
2022 Care Everywhere updates requested and reviewed.  Immunizations reconciled. Media reports reviewed.  Duplicate HM overrides and  orders removed.  Overdue HM topic chart audit and/or requested.  Overdue lab testing linked to upcoming lab appointments if applies.    Health Maintenance Due   Topic Date Due    High Dose Statin  Never done    Shingles Vaccine (2 of 2) 2021    Pneumococcal Vaccines (Age 65+) (3 - PPSV23 or PCV20) 2021    COVID-19 Vaccine (3 - Booster for Remi series) 2022

## 2022-06-21 NOTE — TELEPHONE ENCOUNTER
Attempted to contact patient. Left message that his rx refill request was received.   Rx Refill Note  Requested Prescriptions     Pending Prescriptions Disp Refills   • metFORMIN (GLUCOPHAGE) 1000 MG tablet 180 tablet 3      Last office visit with prescribing clinician: 4/18/2022      Next office visit with prescribing clinician: 7/20/2022            Dalia Belcher LPN  06/21/22, 10:44 EDT

## 2022-06-29 ENCOUNTER — OFFICE VISIT (OUTPATIENT)
Dept: FAMILY MEDICINE | Facility: CLINIC | Age: 72
End: 2022-06-29
Payer: MEDICARE

## 2022-06-29 VITALS
WEIGHT: 180.13 LBS | HEIGHT: 70 IN | TEMPERATURE: 98 F | RESPIRATION RATE: 18 BRPM | BODY MASS INDEX: 25.79 KG/M2 | DIASTOLIC BLOOD PRESSURE: 60 MMHG | OXYGEN SATURATION: 93 % | SYSTOLIC BLOOD PRESSURE: 114 MMHG | HEART RATE: 77 BPM

## 2022-06-29 DIAGNOSIS — I10 ESSENTIAL HYPERTENSION: ICD-10-CM

## 2022-06-29 DIAGNOSIS — Z12.5 PROSTATE CANCER SCREENING: ICD-10-CM

## 2022-06-29 DIAGNOSIS — Z00.00 PREVENTATIVE HEALTH CARE: ICD-10-CM

## 2022-06-29 DIAGNOSIS — S22.41XS CLOSED FRACTURE OF MULTIPLE RIBS OF RIGHT SIDE, SEQUELA: Primary | ICD-10-CM

## 2022-06-29 DIAGNOSIS — G47.33 MODERATE OBSTRUCTIVE SLEEP APNEA: ICD-10-CM

## 2022-06-29 PROCEDURE — 90732 PPSV23 VACC 2 YRS+ SUBQ/IM: CPT | Mod: S$GLB,,, | Performed by: FAMILY MEDICINE

## 2022-06-29 PROCEDURE — 99999 PR PBB SHADOW E&M-EST. PATIENT-LVL III: CPT | Mod: PBBFAC,,, | Performed by: FAMILY MEDICINE

## 2022-06-29 PROCEDURE — 90732 PNEUMOCOCCAL POLYSACCHARIDE VACCINE 23-VALENT =>2YO SQ IM: ICD-10-PCS | Mod: S$GLB,,, | Performed by: FAMILY MEDICINE

## 2022-06-29 PROCEDURE — 99214 OFFICE O/P EST MOD 30 MIN: CPT | Mod: 25,S$GLB,, | Performed by: FAMILY MEDICINE

## 2022-06-29 PROCEDURE — 99999 PR PBB SHADOW E&M-EST. PATIENT-LVL III: ICD-10-PCS | Mod: PBBFAC,,, | Performed by: FAMILY MEDICINE

## 2022-06-29 PROCEDURE — G0009 PNEUMOCOCCAL POLYSACCHARIDE VACCINE 23-VALENT =>2YO SQ IM: ICD-10-PCS | Mod: S$GLB,,, | Performed by: FAMILY MEDICINE

## 2022-06-29 PROCEDURE — G0009 ADMIN PNEUMOCOCCAL VACCINE: HCPCS | Mod: S$GLB,,, | Performed by: FAMILY MEDICINE

## 2022-06-29 PROCEDURE — 99214 PR OFFICE/OUTPT VISIT, EST, LEVL IV, 30-39 MIN: ICD-10-PCS | Mod: 25,S$GLB,, | Performed by: FAMILY MEDICINE
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
## 2022-06-30 DIAGNOSIS — G47.00 INSOMNIA, UNSPECIFIED TYPE: ICD-10-CM

## 2022-06-30 RX ORDER — ZOLPIDEM TARTRATE 5 MG/1
5 TABLET ORAL DAILY
Qty: 90 TABLET | Refills: 1 | Status: CANCELLED | OUTPATIENT
Start: 2022-06-30

## 2022-06-30 RX ORDER — ZOLPIDEM TARTRATE 5 MG/1
5 TABLET ORAL DAILY
Qty: 90 TABLET | Refills: 1 | Status: SHIPPED | OUTPATIENT
Start: 2022-06-30 | End: 2023-01-02 | Stop reason: SDUPTHER

## 2022-06-30 NOTE — TELEPHONE ENCOUNTER
No new care gaps identified.  [facility] Embedded Care Gaps. Reference number: [id number]. 6/30/2022   5:08:38 AM SONIAT

## 2022-06-30 NOTE — TELEPHONE ENCOUNTER
No new care gaps identified.  [facility] Embedded Care Gaps. Reference number: [id number]. 6/30/2022   11:46:13 AM SONIAT

## 2022-06-30 NOTE — TELEPHONE ENCOUNTER
Refill Routing Note   Medication(s) are not appropriate for processing by [facility] for the following reason(s):      - Outside of protocol    [facility] action(s):  Route          Medication reconciliation completed: No     Appointments  past 12m or future 3m with PCP    Date Provider   Last Visit   6/29/2022 Robbie Orr MD   Next Visit   Visit date not found Robbie Orr MD   ED visits in past 90 days: 0        Note composed:11:29 AM 06/30/2022

## 2022-07-05 ENCOUNTER — PATIENT MESSAGE (OUTPATIENT)
Dept: FAMILY MEDICINE | Facility: CLINIC | Age: 72
End: 2022-07-05
Payer: MEDICARE

## 2022-07-05 RX ORDER — COLCHICINE 0.6 MG/1
1 CAPSULE ORAL 2 TIMES DAILY PRN
Qty: 60 CAPSULE | Refills: 0 | Status: SHIPPED | OUTPATIENT
Start: 2022-07-05 | End: 2023-03-24

## 2022-07-18 ENCOUNTER — PES CALL (OUTPATIENT)
Dept: ADMINISTRATIVE | Facility: CLINIC | Age: 72
End: 2022-07-18
Payer: MEDICARE

## 2022-07-22 ENCOUNTER — PES CALL (OUTPATIENT)
Dept: ADMINISTRATIVE | Facility: CLINIC | Age: 72
End: 2022-07-22
Payer: MEDICARE

## 2022-08-15 ENCOUNTER — TELEPHONE (OUTPATIENT)
Dept: FAMILY MEDICINE | Facility: CLINIC | Age: 72
End: 2022-08-15
Payer: MEDICARE

## 2022-08-15 NOTE — TELEPHONE ENCOUNTER
Call placed to patient, advised urgent care or appointment with provider in am.   Patient opted for urgent care. Will send message to update later in week.

## 2022-08-15 NOTE — TELEPHONE ENCOUNTER
----- Message from Roge Duncan sent at 8/15/2022  8:49 AM CDT -----  Type: Needs Medical Advice  Who Called:  pt   Symptoms (please be specific):  pt said he was bit by a cat and it broke the skin and its infected and need abx called into the pharmacy--hand swollen and sensitive to the touch--please advise  How long has patient had these symptoms:  9\8/13  Pharmacy name and phone #:    [facility] [place] [facility] [id number] - [place] - [address]  [address]  [place] [address]  Phone: [phone] Fax: [phone]      Best Call Back Number: [phone] (home)     Additional Information: please advise--thank you

## 2022-09-01 ENCOUNTER — PATIENT MESSAGE (OUTPATIENT)
Dept: HEMATOLOGY/ONCOLOGY | Facility: CLINIC | Age: 72
End: 2022-09-01
Payer: MEDICARE

## 2022-09-14 ENCOUNTER — OFFICE VISIT (OUTPATIENT)
Dept: URGENT CARE | Facility: CLINIC | Age: 72
End: 2022-09-14
Payer: MEDICARE

## 2022-09-14 VITALS
WEIGHT: 178 LBS | RESPIRATION RATE: 16 BRPM | TEMPERATURE: 98 F | OXYGEN SATURATION: 96 % | SYSTOLIC BLOOD PRESSURE: 144 MMHG | HEIGHT: 70 IN | HEART RATE: 71 BPM | DIASTOLIC BLOOD PRESSURE: 71 MMHG | BODY MASS INDEX: 25.48 KG/M2

## 2022-09-14 DIAGNOSIS — L08.9 CAT BITE OF RIGHT FOREARM WITH INFECTION, INITIAL ENCOUNTER: Primary | ICD-10-CM
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
## 2022-09-14 DIAGNOSIS — R53.82 CHRONIC FATIGUE: ICD-10-CM

## 2022-09-14 DIAGNOSIS — S51.851A CAT BITE OF RIGHT FOREARM WITH INFECTION, INITIAL ENCOUNTER: Primary | ICD-10-CM

## 2022-09-14 DIAGNOSIS — R06.2 WHEEZING: ICD-10-CM

## 2022-09-14 PROCEDURE — 4010F PR ACE/ARB THEARPY RXD/TAKEN: ICD-10-PCS | Mod: CPTII,S$GLB,, | Performed by: NURSE PRACTITIONER

## 2022-09-14 PROCEDURE — 1159F PR MEDICATION LIST DOCUMENTED IN MEDICAL RECORD: ICD-10-PCS | Mod: CPTII,S$GLB,, | Performed by: NURSE PRACTITIONER

## 2022-09-14 PROCEDURE — 1160F RVW MEDS BY RX/DR IN RCRD: CPT | Mod: CPTII,S$GLB,, | Performed by: NURSE PRACTITIONER

## 2022-09-14 PROCEDURE — 3077F SYST BP >= 140 MM HG: CPT | Mod: CPTII,S$GLB,, | Performed by: NURSE PRACTITIONER

## 2022-09-14 PROCEDURE — 3078F DIAST BP <80 MM HG: CPT | Mod: CPTII,S$GLB,, | Performed by: NURSE PRACTITIONER

## 2022-09-14 PROCEDURE — 3008F BODY MASS INDEX DOCD: CPT | Mod: CPTII,S$GLB,, | Performed by: NURSE PRACTITIONER

## 2022-09-14 PROCEDURE — 1159F MED LIST DOCD IN RCRD: CPT | Mod: CPTII,S$GLB,, | Performed by: NURSE PRACTITIONER

## 2022-09-14 PROCEDURE — 1160F PR REVIEW ALL MEDS BY PRESCRIBER/CLIN PHARMACIST DOCUMENTED: ICD-10-PCS | Mod: CPTII,S$GLB,, | Performed by: NURSE PRACTITIONER

## 2022-09-14 PROCEDURE — 99214 OFFICE O/P EST MOD 30 MIN: CPT | Mod: S$GLB,,, | Performed by: NURSE PRACTITIONER

## 2022-09-14 PROCEDURE — 3077F PR MOST RECENT SYSTOLIC BLOOD PRESSURE >= 140 MM HG: ICD-10-PCS | Mod: CPTII,S$GLB,, | Performed by: NURSE PRACTITIONER

## 2022-09-14 PROCEDURE — 3008F PR BODY MASS INDEX (BMI) DOCUMENTED: ICD-10-PCS | Mod: CPTII,S$GLB,, | Performed by: NURSE PRACTITIONER

## 2022-09-14 PROCEDURE — 3078F PR MOST RECENT DIASTOLIC BLOOD PRESSURE < 80 MM HG: ICD-10-PCS | Mod: CPTII,S$GLB,, | Performed by: NURSE PRACTITIONER

## 2022-09-14 PROCEDURE — 4010F ACE/ARB THERAPY RXD/TAKEN: CPT | Mod: CPTII,S$GLB,, | Performed by: NURSE PRACTITIONER

## 2022-09-14 PROCEDURE — 99214 PR OFFICE/OUTPT VISIT, EST, LEVL IV, 30-39 MIN: ICD-10-PCS | Mod: S$GLB,,, | Performed by: NURSE PRACTITIONER

## 2022-09-14 RX ORDER — AMOXICILLIN AND CLAVULANATE POTASSIUM 875; 125 MG/1; MG/1
1 TABLET, FILM COATED ORAL 2 TIMES DAILY
Qty: 20 TABLET | Refills: 0 | Status: SHIPPED | OUTPATIENT
Start: 2022-09-14 | End: 2022-09-24

## 2022-09-14 NOTE — PROGRESS NOTES
"Subjective:       Patient ID: Saurav Bahena is a 72 y.o. male.    Vitals:  height is 5' 10" (1.778 m) and weight is 80.7 kg (178 lb). His oral temperature is 98.1 °F (36.7 °C). His blood pressure is 144/71 (abnormal) and his pulse is 71. His respiration is 16 and oxygen saturation is 96%.     Chief Complaint: Fatigue    Patient presents today with a cat bite to right forearm occurred yesterday, last tetanus 2016, up to date. C/o tenderness, mild redness to area of cat bite. Denies joint pain or swelling.  Patient also c/o fatigue over last few "months" started after testing positive for COVID last year.  Patient has seen his PCP and discussed this with him.   Pt presents with fatigue, headaches, congestion, and cough originating a few months ago. Pt states he feels as though he can never get enough sleep and is tired within 10-15 minutes of waking/exertion. Pt states feels "spaced out" or "foggy" and has trouble concentrating. Pt reached out to his PCP who stated this may be the effects of "long COVID" as he has tested positive for COVID multiple times. Reports sometimes sleeping for 10 hours on the weekend and waking up feeling tired.  Reports the fatigue is affected his daily activity and work. Reports he has TOMMIE and has a CPAP but does not wear it normally, as he pulls it off through the night and does not feel like it helps him. Pt states he usually wakes up every 2 hours throughout the night. Pt has not been taking any medications or trying anything for his symptoms, therefore, he has not gotten any relief. Patient reports a history of COPD, but reports he does not use his inhaler, he does not feel like they help.   Denies fever, chills, chest congestion, worsened cough, chest pain, SOB, abdominal pain, n/v/d.    Fatigue  This is a new problem. The current episode started more than 1 month ago. The problem occurs constantly. The problem has been gradually worsening. Associated symptoms include congestion, " coughing, fatigue and headaches. The symptoms are aggravated by exertion and walking. He has tried sleep and rest for the symptoms. The treatment provided no relief.     Constitution: Positive for fatigue.   HENT:  Positive for congestion.    Respiratory:  Positive for cough. Negative for shortness of breath.    Skin:  Positive for wound (cat bite).   Neurological:  Positive for headaches.     Objective:      Physical Exam   Constitutional: He is oriented to person, place, and time. He appears well-developed. He is cooperative.  Non-toxic appearance. He does not appear ill. No distress.          Comments:right forearm x3 sites superficial at area of reported cat bite, mild redness and erythema surrounding area, no drainage, wound edges approximated, scabbing present, small amount old sanguinous drainage dried to area     HENT:   Head: Normocephalic and atraumatic.   Ears:   Right Ear: Hearing, tympanic membrane, external ear and ear canal normal.   Left Ear: Hearing, tympanic membrane, external ear and ear canal normal.   Nose: Congestion present. No mucosal edema, rhinorrhea or nasal deformity. No epistaxis. Right sinus exhibits no maxillary sinus tenderness and no frontal sinus tenderness. Left sinus exhibits no maxillary sinus tenderness and no frontal sinus tenderness.   Mouth/Throat: Uvula is midline, oropharynx is clear and moist and mucous membranes are normal. No trismus in the jaw. Normal dentition. No uvula swelling. No oropharyngeal exudate, posterior oropharyngeal edema or posterior oropharyngeal erythema.   Eyes: Conjunctivae and lids are normal. No scleral icterus.   Neck: Trachea normal and phonation normal. Neck supple. No edema present. No erythema present. No neck rigidity present.   Cardiovascular: Normal rate, regular rhythm, normal heart sounds and normal pulses.   Pulmonary/Chest: Effort normal. No respiratory distress. He has decreased breath sounds in the left lower field. He has wheezes in  the left upper field and the left middle field. He has no rhonchi. He has no rales.   Abdominal: Normal appearance.   Musculoskeletal: Normal range of motion.         General: No deformity. Normal range of motion.   Neurological: He is alert and oriented to person, place, and time. He exhibits normal muscle tone. Coordination normal.   Skin: Skin is warm, dry, intact, not diaphoretic, not pale and no rash. Capillary refill takes 2 to 3 seconds. lesion (right forearm at site of cat bite)        Psychiatric: His speech is normal and behavior is normal. Judgment and thought content normal.   Nursing note and vitals reviewed.      Assessment:       1. Cat bite of right forearm with infection, initial encounter    2. Wheezing    3. Chronic fatigue          Plan:         Cat bite of right forearm with infection, initial encounter  -     amoxicillin-clavulanate 875-125mg (AUGMENTIN) 875-125 mg per tablet; Take 1 tablet by mouth 2 (two) times daily. Take with food for 10 days  Dispense: 20 tablet; Refill: 0    Wheezing  -     Cancel: XR CHEST PA AND LATERAL; Future; Expected date: 09/14/2022    Chronic fatigue     Advised to follow-up with PCP for further evaluation of ongoing fatigue.  Patient declined chest x-ray, reports he has to leave for work, states he will follow up with his PCP for further evaluation.

## 2022-09-16 ENCOUNTER — PATIENT MESSAGE (OUTPATIENT)
Dept: FAMILY MEDICINE | Facility: CLINIC | Age: 72
End: 2022-09-16
Payer: MEDICARE

## 2022-09-16 RX ORDER — NICOTINE 7MG/24HR
1 PATCH, TRANSDERMAL 24 HOURS TRANSDERMAL DAILY
Qty: 14 PATCH | Refills: 0 | Status: SHIPPED | OUTPATIENT
Start: 2022-09-16 | End: 2022-09-30

## 2022-09-17 ENCOUNTER — TELEPHONE (OUTPATIENT)
Dept: URGENT CARE | Facility: CLINIC | Age: 72
End: 2022-09-17
Payer: MEDICARE

## 2022-09-26 ENCOUNTER — OFFICE VISIT (OUTPATIENT)
Dept: FAMILY MEDICINE | Facility: CLINIC | Age: 72
End: 2022-09-26
Payer: MEDICARE

## 2022-09-26 VITALS
DIASTOLIC BLOOD PRESSURE: 76 MMHG | WEIGHT: 177.5 LBS | SYSTOLIC BLOOD PRESSURE: 130 MMHG | HEIGHT: 70 IN | OXYGEN SATURATION: 95 % | HEART RATE: 79 BPM | BODY MASS INDEX: 25.41 KG/M2

## 2022-09-26 DIAGNOSIS — E78.49 OTHER HYPERLIPIDEMIA: ICD-10-CM

## 2022-09-26 DIAGNOSIS — Z76.89 ENCOUNTER TO ESTABLISH CARE WITH NEW DOCTOR: ICD-10-CM

## 2022-09-26 DIAGNOSIS — R35.1 BPH ASSOCIATED WITH NOCTURIA: ICD-10-CM

## 2022-09-26 DIAGNOSIS — F41.9 ANXIETY: ICD-10-CM

## 2022-09-26 DIAGNOSIS — I10 ESSENTIAL HYPERTENSION: Primary | ICD-10-CM

## 2022-09-26 DIAGNOSIS — Z01.89 ENCOUNTER FOR LABORATORY TEST: ICD-10-CM

## 2022-09-26 DIAGNOSIS — G47.9 SLEEP DISORDER: ICD-10-CM

## 2022-09-26 DIAGNOSIS — G47.00 INSOMNIA, UNSPECIFIED TYPE: ICD-10-CM

## 2022-09-26 DIAGNOSIS — R53.83 FATIGUE, UNSPECIFIED TYPE: ICD-10-CM

## 2022-09-26 DIAGNOSIS — E66.3 OVERWEIGHT (BMI 25.0-29.9): ICD-10-CM

## 2022-09-26 DIAGNOSIS — N40.1 BPH ASSOCIATED WITH NOCTURIA: ICD-10-CM

## 2022-09-26 DIAGNOSIS — G47.33 MODERATE OBSTRUCTIVE SLEEP APNEA: ICD-10-CM

## 2022-09-26 PROCEDURE — 1160F RVW MEDS BY RX/DR IN RCRD: CPT | Mod: CPTII,S$GLB,, | Performed by: INTERNAL MEDICINE

## 2022-09-26 PROCEDURE — 1159F MED LIST DOCD IN RCRD: CPT | Mod: CPTII,S$GLB,, | Performed by: INTERNAL MEDICINE

## 2022-09-26 PROCEDURE — 99215 PR OFFICE/OUTPT VISIT, EST, LEVL V, 40-54 MIN: ICD-10-PCS | Mod: S$GLB,,, | Performed by: INTERNAL MEDICINE

## 2022-09-26 PROCEDURE — 1159F PR MEDICATION LIST DOCUMENTED IN MEDICAL RECORD: ICD-10-PCS | Mod: CPTII,S$GLB,, | Performed by: INTERNAL MEDICINE

## 2022-09-26 PROCEDURE — 99215 OFFICE O/P EST HI 40 MIN: CPT | Mod: S$GLB,,, | Performed by: INTERNAL MEDICINE

## 2022-09-26 PROCEDURE — 1126F AMNT PAIN NOTED NONE PRSNT: CPT | Mod: CPTII,S$GLB,, | Performed by: INTERNAL MEDICINE

## 2022-09-26 PROCEDURE — 3075F PR MOST RECENT SYSTOLIC BLOOD PRESS GE 130-139MM HG: ICD-10-PCS | Mod: CPTII,S$GLB,, | Performed by: INTERNAL MEDICINE

## 2022-09-26 PROCEDURE — 4010F PR ACE/ARB THEARPY RXD/TAKEN: ICD-10-PCS | Mod: CPTII,S$GLB,, | Performed by: INTERNAL MEDICINE

## 2022-09-26 PROCEDURE — 3078F DIAST BP <80 MM HG: CPT | Mod: CPTII,S$GLB,, | Performed by: INTERNAL MEDICINE

## 2022-09-26 PROCEDURE — 1160F PR REVIEW ALL MEDS BY PRESCRIBER/CLIN PHARMACIST DOCUMENTED: ICD-10-PCS | Mod: CPTII,S$GLB,, | Performed by: INTERNAL MEDICINE

## 2022-09-26 PROCEDURE — 3078F PR MOST RECENT DIASTOLIC BLOOD PRESSURE < 80 MM HG: ICD-10-PCS | Mod: CPTII,S$GLB,, | Performed by: INTERNAL MEDICINE

## 2022-09-26 PROCEDURE — 4010F ACE/ARB THERAPY RXD/TAKEN: CPT | Mod: CPTII,S$GLB,, | Performed by: INTERNAL MEDICINE

## 2022-09-26 PROCEDURE — 99999 PR PBB SHADOW E&M-EST. PATIENT-LVL IV: CPT | Mod: PBBFAC,,, | Performed by: INTERNAL MEDICINE

## 2022-09-26 PROCEDURE — 3075F SYST BP GE 130 - 139MM HG: CPT | Mod: CPTII,S$GLB,, | Performed by: INTERNAL MEDICINE

## 2022-09-26 PROCEDURE — 99999 PR PBB SHADOW E&M-EST. PATIENT-LVL IV: ICD-10-PCS | Mod: PBBFAC,,, | Performed by: INTERNAL MEDICINE

## 2022-09-26 PROCEDURE — 1126F PR PAIN SEVERITY QUANTIFIED, NO PAIN PRESENT: ICD-10-PCS | Mod: CPTII,S$GLB,, | Performed by: INTERNAL MEDICINE

## 2022-09-26 RX ORDER — BUSPIRONE HYDROCHLORIDE 5 MG/1
TABLET ORAL
Qty: 30 TABLET | Refills: 2 | Status: SHIPPED | OUTPATIENT
Start: 2022-09-26 | End: 2023-03-24 | Stop reason: CLARIF

## 2022-09-26 RX ORDER — TRAZODONE HYDROCHLORIDE 50 MG/1
50 TABLET ORAL NIGHTLY
Qty: 30 TABLET | Refills: 2 | Status: SHIPPED | OUTPATIENT
Start: 2022-09-26 | End: 2023-01-27 | Stop reason: SDUPTHER

## 2022-09-26 NOTE — PATIENT INSTRUCTIONS
Essential hypertension; Maintain < 2 Gm Na a day diet, and monitor BP at home; keep a log.   -     CBC Auto Differential; Future; Expected date: 09/26/2022  -     Comprehensive Metabolic Panel; Future; Expected date: 09/26/2022  -     Lipid Panel; Future; Expected date: 09/26/2022  -     Magnesium; Future; Expected date: 09/26/2022  -     Hemoglobin A1C; Future; Expected date: 09/26/2022  -     TSH; Future; Expected date: 09/26/2022    Moderate obstructive sleep apnea: uses CPAP nightly; helps.     Other hyperlipidemia; Maintain low fat high fiber diet, exercise regularly. Weight reduction where indicated.   -     Comprehensive Metabolic Panel; Future; Expected date: 09/26/2022  -     Lipid Panel; Future; Expected date: 09/26/2022  -     Hemoglobin A1C; Future; Expected date: 09/26/2022    Anxiety: Limit caffeine intake with stress reduction and regular exercise as tolerated.         -     TSH; Future; Expected date: 09/26/2022  -     traZODone (DESYREL) 50 MG tablet; Take 1 tablet (50 mg total) by mouth every evening. Generic  Dispense: 30 tablet; Refill: 2  -     busPIRone (BUSPAR) 5 MG Tab; 5 mg po TID as needed for anxiety; can take to help him sleep if needed.  Dispense: 30 tablet; Refill: 2    Sleep disorder; exercise and limit caffeine as well as blue light exposure.   -     TSH; Future; Expected date: 09/26/2022  -     traZODone (DESYREL) 50 MG tablet; Take 1 tablet (50 mg total) by mouth every evening. Generic  Dispense: 30 tablet; Refill: 2  -     busPIRone (BUSPAR) 5 MG Tab; 5 mg po TID as needed for anxiety; can take to help him sleep if needed.  Dispense: 30 tablet; Refill: 2    Insomnia, unspecified type; trial trazodone 50 mg at night. Limit caffeine; exercise after late afternoon.   -     TSH; Future; Expected date: 09/26/2022  -     traZODone (DESYREL) 50 MG tablet; Take 1 tablet (50 mg total) by mouth every evening. Generic  Dispense: 30 tablet; Refill: 2  -     busPIRone (BUSPAR) 5 MG Tab; 5 mg po  TID as needed for anxiety; can take to help him sleep if needed.  Dispense: 30 tablet; Refill: 2    Fatigue, unspecified type  -     CBC Auto Differential; Future; Expected date: 09/26/2022  -     Comprehensive Metabolic Panel; Future; Expected date: 09/26/2022  -     TSH; Future; Expected date: 09/26/2022  -     traZODone (DESYREL) 50 MG tablet; Take 1 tablet (50 mg total) by mouth every evening. Generic  Dispense: 30 tablet; Refill: 2  -     busPIRone (BUSPAR) 5 MG Tab; 5 mg po TID as needed for anxiety; can take to help him sleep if needed.  Dispense: 30 tablet; Refill: 2    BPH associated with nocturia; sees Dr TAMIE Tam as his ; cont flomax and finasteride. Needs f/u w his .   -     PSA, Screening; Future; Expected date: 09/26/2022    Overweight (BMI 25.0-29.9); Caloric restriction w regular exercise and weight reduction.
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
## 2022-11-21 ENCOUNTER — PATIENT MESSAGE (OUTPATIENT)
Dept: FAMILY MEDICINE | Facility: CLINIC | Age: 72
End: 2022-11-21
Payer: MEDICARE

## 2022-11-21 ENCOUNTER — TELEPHONE (OUTPATIENT)
Dept: FAMILY MEDICINE | Facility: CLINIC | Age: 72
End: 2022-11-21
Payer: MEDICARE

## 2022-12-14 ENCOUNTER — LAB VISIT (OUTPATIENT)
Dept: LAB | Facility: HOSPITAL | Age: 72
End: 2022-12-14
Attending: FAMILY MEDICINE
Payer: MEDICARE

## 2022-12-14 DIAGNOSIS — R35.1 BPH ASSOCIATED WITH NOCTURIA: ICD-10-CM

## 2022-12-14 DIAGNOSIS — G47.9 SLEEP DISORDER: ICD-10-CM

## 2022-12-14 DIAGNOSIS — R53.83 FATIGUE, UNSPECIFIED TYPE: ICD-10-CM

## 2022-12-14 DIAGNOSIS — I10 ESSENTIAL HYPERTENSION: ICD-10-CM

## 2022-12-14 DIAGNOSIS — E78.49 OTHER HYPERLIPIDEMIA: ICD-10-CM

## 2022-12-14 DIAGNOSIS — N40.1 BPH ASSOCIATED WITH NOCTURIA: ICD-10-CM

## 2022-12-14 DIAGNOSIS — F41.9 ANXIETY: ICD-10-CM

## 2022-12-14 DIAGNOSIS — G47.00 INSOMNIA, UNSPECIFIED TYPE: ICD-10-CM

## 2022-12-14 DIAGNOSIS — Z12.5 PROSTATE CANCER SCREENING: ICD-10-CM

## 2022-12-14 LAB
ALBUMIN SERPL BCP-MCNC: 3.4 G/DL (ref 3.5–5.2)
ALBUMIN SERPL BCP-MCNC: 3.4 G/DL (ref 3.5–5.2)
ALP SERPL-CCNC: 87 U/L (ref 55–135)
ALP SERPL-CCNC: 87 U/L (ref 55–135)
ALT SERPL W/O P-5'-P-CCNC: 12 U/L (ref 10–44)
ALT SERPL W/O P-5'-P-CCNC: 12 U/L (ref 10–44)
ANION GAP SERPL CALC-SCNC: 7 MMOL/L (ref 8–16)
ANION GAP SERPL CALC-SCNC: 7 MMOL/L (ref 8–16)
AST SERPL-CCNC: 12 U/L (ref 10–40)
AST SERPL-CCNC: 12 U/L (ref 10–40)
BASOPHILS NFR BLD: 0 % (ref 0–1.9)
BASOPHILS NFR BLD: 0 % (ref 0–1.9)
BILIRUB SERPL-MCNC: 0.5 MG/DL (ref 0.1–1)
BILIRUB SERPL-MCNC: 0.5 MG/DL (ref 0.1–1)
BUN SERPL-MCNC: 19 MG/DL (ref 8–23)
BUN SERPL-MCNC: 19 MG/DL (ref 8–23)
CALCIUM SERPL-MCNC: 9.3 MG/DL (ref 8.7–10.5)
CALCIUM SERPL-MCNC: 9.3 MG/DL (ref 8.7–10.5)
CHLORIDE SERPL-SCNC: 99 MMOL/L (ref 95–110)
CHLORIDE SERPL-SCNC: 99 MMOL/L (ref 95–110)
CHOLEST SERPL-MCNC: 147 MG/DL (ref 120–199)
CHOLEST SERPL-MCNC: 147 MG/DL (ref 120–199)
CHOLEST/HDLC SERPL: 4.3 {RATIO} (ref 2–5)
CHOLEST/HDLC SERPL: 4.3 {RATIO} (ref 2–5)
CO2 SERPL-SCNC: 25 MMOL/L (ref 23–29)
CO2 SERPL-SCNC: 25 MMOL/L (ref 23–29)
COMPLEXED PSA SERPL-MCNC: 0.15 NG/ML (ref 0–4)
COMPLEXED PSA SERPL-MCNC: 0.15 NG/ML (ref 0–4)
CREAT SERPL-MCNC: 1 MG/DL (ref 0.5–1.4)
CREAT SERPL-MCNC: 1 MG/DL (ref 0.5–1.4)
DIFFERENTIAL METHOD: ABNORMAL
DIFFERENTIAL METHOD: ABNORMAL
EOSINOPHIL NFR BLD: 2 % (ref 0–8)
EOSINOPHIL NFR BLD: 2 % (ref 0–8)
ERYTHROCYTE [DISTWIDTH] IN BLOOD BY AUTOMATED COUNT: 15.8 % (ref 11.5–14.5)
ERYTHROCYTE [DISTWIDTH] IN BLOOD BY AUTOMATED COUNT: 15.8 % (ref 11.5–14.5)
EST. GFR  (NO RACE VARIABLE): >60 ML/MIN/1.73 M^2
EST. GFR  (NO RACE VARIABLE): >60 ML/MIN/1.73 M^2
ESTIMATED AVG GLUCOSE: 105 MG/DL (ref 68–131)
GLUCOSE SERPL-MCNC: 96 MG/DL (ref 70–110)
GLUCOSE SERPL-MCNC: 96 MG/DL (ref 70–110)
HBA1C MFR BLD: 5.3 % (ref 4–5.6)
HCT VFR BLD AUTO: 33.4 % (ref 40–54)
HCT VFR BLD AUTO: 33.4 % (ref 40–54)
HDLC SERPL-MCNC: 34 MG/DL (ref 40–75)
HDLC SERPL-MCNC: 34 MG/DL (ref 40–75)
HDLC SERPL: 23.1 % (ref 20–50)
HDLC SERPL: 23.1 % (ref 20–50)
HGB BLD-MCNC: 10.4 G/DL (ref 14–18)
HGB BLD-MCNC: 10.4 G/DL (ref 14–18)
IMM GRANULOCYTES # BLD AUTO: ABNORMAL K/UL (ref 0–0.04)
IMM GRANULOCYTES # BLD AUTO: ABNORMAL K/UL (ref 0–0.04)
IMM GRANULOCYTES NFR BLD AUTO: ABNORMAL % (ref 0–0.5)
IMM GRANULOCYTES NFR BLD AUTO: ABNORMAL % (ref 0–0.5)
LDLC SERPL CALC-MCNC: 99.4 MG/DL (ref 63–159)
LDLC SERPL CALC-MCNC: 99.4 MG/DL (ref 63–159)
LYMPHOCYTES NFR BLD: 76 % (ref 18–48)
LYMPHOCYTES NFR BLD: 76 % (ref 18–48)
MAGNESIUM SERPL-MCNC: 1.8 MG/DL (ref 1.6–2.6)
MCH RBC QN AUTO: 25.7 PG (ref 27–31)
MCH RBC QN AUTO: 25.7 PG (ref 27–31)
MCHC RBC AUTO-ENTMCNC: 31.1 G/DL (ref 32–36)
MCHC RBC AUTO-ENTMCNC: 31.1 G/DL (ref 32–36)
MCV RBC AUTO: 83 FL (ref 82–98)
MCV RBC AUTO: 83 FL (ref 82–98)
MONOCYTES NFR BLD: 5 % (ref 4–15)
MONOCYTES NFR BLD: 5 % (ref 4–15)
NEUTROPHILS NFR BLD: 17 % (ref 38–73)
NEUTROPHILS NFR BLD: 17 % (ref 38–73)
NONHDLC SERPL-MCNC: 113 MG/DL
NONHDLC SERPL-MCNC: 113 MG/DL
NRBC BLD-RTO: 0 /100 WBC
NRBC BLD-RTO: 0 /100 WBC
PLATELET # BLD AUTO: 148 K/UL (ref 150–450)
PLATELET # BLD AUTO: 148 K/UL (ref 150–450)
PMV BLD AUTO: 9.2 FL (ref 9.2–12.9)
PMV BLD AUTO: 9.2 FL (ref 9.2–12.9)
POTASSIUM SERPL-SCNC: 4.4 MMOL/L (ref 3.5–5.1)
POTASSIUM SERPL-SCNC: 4.4 MMOL/L (ref 3.5–5.1)
PROT SERPL-MCNC: 7.7 G/DL (ref 6–8.4)
PROT SERPL-MCNC: 7.7 G/DL (ref 6–8.4)
RBC # BLD AUTO: 4.04 M/UL (ref 4.6–6.2)
RBC # BLD AUTO: 4.04 M/UL (ref 4.6–6.2)
SODIUM SERPL-SCNC: 131 MMOL/L (ref 136–145)
SODIUM SERPL-SCNC: 131 MMOL/L (ref 136–145)
TRIGL SERPL-MCNC: 68 MG/DL (ref 30–150)
TRIGL SERPL-MCNC: 68 MG/DL (ref 30–150)
TSH SERPL DL<=0.005 MIU/L-ACNC: 2.35 UIU/ML (ref 0.4–4)
WBC # BLD AUTO: 12.14 K/UL (ref 3.9–12.7)
WBC # BLD AUTO: 12.14 K/UL (ref 3.9–12.7)

## 2022-12-14 PROCEDURE — 85007 BL SMEAR W/DIFF WBC COUNT: CPT | Performed by: FAMILY MEDICINE

## 2022-12-14 PROCEDURE — 80061 LIPID PANEL: CPT | Performed by: FAMILY MEDICINE

## 2022-12-14 PROCEDURE — 85027 COMPLETE CBC AUTOMATED: CPT | Performed by: FAMILY MEDICINE

## 2022-12-14 PROCEDURE — 85060 PATHOLOGIST REVIEW: ICD-10-PCS | Mod: ,,, | Performed by: PATHOLOGY

## 2022-12-14 PROCEDURE — 85060 BLOOD SMEAR INTERPRETATION: CPT | Mod: ,,, | Performed by: PATHOLOGY

## 2022-12-14 PROCEDURE — 84443 ASSAY THYROID STIM HORMONE: CPT | Performed by: INTERNAL MEDICINE

## 2022-12-14 PROCEDURE — 80053 COMPREHEN METABOLIC PANEL: CPT | Performed by: FAMILY MEDICINE

## 2022-12-14 PROCEDURE — 36415 COLL VENOUS BLD VENIPUNCTURE: CPT | Mod: PN | Performed by: FAMILY MEDICINE

## 2022-12-14 PROCEDURE — 84153 ASSAY OF PSA TOTAL: CPT | Performed by: FAMILY MEDICINE

## 2022-12-14 PROCEDURE — 83735 ASSAY OF MAGNESIUM: CPT | Performed by: INTERNAL MEDICINE

## 2022-12-14 PROCEDURE — 83036 HEMOGLOBIN GLYCOSYLATED A1C: CPT | Performed by: INTERNAL MEDICINE

## 2022-12-15 LAB
PATH REV BLD -IMP: NORMAL
PATH REV BLD -IMP: NORMAL

## 2022-12-19 DIAGNOSIS — N40.0 BENIGN PROSTATIC HYPERPLASIA, UNSPECIFIED WHETHER LOWER URINARY TRACT SYMPTOMS PRESENT: ICD-10-CM

## 2022-12-19 DIAGNOSIS — N40.1 BPH WITH OBSTRUCTION/LOWER URINARY TRACT SYMPTOMS: ICD-10-CM

## 2022-12-19 DIAGNOSIS — N13.8 BPH WITH OBSTRUCTION/LOWER URINARY TRACT SYMPTOMS: ICD-10-CM

## 2022-12-19 RX ORDER — FINASTERIDE 5 MG/1
5 TABLET, FILM COATED ORAL DAILY
Qty: 90 TABLET | Refills: 3 | Status: CANCELLED | OUTPATIENT
Start: 2022-12-19

## 2022-12-19 RX ORDER — TAMSULOSIN HYDROCHLORIDE 0.4 MG/1
0.8 CAPSULE ORAL DAILY
Qty: 180 CAPSULE | Refills: 3 | Status: CANCELLED | OUTPATIENT
Start: 2022-12-19

## 2022-12-20 DIAGNOSIS — N40.1 BPH WITH OBSTRUCTION/LOWER URINARY TRACT SYMPTOMS: ICD-10-CM

## 2022-12-20 DIAGNOSIS — N13.8 BPH WITH OBSTRUCTION/LOWER URINARY TRACT SYMPTOMS: ICD-10-CM

## 2022-12-20 RX ORDER — FINASTERIDE 5 MG/1
5 TABLET, FILM COATED ORAL DAILY
Qty: 90 TABLET | Refills: 3 | Status: CANCELLED | OUTPATIENT
Start: 2022-12-19

## 2022-12-20 NOTE — TELEPHONE ENCOUNTER
Care Due:                  Date            Visit Type   Department     Provider  --------------------------------------------------------------------------------                                NP -                              [facility]  Last Visit: 09-      CARE ([facility])   MEDICINE       Arjun Arthur                              EP -                              [facility]  Next Visit: 01-      CARE ([facility])   MEDICINE       Arjun Arthur                                                            Last  Test          Frequency    Reason                     Performed    Due Date  --------------------------------------------------------------------------------    Uric Acid...  12 months..  colchicine...............  Not Found    Overdue    [employer] Embedded Care Gaps. Reference number: [id number]. 12/19/2022   8:53:03 PM CST

## 2022-12-20 NOTE — TELEPHONE ENCOUNTER
No new care gaps identified.  [facility] Embedded Care Gaps. Reference number: [id number]. 12/19/2022   8:54:41 PM CST

## 2022-12-20 NOTE — TELEPHONE ENCOUNTER
Refill Routing Note   Medication(s) are not appropriate for processing by [facility] for the following reason(s):      - Medication not previously prescribed by PCP    [facility] action(s):  Defer          Medication reconciliation completed: No     Appointments  past 12m or future 3m with PCP    Date Provider   Last Visit   9/26/2022 Arjun Arthur MD   Next Visit   1/27/2023 Arjun Arthur MD   ED visits in past 90 days: 0        Note composed:9:00 PM 12/19/2022

## 2022-12-20 NOTE — TELEPHONE ENCOUNTER
Refill Routing Note   Medication(s) are not appropriate for processing by [facility] for the following reason(s):      - Medication not previously prescribed by PCP    [facility] action(s):  Defer Medication-related problems identified: Requires labs     Medication Therapy Plan: Lab: Uric Acid.  Medication reconciliation completed: No     Appointments  past 12m or future 3m with PCP    Date Provider   Last Visit   9/26/2022 Arjun Arthur MD   Next Visit   12/19/2022 Arjun Arthur MD   ED visits in past 90 days: 0        Note composed:8:58 PM 12/19/2022

## 2022-12-21 DIAGNOSIS — N13.8 BPH WITH OBSTRUCTION/LOWER URINARY TRACT SYMPTOMS: ICD-10-CM

## 2022-12-21 DIAGNOSIS — N40.1 BPH WITH OBSTRUCTION/LOWER URINARY TRACT SYMPTOMS: ICD-10-CM

## 2022-12-23 RX ORDER — FINASTERIDE 5 MG/1
5 TABLET, FILM COATED ORAL DAILY
Qty: 90 TABLET | Refills: 1 | Status: SHIPPED | OUTPATIENT
Start: 2022-12-23 | End: 2023-05-31 | Stop reason: SDUPTHER

## 2023-01-02 DIAGNOSIS — G47.00 INSOMNIA, UNSPECIFIED TYPE: ICD-10-CM

## 2023-01-02 NOTE — TELEPHONE ENCOUNTER
No new care gaps identified.  [facility] Embedded Care Gaps. Reference number: [id number]. 1/02/2023   5:09:09 AM CST

## 2023-01-05 ENCOUNTER — TELEPHONE (OUTPATIENT)
Dept: FAMILY MEDICINE | Facility: CLINIC | Age: 73
End: 2023-01-05
Payer: MEDICARE

## 2023-01-05 RX ORDER — ZOLPIDEM TARTRATE 5 MG/1
TABLET ORAL
Qty: 30 TABLET | Refills: 0 | Status: SHIPPED | OUTPATIENT
Start: 2023-01-05 | End: 2023-01-27 | Stop reason: SDUPTHER

## 2023-01-05 NOTE — TELEPHONE ENCOUNTER
Attempted to contact pt. No answer, left message for pt to call clinic back regarding ambien and trazodone

## 2023-01-05 NOTE — TELEPHONE ENCOUNTER
Received a request for Ambien refill.  Please tell patient he is to be using trazodone 50 mg every night to help him wean off Ambien.  He also can use BuSpar 5 mg 1 hour later if still needed for sleep behind trazodone.  If still fails to go to sleep he can then take an Ambien 5 mg tablet 1 hour after the BuSpar.  He is to wean himself off the Ambien as I will not be able to further refill it as previously discussed.  Thirty Ambien have been sent in to bridge the gap but as discussed earlier it is not my policy to prescribe Ambien on a chronic basis if he feels like he needs to have that then he needs to establish with psychiatry for Ambien management

## 2023-01-06 ENCOUNTER — TELEPHONE (OUTPATIENT)
Dept: FAMILY MEDICINE | Facility: CLINIC | Age: 73
End: 2023-01-06
Payer: MEDICARE

## 2023-01-06 NOTE — TELEPHONE ENCOUNTER
----- Message from Erica Washington sent at 1/5/2023  3:27 PM CST -----  Contact: Pt @ [phone]  Type:  Patient Returning Call    Who Called:Pt  Who Left Message for Patient:Zoë  Does the patient know what this is regarding?:yes  Would the patient rather a call back or a response via [facility]? call  Best Call Back Number:[phone]   Additional Information: Pt missed a call from Zoë. Please return pt's call.

## 2023-01-13 ENCOUNTER — PATIENT OUTREACH (OUTPATIENT)
Dept: ADMINISTRATIVE | Facility: HOSPITAL | Age: 73
End: 2023-01-13
Payer: MEDICARE

## 2023-01-13 ENCOUNTER — PATIENT MESSAGE (OUTPATIENT)
Dept: ADMINISTRATIVE | Facility: HOSPITAL | Age: 73
End: 2023-01-13
Payer: MEDICARE

## 2023-01-13 NOTE — PROGRESS NOTES
2023 Care Everywhere updates requested and reviewed.  Immunizations reconciled. Media reports reviewed.  Duplicate HM overrides and  orders removed.  Overdue HM topic chart audit and/or requested.  Overdue lab testing linked to upcoming lab appointments if applies.    Health Maintenance Due   Topic Date Due    High Dose Statin  Never done    Shingles Vaccine (2 of 2) 2021    COVID-19 Vaccine (3 - Booster for Remi series) 2022    Colorectal Cancer Screening  10/07/2022

## 2023-01-16 ENCOUNTER — PATIENT MESSAGE (OUTPATIENT)
Dept: ADMINISTRATIVE | Facility: OTHER | Age: 73
End: 2023-01-16
Payer: MEDICARE

## 2023-01-27 ENCOUNTER — LAB VISIT (OUTPATIENT)
Dept: LAB | Facility: HOSPITAL | Age: 73
End: 2023-01-27
Attending: INTERNAL MEDICINE
Payer: MEDICARE

## 2023-01-27 ENCOUNTER — TELEPHONE (OUTPATIENT)
Dept: FAMILY MEDICINE | Facility: CLINIC | Age: 73
End: 2023-01-27
Payer: MEDICARE

## 2023-01-27 ENCOUNTER — OFFICE VISIT (OUTPATIENT)
Dept: FAMILY MEDICINE | Facility: CLINIC | Age: 73
End: 2023-01-27
Payer: MEDICARE

## 2023-01-27 VITALS
OXYGEN SATURATION: 97 % | HEIGHT: 70 IN | DIASTOLIC BLOOD PRESSURE: 62 MMHG | WEIGHT: 184.94 LBS | HEART RATE: 79 BPM | BODY MASS INDEX: 26.48 KG/M2 | SYSTOLIC BLOOD PRESSURE: 124 MMHG

## 2023-01-27 DIAGNOSIS — F41.9 ANXIETY: ICD-10-CM

## 2023-01-27 DIAGNOSIS — C91.10 CLL (CHRONIC LYMPHOCYTIC LEUKEMIA): ICD-10-CM

## 2023-01-27 DIAGNOSIS — N40.1 BPH WITH OBSTRUCTION/LOWER URINARY TRACT SYMPTOMS: ICD-10-CM

## 2023-01-27 DIAGNOSIS — I10 PRIMARY HYPERTENSION: ICD-10-CM

## 2023-01-27 DIAGNOSIS — N13.8 BPH WITH OBSTRUCTION/LOWER URINARY TRACT SYMPTOMS: ICD-10-CM

## 2023-01-27 DIAGNOSIS — D69.6 THROMBOCYTOPENIA: ICD-10-CM

## 2023-01-27 DIAGNOSIS — R53.83 FATIGUE, UNSPECIFIED TYPE: ICD-10-CM

## 2023-01-27 DIAGNOSIS — Z01.89 ENCOUNTER FOR LABORATORY TEST: ICD-10-CM

## 2023-01-27 DIAGNOSIS — D64.9 NORMOCYTIC ANEMIA: ICD-10-CM

## 2023-01-27 DIAGNOSIS — G47.00 INSOMNIA, UNSPECIFIED TYPE: ICD-10-CM

## 2023-01-27 DIAGNOSIS — I10 PRIMARY HYPERTENSION: Primary | ICD-10-CM

## 2023-01-27 DIAGNOSIS — G47.9 SLEEP DISORDER: ICD-10-CM

## 2023-01-27 DIAGNOSIS — Z91.89 AT RISK FOR SIDE EFFECT OF MEDICATION: ICD-10-CM

## 2023-01-27 DIAGNOSIS — E87.1 HYPONATREMIA: ICD-10-CM

## 2023-01-27 DIAGNOSIS — E78.5 HYPERLIPIDEMIA, UNSPECIFIED HYPERLIPIDEMIA TYPE: ICD-10-CM

## 2023-01-27 LAB
ANION GAP SERPL CALC-SCNC: 12 MMOL/L (ref 8–16)
BUN SERPL-MCNC: 19 MG/DL (ref 8–23)
CALCIUM SERPL-MCNC: 9.5 MG/DL (ref 8.7–10.5)
CHLORIDE SERPL-SCNC: 99 MMOL/L (ref 95–110)
CO2 SERPL-SCNC: 22 MMOL/L (ref 23–29)
CREAT SERPL-MCNC: 1.1 MG/DL (ref 0.5–1.4)
EST. GFR  (NO RACE VARIABLE): >60 ML/MIN/1.73 M^2
FERRITIN SERPL-MCNC: 187 NG/ML (ref 20–300)
GLUCOSE SERPL-MCNC: 89 MG/DL (ref 70–110)
IRON SERPL-MCNC: 16 UG/DL (ref 45–160)
POTASSIUM SERPL-SCNC: 5.1 MMOL/L (ref 3.5–5.1)
SATURATED IRON: 6 % (ref 20–50)
SODIUM SERPL-SCNC: 133 MMOL/L (ref 136–145)
TOTAL IRON BINDING CAPACITY: 280 UG/DL (ref 250–450)
TRANSFERRIN SERPL-MCNC: 189 MG/DL (ref 200–375)

## 2023-01-27 PROCEDURE — 3074F SYST BP LT 130 MM HG: CPT | Mod: CPTII,S$GLB,, | Performed by: INTERNAL MEDICINE

## 2023-01-27 PROCEDURE — 4010F ACE/ARB THERAPY RXD/TAKEN: CPT | Mod: CPTII,S$GLB,, | Performed by: INTERNAL MEDICINE

## 2023-01-27 PROCEDURE — 1101F PR PT FALLS ASSESS DOC 0-1 FALLS W/OUT INJ PAST YR: ICD-10-PCS | Mod: CPTII,S$GLB,, | Performed by: INTERNAL MEDICINE

## 2023-01-27 PROCEDURE — 99999 PR PBB SHADOW E&M-EST. PATIENT-LVL IV: ICD-10-PCS | Mod: PBBFAC,,, | Performed by: INTERNAL MEDICINE

## 2023-01-27 PROCEDURE — 3008F BODY MASS INDEX DOCD: CPT | Mod: CPTII,S$GLB,, | Performed by: INTERNAL MEDICINE

## 2023-01-27 PROCEDURE — 1160F PR REVIEW ALL MEDS BY PRESCRIBER/CLIN PHARMACIST DOCUMENTED: ICD-10-PCS | Mod: CPTII,S$GLB,, | Performed by: INTERNAL MEDICINE

## 2023-01-27 PROCEDURE — 1159F PR MEDICATION LIST DOCUMENTED IN MEDICAL RECORD: ICD-10-PCS | Mod: CPTII,S$GLB,, | Performed by: INTERNAL MEDICINE

## 2023-01-27 PROCEDURE — 36415 COLL VENOUS BLD VENIPUNCTURE: CPT | Mod: PN | Performed by: INTERNAL MEDICINE

## 2023-01-27 PROCEDURE — 3078F PR MOST RECENT DIASTOLIC BLOOD PRESSURE < 80 MM HG: ICD-10-PCS | Mod: CPTII,S$GLB,, | Performed by: INTERNAL MEDICINE

## 2023-01-27 PROCEDURE — 1159F MED LIST DOCD IN RCRD: CPT | Mod: CPTII,S$GLB,, | Performed by: INTERNAL MEDICINE

## 2023-01-27 PROCEDURE — 3074F PR MOST RECENT SYSTOLIC BLOOD PRESSURE < 130 MM HG: ICD-10-PCS | Mod: CPTII,S$GLB,, | Performed by: INTERNAL MEDICINE

## 2023-01-27 PROCEDURE — 85007 BL SMEAR W/DIFF WBC COUNT: CPT | Performed by: INTERNAL MEDICINE

## 2023-01-27 PROCEDURE — 99215 PR OFFICE/OUTPT VISIT, EST, LEVL V, 40-54 MIN: ICD-10-PCS | Mod: S$GLB,,, | Performed by: INTERNAL MEDICINE

## 2023-01-27 PROCEDURE — 3288F FALL RISK ASSESSMENT DOCD: CPT | Mod: CPTII,S$GLB,, | Performed by: INTERNAL MEDICINE

## 2023-01-27 PROCEDURE — 3078F DIAST BP <80 MM HG: CPT | Mod: CPTII,S$GLB,, | Performed by: INTERNAL MEDICINE

## 2023-01-27 PROCEDURE — 83930 ASSAY OF BLOOD OSMOLALITY: CPT | Performed by: INTERNAL MEDICINE

## 2023-01-27 PROCEDURE — 80048 BASIC METABOLIC PNL TOTAL CA: CPT | Performed by: INTERNAL MEDICINE

## 2023-01-27 PROCEDURE — 4010F PR ACE/ARB THEARPY RXD/TAKEN: ICD-10-PCS | Mod: CPTII,S$GLB,, | Performed by: INTERNAL MEDICINE

## 2023-01-27 PROCEDURE — 1160F RVW MEDS BY RX/DR IN RCRD: CPT | Mod: CPTII,S$GLB,, | Performed by: INTERNAL MEDICINE

## 2023-01-27 PROCEDURE — 85045 AUTOMATED RETICULOCYTE COUNT: CPT | Performed by: INTERNAL MEDICINE

## 2023-01-27 PROCEDURE — 82728 ASSAY OF FERRITIN: CPT | Performed by: INTERNAL MEDICINE

## 2023-01-27 PROCEDURE — 99999 PR PBB SHADOW E&M-EST. PATIENT-LVL IV: CPT | Mod: PBBFAC,,, | Performed by: INTERNAL MEDICINE

## 2023-01-27 PROCEDURE — 3288F PR FALLS RISK ASSESSMENT DOCUMENTED: ICD-10-PCS | Mod: CPTII,S$GLB,, | Performed by: INTERNAL MEDICINE

## 2023-01-27 PROCEDURE — 3008F PR BODY MASS INDEX (BMI) DOCUMENTED: ICD-10-PCS | Mod: CPTII,S$GLB,, | Performed by: INTERNAL MEDICINE

## 2023-01-27 PROCEDURE — 1126F AMNT PAIN NOTED NONE PRSNT: CPT | Mod: CPTII,S$GLB,, | Performed by: INTERNAL MEDICINE

## 2023-01-27 PROCEDURE — 99215 OFFICE O/P EST HI 40 MIN: CPT | Mod: S$GLB,,, | Performed by: INTERNAL MEDICINE

## 2023-01-27 PROCEDURE — 85027 COMPLETE CBC AUTOMATED: CPT | Performed by: INTERNAL MEDICINE

## 2023-01-27 PROCEDURE — 1126F PR PAIN SEVERITY QUANTIFIED, NO PAIN PRESENT: ICD-10-PCS | Mod: CPTII,S$GLB,, | Performed by: INTERNAL MEDICINE

## 2023-01-27 PROCEDURE — 84466 ASSAY OF TRANSFERRIN: CPT | Performed by: INTERNAL MEDICINE

## 2023-01-27 PROCEDURE — 1101F PT FALLS ASSESS-DOCD LE1/YR: CPT | Mod: CPTII,S$GLB,, | Performed by: INTERNAL MEDICINE

## 2023-01-27 RX ORDER — AMLODIPINE BESYLATE 2.5 MG/1
2.5 TABLET ORAL DAILY
Qty: 30 TABLET | Refills: 3 | Status: SHIPPED | OUTPATIENT
Start: 2023-01-27 | End: 2023-06-05 | Stop reason: SDUPTHER

## 2023-01-27 RX ORDER — TRAZODONE HYDROCHLORIDE 50 MG/1
TABLET ORAL
Qty: 60 TABLET | Refills: 2 | Status: SHIPPED | OUTPATIENT
Start: 2023-01-27 | End: 2023-03-24

## 2023-01-27 RX ORDER — ZOLPIDEM TARTRATE 5 MG/1
TABLET ORAL
Qty: 21 TABLET | Refills: 0 | Status: SHIPPED | OUTPATIENT
Start: 2023-01-27 | End: 2023-06-19 | Stop reason: CLARIF

## 2023-01-27 RX ORDER — LOSARTAN POTASSIUM 50 MG/1
50 TABLET ORAL DAILY
Qty: 30 TABLET | Refills: 3 | Status: SHIPPED | OUTPATIENT
Start: 2023-01-27 | End: 2023-06-05 | Stop reason: SDUPTHER

## 2023-01-27 NOTE — PROGRESS NOTES
Subjective:       Patient ID: Saurav Bahena is a 72 y.o. male.    Chief Complaint: Follow-up (Blood work)    HPI: Here today for reassessment and go over lab work:    Primary hypertension; Maintain < 2 Gm Na a day diet, and monitor BP at home; keep a log. Decrease losartan from 100 mg to 50 mg a day and add amlodipine 2.5 mg po daily due to hyponatremia.  Serum sodium 131.  Limit free water intake.  At home his blood pressure is 120 to 130/75.  Here manually he is 124/62.  -     losartan (COZAAR) 50 MG tablet; Take 1 tablet (50 mg total) by mouth once daily.  Dispense: 30 tablet; Refill: 3  -     amLODIPine (NORVASC) 2.5 MG tablet; Take 1 tablet (2.5 mg total) by mouth once daily.  Dispense: 30 tablet; Refill: 3  -     Osmolality, Serum; Future; Expected date: 01/27/2023  -     Osmolality, urine  -     Sodium, urine, random    Hyponatremia; limit water intake but not total fluids; Limit water to 800 ml a day. .  Have cut back on losartan to 50 mg a day and have added amlodipine 2.5 mg daily to  the slack in his blood pressure and hopefully allow all his serum sodium to improve  -     losartan (COZAAR) 50 MG tablet; Take 1 tablet (50 mg total) by mouth once daily.  Dispense: 30 tablet; Refill: 3  -     amLODIPine (NORVASC) 2.5 MG tablet; Take 1 tablet (2.5 mg total) by mouth once daily.  Dispense: 30 tablet; Refill: 3  -     Osmolality, Serum; Future; Expected date: 01/27/2023  -     Osmolality, urine  -     Sodium, urine, random    At risk for side effect of medication; r/o SIADH. Vs Na loss w losartan. Decrease losartan to 50 mg a day.   -     Osmolality, Serum; Future; Expected date: 01/27/2023  -     Osmolality, urine  -     Sodium, urine, random    Hyperlipidemia, unspecified hyperlipidemia type; Maintain low fat high fiber diet, exercise regularly. Weight reduction where indicated. Watch diet and exercise more. Increase aerobic activity.  Lipid profile:  Cholesterol 147/triglyceride 68/HDL 34/LDL  99.4.  Watches diet closer including regular exercise.    The 10-year ASCVD risk score (Tres HOOKS, et al., 2019) is: 20.2%    Values used to calculate the score:      Age: 72 years      Sex: Male      Is Non- : No      Diabetic: No      Tobacco smoker: No      Systolic Blood Pressure: 124 mmHg      Is BP treated: Yes      HDL Cholesterol: 34 mg/dL      Total Cholesterol: 147 mg/dL    Insomnia, unspecified type  -     zolpidem (AMBIEN) 5 MG Tab; To use trazodone prescribed earlier as needed for sleep and wean off Ambien 5 mg by mouth at bedtime as needed for sleep still needed 1 hour after trazodone.  Will not be able to continue to prescribe Ambien.  If he feels like he needs to be on Ambien will need to obtain from psych.  Generic please  Dispense: 21 tablet; Refill: 0  -     traZODone (DESYREL) 50 MG tablet; Take 75 mg po nightly as needed for sleep.  Dispense: 60 tablet; Refill: 2    Fatigue, unspecified type; needs help w insomnia. Try to wean off ambien use.   -     traZODone (DESYREL) 50 MG tablet; Take 75 mg po nightly as needed for sleep.  Dispense: 60 tablet; Refill: 2    Sleep disorder; as above  -     traZODone (DESYREL) 50 MG tablet; Take 75 mg po nightly as needed for sleep.  Dispense: 60 tablet; Refill: 2    Anxiety; Limit caffeine intake with stress reduction and regular exercise as tolerated.   -     traZODone (DESYREL) 50 MG tablet; Take 75 mg po nightly as needed for sleep.  Dispense: 60 tablet; Refill: 2    BPH with obstruction/lower urinary tract symptoms; on finasteride and tamsulosin. ELLIOT Tam; needs routine f/u.  12/14 PSA 0.15    CLL (chronic lymphocytic leukemia): Dr Solares has retired; will consult Dr Yates as routine for CLL following.   -     Ambulatory referral/consult to Hematology / Oncology; Future; Expected date: 02/03/2023    Normocytic anemia; Me's 50+ MVI one a day  -     Ambulatory referral/consult to Hematology / Oncology; Future; Expected  date: 02/03/2023  -     Ferritin; Future; Expected date: 01/27/2023  -     Iron and TIBC; Future; Expected date: 01/27/2023  -     Reticulocytes; Future; Expected date: 01/27/2023  -     Fecal Immunochemical Test (iFOBT); Future; Expected date: 01/27/2023    Thrombocytopenia; check iron levels'; on Men's 50+ one a day. May also be med related.  Platelet level 148  -     Ambulatory referral/consult to Hematology / Oncology; Future; Expected date: 02/03/2023    Total time 10:49 a.m. through 11:52 a.m..  Greater than 50% of the time spent in discussion counseling and review.  Labs reviewed with patient ordered for follow-up medications reviewed and addressed.  Various different diagnosis is discussed including plan of care.    Review of Systems   Constitutional:  Negative for appetite change and unexpected weight change.   HENT:  Negative for congestion, postnasal drip, rhinorrhea and sinus pressure.         Denies seasonal allergies, or perennial allergies   Eyes:  Negative for discharge and itching.   Respiratory:  Negative for cough, chest tightness, shortness of breath and wheezing.    Cardiovascular:  Negative for chest pain, palpitations and leg swelling.   Gastrointestinal:  Negative for abdominal pain, blood in stool, constipation, diarrhea, nausea and vomiting.   Endocrine: Negative for polydipsia, polyphagia and polyuria.   Genitourinary:  Negative for dysuria and hematuria.   Musculoskeletal:  Negative for arthralgias and myalgias.   Skin:  Negative for rash.   Allergic/Immunologic: Negative for environmental allergies and food allergies.   Neurological:  Negative for tremors, seizures and headaches.   Hematological:  Negative for adenopathy. Does not bruise/bleed easily.   Psychiatric/Behavioral:  Negative for dysphoric mood. The patient is not nervous/anxious.         Denies anxiety or depression.    Objective:        Vitals:    01/27/23 1017   BP: 124/62   Pulse: 79   SpO2: 97%   Weight: 83.9 kg (184 lb  "15.5 oz)   Height: 5' 10" (1.778 m)       BMI Readings from Last 3 Encounters:   01/27/23 26.54 kg/m²   09/26/22 25.46 kg/m²   09/14/22 25.54 kg/m²        Wt Readings from Last 3 Encounters:   01/27/23 1017 83.9 kg (184 lb 15.5 oz)   09/26/22 0836 80.5 kg (177 lb 7.5 oz)   09/14/22 0806 80.7 kg (178 lb)        BP Readings from Last 3 Encounters:   01/27/23 124/62   09/26/22 130/76   09/14/22 (!) 144/71        There are no preventive care reminders to display for this patient.     Health Maintenance Due   Topic Date Due    High Dose Statin  Never done    Shingles Vaccine (2 of 2) 01/16/2021    COVID-19 Vaccine (3 - Booster for Remi series) 01/28/2022         Past Medical History:   Diagnosis Date    Anticoagulant long-term use     aspirin    Atherosclerosis of native coronary artery of native heart without angina pectoris 3/8/2022    Atrophic kidney     right    BPH (benign prostatic hyperplasia)     Cat bite of left forearm with infection 2/11/2019    CLL (chronic lymphocytic leukemia)     sees Dr. Solares    CTS (carpal tunnel syndrome)     Current smoker on some days     using e cig, regular cigarettes sometimes    DJD (degenerative joint disease) of knee     left    DJD (degenerative joint disease) of knee     bilateral/ LT more than RT    Emphysema lung     HLD (hyperlipidemia)     no current meds    HTN (hypertension)     Insomnia     Nocturia        Past Surgical History:   Procedure Laterality Date    COLONOSCOPY  09/01/2010    Dr. Aguilar, in legacy: int. hemorrhoids, repeat in 10 years for screening    CYSTOSCOPY W/ RETROGRADES Bilateral 7/2/2020    Procedure: CYSTOSCOPY, WITH RETROGRADE PYELOGRAM;  Surgeon: Andrea Tam MD;  Location: [facility] OR;  Service: Urology;  Laterality: Bilateral;    ELBOW SURGERY      left elbow, bursitis    KNEE SURGERY  1981?    right    MULTIPLE TOOTH EXTRACTIONS      TONSILLECTOMY      TRANSRECTAL ULTRASOUND EXAMINATION N/A 7/2/2020    Procedure: ULTRASOUND, RECTAL " APPROACH;  Surgeon: Andrea Tam MD;  Location: [facility] OR;  Service: Urology;  Laterality: N/A;    TRIGGER FINGER RELEASE Left        Social History     Tobacco Use    Smoking status: Former     Packs/day: 0.30     Years: 20.00     Pack years: 6.00     Types: Cigarettes     Quit date: 2015     Years since quittin.1    Smokeless tobacco: Never    Tobacco comments:     nicorette, vapor cigs/ 3 cigarettes   Substance Use Topics    Alcohol use: Yes     Alcohol/week: 2.0 - 3.0 standard drinks     Types: 2 - 3 Shots of liquor per week     Comment: 2-3/day on weekends    Drug use: No       Family History   Problem Relation Age of Onset    Stroke Mother     Diabetes Mother     Heart disease Father 65        MI    Arthritis Father         RA    Diabetes Brother     Colon cancer Neg Hx     Crohn's disease Neg Hx     Ulcerative colitis Neg Hx     Stomach cancer Neg Hx     Esophageal cancer Neg Hx        Review of patient's allergies indicates:   Allergen Reactions    No known drug allergies        Current Outpatient Medications on File Prior to Visit   Medication Sig Dispense Refill    albuterol (PROVENTIL HFA) 90 mcg/actuation inhaler Inhale 2 puffs into the lungs every 6 (six) hours as needed for Wheezing or Shortness of Breath (or before exercise). Rescue 18 g 3    ascorbic acid, vitamin C, (VITAMIN C) 500 MG tablet Take 500 mg by mouth once daily.       aspirin (ECOTRIN) 81 MG EC tablet Take 81 mg by mouth once daily. Every day      colchicine (MITIGARE) 0.6 mg Cap Take 1 capsule (0.6 mg total) by mouth 2 (two) times daily as needed (gout flare). 60 capsule 0    ERGOCALCIFEROL, VITAMIN D2, (VITAMIN D ORAL) Take 5,000 Units by mouth once daily.      finasteride (PROSCAR) 5 mg tablet TAKE 1 TABLET BY MOUTH ONCE DAILY 90 tablet 1    fish oil-omega-3 fatty acids 300-1,000 mg capsule Take 4 capsules by mouth once daily.       FLUZONE HIGHDOSE QUAD 21-22  mcg/0.7 mL Syrg       HYDROcodone-acetaminophen (NORCO)  5-325 mg per tablet Take 1 tablet by mouth every 6 (six) hours as needed for Pain. 15 tablet 0    Lactobac no.41/Bifidobact no.7 (PROBIOTIC-10 ORAL) Take by mouth once daily.      multivitamin capsule Every day      tamsulosin (FLOMAX) 0.4 mg Cap Take 2 capsules (0.8 mg total) by mouth once daily. 180 capsule 1    varicella-zoster gE-AS01B, PF, (SHINGRIX) 50 mcg/0.5 mL injection Inject into the muscle. 1 each 0    zinc sulfate (ZINC-220 ORAL) Take by mouth.      busPIRone (BUSPAR) 5 MG Tab 5 mg po TID as needed for anxiety; can take to help him sleep if needed. (Patient not taking: Reported on 1/27/2023) 30 tablet 2     Current Facility-Administered Medications on File Prior to Visit   Medication Dose Route Frequency Provider Last Rate Last Admin    lactated ringers infusion   Intravenous Continuous Mateo Cheng MD   Stopped at 07/02/20 0824    lidocaine (PF) 10 mg/ml (1%) injection 10 mg  1 mL Intradermal Once Mateo Cheng MD        triamcinolone acetonide injection 40 mg  40 mg Intra-articular  Gregory Avila MD   40 mg at 07/30/21 0900       Physical Exam  Vitals reviewed.   Constitutional:       Appearance: He is well-developed.   HENT:      Head: Normocephalic and atraumatic.   Neck:      Thyroid: No thyromegaly.   Cardiovascular:      Rate and Rhythm: Normal rate and regular rhythm.      Heart sounds: Normal heart sounds. No murmur heard.    No gallop.   Pulmonary:      Effort: Pulmonary effort is normal. No respiratory distress.      Breath sounds: Normal breath sounds. No wheezing or rales.   Abdominal:      General: Bowel sounds are normal. There is no distension.      Palpations: Abdomen is soft.      Tenderness: There is no abdominal tenderness. There is no guarding or rebound.   Musculoskeletal:         General: Normal range of motion.      Cervical back: Normal range of motion and neck supple.   Lymphadenopathy:      Cervical: No cervical adenopathy.   Skin:     Findings: No rash.    Neurological:      Mental Status: He is alert and oriented to person, place, and time.      Comments: Moves all 4 extremities fine.   Psychiatric:         Behavior: Behavior normal.         Thought Content: Thought content normal.       Assessment:       1. Primary hypertension    2. Hyponatremia    3. At risk for side effect of medication    4. Hyperlipidemia, unspecified hyperlipidemia type    5. Insomnia, unspecified type    6. Fatigue, unspecified type    7. Sleep disorder    8. Anxiety    9. BPH with obstruction/lower urinary tract symptoms    10. CLL (chronic lymphocytic leukemia)    11. Normocytic anemia    12. Thrombocytopenia    13. Encounter for laboratory test          Plan:       Primary hypertension; Maintain < 2 Gm Na a day diet, and monitor BP at home; keep a log. Decrease losartan from 100 mg to 50 mg a day and add amlodipine 2.5 mg po daily due to hyponatremia.  Serum sodium 131.  Limit free water intake.  At home his blood pressure is 120 to 130/75.  Here manually he is 124/62.  -     losartan (COZAAR) 50 MG tablet; Take 1 tablet (50 mg total) by mouth once daily.  Dispense: 30 tablet; Refill: 3  -     amLODIPine (NORVASC) 2.5 MG tablet; Take 1 tablet (2.5 mg total) by mouth once daily.  Dispense: 30 tablet; Refill: 3  -     Osmolality, Serum; Future; Expected date: 01/27/2023  -     Osmolality, urine  -     Sodium, urine, random    Hyponatremia; limit water intake but not total fluids; Limit water to 800 ml a day. .  Have cut back on losartan to 50 mg a day and have added amlodipine 2.5 mg daily to  the slack in his blood pressure and hopefully allow all his serum sodium to improve  -     losartan (COZAAR) 50 MG tablet; Take 1 tablet (50 mg total) by mouth once daily.  Dispense: 30 tablet; Refill: 3  -     amLODIPine (NORVASC) 2.5 MG tablet; Take 1 tablet (2.5 mg total) by mouth once daily.  Dispense: 30 tablet; Refill: 3  -     Osmolality, Serum; Future; Expected date: 01/27/2023  -      Osmolality, urine  -     Sodium, urine, random    At risk for side effect of medication; r/o SIADH. Vs Na loss w losartan. Decrease losartan to 50 mg a day.   -     Osmolality, Serum; Future; Expected date: 01/27/2023  -     Osmolality, urine  -     Sodium, urine, random    Hyperlipidemia, unspecified hyperlipidemia type; Maintain low fat high fiber diet, exercise regularly. Weight reduction where indicated. Watch diet and exercise more. Increase aerobic activity    Insomnia, unspecified type  -     zolpidem (AMBIEN) 5 MG Tab; To use trazodone prescribed earlier as needed for sleep and wean off Ambien 5 mg by mouth at bedtime as needed for sleep still needed 1 hour after trazodone.  Will not be able to continue to prescribe Ambien.  If he feels like he needs to be on Ambien will need to obtain from psych.  Generic please  Dispense: 21 tablet; Refill: 0  -     traZODone (DESYREL) 50 MG tablet; Take 75 mg po nightly as needed for sleep.  Dispense: 60 tablet; Refill: 2    Fatigue, unspecified type; needs help w insomnia. Try to wean off ambien use.   -     traZODone (DESYREL) 50 MG tablet; Take 75 mg po nightly as needed for sleep.  Dispense: 60 tablet; Refill: 2    Sleep disorder; as above  -     traZODone (DESYREL) 50 MG tablet; Take 75 mg po nightly as needed for sleep.  Dispense: 60 tablet; Refill: 2    Anxiety; Limit caffeine intake with stress reduction and regular exercise as tolerated.   -     traZODone (DESYREL) 50 MG tablet; Take 75 mg po nightly as needed for sleep.  Dispense: 60 tablet; Refill: 2    BPH with obstruction/lower urinary tract symptoms; on finasteride and tamsulosin.  Dr Andrea Tam; needs routine f/u.     CLL (chronic lymphocytic leukemia): Dr Solares has retired; will consult Dr Yates as routine for CLL following.   -     Ambulatory referral/consult to Hematology / Oncology; Future; Expected date: 02/03/2023    Normocytic anemia; Me's 50+ MVI one a day  -     Ambulatory referral/consult  to Hematology / Oncology; Future; Expected date: 02/03/2023  -     Ferritin; Future; Expected date: 01/27/2023  -     Iron and TIBC; Future; Expected date: 01/27/2023  -     Reticulocytes; Future; Expected date: 01/27/2023  -     Fecal Immunochemical Test (iFOBT); Future; Expected date: 01/27/2023    Thrombocytopenia; check iron levels'; on Men's 50+ one a day. May also be med related.  Platelet level 148  -     Ambulatory referral/consult to Hematology / Oncology; Future; Expected date: 02/03/2023    Encounter for lab test: Labs reviewed and discussed with patient at length in ordered for follow-up

## 2023-01-27 NOTE — TELEPHONE ENCOUNTER
"----- Message from Dee Hood RN sent at 1/27/2023  1:35 PM CST -----  Regarding: referral  A referral was received at the [facility] for the above patient.  Please re-enter as an external referral and notify Dr. Yates office for scheduling.    Thanks,      Dee Hood MBA (Kaye), MSN, RN  Oncology RN Navigator  [facility]  [facility]    P:  [phone]  F:  [phone]                 "

## 2023-01-27 NOTE — PATIENT INSTRUCTIONS
Primary hypertension; Maintain < 2 Gm Na a day diet, and monitor BP at home; keep a log. Decrease losartan to 50 mg a day and add amlodipine 2.5 mg po daily due to hyponatremia.   -     losartan (COZAAR) 50 MG tablet; Take 1 tablet (50 mg total) by mouth once daily.  Dispense: 30 tablet; Refill: 3  -     amLODIPine (NORVASC) 2.5 MG tablet; Take 1 tablet (2.5 mg total) by mouth once daily.  Dispense: 30 tablet; Refill: 3  -     Osmolality, Serum; Future; Expected date: 01/27/2023  -     Osmolality, urine  -     Sodium, urine, random    Hyponatremia; limit water intake but not total fluids; Limit water to 800 ml a day. .   -     losartan (COZAAR) 50 MG tablet; Take 1 tablet (50 mg total) by mouth once daily.  Dispense: 30 tablet; Refill: 3  -     amLODIPine (NORVASC) 2.5 MG tablet; Take 1 tablet (2.5 mg total) by mouth once daily.  Dispense: 30 tablet; Refill: 3  -     Osmolality, Serum; Future; Expected date: 01/27/2023  -     Osmolality, urine  -     Sodium, urine, random    At risk for side effect of medication; r/o SIADH. Vs Na loss w losartan. Decrease losartan to 50 mg a day.   -     Osmolality, Serum; Future; Expected date: 01/27/2023  -     Osmolality, urine  -     Sodium, urine, random    Hyperlipidemia, unspecified hyperlipidemia type; Maintain low fat high fiber diet, exercise regularly. Weight reduction where indicated. Watch diet and exercise more. Increase aerobic activity    Insomnia, unspecified type  -     zolpidem (AMBIEN) 5 MG Tab; To use trazodone prescribed earlier as needed for sleep and wean off Ambien 5 mg by mouth at bedtime as needed for sleep still needed 1 hour after trazodone.  Will not be able to continue to prescribe Ambien.  If he feels like he needs to be on Ambien will need to obtain from psych.  Generic please  Dispense: 21 tablet; Refill: 0  -     traZODone (DESYREL) 50 MG tablet; Take 75 mg po nightly as needed for sleep.  Dispense: 60 tablet; Refill: 2    Fatigue, unspecified  type; needs help w insomnia. Try to wean off ambien use.   -     traZODone (DESYREL) 50 MG tablet; Take 75 mg po nightly as needed for sleep.  Dispense: 60 tablet; Refill: 2    Sleep disorder; as above  -     traZODone (DESYREL) 50 MG tablet; Take 75 mg po nightly as needed for sleep.  Dispense: 60 tablet; Refill: 2    Anxiety; Limit caffeine intake with stress reduction and regular exercise as tolerated.   -     traZODone (DESYREL) 50 MG tablet; Take 75 mg po nightly as needed for sleep.  Dispense: 60 tablet; Refill: 2    BPH with obstruction/lower urinary tract symptoms; on finasteride and tamsulosin.  Dr Andrea Tam; needs routine f/u.     CLL (chronic lymphocytic leukemia): Dr Solares has retired; will consult dr chino as routine for CLL following.   -     Ambulatory referral/consult to Hematology / Oncology; Future; Expected date: 02/03/2023    Normocytic anemia; Me's 50+ MVI one a day  -     Ambulatory referral/consult to Hematology / Oncology; Future; Expected date: 02/03/2023  -     Ferritin; Future; Expected date: 01/27/2023  -     Iron and TIBC; Future; Expected date: 01/27/2023  -     Reticulocytes; Future; Expected date: 01/27/2023  -     Fecal Immunochemical Test (iFOBT); Future; Expected date: 01/27/2023    Thrombocytopenia; check iron levels'; on Men's 50+ one a day. May also be med related.   -     Ambulatory referral/consult to Hematology / Oncology; Future; Expected date: 02/03/2023

## 2023-01-28 LAB
ANISOCYTOSIS BLD QL SMEAR: SLIGHT
BASOPHILS NFR BLD: 0 % (ref 0–1.9)
DIFFERENTIAL METHOD: ABNORMAL
EOSINOPHIL NFR BLD: 0 % (ref 0–8)
ERYTHROCYTE [DISTWIDTH] IN BLOOD BY AUTOMATED COUNT: 15.9 % (ref 11.5–14.5)
HCT VFR BLD AUTO: 34.9 % (ref 40–54)
HGB BLD-MCNC: 10.5 G/DL (ref 14–18)
IMM GRANULOCYTES # BLD AUTO: ABNORMAL K/UL (ref 0–0.04)
IMM GRANULOCYTES NFR BLD AUTO: ABNORMAL % (ref 0–0.5)
LYMPHOCYTES NFR BLD: 69 % (ref 18–48)
MCH RBC QN AUTO: 24.9 PG (ref 27–31)
MCHC RBC AUTO-ENTMCNC: 30.1 G/DL (ref 32–36)
MCV RBC AUTO: 83 FL (ref 82–98)
MONOCYTES NFR BLD: 3 % (ref 4–15)
NEUTROPHILS NFR BLD: 28 % (ref 38–73)
NRBC BLD-RTO: 0 /100 WBC
OSMOLALITY SERPL: 292 MOSM/KG (ref 280–300)
OVALOCYTES BLD QL SMEAR: ABNORMAL
PLATELET # BLD AUTO: 158 K/UL (ref 150–450)
PLATELET BLD QL SMEAR: ABNORMAL
PMV BLD AUTO: 9.6 FL (ref 9.2–12.9)
POIKILOCYTOSIS BLD QL SMEAR: SLIGHT
RBC # BLD AUTO: 4.22 M/UL (ref 4.6–6.2)
RETICS/RBC NFR AUTO: 1.9 % (ref 0.4–2)
WBC # BLD AUTO: 16.15 K/UL (ref 3.9–12.7)

## 2023-01-30 ENCOUNTER — PATIENT OUTREACH (OUTPATIENT)
Dept: ADMINISTRATIVE | Facility: HOSPITAL | Age: 73
End: 2023-01-30
Payer: MEDICARE

## 2023-01-31 ENCOUNTER — PATIENT MESSAGE (OUTPATIENT)
Dept: FAMILY MEDICINE | Facility: CLINIC | Age: 73
End: 2023-01-31
Payer: MEDICARE

## 2023-01-31 DIAGNOSIS — E61.1 IRON DEFICIENCY: Primary | ICD-10-CM

## 2023-01-31 DIAGNOSIS — D64.9 NORMOCYTIC ANEMIA: ICD-10-CM

## 2023-02-01 ENCOUNTER — LAB VISIT (OUTPATIENT)
Dept: LAB | Facility: HOSPITAL | Age: 73
End: 2023-02-01
Attending: INTERNAL MEDICINE
Payer: MEDICARE

## 2023-02-01 DIAGNOSIS — D64.9 NORMOCYTIC ANEMIA: ICD-10-CM

## 2023-02-01 PROCEDURE — 82274 ASSAY TEST FOR BLOOD FECAL: CPT | Performed by: INTERNAL MEDICINE

## 2023-02-07 ENCOUNTER — TELEPHONE (OUTPATIENT)
Dept: FAMILY MEDICINE | Facility: CLINIC | Age: 73
End: 2023-02-07

## 2023-02-07 LAB — HEMOCCULT STL QL IA: NEGATIVE

## 2023-02-07 NOTE — TELEPHONE ENCOUNTER
Discussed case with patient by phone and asked him to take ferrous gluconate/Fergon 324 mg daily with vitamin-C 500 mg.  Also continue taking men's 50+ multivitamin once a day.  Keep his follow-up appointment in early March and please schedule a CBC with ferritin and iron levels a few days prior.  Patient has seed Hematology-Oncology in the past Dr. Solares and suspected as having CLL.  Was followed by him on a 6 month for labs.  Will continue to follow CBCs periodically on him

## 2023-02-07 NOTE — TELEPHONE ENCOUNTER
Please have patient past by the lab to leave a urine specimen for urine osmolarity and urine spot sodium still needed.  Please also help him schedule appointment to see hematology oncology Dr. Yates routine follow-up for his CLL

## 2023-02-08 ENCOUNTER — PATIENT MESSAGE (OUTPATIENT)
Dept: ADMINISTRATIVE | Facility: OTHER | Age: 73
End: 2023-02-08
Payer: MEDICARE

## 2023-02-08 ENCOUNTER — PATIENT MESSAGE (OUTPATIENT)
Dept: FAMILY MEDICINE | Facility: CLINIC | Age: 73
End: 2023-02-08
Payer: MEDICARE

## 2023-02-14 ENCOUNTER — PATIENT MESSAGE (OUTPATIENT)
Dept: ADMINISTRATIVE | Facility: OTHER | Age: 73
End: 2023-02-14
Payer: MEDICARE

## 2023-02-16 ENCOUNTER — TELEPHONE (OUTPATIENT)
Dept: FAMILY MEDICINE | Facility: CLINIC | Age: 73
End: 2023-02-16
Payer: MEDICARE

## 2023-02-16 NOTE — TELEPHONE ENCOUNTER
----- Message from Pattie Duque sent at 2/16/2023  4:10 PM CST -----  Contact: PT/[phone]  Type:  Sooner Apoointment Request    Caller is requesting a sooner appointment.    Name of Caller:PT   When is the first available appointment?No dates given   Symptoms:Pt  seen  at the  ER  02/15 abnormal  EKG  findings   needs a appointment  within 2-3 days   Would the patient rather a call back or a response via [facility] Call   Best Call Back Number:[phone]  Additional Information: please  call pt  ASAP

## 2023-02-20 NOTE — TELEPHONE ENCOUNTER
Dr Arthur pt-will you please address in his absence    Pt prescribed Eliquis for PE at ER 2/15/2023  Medication had to be ordered and pt just picked this up yesterday, but has not started   He noted a warning not to take this with any kidney issues  He had an ultrasound 8/12/2019 showing moderately atrophic right kidney  Pt concerned and questioning if he should take this or change to another medication

## 2023-02-20 NOTE — TELEPHONE ENCOUNTER
Per Dr Almendarez:  Please let him know that he definitely needs to take this medication - prescribed dose is safe and appropriate. Thanks!

## 2023-02-20 NOTE — TELEPHONE ENCOUNTER
----- Message from Carl Pathak sent at 2/20/2023 12:32 PM CST -----  Who Called: patient    What is the reqeust in detail: Requesting call back to discuss pt went to urgent care in [place] due to pt passing out, cat scan on head and chest was done, Dr said looks like he has a blood clot in chest, gave rx for eliquis.   Pt has questions for medication due to having problems with right kidney he wants to know when he should start taking the Eliquis.          Can the clinic reply by [facility]? no         Best Call Back Number: [phone]            Additional Information:

## 2023-03-01 ENCOUNTER — OFFICE VISIT (OUTPATIENT)
Dept: FAMILY MEDICINE | Facility: CLINIC | Age: 73
End: 2023-03-01
Payer: MEDICARE

## 2023-03-01 ENCOUNTER — LAB VISIT (OUTPATIENT)
Dept: LAB | Facility: HOSPITAL | Age: 73
End: 2023-03-01
Attending: INTERNAL MEDICINE
Payer: MEDICARE

## 2023-03-01 ENCOUNTER — TELEPHONE (OUTPATIENT)
Dept: HEMATOLOGY/ONCOLOGY | Facility: CLINIC | Age: 73
End: 2023-03-01
Payer: MEDICARE

## 2023-03-01 VITALS
WEIGHT: 180.13 LBS | OXYGEN SATURATION: 96 % | DIASTOLIC BLOOD PRESSURE: 60 MMHG | BODY MASS INDEX: 25.79 KG/M2 | HEIGHT: 70 IN | SYSTOLIC BLOOD PRESSURE: 110 MMHG | HEART RATE: 71 BPM

## 2023-03-01 DIAGNOSIS — Z09 HOSPITAL DISCHARGE FOLLOW-UP: Primary | ICD-10-CM

## 2023-03-01 DIAGNOSIS — I26.99 ACUTE PULMONARY EMBOLISM, UNSPECIFIED PULMONARY EMBOLISM TYPE, UNSPECIFIED WHETHER ACUTE COR PULMONALE PRESENT: ICD-10-CM

## 2023-03-01 DIAGNOSIS — D50.9 IRON DEFICIENCY ANEMIA, UNSPECIFIED IRON DEFICIENCY ANEMIA TYPE: ICD-10-CM

## 2023-03-01 DIAGNOSIS — Z87.891 FORMER SMOKER, STOPPED SMOKING IN DISTANT PAST: ICD-10-CM

## 2023-03-01 DIAGNOSIS — D50.9 MICROCYTIC ANEMIA: ICD-10-CM

## 2023-03-01 DIAGNOSIS — N28.1 KIDNEY CYSTS: ICD-10-CM

## 2023-03-01 DIAGNOSIS — N26.1 ATROPHY OF RIGHT KIDNEY: ICD-10-CM

## 2023-03-01 DIAGNOSIS — I25.10 ATHEROSCLEROSIS OF NATIVE CORONARY ARTERY OF NATIVE HEART WITHOUT ANGINA PECTORIS: ICD-10-CM

## 2023-03-01 DIAGNOSIS — R93.89 ABNORMAL CT OF THE CHEST: ICD-10-CM

## 2023-03-01 DIAGNOSIS — J43.1 PANLOBULAR EMPHYSEMA: ICD-10-CM

## 2023-03-01 DIAGNOSIS — I10 PRIMARY HYPERTENSION: ICD-10-CM

## 2023-03-01 DIAGNOSIS — C91.10 CLL (CHRONIC LYMPHOCYTIC LEUKEMIA): ICD-10-CM

## 2023-03-01 LAB
ANION GAP SERPL CALC-SCNC: 10 MMOL/L (ref 8–16)
BUN SERPL-MCNC: 20 MG/DL (ref 8–23)
CALCIUM SERPL-MCNC: 9.8 MG/DL (ref 8.7–10.5)
CHLORIDE SERPL-SCNC: 97 MMOL/L (ref 95–110)
CO2 SERPL-SCNC: 24 MMOL/L (ref 23–29)
CREAT SERPL-MCNC: 1 MG/DL (ref 0.5–1.4)
EST. GFR  (NO RACE VARIABLE): >60 ML/MIN/1.73 M^2
GLUCOSE SERPL-MCNC: 89 MG/DL (ref 70–110)
IRON SERPL-MCNC: 14 UG/DL (ref 45–160)
POTASSIUM SERPL-SCNC: 5 MMOL/L (ref 3.5–5.1)
SATURATED IRON: 5 % (ref 20–50)
SODIUM SERPL-SCNC: 131 MMOL/L (ref 136–145)
TOTAL IRON BINDING CAPACITY: 302 UG/DL (ref 250–450)
TRANSFERRIN SERPL-MCNC: 204 MG/DL (ref 200–375)

## 2023-03-01 PROCEDURE — 1159F PR MEDICATION LIST DOCUMENTED IN MEDICAL RECORD: ICD-10-PCS | Mod: CPTII,S$GLB,, | Performed by: INTERNAL MEDICINE

## 2023-03-01 PROCEDURE — 3288F PR FALLS RISK ASSESSMENT DOCUMENTED: ICD-10-PCS | Mod: CPTII,S$GLB,, | Performed by: INTERNAL MEDICINE

## 2023-03-01 PROCEDURE — 1100F PR PT FALLS ASSESS DOC 2+ FALLS/FALL W/INJURY/YR: ICD-10-PCS | Mod: CPTII,S$GLB,, | Performed by: INTERNAL MEDICINE

## 2023-03-01 PROCEDURE — 84466 ASSAY OF TRANSFERRIN: CPT | Performed by: INTERNAL MEDICINE

## 2023-03-01 PROCEDURE — 85007 BL SMEAR W/DIFF WBC COUNT: CPT | Performed by: INTERNAL MEDICINE

## 2023-03-01 PROCEDURE — 3078F DIAST BP <80 MM HG: CPT | Mod: CPTII,S$GLB,, | Performed by: INTERNAL MEDICINE

## 2023-03-01 PROCEDURE — 99999 PR PBB SHADOW E&M-EST. PATIENT-LVL V: CPT | Mod: PBBFAC,,, | Performed by: INTERNAL MEDICINE

## 2023-03-01 PROCEDURE — 99215 OFFICE O/P EST HI 40 MIN: CPT | Mod: S$GLB,,, | Performed by: INTERNAL MEDICINE

## 2023-03-01 PROCEDURE — 99417 PR PROLONGED SVC, OUTPT, W/WO DIRECT PT CONTACT,  EA ADDTL 15 MIN: ICD-10-PCS | Mod: S$GLB,,, | Performed by: INTERNAL MEDICINE

## 2023-03-01 PROCEDURE — 99417 PROLNG OP E/M EACH 15 MIN: CPT | Mod: S$GLB,,, | Performed by: INTERNAL MEDICINE

## 2023-03-01 PROCEDURE — 3074F PR MOST RECENT SYSTOLIC BLOOD PRESSURE < 130 MM HG: ICD-10-PCS | Mod: CPTII,S$GLB,, | Performed by: INTERNAL MEDICINE

## 2023-03-01 PROCEDURE — 3078F PR MOST RECENT DIASTOLIC BLOOD PRESSURE < 80 MM HG: ICD-10-PCS | Mod: CPTII,S$GLB,, | Performed by: INTERNAL MEDICINE

## 2023-03-01 PROCEDURE — 1126F PR PAIN SEVERITY QUANTIFIED, NO PAIN PRESENT: ICD-10-PCS | Mod: CPTII,S$GLB,, | Performed by: INTERNAL MEDICINE

## 2023-03-01 PROCEDURE — 1100F PTFALLS ASSESS-DOCD GE2>/YR: CPT | Mod: CPTII,S$GLB,, | Performed by: INTERNAL MEDICINE

## 2023-03-01 PROCEDURE — 1159F MED LIST DOCD IN RCRD: CPT | Mod: CPTII,S$GLB,, | Performed by: INTERNAL MEDICINE

## 2023-03-01 PROCEDURE — 4010F PR ACE/ARB THEARPY RXD/TAKEN: ICD-10-PCS | Mod: CPTII,S$GLB,, | Performed by: INTERNAL MEDICINE

## 2023-03-01 PROCEDURE — 80048 BASIC METABOLIC PNL TOTAL CA: CPT | Performed by: INTERNAL MEDICINE

## 2023-03-01 PROCEDURE — 4010F ACE/ARB THERAPY RXD/TAKEN: CPT | Mod: CPTII,S$GLB,, | Performed by: INTERNAL MEDICINE

## 2023-03-01 PROCEDURE — 85027 COMPLETE CBC AUTOMATED: CPT | Performed by: INTERNAL MEDICINE

## 2023-03-01 PROCEDURE — 82728 ASSAY OF FERRITIN: CPT | Performed by: INTERNAL MEDICINE

## 2023-03-01 PROCEDURE — 3288F FALL RISK ASSESSMENT DOCD: CPT | Mod: CPTII,S$GLB,, | Performed by: INTERNAL MEDICINE

## 2023-03-01 PROCEDURE — 3008F PR BODY MASS INDEX (BMI) DOCUMENTED: ICD-10-PCS | Mod: CPTII,S$GLB,, | Performed by: INTERNAL MEDICINE

## 2023-03-01 PROCEDURE — 3008F BODY MASS INDEX DOCD: CPT | Mod: CPTII,S$GLB,, | Performed by: INTERNAL MEDICINE

## 2023-03-01 PROCEDURE — 99999 PR PBB SHADOW E&M-EST. PATIENT-LVL V: ICD-10-PCS | Mod: PBBFAC,,, | Performed by: INTERNAL MEDICINE

## 2023-03-01 PROCEDURE — 3074F SYST BP LT 130 MM HG: CPT | Mod: CPTII,S$GLB,, | Performed by: INTERNAL MEDICINE

## 2023-03-01 PROCEDURE — 36415 COLL VENOUS BLD VENIPUNCTURE: CPT | Mod: PN | Performed by: INTERNAL MEDICINE

## 2023-03-01 PROCEDURE — 1126F AMNT PAIN NOTED NONE PRSNT: CPT | Mod: CPTII,S$GLB,, | Performed by: INTERNAL MEDICINE

## 2023-03-01 PROCEDURE — 99215 PR OFFICE/OUTPT VISIT, EST, LEVL V, 40-54 MIN: ICD-10-PCS | Mod: S$GLB,,, | Performed by: INTERNAL MEDICINE

## 2023-03-01 NOTE — PROGRESS NOTES
Subjective:       Patient ID: Saurav Bahena is a 72 y.o. male.    Chief Complaint: Follow-up (Blood work)  HPI: Here for [facility] ER visit for dizziness and RUL suspected subsegmental PE on 2/15/23; Er w/u reviewed.   Follow-up  Pertinent negatives include no abdominal pain, arthralgias, chest pain, congestion, coughing, headaches, myalgias, nausea, rash or vomiting.     Hospital discharge follow-up: 2/15/23 [facility] ER eval for dizziness/near syncope w fall; CT head no acute abnormality; CTA suspected RUL subsegmental PE; now on eliquis. Hx of CLL; past smoker; [place] 2x a week. Doing a lot better now. No further near syncope spells or dizziness. No prior DVT or PE. No FMH of clot ds.     Acute pulmonary embolism, unspecified pulmonary embolism type, unspecified whether acute cor pulmonale present  -     Ambulatory referral/consult to Pulmonology; Future; Expected date: 03/02/2023  -     Ambulatory referral/consult to Hematology / Oncology; Future; Expected date: 03/08/2023  -     US Lower Extremity Veins Bilateral; Future; Expected date: 03/01/2023    Abnormal CT of the chest; subsegmental RUL suspected PE. Bandlike nodular density RUL, panlobular emphysema; atrophy right kidney w mult right renal cysts.     Atrophy of right kidney  -     US Renal Artery Stenosis Hyperten (xpd); Future; Expected date: 03/01/2023    Kidney cysts; multiple right kidney  -     US Renal Artery Stenosis Hyperten (xpd); Future; Expected date: 03/01/2023    Panlobular emphysema; has albuterol rescue inhaler to use if needed. Not needing to use rescue inhaler.     CLL (chronic lymphocytic leukemia); was followed by dr Solares; has retired; consult Dr Lisbet Mohamud H/onc for eval/tx. Consideration hypercoag w/u as well.   -     Ambulatory referral/consult to Pulmonology; Future; Expected date: 03/02/2023  -     Ambulatory referral/consult to Hematology / Oncology; Future; Expected date: 03/08/2023  -     US Lower Extremity  Veins Bilateral; Future; Expected date: 03/01/2023    Primary hypertension: Maintain < 2 Gm Na a day diet, and monitor BP at home; keep a log.   -     US Renal Artery Stenosis Hyperten (xpd); Future; Expected date: 03/01/2023  -     CBC Auto Differential; Future; Expected date: 03/01/2023  -     Basic Metabolic Panel; Future; Expected date: 03/01/2023    Atherosclerosis of native coronary artery of native heart without angina pectoris; CAD has been stable without any chest pain, shortness of breath, or palpitations. Keep follow up with cardiologist as directed.      Former smoker, stopped smoking in distant past; 1 PPD for 20 yrs; quit 5-6 yrs ago.   -     Ambulatory referral/consult to Pulmonology; Future; Expected date: 03/02/2023  -     Ambulatory referral/consult to Hematology / Oncology; Future; Expected date: 03/08/2023  -     US Lower Extremity Veins Bilateral; Future; Expected date: 03/01/2023    Iron deficiency anemia, unspecified iron deficiency anemia type; fergon 324 mg a day; Men's 50+ MVI one a day to continue. Ser iron reduced at 16 w %sat reduced at 6; ferritin wnl    Microcytic anemia; iFOBT recently neg.   -     CBC Auto Differential; Future; Expected date: 03/01/2023  -     Iron and TIBC; Future; Expected date: 03/01/2023  -     Ferritin; Future; Expected date: 03/01/2023    Total time:  11:42 a.m. through 12:58 p.m..  Greater than 50% of the time spent in discussion, counseling, and review.  Labs reviewed and discussed with patient at length in ordered for follow-up.  Medications reviewed and addressed.  Various different topics discussed including plan of care.  Total time 76 minutes.  Includes review of database and review of labs discussing with patient and ordering for follow-up as well as discussing plan of care at length; consultations placed to hematology oncology and Pulmonary Services.    Review of Systems   Constitutional:  Negative for appetite change and unexpected weight change.  "  HENT:  Negative for congestion, postnasal drip, rhinorrhea and sinus pressure.         Denies seasonal allergies, or perennial allergies   Eyes:  Negative for discharge and itching.   Respiratory:  Negative for cough, chest tightness, shortness of breath and wheezing.    Cardiovascular:  Negative for chest pain, palpitations and leg swelling.   Gastrointestinal:  Negative for abdominal pain, blood in stool, constipation, diarrhea, nausea and vomiting.   Endocrine: Negative for polydipsia, polyphagia and polyuria.   Genitourinary:  Negative for dysuria and hematuria.   Musculoskeletal:  Negative for arthralgias and myalgias.   Skin:  Negative for rash.   Allergic/Immunologic: Negative for environmental allergies and food allergies.   Neurological:  Negative for tremors, seizures and headaches.   Hematological:  Negative for adenopathy. Does not bruise/bleed easily.   Psychiatric/Behavioral:  Negative for dysphoric mood. The patient is not nervous/anxious.         Denies anxiety or depression.    Objective:        Vitals:    03/01/23 1038   BP: 110/60   Pulse: 71   SpO2: 96%   Weight: 81.7 kg (180 lb 1.9 oz)   Height: 5' 10" (1.778 m)       BMI Readings from Last 3 Encounters:   03/01/23 25.84 kg/m²   02/15/23 26.00 kg/m²   01/27/23 26.54 kg/m²        Wt Readings from Last 3 Encounters:   03/01/23 1038 81.7 kg (180 lb 1.9 oz)   02/15/23 0950 82.2 kg (181 lb 3.5 oz)   01/27/23 1017 83.9 kg (184 lb 15.5 oz)        BP Readings from Last 3 Encounters:   03/01/23 110/60   02/15/23 (!) 143/67   01/27/23 124/62        There are no preventive care reminders to display for this patient.     Health Maintenance Due   Topic Date Due    High Dose Statin  Never done    Shingles Vaccine (2 of 2) 01/16/2021    COVID-19 Vaccine (3 - Booster for Remi series) 01/28/2022         Past Medical History:   Diagnosis Date    Anticoagulant long-term use     aspirin    Atherosclerosis of native coronary artery of native heart without " angina pectoris 3/8/2022    Atrophic kidney     right    BPH (benign prostatic hyperplasia)     Cat bite of left forearm with infection 2019    CLL (chronic lymphocytic leukemia)     sees Dr. Solares    CTS (carpal tunnel syndrome)     Current smoker on some days     using e cig, regular cigarettes sometimes    DJD (degenerative joint disease) of knee     left    DJD (degenerative joint disease) of knee     bilateral/ LT more than RT    Emphysema lung     HLD (hyperlipidemia)     no current meds    HTN (hypertension)     Insomnia     Nocturia     Panlobular emphysema 3/1/2023       Past Surgical History:   Procedure Laterality Date    COLONOSCOPY  2010    Dr. Aguilar, in legacy: int. hemorrhoids, repeat in 10 years for screening    CYSTOSCOPY W/ RETROGRADES Bilateral 2020    Procedure: CYSTOSCOPY, WITH RETROGRADE PYELOGRAM;  Surgeon: Andrea Tam MD;  Location: [facility] OR;  Service: Urology;  Laterality: Bilateral;    ELBOW SURGERY      left elbow, bursitis    KNEE SURGERY  ?    right    MULTIPLE TOOTH EXTRACTIONS      TONSILLECTOMY      TRANSRECTAL ULTRASOUND EXAMINATION N/A 2020    Procedure: ULTRASOUND, RECTAL APPROACH;  Surgeon: Andrea Tam MD;  Location: [facility] OR;  Service: Urology;  Laterality: N/A;    TRIGGER FINGER RELEASE Left        Social History     Tobacco Use    Smoking status: Former     Packs/day: 0.30     Years: 20.00     Pack years: 6.00     Types: Cigarettes     Quit date: 2015     Years since quittin.1    Smokeless tobacco: Never    Tobacco comments:     nicorette, vapor cigs/ 3 cigarettes   Substance Use Topics    Alcohol use: Yes     Alcohol/week: 2.0 - 3.0 standard drinks     Types: 2 - 3 Shots of liquor per week     Comment: 2-3/day on weekends    Drug use: No       Family History   Problem Relation Age of Onset    Stroke Mother     Diabetes Mother     Heart disease Father 65        MI    Arthritis Father         RA    Diabetes Brother     Colon cancer  Neg Hx     Crohn's disease Neg Hx     Ulcerative colitis Neg Hx     Stomach cancer Neg Hx     Esophageal cancer Neg Hx        Review of patient's allergies indicates:   Allergen Reactions    No known drug allergies        Current Outpatient Medications on File Prior to Visit   Medication Sig Dispense Refill    albuterol (PROVENTIL HFA) 90 mcg/actuation inhaler Inhale 2 puffs into the lungs every 6 (six) hours as needed for Wheezing or Shortness of Breath (or before exercise). Rescue 18 g 3    amLODIPine (NORVASC) 2.5 MG tablet Take 1 tablet (2.5 mg total) by mouth once daily. 30 tablet 3    apixaban (ELIQUIS DVT-PE TREAT 30D START) 5 mg (74 tabs) DsPk For the first 7 days take two 5 mg tablets twice daily.  After 7 days take one 5 mg tablet twice daily. 1 each 1    ascorbic acid, vitamin C, (VITAMIN C) 500 MG tablet Take 500 mg by mouth once daily.       busPIRone (BUSPAR) 5 MG Tab 5 mg po TID as needed for anxiety; can take to help him sleep if needed. 30 tablet 2    ERGOCALCIFEROL, VITAMIN D2, (VITAMIN D ORAL) Take 5,000 Units by mouth once daily.      finasteride (PROSCAR) 5 mg tablet TAKE 1 TABLET BY MOUTH ONCE DAILY 90 tablet 1    Lactobac no.41/Bifidobact no.7 (PROBIOTIC-10 ORAL) Take by mouth once daily.      losartan (COZAAR) 50 MG tablet Take 1 tablet (50 mg total) by mouth once daily. 30 tablet 3    multivitamin capsule Every day      tamsulosin (FLOMAX) 0.4 mg Cap Take 2 capsules (0.8 mg total) by mouth once daily. 180 capsule 1    zinc sulfate (ZINC-220 ORAL) Take by mouth.      zolpidem (AMBIEN) 5 MG Tab To use trazodone prescribed earlier as needed for sleep and wean off Ambien 5 mg by mouth at bedtime as needed for sleep still needed 1 hour after trazodone.  Will not be able to continue to prescribe Ambien.  If he feels like he needs to be on Ambien will need to obtain from psych.  Generic please 21 tablet 0    aspirin (ECOTRIN) 81 MG EC tablet Take 81 mg by mouth once daily. Every day      colchicine  (MITIGARE) 0.6 mg Cap Take 1 capsule (0.6 mg total) by mouth 2 (two) times daily as needed (gout flare). (Patient not taking: Reported on 3/1/2023) 60 capsule 0    fish oil-omega-3 fatty acids 300-1,000 mg capsule Take 4 capsules by mouth once daily.       FLUZONE HIGHDOSE QUAD 21-22  mcg/0.7 mL Syrg       HYDROcodone-acetaminophen (NORCO) 5-325 mg per tablet Take 1 tablet by mouth every 6 (six) hours as needed for Pain. 15 tablet 0    traZODone (DESYREL) 50 MG tablet Take 75 mg po nightly as needed for sleep. (Patient not taking: Reported on 3/1/2023) 60 tablet 2    varicella-zoster [id number], PF, (SHINGRIX) 50 mcg/0.5 mL injection Inject into the muscle. 1 each 0     Current Facility-Administered Medications on File Prior to Visit   Medication Dose Route Frequency Provider Last Rate Last Admin    lactated ringers infusion   Intravenous Continuous Mateo Cheng MD   Stopped at 07/02/20 0824    lidocaine (PF) 10 mg/ml (1%) injection 10 mg  1 mL Intradermal Once Mateo Cheng MD        triamcinolone acetonide injection 40 mg  40 mg Intra-articular  Gregory Avila MD   40 mg at 07/30/21 0900       Physical Exam  Vitals reviewed.   Constitutional:       Appearance: He is well-developed.   HENT:      Head: Normocephalic and atraumatic.   Neck:      Thyroid: No thyromegaly.   Cardiovascular:      Rate and Rhythm: Normal rate and regular rhythm.      Heart sounds: Normal heart sounds. No murmur heard.    No gallop.   Pulmonary:      Effort: Pulmonary effort is normal. No respiratory distress.      Breath sounds: Normal breath sounds. No wheezing or rales.   Abdominal:      General: Bowel sounds are normal. There is no distension.      Palpations: Abdomen is soft.      Tenderness: There is no abdominal tenderness. There is no guarding or rebound.   Musculoskeletal:         General: Normal range of motion.      Cervical back: Normal range of motion and neck supple.      Right lower leg: No edema.      Left lower  leg: No edema.      Comments: Left elbow w good ROM olecranon bursae w fluid from recent trauma. Loose collection.    Lymphadenopathy:      Cervical: No cervical adenopathy.   Skin:     Findings: No rash.   Neurological:      Mental Status: He is alert and oriented to person, place, and time.      Comments: Moves all 4 extremities fine.   Psychiatric:         Behavior: Behavior normal.         Thought Content: Thought content normal.       Assessment:       1. Hospital discharge follow-up    2. Acute pulmonary embolism, unspecified pulmonary embolism type, unspecified whether acute cor pulmonale present    3. Abnormal CT of the chest    4. Atrophy of right kidney    5. Kidney cysts    6. Panlobular emphysema    7. CLL (chronic lymphocytic leukemia)    8. Primary hypertension    9. Atherosclerosis of native coronary artery of native heart without angina pectoris    10. Former smoker, stopped smoking in distant past    11. Iron deficiency anemia, unspecified iron deficiency anemia type    12. Microcytic anemia        Plan:       Hospital discharge follow-up: 2/15/23 [facility] ER eval for dizziness/near syncope w fall; CT head no acute abnormality; CTA suspected RUL subsegmental PE; now on eliquis. Hx of CLL; past smoker; [place]x a week. Doing a lot better now. No further near syncope spells or dizziness. No prior DVT or PE. No FMH of clot ds.     Acute pulmonary embolism, unspecified pulmonary embolism type, unspecified whether acute cor pulmonale present.  On Eliquis for anticoagulation  -     Ambulatory referral/consult to Pulmonology; Future; Expected date: 03/02/2023  -     Ambulatory referral/consult to Hematology / Oncology; Future; Expected date: 03/08/2023  -      Lower Extremity Veins Bilateral; Future; Expected date: 03/01/2023    Abnormal CT of the chest; subsegmental RUL suspected PE. Bandlike nodular density RUL, panlobular emphysema; atrophy right kidney w mult right renal cysts.   Pulmonary consult placed.    Atrophy of right kidney  -     US Renal Artery Stenosis Hyperten (xpd); Future; Expected date: 03/01/2023    Kidney cysts; multiple right kidney  -     US Renal Artery Stenosis Hyperten (xpd); Future; Expected date: 03/01/2023    Panlobular emphysema; has albuterol rescue inhaler to use if needed. Not needing to use rescue inhaler.     CLL (chronic lymphocytic leukemia); was followed by dr Solares; has retired; consult Dr Lisbet Mohamud H/onc for eval/tx. Consideration hypercoag w/u as well.   -     Ambulatory referral/consult to Pulmonology; Future; Expected date: 03/02/2023  -     Ambulatory referral/consult to Hematology / Oncology; Future; Expected date: 03/08/2023  -     US Lower Extremity Veins Bilateral; Future; Expected date: 03/01/2023    Primary hypertension: Maintain < 2 Gm Na a day diet, and monitor BP at home; keep a log.  Manual blood pressure here today is controlled 110/60 with pulse 71  -     US Renal Artery Stenosis Hyperten (xpd); Future; Expected date: 03/01/2023  -     CBC Auto Differential; Future; Expected date: 03/01/2023  -     Basic Metabolic Panel; Future; Expected date: 03/01/2023    Atherosclerosis of native coronary artery of native heart without angina pectoris; CAD has been stable without any chest pain, shortness of breath, or palpitations. Keep follow up with cardiologist as directed.      Former smoker, stopped smoking in distant past; 1 PPD for 20 yrs; quit 5-6 yrs ago.   -     Ambulatory referral/consult to Pulmonology; Future; Expected date: 03/02/2023  -     Ambulatory referral/consult to Hematology / Oncology; Future; Expected date: 03/08/2023  -     US Lower Extremity Veins Bilateral; Future; Expected date: 03/01/2023    Iron deficiency anemia, unspecified iron deficiency anemia type; fergon 324 mg a day; Men's 50+ MVI one a day to continue. Ser iron reduced at 16 w %sat reduced at 6; ferritin wnl    Microcytic anemia; iFOBT recently neg.   -      CBC Auto Differential; Future; Expected date: 03/01/2023  -     Iron and TIBC; Future; Expected date: 03/01/2023  -     Ferritin; Future; Expected date: 03/01/2023    Encounter for lab test: Labs reviewed and discussed with patient and ordered for follow-up

## 2023-03-01 NOTE — TELEPHONE ENCOUNTER
Spoke Mr Bahena regarding referral to Dr. Mohamud patient stated he does not want to come to [place]. He would like to schedule in [place] referral transferred to [facility] Navigation Team

## 2023-03-01 NOTE — PATIENT INSTRUCTIONS
Hospital discharge follow-up: 2/15/23 [facility] ER eval for dizziness/near syncope w fall; CT head no acute abnormality; CTA suspected RUL subsegmental PE; now on eliquis. Hx of CLL; past smoker; [place]. Doing a lot better now. No further near syncope spells or dizziness. No prior DVT or PE. No FMH of clot ds.     Acute pulmonary embolism, unspecified pulmonary embolism type, unspecified whether acute cor pulmonale present  -     Ambulatory referral/consult to Pulmonology; Future; Expected date: 03/02/2023  -     Ambulatory referral/consult to Hematology / Oncology; Future; Expected date: 03/08/2023  -     US Lower Extremity Veins Bilateral; Future; Expected date: 03/01/2023    Abnormal CT of the chest; subsegmental RUL suspected PE. Bandlike nodular density RUL, panlobular emphysema; atrophy right kidney w mult right renal cysts.     Atrophy of right kidney  -     US Renal Artery Stenosis Hyperten (xpd); Future; Expected date: 03/01/2023    Kidney cysts; multiple right kidney  -     US Renal Artery Stenosis Hyperten (xpd); Future; Expected date: 03/01/2023    Panlobular emphysema; has albuterol rescue inhaler to use if needed. Not needing to use rescue inhaler.     CLL (chronic lymphocytic leukemia); was followed by dr Solares; has retired; consult Dr Lisbet Mohamud H/onc for eval/tx. Consideration hypercoag w/u as well.   -     Ambulatory referral/consult to Pulmonology; Future; Expected date: 03/02/2023  -     Ambulatory referral/consult to Hematology / Oncology; Future; Expected date: 03/08/2023  -     US Lower Extremity Veins Bilateral; Future; Expected date: 03/01/2023    Primary hypertension: Maintain < 2 Gm Na a day diet, and monitor BP at home; keep a log.   -     US Renal Artery Stenosis Hyperten (xpd); Future; Expected date: 03/01/2023  -     CBC Auto Differential; Future; Expected date: 03/01/2023  -     Basic Metabolic Panel; Future; Expected date: 03/01/2023    Atherosclerosis of  native coronary artery of native heart without angina pectoris; CAD has been stable without any chest pain, shortness of breath, or palpitations. Keep follow up with cardiologist as directed.      Former smoker, stopped smoking in distant past; 1 PPD for 20 yrs; quit 5-6 yrs ago.   -     Ambulatory referral/consult to Pulmonology; Future; Expected date: 03/02/2023  -     Ambulatory referral/consult to Hematology / Oncology; Future; Expected date: 03/08/2023  -      Lower Extremity Veins Bilateral; Future; Expected date: 03/01/2023    Iron deficiency anemia, unspecified iron deficiency anemia type; fergon 324 mg a day; Men's 50+ MVI one a day to continue. Ser iron reduced at 16 w %sat reduced at 6; ferritin wnl    Microcytic anemia; iFOBT recently neg.   -     CBC Auto Differential; Future; Expected date: 03/01/2023  -     Iron and TIBC; Future; Expected date: 03/01/2023  -     Ferritin; Future; Expected date: 03/01/2023

## 2023-03-02 ENCOUNTER — TELEPHONE (OUTPATIENT)
Dept: HEMATOLOGY/ONCOLOGY | Facility: CLINIC | Age: 73
End: 2023-03-02
Payer: MEDICARE

## 2023-03-02 LAB
BASOPHILS NFR BLD: 0 % (ref 0–1.9)
BURR CELLS BLD QL SMEAR: ABNORMAL
DIFFERENTIAL METHOD: ABNORMAL
EOSINOPHIL NFR BLD: 0 % (ref 0–8)
ERYTHROCYTE [DISTWIDTH] IN BLOOD BY AUTOMATED COUNT: 16 % (ref 11.5–14.5)
FERRITIN SERPL-MCNC: 229 NG/ML (ref 20–300)
HCT VFR BLD AUTO: 36.9 % (ref 40–54)
HGB BLD-MCNC: 11.2 G/DL (ref 14–18)
HYPOCHROMIA BLD QL SMEAR: ABNORMAL
IMM GRANULOCYTES # BLD AUTO: ABNORMAL K/UL (ref 0–0.04)
IMM GRANULOCYTES NFR BLD AUTO: ABNORMAL % (ref 0–0.5)
LYMPHOCYTES NFR BLD: 68 % (ref 18–48)
MCH RBC QN AUTO: 25 PG (ref 27–31)
MCHC RBC AUTO-ENTMCNC: 30.4 G/DL (ref 32–36)
MCV RBC AUTO: 82 FL (ref 82–98)
METAMYELOCYTES NFR BLD MANUAL: 2 %
MONOCYTES NFR BLD: 8 % (ref 4–15)
MYELOCYTES NFR BLD MANUAL: 3 %
NEUTROPHILS NFR BLD: 19 % (ref 38–73)
NRBC BLD-RTO: 0 /100 WBC
PLATELET # BLD AUTO: 178 K/UL (ref 150–450)
PLATELET BLD QL SMEAR: ABNORMAL
PMV BLD AUTO: 9.7 FL (ref 9.2–12.9)
RBC # BLD AUTO: 4.48 M/UL (ref 4.6–6.2)
SMUDGE CELLS BLD QL SMEAR: PRESENT
WBC # BLD AUTO: 20.15 K/UL (ref 3.9–12.7)

## 2023-03-02 NOTE — TELEPHONE ENCOUNTER
Received referral for pt to see hem for hx of CLL and acute PE.   Pt last seen DR Solares on 6/16/21 for CLL f/u.  Said no issues at this time.  Sched and confirmed pt's appt date time and location.

## 2023-03-24 ENCOUNTER — TELEPHONE (OUTPATIENT)
Dept: FAMILY MEDICINE | Facility: CLINIC | Age: 73
End: 2023-03-24
Payer: MEDICARE

## 2023-03-24 PROBLEM — E87.1 HYPONATREMIA: Status: ACTIVE | Noted: 2023-03-24

## 2023-03-24 PROBLEM — Z71.89 ACP (ADVANCE CARE PLANNING): Status: ACTIVE | Noted: 2023-03-24

## 2023-03-24 PROBLEM — R31.9 HEMATURIA: Status: ACTIVE | Noted: 2023-03-24

## 2023-03-24 NOTE — TELEPHONE ENCOUNTER
----- Message from Willam Franco sent at 3/23/2023  3:16 PM CDT -----  Regarding: Advice  Contact: TYESHA MARTINEZ [[id number]]  Type: Needs Medical Advice    Who Called:  tyesha    Symptoms (please be specific):  Hematuria    How long has patient had these symptoms:  20 minutes    Pharmacy name and phone #:  [facility]    Best Call Back Number: [phone] (home)     Additional Information: Please call to advise.

## 2023-03-24 NOTE — TELEPHONE ENCOUNTER
"Spoke w/ pt. He states he is having "a lot of it". Went to  just now, they are sending him to [facility] now.   Advised pt this is the same recommendation we have and f/u AD.  "

## 2023-03-27 ENCOUNTER — TELEPHONE (OUTPATIENT)
Dept: FAMILY MEDICINE | Facility: CLINIC | Age: 73
End: 2023-03-27
Payer: MEDICARE

## 2023-03-27 NOTE — TELEPHONE ENCOUNTER
----- Message from Jer Truong sent at 3/27/2023  8:06 AM CDT -----  Contact: pt  Type: Needs Medical Advice  Who Called:  pt   Symptoms (please be specific):  urinating blood , uti  How long has patient had these symptoms:  03/24  Pharmacy name and phone #:    [id number] - [place] - [address]  [address]  [place] [address]  Phone: [phone] Fax: [phone]            Best Call Back Number: [phone]    Additional Information: pt states he needs a rx for antibiotics for a uti. Please advise.

## 2023-03-27 NOTE — TELEPHONE ENCOUNTER
Please see ER note  Pt states he was taken off Eliquis at ER and was told he'd likely need abx, but was not prescribed anything  He states he was told the dr left, so he left AMA  Also states CBI was painful and after several hours he could not complete this  He is still seeing some blood, but it is clearing up  Pt asking if he should keep appt for Friday or be seen sooner  Please advise

## 2023-03-28 ENCOUNTER — LAB VISIT (OUTPATIENT)
Dept: LAB | Facility: HOSPITAL | Age: 73
End: 2023-03-28
Attending: INTERNAL MEDICINE
Payer: MEDICARE

## 2023-03-28 ENCOUNTER — OFFICE VISIT (OUTPATIENT)
Dept: FAMILY MEDICINE | Facility: CLINIC | Age: 73
End: 2023-03-28
Payer: MEDICARE

## 2023-03-28 VITALS
RESPIRATION RATE: 16 BRPM | HEIGHT: 70 IN | OXYGEN SATURATION: 95 % | DIASTOLIC BLOOD PRESSURE: 50 MMHG | WEIGHT: 177.56 LBS | SYSTOLIC BLOOD PRESSURE: 114 MMHG | HEART RATE: 74 BPM | BODY MASS INDEX: 25.42 KG/M2

## 2023-03-28 DIAGNOSIS — D50.9 IRON DEFICIENCY ANEMIA, UNSPECIFIED IRON DEFICIENCY ANEMIA TYPE: ICD-10-CM

## 2023-03-28 DIAGNOSIS — T88.7XXA SIDE EFFECT OF MEDICATION: ICD-10-CM

## 2023-03-28 DIAGNOSIS — E87.1 HYPONATREMIA: ICD-10-CM

## 2023-03-28 DIAGNOSIS — N13.8 BPH WITH OBSTRUCTION/LOWER URINARY TRACT SYMPTOMS: ICD-10-CM

## 2023-03-28 DIAGNOSIS — R91.1 PULMONARY NODULE LESS THAN 1 CM IN DIAMETER WITH MODERATE TO HIGH RISK FOR MALIGNANT NEOPLASM: ICD-10-CM

## 2023-03-28 DIAGNOSIS — R31.0 GROSS HEMATURIA: Primary | ICD-10-CM

## 2023-03-28 DIAGNOSIS — Z91.89 PULMONARY NODULE LESS THAN 1 CM IN DIAMETER WITH MODERATE TO HIGH RISK FOR MALIGNANT NEOPLASM: ICD-10-CM

## 2023-03-28 DIAGNOSIS — R93.41 ABNORMAL CT SCAN, BLADDER: ICD-10-CM

## 2023-03-28 DIAGNOSIS — I10 PRIMARY HYPERTENSION: ICD-10-CM

## 2023-03-28 DIAGNOSIS — I26.99 ACUTE PULMONARY EMBOLISM, UNSPECIFIED PULMONARY EMBOLISM TYPE, UNSPECIFIED WHETHER ACUTE COR PULMONALE PRESENT: ICD-10-CM

## 2023-03-28 DIAGNOSIS — Z87.891 QUIT SMOKING: ICD-10-CM

## 2023-03-28 DIAGNOSIS — N40.1 BPH WITH OBSTRUCTION/LOWER URINARY TRACT SYMPTOMS: ICD-10-CM

## 2023-03-28 DIAGNOSIS — D50.8 OTHER IRON DEFICIENCY ANEMIA: ICD-10-CM

## 2023-03-28 DIAGNOSIS — D50.0 ANEMIA DUE TO BLOOD LOSS: ICD-10-CM

## 2023-03-28 DIAGNOSIS — R31.0 GROSS HEMATURIA: ICD-10-CM

## 2023-03-28 LAB
ALBUMIN SERPL BCP-MCNC: 3.6 G/DL (ref 3.5–5.2)
ALP SERPL-CCNC: 79 U/L (ref 55–135)
ALT SERPL W/O P-5'-P-CCNC: 11 U/L (ref 10–44)
ANION GAP SERPL CALC-SCNC: 11 MMOL/L (ref 8–16)
AST SERPL-CCNC: 12 U/L (ref 10–40)
BASOPHILS NFR BLD: 0 % (ref 0–1.9)
BILIRUB SERPL-MCNC: 0.3 MG/DL (ref 0.1–1)
BUN SERPL-MCNC: 13 MG/DL (ref 8–23)
CALCIUM SERPL-MCNC: 9.6 MG/DL (ref 8.7–10.5)
CHLORIDE SERPL-SCNC: 96 MMOL/L (ref 95–110)
CO2 SERPL-SCNC: 25 MMOL/L (ref 23–29)
CREAT SERPL-MCNC: 0.9 MG/DL (ref 0.5–1.4)
DIFFERENTIAL METHOD: ABNORMAL
EOSINOPHIL NFR BLD: 0 % (ref 0–8)
ERYTHROCYTE [DISTWIDTH] IN BLOOD BY AUTOMATED COUNT: 16.2 % (ref 11.5–14.5)
EST. GFR  (NO RACE VARIABLE): >60 ML/MIN/1.73 M^2
GLUCOSE SERPL-MCNC: 98 MG/DL (ref 70–110)
HCT VFR BLD AUTO: 33 % (ref 40–54)
HGB BLD-MCNC: 10 G/DL (ref 14–18)
IMM GRANULOCYTES # BLD AUTO: ABNORMAL K/UL (ref 0–0.04)
IMM GRANULOCYTES NFR BLD AUTO: ABNORMAL % (ref 0–0.5)
LYMPHOCYTES NFR BLD: 74 % (ref 18–48)
MCH RBC QN AUTO: 24.7 PG (ref 27–31)
MCHC RBC AUTO-ENTMCNC: 30.3 G/DL (ref 32–36)
MCV RBC AUTO: 82 FL (ref 82–98)
MONOCYTES NFR BLD: 2 % (ref 4–15)
NEUTROPHILS NFR BLD: 24 % (ref 38–73)
NRBC BLD-RTO: 0 /100 WBC
PLATELET # BLD AUTO: 181 K/UL (ref 150–450)
PLATELET BLD QL SMEAR: ABNORMAL
PMV BLD AUTO: 9 FL (ref 9.2–12.9)
POTASSIUM SERPL-SCNC: 4.5 MMOL/L (ref 3.5–5.1)
PROT SERPL-MCNC: 7.8 G/DL (ref 6–8.4)
RBC # BLD AUTO: 4.05 M/UL (ref 4.6–6.2)
SMUDGE CELLS BLD QL SMEAR: PRESENT
SODIUM SERPL-SCNC: 132 MMOL/L (ref 136–145)
WBC # BLD AUTO: 12.52 K/UL (ref 3.9–12.7)

## 2023-03-28 PROCEDURE — 3066F NEPHROPATHY DOC TX: CPT | Mod: CPTII,S$GLB,, | Performed by: INTERNAL MEDICINE

## 2023-03-28 PROCEDURE — 82728 ASSAY OF FERRITIN: CPT | Performed by: INTERNAL MEDICINE

## 2023-03-28 PROCEDURE — 3066F PR DOCUMENTATION OF TREATMENT FOR NEPHROPATHY: ICD-10-PCS | Mod: CPTII,S$GLB,, | Performed by: INTERNAL MEDICINE

## 2023-03-28 PROCEDURE — 85007 BL SMEAR W/DIFF WBC COUNT: CPT | Performed by: INTERNAL MEDICINE

## 2023-03-28 PROCEDURE — 1101F PT FALLS ASSESS-DOCD LE1/YR: CPT | Mod: CPTII,S$GLB,, | Performed by: INTERNAL MEDICINE

## 2023-03-28 PROCEDURE — 3008F PR BODY MASS INDEX (BMI) DOCUMENTED: ICD-10-PCS | Mod: CPTII,S$GLB,, | Performed by: INTERNAL MEDICINE

## 2023-03-28 PROCEDURE — 80053 COMPREHEN METABOLIC PANEL: CPT | Performed by: INTERNAL MEDICINE

## 2023-03-28 PROCEDURE — 4010F PR ACE/ARB THEARPY RXD/TAKEN: ICD-10-PCS | Mod: CPTII,S$GLB,, | Performed by: INTERNAL MEDICINE

## 2023-03-28 PROCEDURE — 1111F DSCHRG MED/CURRENT MED MERGE: CPT | Mod: CPTII,S$GLB,, | Performed by: INTERNAL MEDICINE

## 2023-03-28 PROCEDURE — 84466 ASSAY OF TRANSFERRIN: CPT | Performed by: INTERNAL MEDICINE

## 2023-03-28 PROCEDURE — 84443 ASSAY THYROID STIM HORMONE: CPT | Performed by: INTERNAL MEDICINE

## 2023-03-28 PROCEDURE — 99215 PR OFFICE/OUTPT VISIT, EST, LEVL V, 40-54 MIN: ICD-10-PCS | Mod: S$GLB,,, | Performed by: INTERNAL MEDICINE

## 2023-03-28 PROCEDURE — 99999 PR PBB SHADOW E&M-EST. PATIENT-LVL V: ICD-10-PCS | Mod: PBBFAC,,, | Performed by: INTERNAL MEDICINE

## 2023-03-28 PROCEDURE — 36415 COLL VENOUS BLD VENIPUNCTURE: CPT | Mod: PN | Performed by: INTERNAL MEDICINE

## 2023-03-28 PROCEDURE — 1111F PR DISCHARGE MEDS RECONCILED W/ CURRENT OUTPATIENT MED LIST: ICD-10-PCS | Mod: CPTII,S$GLB,, | Performed by: INTERNAL MEDICINE

## 2023-03-28 PROCEDURE — 4010F ACE/ARB THERAPY RXD/TAKEN: CPT | Mod: CPTII,S$GLB,, | Performed by: INTERNAL MEDICINE

## 2023-03-28 PROCEDURE — 3008F BODY MASS INDEX DOCD: CPT | Mod: CPTII,S$GLB,, | Performed by: INTERNAL MEDICINE

## 2023-03-28 PROCEDURE — 3074F SYST BP LT 130 MM HG: CPT | Mod: CPTII,S$GLB,, | Performed by: INTERNAL MEDICINE

## 2023-03-28 PROCEDURE — 3062F POS MACROALBUMINURIA REV: CPT | Mod: CPTII,S$GLB,, | Performed by: INTERNAL MEDICINE

## 2023-03-28 PROCEDURE — 3074F PR MOST RECENT SYSTOLIC BLOOD PRESSURE < 130 MM HG: ICD-10-PCS | Mod: CPTII,S$GLB,, | Performed by: INTERNAL MEDICINE

## 2023-03-28 PROCEDURE — 85027 COMPLETE CBC AUTOMATED: CPT | Performed by: INTERNAL MEDICINE

## 2023-03-28 PROCEDURE — 3062F PR POS MACROALBUMINURIA RESULT DOCUMENTED/REVIEW: ICD-10-PCS | Mod: CPTII,S$GLB,, | Performed by: INTERNAL MEDICINE

## 2023-03-28 PROCEDURE — 3288F FALL RISK ASSESSMENT DOCD: CPT | Mod: CPTII,S$GLB,, | Performed by: INTERNAL MEDICINE

## 2023-03-28 PROCEDURE — 3078F PR MOST RECENT DIASTOLIC BLOOD PRESSURE < 80 MM HG: ICD-10-PCS | Mod: CPTII,S$GLB,, | Performed by: INTERNAL MEDICINE

## 2023-03-28 PROCEDURE — 3078F DIAST BP <80 MM HG: CPT | Mod: CPTII,S$GLB,, | Performed by: INTERNAL MEDICINE

## 2023-03-28 PROCEDURE — 1101F PR PT FALLS ASSESS DOC 0-1 FALLS W/OUT INJ PAST YR: ICD-10-PCS | Mod: CPTII,S$GLB,, | Performed by: INTERNAL MEDICINE

## 2023-03-28 PROCEDURE — 3288F PR FALLS RISK ASSESSMENT DOCUMENTED: ICD-10-PCS | Mod: CPTII,S$GLB,, | Performed by: INTERNAL MEDICINE

## 2023-03-28 PROCEDURE — 99999 PR PBB SHADOW E&M-EST. PATIENT-LVL V: CPT | Mod: PBBFAC,,, | Performed by: INTERNAL MEDICINE

## 2023-03-28 PROCEDURE — 99215 OFFICE O/P EST HI 40 MIN: CPT | Mod: S$GLB,,, | Performed by: INTERNAL MEDICINE

## 2023-03-28 RX ORDER — ENOXAPARIN SODIUM 100 MG/ML
INJECTION SUBCUTANEOUS
Qty: 30 EACH | Refills: 1 | Status: SHIPPED | OUTPATIENT
Start: 2023-03-28 | End: 2023-04-13

## 2023-03-28 RX ORDER — ENOXAPARIN SODIUM 100 MG/ML
INJECTION SUBCUTANEOUS
Qty: 30 EACH | Refills: 1 | Status: SHIPPED | OUTPATIENT
Start: 2023-03-28 | End: 2023-03-28 | Stop reason: SDUPTHER

## 2023-03-28 NOTE — PROGRESS NOTES
Subjective:       Patient ID: Saurav Bahena is a 72 y.o. male.    Chief Complaint: Hospital visit (Patient visited [facility] ER on Friday  due to hematuria )    HPI:  Recent ER visit at seen [facility] on Friday due to hematuria  Gross hematuria; keep well hydrated; hold eliquis; has apt to see Dr Linnea St Hem/Onc; hx CLL as well w SMG and lymphadenopathy.  Hematuria started 3/23 in the evening and urine was grossly cleared of blood on Saturday morning 03/25/2023.  Eliquis, Pradaxa, Xarelto too high risk for rebleed; Coumadin likewise would take a while to reverse.  Will place patient on Lovenox 40 mg subQ daily for anticoagulation for continued prophylaxis coverage for PE; for now. PE treatment which was initiated 02/15/2023 with Eliquis which has been discontinued now.         -     Ambulatory referral/consult to Urology; Future; Expected date: 03/28/2023  -     CBC Auto Differential; Future; Expected date: 03/28/2023  -     Comprehensive Metabolic Panel; Future; Expected date: 03/28/2023  -     Ferritin; Future; Expected date: 03/28/2023  -     Iron and TIBC; Future; Expected date: 03/28/2023  -     TSH; Future; Expected date: 03/28/2023    Side effect of medication; eliquis w gross hematuria    Abnormal CT scan, bladder; see epic report.  -     Ambulatory referral/consult to Urology; Future; Expected date: 03/28/2023    BPH with obstruction/lower urinary tract symptoms  -     Ambulatory referral/consult to Urology; Future; Expected date: 03/28/2023    Acute pulmonary embolism, unspecified pulmonary embolism type, unspecified whether acute cor pulmonale present:  Has been off Eliquis since 03/23/2023.  Placed on it 02/15/2023 with CTA showing right upper lobe pulmonary embolus.  Still holding it today is day 6.  Was on 5 mg b.i.d. when stops.  Saturday morning 03/25/2023 gross hematuria finally cleared; recommend he start Lovenox 40 mg subQ daily for anticoagulation prophylaxis for his  "pulmonary embolus/generic in face of recent gross hematuria; Eliquis discontinued due to gross hematuria which is hence resolved; Coumadin would subsequently take too long to clear.  Eliquis and Xarelto as well as Pradaxa too high risk for rebleed.      Iron deficiency anemia, unspecified iron deficiency anemia type  -     CBC Auto Differential; Future; Expected date: 03/28/2023  -     Comprehensive Metabolic Panel; Future; Expected date: 03/28/2023  -     Ferritin; Future; Expected date: 03/28/2023  -     Iron and TIBC; Future; Expected date: 03/28/2023  -     TSH; Future; Expected date: 03/28/2023    Hyponatremia; limit water but not fluids.     Primary hypertension; Maintain < 2 Gm Na a day diet, and monitor BP at home; keep a log for office review.  Manual blood pressure here today is 114/50 with pulse 74.  Continue present management.     Anemia due to blood loss; fergon 324 mg 2x a day w vit C 500 mg 2x a day; Men's 50+ MVI one a day    Other iron deficiency anemia    Pulmonary nodule less than 1 cm in diameter with moderate to high risk for malignant neoplasm; repeat CT chest in 3 mos ordered as recommended per Radiology.  CT renal stone study showed the following pulmonary abnormality:  "There is a 6 mm nodular opacity in the medial right lower lobe which is new since CT dated 02/15/2023.  Given rapid development of this finding, this could be related to infectious or inflammatory process, but malignancy or metastatic disease would be difficult to entirely exclude.  Therefore, follow-up with CT chest within 3 months is recommended." High-risk patient due to smoking history    Quit smoking  -     CT Chest Without Contrast; Future; Expected date: 06/23/2023    Total time 11:20 a.m. through 12:29 p.m..  Greater than 50% of the time spent in discussion, counseling, and review.  Labs reviewed and discussed with patient and ordered for follow-up.  Medications reviewed and addressed.  Various different topics " "discussed including plan of care    Review of Systems   Constitutional:  Negative for activity change and unexpected weight change.   HENT:  Negative for hearing loss, rhinorrhea and trouble swallowing.    Eyes:  Negative for discharge and visual disturbance.   Respiratory:  Negative for chest tightness and wheezing.    Cardiovascular:  Negative for chest pain and palpitations.   Gastrointestinal:  Negative for blood in stool, constipation, diarrhea and vomiting.   Endocrine: Negative for polydipsia and polyuria.   Genitourinary:  Positive for difficulty urinating, hematuria and urgency.   Musculoskeletal:  Negative for arthralgias, joint swelling and neck pain.   Neurological:  Negative for weakness and headaches.   Psychiatric/Behavioral:  Negative for confusion and dysphoric mood. The patient is not nervous/anxious.     Objective:        Vitals:    03/28/23 1103   BP: (!) 114/50   BP Location: Left arm   Patient Position: Sitting   Pulse: 74   Resp: 16   SpO2: 95%   Weight: 80.6 kg (177 lb 9.3 oz)   Height: 5' 10" (1.778 m)       BMI Readings from Last 3 Encounters:   03/28/23 25.48 kg/m²   03/24/23 25.59 kg/m²   03/24/23 25.59 kg/m²        Wt Readings from Last 3 Encounters:   03/28/23 1103 80.6 kg (177 lb 9.3 oz)   03/24/23 1523 80.9 kg (178 lb 5.6 oz)   03/24/23 1259 80.9 kg (178 lb 5.6 oz)   03/24/23 0854 80.9 kg (178 lb 5.6 oz)        BP Readings from Last 3 Encounters:   03/28/23 (!) 114/50   03/24/23 131/62   03/24/23 139/71        There are no preventive care reminders to display for this patient.     Health Maintenance Due   Topic Date Due    Aspirin/Antiplatelet Therapy  Never done    High Dose Statin  Never done    Shingles Vaccine (2 of 2) 01/16/2021    COVID-19 Vaccine (3 - Booster for Remi series) 01/28/2022         Past Medical History:   Diagnosis Date    Anticoagulant long-term use     aspirin    Atherosclerosis of native coronary artery of native heart without angina pectoris 3/8/2022    " Atrophic kidney     right    BPH (benign prostatic hyperplasia)     Cat bite of left forearm with infection 2019    CLL (chronic lymphocytic leukemia)     sees Dr. Solares    CTS (carpal tunnel syndrome)     Current smoker on some days     using e cig, regular cigarettes sometimes    DJD (degenerative joint disease) of knee     left    DJD (degenerative joint disease) of knee     bilateral/ LT more than RT    Emphysema lung     HLD (hyperlipidemia)     no current meds    HTN (hypertension)     Insomnia     Nocturia     Panlobular emphysema 3/1/2023       Past Surgical History:   Procedure Laterality Date    COLONOSCOPY  2010    Dr. Aguilar, in legacy: int. hemorrhoids, repeat in 10 years for screening    CYSTOSCOPY W/ RETROGRADES Bilateral 2020    Procedure: CYSTOSCOPY, WITH RETROGRADE PYELOGRAM;  Surgeon: Andrea Tam MD;  Location: [facility] OR;  Service: Urology;  Laterality: Bilateral;    ELBOW SURGERY      left elbow, bursitis    KNEE SURGERY  ?    right    MULTIPLE TOOTH EXTRACTIONS      TONSILLECTOMY      TRANSRECTAL ULTRASOUND EXAMINATION N/A 2020    Procedure: ULTRASOUND, RECTAL APPROACH;  Surgeon: Andrea Tam MD;  Location: [facility] OR;  Service: Urology;  Laterality: N/A;    TRIGGER FINGER RELEASE Left        Social History     Tobacco Use    Smoking status: Former     Packs/day: 0.30     Years: 20.00     Pack years: 6.00     Types: Cigarettes     Quit date: 2015     Years since quittin.2    Smokeless tobacco: Never    Tobacco comments:     nicorette, vapor cigs/ 3 cigarettes   Substance Use Topics    Alcohol use: Yes     Alcohol/week: 2.0 - 3.0 standard drinks     Types: 2 - 3 Shots of liquor per week     Comment: 2-3/day on weekends    Drug use: No       Family History   Problem Relation Age of Onset    Stroke Mother     Diabetes Mother     Heart disease Father 65        MI    Arthritis Father         RA    Diabetes Brother     Colon cancer Neg Hx     Crohn's disease  Neg Hx     Ulcerative colitis Neg Hx     Stomach cancer Neg Hx     Esophageal cancer Neg Hx        Review of patient's allergies indicates:   Allergen Reactions    No known drug allergies        Current Outpatient Medications on File Prior to Visit   Medication Sig Dispense Refill    amLODIPine (NORVASC) 2.5 MG tablet Take 1 tablet (2.5 mg total) by mouth once daily. 30 tablet 3    ascorbic acid (VITAMIN C ORAL) Take 1 tablet by mouth once daily.      cholecalciferol, vitamin D3, (VITAMIN D3 ORAL) Take 1 capsule by mouth once daily.      finasteride (PROSCAR) 5 mg tablet TAKE 1 TABLET BY MOUTH ONCE DAILY 90 tablet 1    losartan (COZAAR) 50 MG tablet Take 1 tablet (50 mg total) by mouth once daily. 30 tablet 3    multivit-min/folic/vit K/lycop (MEN'S 50 PLUS MULTIVITAMIN ORAL) Take 1 tablet by mouth once daily.      tamsulosin (FLOMAX) 0.4 mg Cap Take 2 capsules (0.8 mg total) by mouth once daily. 180 capsule 1    traZODone (DESYREL) 50 MG tablet Take 25-50 mg by mouth nightly as needed (sleep).      ZINC ORAL Take 1 tablet by mouth once daily.      zolpidem (AMBIEN) 5 MG Tab To use trazodone prescribed earlier as needed for sleep and wean off Ambien 5 mg by mouth at bedtime as needed for sleep still needed 1 hour after trazodone.  Will not be able to continue to prescribe Ambien.  If he feels like he needs to be on Ambien will need to obtain from psych.  Generic please (Patient taking differently: Take 5 mg by mouth nightly as needed (sleep).) 21 tablet 0    apixaban (ELIQUIS DVT-PE TREAT 30D START) 5 mg (74 tabs) DsPk For the first 7 days take two 5 mg tablets twice daily.  After 7 days take one 5 mg tablet twice daily. (Patient not taking: Reported on 3/28/2023) 1 each 1     Current Facility-Administered Medications on File Prior to Visit   Medication Dose Route Frequency Provider Last Rate Last Admin    lactated ringers infusion   Intravenous Continuous Mateo Cheng MD   Stopped at 07/02/20 0824    lidocaine  (PF) 10 mg/ml (1%) injection 10 mg  1 mL Intradermal Once Mateo Cheng MD        triamcinolone acetonide injection 40 mg  40 mg Intra-articular  Gregory Avila MD   40 mg at 07/30/21 0900       Physical Exam  Vitals reviewed.   Constitutional:       Appearance: He is well-developed.   HENT:      Head: Normocephalic and atraumatic.   Neck:      Thyroid: No thyromegaly.   Cardiovascular:      Rate and Rhythm: Normal rate and regular rhythm.      Heart sounds: Normal heart sounds. No murmur heard.    No gallop.   Pulmonary:      Effort: Pulmonary effort is normal. No respiratory distress.      Breath sounds: Normal breath sounds. No wheezing or rales.   Abdominal:      General: Bowel sounds are normal. There is no distension.      Palpations: Abdomen is soft.      Tenderness: There is no abdominal tenderness. There is no guarding or rebound.   Musculoskeletal:         General: Normal range of motion.      Cervical back: Normal range of motion and neck supple.      Right lower leg: No edema.      Left lower leg: No edema.      Comments: No calf edema or tenderness.    Lymphadenopathy:      Cervical: No cervical adenopathy.   Skin:     Findings: No rash.   Neurological:      Mental Status: He is alert and oriented to person, place, and time.      Comments: Moves all 4 extremities fine.   Psychiatric:         Behavior: Behavior normal.         Thought Content: Thought content normal.       Assessment:       1. Gross hematuria    2. Side effect of medication    3. Abnormal CT scan, bladder    4. BPH with obstruction/lower urinary tract symptoms    5. Acute pulmonary embolism, unspecified pulmonary embolism type, unspecified whether acute cor pulmonale present    6. Iron deficiency anemia, unspecified iron deficiency anemia type    7. Hyponatremia    8. Primary hypertension    9. Anemia due to blood loss    10. Other iron deficiency anemia    11. Pulmonary nodule less than 1 cm in diameter with moderate to high risk  for malignant neoplasm    12. Quit smoking        Plan:       Gross hematuria; keep well hydrated; hold eliquis; has apt to see Dr Linnea St Hem/Onc; hx CLL as well w SMG and lymphadenopathy.  Hematuria started 3/23 in the evening and urine was grossly cleared of blood on Saturday morning 03/25/2023.  Eliquis, Pradaxa, Xarelto too high risk for rebleed; Coumadin likewise would take a while to reverse.  Will place patient on Lovenox 40 mg subQ daily for anticoagulation for continued coverage for PE treatment which was initiated 02/15/2023.  Should gross hematuria resume patient to return to the emergency room  -     Ambulatory referral/consult to Urology; Future; Expected date: 03/28/2023  -     CBC Auto Differential; Future; Expected date: 03/28/2023  -     Comprehensive Metabolic Panel; Future; Expected date: 03/28/2023  -     Ferritin; Future; Expected date: 03/28/2023  -     Iron and TIBC; Future; Expected date: 03/28/2023  -     TSH; Future; Expected date: 03/28/2023    Side effect of medication; eliquis w gross hematuria    Abnormal CT scan, bladder  -     Ambulatory referral/consult to Urology; Future; Expected date: 03/28/2023    BPH with obstruction/lower urinary tract symptoms  -     Ambulatory referral/consult to Urology; Future; Expected date: 03/28/2023    Acute pulmonary embolism, unspecified pulmonary embolism type, unspecified whether acute cor pulmonale present:  Has been off Eliquis since 03/23/2023.  Placed on it 02/15/2023 with CTA showing right upper lobe pulmonary embolus.  Still holding it today is day 6.  Was on 5 mg b.i.d. when stops.  Saturday morning 03/25/2023 gross hematuria finally cleared; recommend he start Lovenox 40 mg subQ daily for prophylaxis protection for his pulmonary embolus/generic; Eliquis discontinued due to gross hematuria which is hence resolved; Coumadin was subsequently take too long to clear.  Eliquis and Xarelto as well as Pradaxa too high risk for rebleed.  Patient  "to call immediately should gross hematuria resume and head to the emergency room.  Hematology consult pending for anticoagulation management Dr St.    Iron deficiency anemia, unspecified iron deficiency anemia type:  3/28 H&H stable at 10.0/33.0.  To take a men's 50+ multivitamin once daily; Fergon 324 mg b.i.d. with vitamin-C 500 mg b.i.d. as well; will check repeat CBC with ferritin and serum iron levels  -     CBC Auto Differential; Future; Expected date: 03/28/2023  -     Comprehensive Metabolic Panel; Future; Expected date: 03/28/2023  -     Ferritin; Future; Expected date: 03/28/2023  -     Iron and TIBC; Future; Expected date: 03/28/2023  -     TSH; Future; Expected date: 03/28/2023    Hyponatremia; limit water but not fluids.     Primary hypertension; Maintain < 2 Gm Na a day diet, and monitor BP at home; keep a log.     Anemia due to blood loss; fergon 324 mg 2x a day w vit C 500 mg 2x a day; Men's 50+ MVI one a day    Other iron deficiency anemia    Pulmonary nodule less than 1 cm in diameter with moderate to high risk for malignant neoplasm; reperat CT chest in 3 mos ordered. CT renal stone study showed the following pulmonary abnormality:  "There is a 6 mm nodular opacity in the medial right lower lobe which is new since CT dated 02/15/2023.  Given rapid development of this finding, this could be related to infectious or inflammatory process, but malignancy or metastatic disease would be difficult to entirely exclude.  Therefore, follow-up with CT chest within 3 months is recommended."   High-risk patient due to smoking history.  Discussed the importance of obtain repeat CT of the chest in 3 months with patient as lung cancer is needing to be ruled out.    Quit smoking  -     CT Chest Without Contrast; Future; Expected date: 06/23/2023            "

## 2023-03-28 NOTE — PATIENT INSTRUCTIONS
Gross hematuria; keep well hydrated; hold eliquis; has apt to see Dr Linnea St Hem/Onc; hx CLL as well w SMG and lymphadenopathy  -     Ambulatory referral/consult to Urology; Future; Expected date: 03/28/2023  -     CBC Auto Differential; Future; Expected date: 03/28/2023  -     Comprehensive Metabolic Panel; Future; Expected date: 03/28/2023  -     Ferritin; Future; Expected date: 03/28/2023  -     Iron and TIBC; Future; Expected date: 03/28/2023  -     TSH; Future; Expected date: 03/28/2023    Side effect of medication; eliquis w gross hematuria    Abnormal CT scan, bladder  -     Ambulatory referral/consult to Urology; Future; Expected date: 03/28/2023    BPH with obstruction/lower urinary tract symptoms  -     Ambulatory referral/consult to Urology; Future; Expected date: 03/28/2023    Acute pulmonary embolism, unspecified pulmonary embolism type, unspecified whether acute cor pulmonale present    Iron deficiency anemia, unspecified iron deficiency anemia type  -     CBC Auto Differential; Future; Expected date: 03/28/2023  -     Comprehensive Metabolic Panel; Future; Expected date: 03/28/2023  -     Ferritin; Future; Expected date: 03/28/2023  -     Iron and TIBC; Future; Expected date: 03/28/2023  -     TSH; Future; Expected date: 03/28/2023    Hyponatremia; limit water but not fluids.     Primary hypertension; Maintain < 2 Gm Na a day diet, and monitor BP at home; keep a log.     Anemia due to blood loss; fergon 324 mg 2x a day w vit C 500 mg 2x a day; Men's 50+ MVI one a day    Other iron deficiency anemia    Pulmonary nodule less than 1 cm in diameter with moderate to high risk for malignant neoplasm; reperat CT chest in 3 mos ordered.     Quit smoking  -     CT Chest Without Contrast; Future; Expected date: 06/23/2023

## 2023-03-28 NOTE — TELEPHONE ENCOUNTER
Please advise patient that he needs to returned back to the emergency room for further evaluation and treatment ; remain off his Eliquis for the time being and not to take any NSA ID agents like Advil ibuprofen or Motrin or Naprosyn or Alleve or aspirin.  He also can obtain a stat urology consult for further evaluation and treatment.  They can decide on whether or not continuous bladder irrigation is further needed he also needs to be evaluated for emergent cystoscopy as well.  Ask him to also please keep us updated on his condition.  Please stressed the importance of his returned to the emergency room for further evaluation and treatment to be obtained as soon as possible

## 2023-03-29 ENCOUNTER — HOSPITAL ENCOUNTER (OUTPATIENT)
Dept: RADIOLOGY | Facility: HOSPITAL | Age: 73
Discharge: HOME | End: 2023-03-29
Attending: INTERNAL MEDICINE
Payer: MEDICARE

## 2023-03-29 ENCOUNTER — OFFICE VISIT (OUTPATIENT)
Dept: UROLOGY | Facility: CLINIC | Age: 73
End: 2023-03-29
Payer: MEDICARE

## 2023-03-29 DIAGNOSIS — Z87.891 FORMER SMOKER, STOPPED SMOKING IN DISTANT PAST: ICD-10-CM

## 2023-03-29 DIAGNOSIS — C91.10 CLL (CHRONIC LYMPHOCYTIC LEUKEMIA): ICD-10-CM

## 2023-03-29 DIAGNOSIS — N40.1 BPH WITH OBSTRUCTION/LOWER URINARY TRACT SYMPTOMS: ICD-10-CM

## 2023-03-29 DIAGNOSIS — R31.0 GROSS HEMATURIA: Primary | ICD-10-CM

## 2023-03-29 DIAGNOSIS — N13.8 BPH WITH OBSTRUCTION/LOWER URINARY TRACT SYMPTOMS: ICD-10-CM

## 2023-03-29 DIAGNOSIS — I26.99 ACUTE PULMONARY EMBOLISM, UNSPECIFIED PULMONARY EMBOLISM TYPE, UNSPECIFIED WHETHER ACUTE COR PULMONALE PRESENT: ICD-10-CM

## 2023-03-29 LAB
BILIRUBIN, UA POC OHS: NEGATIVE
BLOOD, UA POC OHS: ABNORMAL
CLARITY, UA POC OHS: CLEAR
COLOR, UA POC OHS: YELLOW
FERRITIN SERPL-MCNC: 199 NG/ML (ref 20–300)
GLUCOSE, UA POC OHS: NEGATIVE
IRON SERPL-MCNC: 15 UG/DL (ref 45–160)
KETONES, UA POC OHS: NEGATIVE
LEUKOCYTES, UA POC OHS: NEGATIVE
NITRITE, UA POC OHS: NEGATIVE
PH, UA POC OHS: 7
PROTEIN, UA POC OHS: 30
SATURATED IRON: 5 % (ref 20–50)
SPECIFIC GRAVITY, UA POC OHS: 1.01
TOTAL IRON BINDING CAPACITY: 295 UG/DL (ref 250–450)
TRANSFERRIN SERPL-MCNC: 199 MG/DL (ref 200–375)
TSH SERPL DL<=0.005 MIU/L-ACNC: 1.34 UIU/ML (ref 0.4–4)
UROBILINOGEN, UA POC OHS: 0.2

## 2023-03-29 PROCEDURE — 3062F POS MACROALBUMINURIA REV: CPT | Mod: CPTII,S$GLB,, | Performed by: STUDENT IN AN ORGANIZED HEALTH CARE EDUCATION/TRAINING PROGRAM

## 2023-03-29 PROCEDURE — 93970 EXTREMITY STUDY: CPT | Mod: TC,PO

## 2023-03-29 PROCEDURE — 93970 EXTREMITY STUDY: CPT | Mod: 26,,, | Performed by: RADIOLOGY

## 2023-03-29 PROCEDURE — 1111F PR DISCHARGE MEDS RECONCILED W/ CURRENT OUTPATIENT MED LIST: ICD-10-PCS | Mod: CPTII,S$GLB,, | Performed by: STUDENT IN AN ORGANIZED HEALTH CARE EDUCATION/TRAINING PROGRAM

## 2023-03-29 PROCEDURE — 4010F ACE/ARB THERAPY RXD/TAKEN: CPT | Mod: CPTII,S$GLB,, | Performed by: STUDENT IN AN ORGANIZED HEALTH CARE EDUCATION/TRAINING PROGRAM

## 2023-03-29 PROCEDURE — 1159F PR MEDICATION LIST DOCUMENTED IN MEDICAL RECORD: ICD-10-PCS | Mod: CPTII,S$GLB,, | Performed by: STUDENT IN AN ORGANIZED HEALTH CARE EDUCATION/TRAINING PROGRAM

## 2023-03-29 PROCEDURE — 99999 PR PBB SHADOW E&M-EST. PATIENT-LVL III: CPT | Mod: PBBFAC,,, | Performed by: STUDENT IN AN ORGANIZED HEALTH CARE EDUCATION/TRAINING PROGRAM

## 2023-03-29 PROCEDURE — 88112 PR  CYTOPATH, CELL ENHANCE TECH: ICD-10-PCS | Mod: 26,,, | Performed by: PATHOLOGY

## 2023-03-29 PROCEDURE — 99214 OFFICE O/P EST MOD 30 MIN: CPT | Mod: S$GLB,,, | Performed by: STUDENT IN AN ORGANIZED HEALTH CARE EDUCATION/TRAINING PROGRAM

## 2023-03-29 PROCEDURE — 99999 PR PBB SHADOW E&M-EST. PATIENT-LVL III: ICD-10-PCS | Mod: PBBFAC,,, | Performed by: STUDENT IN AN ORGANIZED HEALTH CARE EDUCATION/TRAINING PROGRAM

## 2023-03-29 PROCEDURE — 3288F PR FALLS RISK ASSESSMENT DOCUMENTED: ICD-10-PCS | Mod: CPTII,S$GLB,, | Performed by: STUDENT IN AN ORGANIZED HEALTH CARE EDUCATION/TRAINING PROGRAM

## 2023-03-29 PROCEDURE — 99214 PR OFFICE/OUTPT VISIT, EST, LEVL IV, 30-39 MIN: ICD-10-PCS | Mod: S$GLB,,, | Performed by: STUDENT IN AN ORGANIZED HEALTH CARE EDUCATION/TRAINING PROGRAM

## 2023-03-29 PROCEDURE — 4010F PR ACE/ARB THEARPY RXD/TAKEN: ICD-10-PCS | Mod: CPTII,S$GLB,, | Performed by: STUDENT IN AN ORGANIZED HEALTH CARE EDUCATION/TRAINING PROGRAM

## 2023-03-29 PROCEDURE — 88112 CYTOPATH CELL ENHANCE TECH: CPT | Performed by: PATHOLOGY

## 2023-03-29 PROCEDURE — 1159F MED LIST DOCD IN RCRD: CPT | Mod: CPTII,S$GLB,, | Performed by: STUDENT IN AN ORGANIZED HEALTH CARE EDUCATION/TRAINING PROGRAM

## 2023-03-29 PROCEDURE — 88112 CYTOPATH CELL ENHANCE TECH: CPT | Mod: 26,,, | Performed by: PATHOLOGY

## 2023-03-29 PROCEDURE — 93970 US LOWER EXTREMITY VEINS BILATERAL: ICD-10-PCS | Mod: 26,,, | Performed by: RADIOLOGY

## 2023-03-29 PROCEDURE — 1126F PR PAIN SEVERITY QUANTIFIED, NO PAIN PRESENT: ICD-10-PCS | Mod: CPTII,S$GLB,, | Performed by: STUDENT IN AN ORGANIZED HEALTH CARE EDUCATION/TRAINING PROGRAM

## 2023-03-29 PROCEDURE — 1126F AMNT PAIN NOTED NONE PRSNT: CPT | Mod: CPTII,S$GLB,, | Performed by: STUDENT IN AN ORGANIZED HEALTH CARE EDUCATION/TRAINING PROGRAM

## 2023-03-29 PROCEDURE — 3288F FALL RISK ASSESSMENT DOCD: CPT | Mod: CPTII,S$GLB,, | Performed by: STUDENT IN AN ORGANIZED HEALTH CARE EDUCATION/TRAINING PROGRAM

## 2023-03-29 PROCEDURE — 3066F NEPHROPATHY DOC TX: CPT | Mod: CPTII,S$GLB,, | Performed by: STUDENT IN AN ORGANIZED HEALTH CARE EDUCATION/TRAINING PROGRAM

## 2023-03-29 PROCEDURE — 3066F PR DOCUMENTATION OF TREATMENT FOR NEPHROPATHY: ICD-10-PCS | Mod: CPTII,S$GLB,, | Performed by: STUDENT IN AN ORGANIZED HEALTH CARE EDUCATION/TRAINING PROGRAM

## 2023-03-29 PROCEDURE — 1160F PR REVIEW ALL MEDS BY PRESCRIBER/CLIN PHARMACIST DOCUMENTED: ICD-10-PCS | Mod: CPTII,S$GLB,, | Performed by: STUDENT IN AN ORGANIZED HEALTH CARE EDUCATION/TRAINING PROGRAM

## 2023-03-29 PROCEDURE — 1101F PR PT FALLS ASSESS DOC 0-1 FALLS W/OUT INJ PAST YR: ICD-10-PCS | Mod: CPTII,S$GLB,, | Performed by: STUDENT IN AN ORGANIZED HEALTH CARE EDUCATION/TRAINING PROGRAM

## 2023-03-29 PROCEDURE — 1111F DSCHRG MED/CURRENT MED MERGE: CPT | Mod: CPTII,S$GLB,, | Performed by: STUDENT IN AN ORGANIZED HEALTH CARE EDUCATION/TRAINING PROGRAM

## 2023-03-29 PROCEDURE — 81003 URINALYSIS AUTO W/O SCOPE: CPT | Mod: QW,S$GLB,, | Performed by: STUDENT IN AN ORGANIZED HEALTH CARE EDUCATION/TRAINING PROGRAM

## 2023-03-29 PROCEDURE — 1160F RVW MEDS BY RX/DR IN RCRD: CPT | Mod: CPTII,S$GLB,, | Performed by: STUDENT IN AN ORGANIZED HEALTH CARE EDUCATION/TRAINING PROGRAM

## 2023-03-29 PROCEDURE — 3062F PR POS MACROALBUMINURIA RESULT DOCUMENTED/REVIEW: ICD-10-PCS | Mod: CPTII,S$GLB,, | Performed by: STUDENT IN AN ORGANIZED HEALTH CARE EDUCATION/TRAINING PROGRAM

## 2023-03-29 PROCEDURE — 1101F PT FALLS ASSESS-DOCD LE1/YR: CPT | Mod: CPTII,S$GLB,, | Performed by: STUDENT IN AN ORGANIZED HEALTH CARE EDUCATION/TRAINING PROGRAM

## 2023-03-29 PROCEDURE — 81003 POCT URINALYSIS(INSTRUMENT): ICD-10-PCS | Mod: QW,S$GLB,, | Performed by: STUDENT IN AN ORGANIZED HEALTH CARE EDUCATION/TRAINING PROGRAM

## 2023-03-29 NOTE — PROGRESS NOTES
"[facility]   Clinic Note    Subjective:     Chief Complaint: No chief complaint on file.      History of Present Illness:  Saurav Bahena is a 72 y.o. male who presents to clinic for evaluation and management of gross hematuria. He is established to our clinic    He was recently seen in the ED for gross hematuria with clots. He had a recent diagnosis of a PE and was started on Eliquis. He had a three-way catheter placed which was irrigated. H\e reported discomfort with the catheter and per notes he left AMA. His eliquis was discontinued. He denies hematuria in the past. He is on flomax 0.8 mg and finasteride. He had a cystoscopy with bilateral RPG with TRUS with Dr. Tam 7/2020. TRUS volume was 174 cc. He has chronic right hydronephrosis which was evaluated with a retrograde pyelogram but not ureteroscopy.  CT renal stone study show severely atrophic right kidney with hydroureteronephrosis to the level of the bladder.  There was moderate amount of hyper densities within the bladder consistent with clot.    Component Urine Culture, Routine   3/24/2023 No growth   3/24/2023 No growth     He has no family history of prostate cancer.   Lab Results   Component Value Date    PSA 0.15 12/14/2022    PSA 0.15 12/14/2022    PSA 0.08 01/21/2022      Past medical, family, surgical and social history reviewed as documented in chart with pertinent positive medical, family, surgical and social history detailed in HPI.    A review systems was conducted with pertinent positive and negative findings documented in HPI.    Anticoagulation/Antiplatelets:  Eliquis for recent PE but he reports this was stopped.     Objective:     Estimated body mass index is 25.48 kg/m² as calculated from the following:    Height as of 3/28/23: 5' 10" (1.778 m).    Weight as of 3/28/23: 80.6 kg (177 lb 9.3 oz).    Vital Signs (Most Recent)       Physical Exam  Vitals and nursing note reviewed.   Constitutional:       General: He is not in " acute distress.     Appearance: He is not ill-appearing or toxic-appearing.   Pulmonary:      Effort: Pulmonary effort is normal. No accessory muscle usage or respiratory distress.   Neurological:      Mental Status: He is alert.       Lab Results   Component Value Date    BUN 13 03/28/2023    CREATININE 0.9 03/28/2023    WBC 12.52 03/28/2023    HGB 10.0 (L) 03/28/2023    HCT 33.0 (L) 03/28/2023     03/28/2023    AST 12 03/28/2023    ALT 11 03/28/2023    ALKPHOS 79 03/28/2023    ALBUMIN 3.6 03/28/2023    HGBA1C 5.3 12/14/2022        Lab Results   Component Value Date    PSA 0.15 12/14/2022    PSA 0.15 12/14/2022    PSA 0.08 01/21/2022    PSA 0.70 12/09/2019    PSA 0.86 12/26/2018    PSA 0.72 12/11/2017    PSA 0.74 09/19/2016    PSA 0.97 04/04/2016    PSA 1.3 02/02/2015    PSA 1.0 12/26/2013      Urine dipstick today showed trace blood, no leukocyte esterase, and negative nitrite.     Assessment:     1. Gross hematuria    2. BPH with obstruction/lower urinary tract symptoms      Plan:     We discussed his situation, the recommended evaluation and options.  We could proceed with cystoscopy and retrograde pyelogram with further evaluation.  Discussed doing a CT with contrast and urogram.  He would prefer to do a CT urogram as opposed to going to the OR.    - CT Urogram  - Urine cytology  - Office based cystoscopy  - Pelvic US for prostate volume assessment  - Continue flomax and finasteride for now    Leonides Cruz MD

## 2023-03-31 ENCOUNTER — PATIENT MESSAGE (OUTPATIENT)
Dept: FAMILY MEDICINE | Facility: CLINIC | Age: 73
End: 2023-03-31
Payer: MEDICARE

## 2023-03-31 DIAGNOSIS — R31.0 GROSS HEMATURIA: Primary | ICD-10-CM

## 2023-03-31 DIAGNOSIS — D50.0 ANEMIA DUE TO BLOOD LOSS: ICD-10-CM

## 2023-03-31 DIAGNOSIS — E87.1 HYPONATREMIA: ICD-10-CM

## 2023-03-31 LAB
FINAL PATHOLOGIC DIAGNOSIS: ABNORMAL
Lab: ABNORMAL

## 2023-04-05 ENCOUNTER — HOSPITAL ENCOUNTER (OUTPATIENT)
Dept: RADIOLOGY | Facility: HOSPITAL | Age: 73
Discharge: HOME | End: 2023-04-05
Attending: INTERNAL MEDICINE
Payer: MEDICARE

## 2023-04-05 DIAGNOSIS — Z87.891 QUIT SMOKING: ICD-10-CM

## 2023-04-05 PROCEDURE — 71250 CT CHEST WITHOUT CONTRAST: ICD-10-PCS | Mod: 26,,, | Performed by: RADIOLOGY

## 2023-04-05 PROCEDURE — 71250 CT THORAX DX C-: CPT | Mod: TC,PO

## 2023-04-05 PROCEDURE — 71250 CT THORAX DX C-: CPT | Mod: 26,,, | Performed by: RADIOLOGY

## 2023-04-06 ENCOUNTER — PATIENT MESSAGE (OUTPATIENT)
Dept: FAMILY MEDICINE | Facility: CLINIC | Age: 73
End: 2023-04-06
Payer: MEDICARE

## 2023-04-06 NOTE — TELEPHONE ENCOUNTER
Please notify patient May 4th follow-up as fine; he can call us if he has any problems between now and then.  I would like him to repeat some blood work though in 2 weeks to reassess his anemia and iron levels.  Please ask him to call for results 2 days after they are drawn.  His does not need to be fasting.  Remind him to continue taking a men's 50+ multivitamin 1 a day

## 2023-04-13 ENCOUNTER — TELEPHONE (OUTPATIENT)
Dept: FAMILY MEDICINE | Facility: CLINIC | Age: 73
End: 2023-04-13
Payer: MEDICARE

## 2023-04-13 DIAGNOSIS — Z86.711 HISTORY OF PULMONARY EMBOLUS (PE): Primary | ICD-10-CM

## 2023-04-13 DIAGNOSIS — N32.89 MASS OF URINARY BLADDER: ICD-10-CM

## 2023-04-13 DIAGNOSIS — R91.8 PULMONARY NODULES: ICD-10-CM

## 2023-04-13 DIAGNOSIS — R93.89 ABNORMAL CT OF THE CHEST: ICD-10-CM

## 2023-04-13 RX ORDER — ENOXAPARIN SODIUM 150 MG/ML
120 INJECTION SUBCUTANEOUS DAILY
Qty: 7 EACH | Refills: 2 | Status: SHIPPED | OUTPATIENT
Start: 2023-04-13 | End: 2023-05-25 | Stop reason: SDUPTHER

## 2023-04-13 NOTE — TELEPHONE ENCOUNTER
Per Dr Arthur:   Find out if pt is still having hematuria. If not, increase the Lovenox from 40mg daily to 120mg daily. If still w/ visible blood, stay on 40mg daily. Pt has cystoscope sched for 4/28/23. Advise pt to d/c Lovenox the day before and resume the day after.    Called pt to advise. Pt states he has not been taking Lovenox and was unaware of this. Advised pt that we would let Dr Arthur know and call him back w/ instructions. According to Epic rx did not go to [facility], printed. Please review and advise how and when pt should start Lovenox. Please send rx to [facility].

## 2023-04-13 NOTE — TELEPHONE ENCOUNTER
Patient has reportedly not had anymore gross hematuria has not started Lovenox 40 mg subQ daily as went to print.  Will begin Lovenox 120 mg injectable daily for a dose of 1.5 milligram/kilogram per day.  Diagnosis of PE was 02/15/2023 ER visit with a CTA of the chest showing partial filling defect subsegmental branch of the right upper lobe, he was started on Eliquis at that time and referred to me in close follow-up.  Patient has cystoscope scheduled for 4/28/23.  He is to stop Lovenox the day prior.  And resume it when okay with his urologist which can be the next day if no problems occur.  Has been referred to Hematology earlier but has not seen them yet; Dr St.  Please help schedule him an appointment to be seen by Hematology as soon as possible.  Also needs an appointment to see pulmonary Dr. Eliseo De La Garza as soon as possible as well for abnormal CT of the chest.  This consult was placed earlier.  Patient is also in need of a PET-CT being performed as soon as possible for further evaluation.  Please have patient see me shortly thereafter to discuss results.

## 2023-04-13 NOTE — TELEPHONE ENCOUNTER
Case discussed with patient at length including radiographic workup and timing of Lovenox for cystoscopy.  Also need for him to get scheduled with Hematology-Oncology Dr. St as soon as possible as well as pulmonary  as well for abnormal CT workup as soon as possible.  Patient also to have PET-CT scheduled as a stat.  Thank you

## 2023-04-14 ENCOUNTER — TELEPHONE (OUTPATIENT)
Dept: HEMATOLOGY/ONCOLOGY | Facility: CLINIC | Age: 73
End: 2023-04-14
Payer: MEDICARE

## 2023-04-14 DIAGNOSIS — C91.10 CLL (CHRONIC LYMPHOCYTIC LEUKEMIA): Primary | ICD-10-CM

## 2023-04-14 DIAGNOSIS — C67.9 MALIGNANT NEOPLASM OF URINARY BLADDER, UNSPECIFIED SITE: ICD-10-CM

## 2023-04-14 NOTE — NURSING
Spoke with patient regarding appt for PET and Dr. St.  Scheduled both appts, patient agreed and verbalized understanding of date, time and location.  Patient inquired about appt with Dr. De La Garza.  Explained that Dr. De La Garza would not be back to this clinic for several weeks and provided main clinic number, [phone].  Patient verbalized he would call to schedule with Dr. De La Garza.

## 2023-04-20 ENCOUNTER — HOSPITAL ENCOUNTER (OUTPATIENT)
Dept: RADIOLOGY | Facility: HOSPITAL | Age: 73
Discharge: HOME | End: 2023-04-20
Attending: INTERNAL MEDICINE
Payer: MEDICARE

## 2023-04-20 DIAGNOSIS — N32.89 MASS OF URINARY BLADDER: ICD-10-CM

## 2023-04-20 DIAGNOSIS — R91.8 PULMONARY NODULES: ICD-10-CM

## 2023-04-20 DIAGNOSIS — R93.89 ABNORMAL CT OF THE CHEST: ICD-10-CM

## 2023-04-20 LAB — GLUCOSE SERPL-MCNC: 105 MG/DL (ref 70–110)

## 2023-04-20 PROCEDURE — 78815 PET IMAGE W/CT SKULL-THIGH: CPT | Mod: 26,PI,, | Performed by: RADIOLOGY

## 2023-04-20 PROCEDURE — 78815 PET IMAGE W/CT SKULL-THIGH: CPT | Mod: PI,TC,PN

## 2023-04-20 PROCEDURE — 78815 NM PET CT ROUTINE: ICD-10-PCS | Mod: 26,PI,, | Performed by: RADIOLOGY

## 2023-04-20 PROCEDURE — A9552 F18 FDG: HCPCS | Mod: PN

## 2023-04-20 NOTE — PROGRESS NOTES
Subjective:       Name: Saurav Bahena  : 1950  MRN: [id number]    Chief Complaint   Patient presents with    CLL (chronic lymphocytic leukemia     New patient      Patient is in clinic by himself.    HPI: Saurav Bahena is a 72 y.o. male presents for evaluation of CLL (chronic lymphocytic leukemia (New patient)  Patient was initially seen by Dr. Solares  for his CLL and iron-deficiency anemia.  He was treated intermittent intermittently with IV iron.  The CLL had an indolent course not requiring systemic therapy.    The patient had a CTA of the chest on February 15, 2023 a that showed a partial filling defect in a subsegmental branch of for the right upper lobe suspicious for PE.  He was started on Eliquis.  He underwent on 2023 bilateral lower extremity duplex that showed no evidence of DVT.    He was recently admitted to the hospital for gross hematuria on 2023.  He underwent a CT kidney stone protocol that showed  1. The urinary bladder is underdistended.  There is bladder wall thickening which may be related to underdistention, but bladder pathology including an element of nonspecific cystitis cannot be excluded.  There appear to be a heterogeneous intermediate and slightly hyperattenuating filling defects within the urinary bladder, more evident dependently.  These findings could be related to hemorrhage/clot, but intraluminal or mural mass would be difficult to exclude.  Correlation with the patient's clinical and laboratory findings with further evaluation including cystoscopy is suggested.  2. Prostatomegaly and splenomegaly was noted.  3. There is a 6 mm nodular opacity in the medial right lower lobe which is new since CT dated 02/15/2023.  Given rapid development of this finding, this could be related to infectious or inflammatory process, but malignancy or metastatic disease would be difficult to entirely exclude. Therefore, follow-up with CT chest within 3 months  is recommended.  4. Splenomegaly is seen.  5. There is redemonstration lymphadenopathy which is indeterminate, possibly reactive or related to malignancy/metastatic disease.  However, as this appears similar to previous CT from 2019, nonaggressive pathology should also be considered, such as sequelae of liver disease.  Please correlate clinically in regards to further assessment and follow-up.      He left the hospital against medical advice after experiencing discomfort s/p three-way catheter placed which was irrigated. Eliquis was discontinued.   He was switched to Lovenox by his PCP 40 mg sc daily. He was started on oral iron treat his iron-deficiency anemia.      He underwent a CT chest without contrast to assess the abnormality seen affecting his lung on the CT kidney stone protocol on April 5, 2023  1. Severe centrilobular emphysematous changes with interval increased conspicuity of an irregular, spiculated 1.2 cm opacity in the anterior right upper lobe abutting the pleural margin as compared to the prior exam on 05/12/2022, and new since 05/07/2020.  While this may reflect an area of parenchymal scarring/fibrosis and/or atelectasis, neoplastic process cannot be excluded.  Consider further characterization with PET-CT and/or biopsy.  2. Interval decreased conspicuity of a 0.4 cm nodule in the right lower lobe, previously 0.6 cm, which may reflect resolving rounded atelectasis or inflammatory/infectious process.  3. Several additional scattered stable subcentimeter pulmonary nodules.        He was seen by Dr. Cruz on March 29 2023 to assess his gross hematuria and abnormal CT kidney protocol.  He recommended to proceed with a cystoscopy in the office. It is scheduled to have it on April 28, 2023.      The patient underwent on April 11, 2023 a CT urogram abdomen and pelvis that showed   5.5 cm urinary bladder soft tissue mass.  This is concerning for malignancy.  Correlation with cystoscopy is advised.  Right  hydronephrosis with an atrophic right kidney.  This has not changed since 2019 and is likely chronic in nature.  Marked splenomegaly measuring 22 cm.  Prostatic hypertrophy.      He also underwent an an ultrasound of the bladder on April 11, 2023 that showed a solid mass in the bladder concerning for transitional cell carcinoma measuring 4.1 x 3.7 x 3.7.         He underwent a PET scan on April 20, 2023  1. Markedly hypermetabolic new subpleural anterior right upper lobe nodule which is highly suspicious for malignancy.  Biopsy is recommended.  2. Markedly hypermetabolic urinary bladder mass which is highly suspicious for malignancy.  The mass causes obstruction of the right ureterovesical junction with associated severe right hydroureter and right hydronephrosis and chronic severe right renal atrophy.  3. Hypermetabolic focus within the distal aspect of the right 2nd rib which is suspicious for osseous metastatic disease.  4. Splenomegaly.  5. Severe centrilobular pulmonary emphysema.    The blood workup done on March 28, 2023 showed a white count of 12.50 to a hemoglobin of 10 MCV of 82 and platelet count of 181 with a lymphocytic shift.  Smudge cells were present on the peripheral smear.  The CMP showed a sodium of 132 with normal electrolytes and LFTs.  His ferritin was normal at 199.  Iron saturation was 5 transferrin 199 and iron was 15.  The TIBC and TSH were normal.  His urine cytology showed no high-grade urothelial neoplasm identified.    The patient denies any fever chills night sweats weight loss recurrent infection easy bruising bleeding.    Oncology History    No history exists.        Past Medical History:   Diagnosis Date    Anticoagulant long-term use     aspirin    Atherosclerosis of native coronary artery of native heart without angina pectoris 3/8/2022    Atrophic kidney     right    BPH (benign prostatic hyperplasia)     Cat bite of left forearm with infection 2/11/2019    CLL (chronic  lymphocytic leukemia)     sees Dr. Solares    CTS (carpal tunnel syndrome)     Current smoker on some days     using e cig, regular cigarettes sometimes    DJD (degenerative joint disease) of knee     left    DJD (degenerative joint disease) of knee     bilateral/ LT more than RT    Emphysema lung     HLD (hyperlipidemia)     no current meds    HTN (hypertension)     Insomnia     Nocturia     Panlobular emphysema 3/1/2023       Past Surgical History:   Procedure Laterality Date    COLONOSCOPY  2010    Dr. Aguilar, in legacy: int. hemorrhoids, repeat in 10 years for screening    CYSTOSCOPY W/ RETROGRADES Bilateral 2020    Procedure: CYSTOSCOPY, WITH RETROGRADE PYELOGRAM;  Surgeon: Andrea Tam MD;  Location: [facility] OR;  Service: Urology;  Laterality: Bilateral;    ELBOW SURGERY      left elbow, bursitis    KNEE SURGERY  ?    right    MULTIPLE TOOTH EXTRACTIONS      TONSILLECTOMY      TRANSRECTAL ULTRASOUND EXAMINATION N/A 2020    Procedure: ULTRASOUND, RECTAL APPROACH;  Surgeon: Andrea Tam MD;  Location: [facility] OR;  Service: Urology;  Laterality: N/A;    TRIGGER FINGER RELEASE Left        Family History   Problem Relation Age of Onset    Stroke Mother     Diabetes Mother     Heart disease Father 65        MI    Arthritis Father         RA    Diabetes Brother     Colon cancer Neg Hx     Crohn's disease Neg Hx     Ulcerative colitis Neg Hx     Stomach cancer Neg Hx     Esophageal cancer Neg Hx        Social History     Socioeconomic History    Marital status: Single   Occupational History    Occupation:    Tobacco Use    Smoking status: Former     Packs/day: 0.30     Years: 20.00     Pack years: 6.00     Types: Cigarettes     Quit date: 2015     Years since quittin.3    Smokeless tobacco: Never    Tobacco comments:     nicorette, vapor cigs/ 3 cigarettes   Substance and Sexual Activity    Alcohol use: Yes     Alcohol/week: 2.0 - 3.0 standard drinks     Types: 2 -  3 Shots of liquor per week     Comment: 2-3/day on weekends    Drug use: No     Social Determinants of Health     Financial Resource Strain: Low Risk     Difficulty of Paying Living Expenses: Not very hard   Food Insecurity: No Food Insecurity    Worried About Running Out of Food in the Last Year: Never true    Ran Out of Food in the Last Year: Never true   Transportation Needs: No Transportation Needs    Lack of Transportation (Medical): No    Lack of Transportation (Non-Medical): No   Physical Activity: Insufficiently Active    Days of Exercise per Week: 3 days    Minutes of Exercise per Session: 30 min   Stress: Stress Concern Present    Feeling of Stress : To some extent   Social Connections: Unknown    Frequency of Communication with Friends and Family: More than three times a week    Frequency of Social Gatherings with Friends and Family: Twice a week    Attends Jainism Services: Patient refused    Active Member of Clubs or Organizations: Yes    Attends Club or Organization Meetings: More than 4 times per year    Marital Status: Patient refused   Housing Stability: Low Risk     Unable to Pay for Housing in the Last Year: No    Number of Places Lived in the Last Year: 1    Unstable Housing in the Last Year: No       Review of patient's allergies indicates:   Allergen Reactions    No known drug allergies        Review of Systems   Constitutional:  Negative for appetite change, fatigue, fever and unexpected weight change.   HENT:  Negative for mouth sores and sore throat.    Eyes:  Negative for photophobia and visual disturbance.   Respiratory:  Negative for cough and shortness of breath.    Cardiovascular:  Negative for chest pain.   Gastrointestinal:  Negative for abdominal pain, constipation and diarrhea.   Genitourinary:  Positive for frequency. Negative for dysuria and hematuria.   Musculoskeletal:  Positive for back pain. Negative for arthralgias and joint swelling.   Integumentary:  Negative for rash.  "  Neurological:  Negative for dizziness, weakness, light-headedness and headaches.   Hematological:  Negative for adenopathy. Does not bruise/bleed easily.   Psychiatric/Behavioral:  Negative for sleep disturbance. The patient is not nervous/anxious.           Objective:     Vitals:    04/21/23 1057   BP: (!) 149/70   BP Location: Left arm   Patient Position: Sitting   BP Method: Medium (Automatic)   Pulse: 76   Resp: 16   Temp: 98.1 °F (36.7 °C)   TempSrc: Temporal   SpO2: 97%   Weight: 79.5 kg (175 lb 4.3 oz)   Height: 5' 10" (1.778 m)        Physical Exam  Vitals reviewed.   Constitutional:       Appearance: Normal appearance.   HENT:      Head: Normocephalic and atraumatic.   Eyes:      General: No scleral icterus.     Pupils: Pupils are equal, round, and reactive to light.   Cardiovascular:      Rate and Rhythm: Normal rate and regular rhythm.      Pulses: Normal pulses.      Heart sounds: Normal heart sounds.   Pulmonary:      Effort: Pulmonary effort is normal.      Breath sounds: Normal breath sounds.   Abdominal:      General: Bowel sounds are normal. There is no distension.   Musculoskeletal:         General: No swelling.   Lymphadenopathy:      Cervical: No cervical adenopathy.   Skin:     General: Skin is warm.      Findings: No rash.   Neurological:      General: No focal deficit present.      Mental Status: He is alert and oriented to person, place, and time.      Cranial Nerves: No cranial nerve deficit.      Motor: No weakness.   Psychiatric:         Mood and Affect: Mood normal.         Behavior: Behavior normal.              Current Outpatient Medications on File Prior to Visit   Medication Sig    amLODIPine (NORVASC) 2.5 MG tablet Take 1 tablet (2.5 mg total) by mouth once daily.    ascorbic acid (VITAMIN C ORAL) Take 1 tablet by mouth once daily.    cholecalciferol, vitamin D3, (VITAMIN D3 ORAL) Take 1 capsule by mouth once daily.    enoxaparin (LOVENOX) 120 mg/0.8 mL Syrg Inject 0.8 mLs (120 mg " total) into the skin Daily. Begin today injecting 120 mg of enoxaparin subcutaneous daily through and inclusive of April 26; hold Lovenox on April 27th, for planned cystoscopy on April 28th.  Resumption of Lovenox when okay with Urology, which can be the next day after cystoscopy if okay with Urology.  Generic please.    finasteride (PROSCAR) 5 mg tablet TAKE 1 TABLET BY MOUTH ONCE DAILY    losartan (COZAAR) 50 MG tablet Take 1 tablet (50 mg total) by mouth once daily.    multivit-min/folic/vit K/lycop (MEN'S 50 PLUS MULTIVITAMIN ORAL) Take 1 tablet by mouth once daily.    tamsulosin (FLOMAX) 0.4 mg Cap Take 2 capsules (0.8 mg total) by mouth once daily.    ZINC ORAL Take 1 tablet by mouth once daily.    traZODone (DESYREL) 50 MG tablet Take 25-50 mg by mouth nightly as needed (sleep).    zolpidem (AMBIEN) 5 MG Tab To use trazodone prescribed earlier as needed for sleep and wean off Ambien 5 mg by mouth at bedtime as needed for sleep still needed 1 hour after trazodone.  Will not be able to continue to prescribe Ambien.  If he feels like he needs to be on Ambien will need to obtain from psych.  Generic please (Patient not taking: Reported on 4/21/2023)     Current Facility-Administered Medications on File Prior to Visit   Medication    lactated ringers infusion    lidocaine (PF) 10 mg/ml (1%) injection 10 mg    triamcinolone acetonide injection 40 mg       CBC:  Lab Results   Component Value Date    WBC 12.52 03/28/2023    HGB 10.0 (L) 03/28/2023    HCT 33.0 (L) 03/28/2023    MCV 82 03/28/2023     03/28/2023         CMP:  Sodium   Date Value Ref Range Status   03/28/2023 132 (L) 136 - 145 mmol/L Final     Potassium   Date Value Ref Range Status   03/28/2023 4.5 3.5 - 5.1 mmol/L Final     Chloride   Date Value Ref Range Status   03/28/2023 96 95 - 110 mmol/L Final     CO2   Date Value Ref Range Status   03/28/2023 25 23 - 29 mmol/L Final     Glucose   Date Value Ref Range Status   03/28/2023 98 70 - 110 mg/dL  Final     BUN   Date Value Ref Range Status   03/28/2023 13 8 - 23 mg/dL Final     Creatinine   Date Value Ref Range Status   03/28/2023 0.9 0.5 - 1.4 mg/dL Final     Calcium   Date Value Ref Range Status   03/28/2023 9.6 8.7 - 10.5 mg/dL Final     Total Protein   Date Value Ref Range Status   03/28/2023 7.8 6.0 - 8.4 g/dL Final     Albumin   Date Value Ref Range Status   03/28/2023 3.6 3.5 - 5.2 g/dL Final     Total Bilirubin   Date Value Ref Range Status   03/28/2023 0.3 0.1 - 1.0 mg/dL Final     Comment:     For infants and newborns, interpretation of results should be based  on gestational age, weight and in agreement with clinical  observations.    Premature Infant recommended reference ranges:  Up to 24 hours.............<8.0 mg/dL  Up to 48 hours............<12.0 mg/dL  3-5 days..................<15.0 mg/dL  6-29 days.................<15.0 mg/dL       Alkaline Phosphatase   Date Value Ref Range Status   03/28/2023 79 55 - 135 U/L Final     AST ([place])   Date Value Ref Range Status   04/04/2016 16 (L) 17 - 59 U/L Final     AST   Date Value Ref Range Status   03/28/2023 12 10 - 40 U/L Final     ALT   Date Value Ref Range Status   03/28/2023 11 10 - 44 U/L Final     Anion Gap   Date Value Ref Range Status   03/28/2023 11 8 - 16 mmol/L Final     eGFR if    Date Value Ref Range Status   01/21/2022 >60.0 >60 mL/min/1.73 m^2 Final     eGFR if non    Date Value Ref Range Status   01/21/2022 >60.0 >60 mL/min/1.73 m^2 Final     Comment:     Calculation used to obtain the estimated glomerular filtration  rate (eGFR) is the CKD-EPI equation.          NM PET CT Routine Skull to Mid Thigh  Narrative: EXAMINATION:  NM PET CT ROUTINE    CLINICAL HISTORY:  Spiculated opacity anterior right upper lobe new since 05/07/2020; 5.5 cm bladder mass also noted on imaging with patient scheduled for cystoscopy for further evaluation please see epic;  Other specified disorders of  bladder    72-year-old male with a history of CLL.  The patient now presents with a bladder mass, recent pulmonary embolism, and new pulmonary nodule.    TECHNIQUE:  Following IV injection of 12.27 mCi F-18 FDG, 3D PET imaging was performed from the skull base to the mid thighs.  A noncontrast non breath hold CT was obtained in conjunction with the PET scan for attenuation correction and anatomic correlation.  PET-CT fusion images were generated.    COMPARISON:  Correlation is made with the CT urogram dated 04/11/2023 and CT chest dated 04/05/2023.    FINDINGS:  Head/neck:    No significant abnormal hypermetabolic foci are identified within the head and neck region.  No lymphadenopathy is present.    Chest:    There is a slightly irregular subpleural markedly hypermetabolic nodule in the anterior right upper lobe which is new compared to a CT dated 05/07/2020 and is highly suspicious for malignancy.  On image 85 the nodule measures 1.6 x 0.8 cm and has a max SUV of 10.2.  No other significant abnormal hypermetabolic foci are identified within the soft tissues of the chest.  No lymphadenopathy or pleural effusion are present.  Findings of severe centrilobular pulmonary emphysema are present.    Abdomen/pelvis:    There is a markedly hypermetabolic mass arising from the posterior bladder wall with the bulk of the mass extending to the right posterior and right posterolateral aspect of the bladder.  The mass measures 6.1 x 4.3 x 3.8 cm and obstructs the right ureterovesical junction with severe right hydroureter and right hydronephrosis and associated severe right renal atrophy.  The max SUV is 29.8.  The spleen is enlarged and measures 21.5 x 14.6 x 8.8 cm.  No lymphadenopathy or ascites are present.  The adrenal glands are normal.    Skeleton:    There is a focus of hypermetabolism involving the distal aspect of the right 2nd rib with no associated fracture, lytic destruction, or sclerosis.  This must be regarded as  suspicious for metastatic disease and the max SUV is 3.6.  No other significant abnormal hypermetabolic foci are identified within the skeleton.  Impression: 1. Markedly hypermetabolic new subpleural anterior right upper lobe nodule which is highly suspicious for malignancy.  Biopsy is recommended.  2. Markedly hypermetabolic urinary bladder mass which is highly suspicious for malignancy.  The mass causes obstruction of the right ureterovesical junction with associated severe right hydroureter and right hydronephrosis and chronic severe right renal atrophy.  3. Hypermetabolic focus within the distal aspect of the right 2nd rib which is suspicious for osseous metastatic disease.  4. Splenomegaly.  5. Severe centrilobular pulmonary emphysema.    Electronically signed by: Kishore Ball MD  Date:    04/20/2023  Time:    11:59       ECOG SCORE    1 - Restricted in strenuous activity-ambulatory and able to carry out work of a light nature              Assessment/Plan:       1- CLL:  -reviewed independently the patient medical record including his laboratory radiologic and pathologic findings.  The patient has been seen and followed by Dr. Solares for years.  He did not require treatment for his disease.  -I reviewed with the patient the natural progression of this disease.  Usually it is not treated unless it starts causing end-organ damage and this include but not restricted to fever chills night sweats unintentional weight loss recurrent infection anemia thrombocytopenia large lymphadenopathy etcetera.  -the patient will have blood workup drawn today for uric acid ESR LDH fish for CLL TP53 and IGH mutation to risk stratify his disease.      2- Lung nodule:  -I reviewed independently the patient imaging studies and discussed the findings with the patient.  His recent PET scan showed an active right lung nodule with an SUV of 10.  This is highly worsened either of a primary or distant metastatic disease possibly coming  from his bladder cancer.  He will be scheduled to undergo a CT-guided lung biopsy.    3- Bladder tumor:  -in view of his recent onset of hematuria on Eliquis the patient underwent imaging studies that confirmed the presence of a bladder cancer.  I reviewed independently the results of his imaging studies as well.  He is due to be seen by  to undergo a cystoscopy.    4- Normocytic anemia and intermittent thrombocytopenia:  - Hgb 10 MCV 82 and plat 181. His plat count has been fluctuating between 148 and 178.  The patient will be scheduled to have further blood workup drawn today to assess his abnormal CBC and this will include vitamin B12 folate TSH haptoglobin hepatitis profile HIV SPEP with immunofixation ESR LDH and quantitative immunoglobulins.    5- Pulmonary embolism.  -patient is currently on Lovenox at a dose of 120 mg subQ daily.  Awaiting him to finish his urologic workup he will be switched at that time to Eliquis.  He will require lifelong anticoagulation since his PE is induced by his malignancy.    5-  HTN and dyslipidemia:  Patient is on amlodipine            Med Onc Chart Routing      Follow up with physician 3 weeks. to discuss labs drawn today as below and CT huided lung biopsy to be done next week   Follow up with [facility]    Infusion scheduling note    Injection scheduling note    Labs LDH, SPEP, vitamin B12, free T4 and TSH   Scheduling:  Preferred lab:  Lab interval:  uric acid, IF, quantitative Ig, HIV, Hepatitis panel, homocystein level, methylmalonic acid, TP53, FISH CLL panel, IGH somatic mutation, Haptoglobin, ESR,   Imaging    Pharmacy appointment    Other referrals            Plan was discussed with the patient at length, and he verbalized understanding. Saurav was given an opportunity to ask questions that were answered to his satisfaction, and he was advised to call in the interval if any problems or questions arise.  Signed:  Linnea St MD   Hematology and Oncology  [place]  [facility] - HEMATOLOGY ONCOLOGY  [facility], [place]

## 2023-04-20 NOTE — PROGRESS NOTES
PET Imaging Questionnaire    Are you a Diabetic? Recent Blood Sugar level? No    Are you anemic? Bone Marrow Stimulation Meds? Yes    Have you had a CT Scan, if so when & where was your last one? Yes -     Have you had a PET Scan, if so when & where was your last one? No    Chemotherapy or currently on Chemotherapy? No    Radiation therapy? No    Surgical History:   Past Surgical History:   Procedure Laterality Date    COLONOSCOPY  09/01/2010    Dr. Aguilar, in legacy: int. hemorrhoids, repeat in 10 years for screening    CYSTOSCOPY W/ RETROGRADES Bilateral 7/2/2020    Procedure: CYSTOSCOPY, WITH RETROGRADE PYELOGRAM;  Surgeon: Andrea Tam MD;  Location: [facility] OR;  Service: Urology;  Laterality: Bilateral;    ELBOW SURGERY      left elbow, bursitis    KNEE SURGERY  1981?    right    MULTIPLE TOOTH EXTRACTIONS      TONSILLECTOMY      TRANSRECTAL ULTRASOUND EXAMINATION N/A 7/2/2020    Procedure: ULTRASOUND, RECTAL APPROACH;  Surgeon: Andrea Tam MD;  Location: [facility] OR;  Service: Urology;  Laterality: N/A;    TRIGGER FINGER RELEASE Left         Have you been fasting for at least 6 hours? Yes    Is there any chance you may be pregnant or breastfeeding? No    Assay: 14.02 MCi@:9.34   Injection Site:lt ac     Residual: 1.75 mCi@: 9.36   Technologist: Agnes Avila Injected:12.27mCi

## 2023-04-21 ENCOUNTER — LAB VISIT (OUTPATIENT)
Dept: LAB | Facility: HOSPITAL | Age: 73
End: 2023-04-21
Attending: INTERNAL MEDICINE
Payer: MEDICARE

## 2023-04-21 ENCOUNTER — OFFICE VISIT (OUTPATIENT)
Dept: HEMATOLOGY/ONCOLOGY | Facility: CLINIC | Age: 73
End: 2023-04-21
Payer: MEDICARE

## 2023-04-21 VITALS
BODY MASS INDEX: 25.09 KG/M2 | TEMPERATURE: 98 F | HEART RATE: 76 BPM | DIASTOLIC BLOOD PRESSURE: 70 MMHG | SYSTOLIC BLOOD PRESSURE: 149 MMHG | RESPIRATION RATE: 16 BRPM | OXYGEN SATURATION: 97 % | WEIGHT: 175.25 LBS | HEIGHT: 70 IN

## 2023-04-21 DIAGNOSIS — D63.8 ANEMIA OF CHRONIC DISEASE: ICD-10-CM

## 2023-04-21 DIAGNOSIS — D63.8 ANEMIA OF CHRONIC DISEASE: Primary | ICD-10-CM

## 2023-04-21 DIAGNOSIS — C67.9 MALIGNANT NEOPLASM OF URINARY BLADDER, UNSPECIFIED SITE: ICD-10-CM

## 2023-04-21 DIAGNOSIS — C91.10 CLL (CHRONIC LYMPHOCYTIC LEUKEMIA): ICD-10-CM

## 2023-04-21 DIAGNOSIS — R91.1 LUNG NODULE: ICD-10-CM

## 2023-04-21 DIAGNOSIS — E78.5 HYPERLIPIDEMIA, UNSPECIFIED HYPERLIPIDEMIA TYPE: ICD-10-CM

## 2023-04-21 DIAGNOSIS — D69.6 THROMBOCYTOPENIA: ICD-10-CM

## 2023-04-21 DIAGNOSIS — I10 PRIMARY HYPERTENSION: ICD-10-CM

## 2023-04-21 DIAGNOSIS — R31.0 GROSS HEMATURIA: ICD-10-CM

## 2023-04-21 DIAGNOSIS — C67.9 MALIGNANT NEOPLASM OF URINARY BLADDER, UNSPECIFIED SITE: Primary | ICD-10-CM

## 2023-04-21 LAB
ERYTHROCYTE [SEDIMENTATION RATE] IN BLOOD BY PHOTOMETRIC METHOD: 74 MM/HR (ref 0–23)
HAPTOGLOB SERPL-MCNC: 246 MG/DL (ref 30–250)
HAV IGM SERPL QL IA: NORMAL
HBV CORE IGM SERPL QL IA: NORMAL
HBV SURFACE AG SERPL QL IA: NORMAL
HCV AB SERPL QL IA: NORMAL
HCYS SERPL-SCNC: 7.5 UMOL/L (ref 4–16.5)
HIV 1+2 AB+HIV1 P24 AG SERPL QL IA: NORMAL
IGA SERPL-MCNC: 125 MG/DL (ref 40–350)
IGG SERPL-MCNC: 1331 MG/DL (ref 650–1600)
IGM SERPL-MCNC: 755 MG/DL (ref 50–300)
LDH SERPL L TO P-CCNC: 116 U/L (ref 110–260)
T4 FREE SERPL-MCNC: 0.92 NG/DL (ref 0.71–1.51)
TSH SERPL DL<=0.005 MIU/L-ACNC: 2.39 UIU/ML (ref 0.4–4)
URATE SERPL-MCNC: 7.5 MG/DL (ref 3.4–7)
VIT B12 SERPL-MCNC: 1249 PG/ML (ref 210–950)

## 2023-04-21 PROCEDURE — 87389 HIV-1 AG W/HIV-1&-2 AB AG IA: CPT | Performed by: INTERNAL MEDICINE

## 2023-04-21 PROCEDURE — 84439 ASSAY OF FREE THYROXINE: CPT | Performed by: INTERNAL MEDICINE

## 2023-04-21 PROCEDURE — 82607 VITAMIN B-12: CPT | Performed by: INTERNAL MEDICINE

## 2023-04-21 PROCEDURE — 99215 PR OFFICE/OUTPT VISIT, EST, LEVL V, 40-54 MIN: ICD-10-PCS | Mod: S$GLB,,, | Performed by: INTERNAL MEDICINE

## 2023-04-21 PROCEDURE — 1160F PR REVIEW ALL MEDS BY PRESCRIBER/CLIN PHARMACIST DOCUMENTED: ICD-10-PCS | Mod: CPTII,S$GLB,, | Performed by: INTERNAL MEDICINE

## 2023-04-21 PROCEDURE — 1101F PT FALLS ASSESS-DOCD LE1/YR: CPT | Mod: CPTII,S$GLB,, | Performed by: INTERNAL MEDICINE

## 2023-04-21 PROCEDURE — 84165 PROTEIN E-PHORESIS SERUM: CPT | Mod: 26,,, | Performed by: PATHOLOGY

## 2023-04-21 PROCEDURE — 99999 PR PBB SHADOW E&M-EST. PATIENT-LVL V: CPT | Mod: PBBFAC,,, | Performed by: INTERNAL MEDICINE

## 2023-04-21 PROCEDURE — 3288F PR FALLS RISK ASSESSMENT DOCUMENTED: ICD-10-PCS | Mod: CPTII,S$GLB,, | Performed by: INTERNAL MEDICINE

## 2023-04-21 PROCEDURE — 88184 FLOWCYTOMETRY/ TC 1 MARKER: CPT | Performed by: INTERNAL MEDICINE

## 2023-04-21 PROCEDURE — 1160F RVW MEDS BY RX/DR IN RCRD: CPT | Mod: CPTII,S$GLB,, | Performed by: INTERNAL MEDICINE

## 2023-04-21 PROCEDURE — 99999 PR PBB SHADOW E&M-EST. PATIENT-LVL V: ICD-10-PCS | Mod: PBBFAC,,, | Performed by: INTERNAL MEDICINE

## 2023-04-21 PROCEDURE — 88275 CYTOGENETICS 100-300: CPT | Mod: 59 | Performed by: INTERNAL MEDICINE

## 2023-04-21 PROCEDURE — 83615 LACTATE (LD) (LDH) ENZYME: CPT | Mod: PN | Performed by: INTERNAL MEDICINE

## 2023-04-21 PROCEDURE — 82784 ASSAY IGA/IGD/IGG/IGM EACH: CPT | Performed by: INTERNAL MEDICINE

## 2023-04-21 PROCEDURE — 85652 RBC SED RATE AUTOMATED: CPT | Performed by: INTERNAL MEDICINE

## 2023-04-21 PROCEDURE — 80074 ACUTE HEPATITIS PANEL: CPT | Performed by: INTERNAL MEDICINE

## 2023-04-21 PROCEDURE — 1111F PR DISCHARGE MEDS RECONCILED W/ CURRENT OUTPATIENT MED LIST: ICD-10-PCS | Mod: CPTII,S$GLB,, | Performed by: INTERNAL MEDICINE

## 2023-04-21 PROCEDURE — 3008F PR BODY MASS INDEX (BMI) DOCUMENTED: ICD-10-PCS | Mod: CPTII,S$GLB,, | Performed by: INTERNAL MEDICINE

## 2023-04-21 PROCEDURE — 1159F MED LIST DOCD IN RCRD: CPT | Mod: CPTII,S$GLB,, | Performed by: INTERNAL MEDICINE

## 2023-04-21 PROCEDURE — 81352 TP53 GENE TRGT SEQUENCE ALYS: CPT | Performed by: INTERNAL MEDICINE

## 2023-04-21 PROCEDURE — 86334 PATHOLOGIST INTERPRETATION IFE: ICD-10-PCS | Mod: 26,,, | Performed by: PATHOLOGY

## 2023-04-21 PROCEDURE — 1126F PR PAIN SEVERITY QUANTIFIED, NO PAIN PRESENT: ICD-10-PCS | Mod: CPTII,S$GLB,, | Performed by: INTERNAL MEDICINE

## 2023-04-21 PROCEDURE — 3078F DIAST BP <80 MM HG: CPT | Mod: CPTII,S$GLB,, | Performed by: INTERNAL MEDICINE

## 2023-04-21 PROCEDURE — 3078F PR MOST RECENT DIASTOLIC BLOOD PRESSURE < 80 MM HG: ICD-10-PCS | Mod: CPTII,S$GLB,, | Performed by: INTERNAL MEDICINE

## 2023-04-21 PROCEDURE — 3077F SYST BP >= 140 MM HG: CPT | Mod: CPTII,S$GLB,, | Performed by: INTERNAL MEDICINE

## 2023-04-21 PROCEDURE — 86334 IMMUNOFIX E-PHORESIS SERUM: CPT | Performed by: INTERNAL MEDICINE

## 2023-04-21 PROCEDURE — 4010F ACE/ARB THERAPY RXD/TAKEN: CPT | Mod: CPTII,S$GLB,, | Performed by: INTERNAL MEDICINE

## 2023-04-21 PROCEDURE — 1111F DSCHRG MED/CURRENT MED MERGE: CPT | Mod: CPTII,S$GLB,, | Performed by: INTERNAL MEDICINE

## 2023-04-21 PROCEDURE — 1159F PR MEDICATION LIST DOCUMENTED IN MEDICAL RECORD: ICD-10-PCS | Mod: CPTII,S$GLB,, | Performed by: INTERNAL MEDICINE

## 2023-04-21 PROCEDURE — 84550 ASSAY OF BLOOD/URIC ACID: CPT | Mod: PN | Performed by: INTERNAL MEDICINE

## 2023-04-21 PROCEDURE — 84165 PROTEIN E-PHORESIS SERUM: CPT | Performed by: INTERNAL MEDICINE

## 2023-04-21 PROCEDURE — 3062F POS MACROALBUMINURIA REV: CPT | Mod: CPTII,S$GLB,, | Performed by: INTERNAL MEDICINE

## 2023-04-21 PROCEDURE — 1101F PR PT FALLS ASSESS DOC 0-1 FALLS W/OUT INJ PAST YR: ICD-10-PCS | Mod: CPTII,S$GLB,, | Performed by: INTERNAL MEDICINE

## 2023-04-21 PROCEDURE — 1126F AMNT PAIN NOTED NONE PRSNT: CPT | Mod: CPTII,S$GLB,, | Performed by: INTERNAL MEDICINE

## 2023-04-21 PROCEDURE — 83090 ASSAY OF HOMOCYSTEINE: CPT | Performed by: INTERNAL MEDICINE

## 2023-04-21 PROCEDURE — 3066F NEPHROPATHY DOC TX: CPT | Mod: CPTII,S$GLB,, | Performed by: INTERNAL MEDICINE

## 2023-04-21 PROCEDURE — 99215 OFFICE O/P EST HI 40 MIN: CPT | Mod: S$GLB,,, | Performed by: INTERNAL MEDICINE

## 2023-04-21 PROCEDURE — 3077F PR MOST RECENT SYSTOLIC BLOOD PRESSURE >= 140 MM HG: ICD-10-PCS | Mod: CPTII,S$GLB,, | Performed by: INTERNAL MEDICINE

## 2023-04-21 PROCEDURE — 36415 COLL VENOUS BLD VENIPUNCTURE: CPT | Mod: PN | Performed by: INTERNAL MEDICINE

## 2023-04-21 PROCEDURE — 3066F PR DOCUMENTATION OF TREATMENT FOR NEPHROPATHY: ICD-10-PCS | Mod: CPTII,S$GLB,, | Performed by: INTERNAL MEDICINE

## 2023-04-21 PROCEDURE — 3062F PR POS MACROALBUMINURIA RESULT DOCUMENTED/REVIEW: ICD-10-PCS | Mod: CPTII,S$GLB,, | Performed by: INTERNAL MEDICINE

## 2023-04-21 PROCEDURE — 84443 ASSAY THYROID STIM HORMONE: CPT | Performed by: INTERNAL MEDICINE

## 2023-04-21 PROCEDURE — 86334 IMMUNOFIX E-PHORESIS SERUM: CPT | Mod: 26,,, | Performed by: PATHOLOGY

## 2023-04-21 PROCEDURE — 88185 FLOWCYTOMETRY/TC ADD-ON: CPT | Performed by: INTERNAL MEDICINE

## 2023-04-21 PROCEDURE — 84165 PATHOLOGIST INTERPRETATION SPE: ICD-10-PCS | Mod: 26,,, | Performed by: PATHOLOGY

## 2023-04-21 PROCEDURE — 83010 ASSAY OF HAPTOGLOBIN QUANT: CPT | Performed by: INTERNAL MEDICINE

## 2023-04-21 PROCEDURE — 81263 IGH VARI REGIONAL MUTATION: CPT | Performed by: INTERNAL MEDICINE

## 2023-04-21 PROCEDURE — 83921 ORGANIC ACID SINGLE QUANT: CPT | Performed by: INTERNAL MEDICINE

## 2023-04-21 PROCEDURE — 4010F PR ACE/ARB THEARPY RXD/TAKEN: ICD-10-PCS | Mod: CPTII,S$GLB,, | Performed by: INTERNAL MEDICINE

## 2023-04-21 PROCEDURE — 3008F BODY MASS INDEX DOCD: CPT | Mod: CPTII,S$GLB,, | Performed by: INTERNAL MEDICINE

## 2023-04-21 PROCEDURE — 3288F FALL RISK ASSESSMENT DOCD: CPT | Mod: CPTII,S$GLB,, | Performed by: INTERNAL MEDICINE

## 2023-04-24 ENCOUNTER — LAB VISIT (OUTPATIENT)
Dept: LAB | Facility: HOSPITAL | Age: 73
End: 2023-04-24
Attending: INTERNAL MEDICINE
Payer: MEDICARE

## 2023-04-24 DIAGNOSIS — E87.1 HYPONATREMIA: ICD-10-CM

## 2023-04-24 DIAGNOSIS — D50.0 ANEMIA DUE TO BLOOD LOSS: ICD-10-CM

## 2023-04-24 DIAGNOSIS — R31.0 GROSS HEMATURIA: ICD-10-CM

## 2023-04-24 LAB
ALBUMIN SERPL ELPH-MCNC: 3.65 G/DL (ref 3.35–5.55)
ALPHA1 GLOB SERPL ELPH-MCNC: 0.42 G/DL (ref 0.17–0.41)
ALPHA2 GLOB SERPL ELPH-MCNC: 0.81 G/DL (ref 0.43–0.99)
ANION GAP SERPL CALC-SCNC: 10 MMOL/L (ref 8–16)
B-GLOBULIN SERPL ELPH-MCNC: 0.75 G/DL (ref 0.5–1.1)
BASOPHILS NFR BLD: 0 % (ref 0–1.9)
BUN SERPL-MCNC: 14 MG/DL (ref 8–23)
CALCIUM SERPL-MCNC: 9.5 MG/DL (ref 8.7–10.5)
CHLORIDE SERPL-SCNC: 99 MMOL/L (ref 95–110)
CO2 SERPL-SCNC: 26 MMOL/L (ref 23–29)
CREAT SERPL-MCNC: 0.9 MG/DL (ref 0.5–1.4)
DIFFERENTIAL METHOD: ABNORMAL
EOSINOPHIL NFR BLD: 0 % (ref 0–8)
ERYTHROCYTE [DISTWIDTH] IN BLOOD BY AUTOMATED COUNT: 16 % (ref 11.5–14.5)
EST. GFR  (NO RACE VARIABLE): >60 ML/MIN/1.73 M^2
FERRITIN SERPL-MCNC: 187 NG/ML (ref 20–300)
GAMMA GLOB SERPL ELPH-MCNC: 1.56 G/DL (ref 0.67–1.58)
GLUCOSE SERPL-MCNC: 112 MG/DL (ref 70–110)
HCT VFR BLD AUTO: 33.3 % (ref 40–54)
HGB BLD-MCNC: 10.1 G/DL (ref 14–18)
IMM GRANULOCYTES # BLD AUTO: ABNORMAL K/UL (ref 0–0.04)
IMM GRANULOCYTES NFR BLD AUTO: ABNORMAL % (ref 0–0.5)
INTERPRETATION SERPL IFE-IMP: NORMAL
IRON SERPL-MCNC: 17 UG/DL (ref 45–160)
LYMPHOCYTES NFR BLD: 67 % (ref 18–48)
MCH RBC QN AUTO: 24.9 PG (ref 27–31)
MCHC RBC AUTO-ENTMCNC: 30.3 G/DL (ref 32–36)
MCV RBC AUTO: 82 FL (ref 82–98)
MONOCYTES NFR BLD: 4 % (ref 4–15)
NEUTROPHILS NFR BLD: 29 % (ref 38–73)
NRBC BLD-RTO: 0 /100 WBC
PATHOLOGIST INTERPRETATION IFE: NORMAL
PATHOLOGIST INTERPRETATION SPE: NORMAL
PLATELET # BLD AUTO: 169 K/UL (ref 150–450)
PLATELET BLD QL SMEAR: ABNORMAL
PMV BLD AUTO: 9.5 FL (ref 9.2–12.9)
POTASSIUM SERPL-SCNC: 5 MMOL/L (ref 3.5–5.1)
PROT SERPL-MCNC: 7.2 G/DL (ref 6–8.4)
RBC # BLD AUTO: 4.06 M/UL (ref 4.6–6.2)
SATURATED IRON: 6 % (ref 20–50)
SODIUM SERPL-SCNC: 135 MMOL/L (ref 136–145)
TOTAL IRON BINDING CAPACITY: 278 UG/DL (ref 250–450)
TRANSFERRIN SERPL-MCNC: 188 MG/DL (ref 200–375)
WBC # BLD AUTO: 11.55 K/UL (ref 3.9–12.7)

## 2023-04-24 PROCEDURE — 85007 BL SMEAR W/DIFF WBC COUNT: CPT | Performed by: INTERNAL MEDICINE

## 2023-04-24 PROCEDURE — 36415 COLL VENOUS BLD VENIPUNCTURE: CPT | Mod: PN | Performed by: INTERNAL MEDICINE

## 2023-04-24 PROCEDURE — 84466 ASSAY OF TRANSFERRIN: CPT | Performed by: INTERNAL MEDICINE

## 2023-04-24 PROCEDURE — 82728 ASSAY OF FERRITIN: CPT | Performed by: INTERNAL MEDICINE

## 2023-04-24 PROCEDURE — 80048 BASIC METABOLIC PNL TOTAL CA: CPT | Performed by: INTERNAL MEDICINE

## 2023-04-24 PROCEDURE — 85027 COMPLETE CBC AUTOMATED: CPT | Performed by: INTERNAL MEDICINE

## 2023-04-25 LAB — METHYLMALONATE SERPL-SCNC: 0.73 UMOL/L

## 2023-04-27 ENCOUNTER — TELEPHONE (OUTPATIENT)
Dept: FAMILY MEDICINE | Facility: CLINIC | Age: 73
End: 2023-04-27
Payer: MEDICARE

## 2023-04-27 LAB
PATH REPORT.FINAL DX SPEC: NORMAL
PATH REPORT.FINAL DX SPEC: NORMAL
SIGNING PATHOLOGIST: NORMAL
TP53 PRE-ANALYSIS CELL SORT: NORMAL

## 2023-04-27 NOTE — TELEPHONE ENCOUNTER
Case discussed with patient by phone.  Was seen earlier by Hematology-Oncology Dr. St.  Excellent detail note reviewed and appreciated.  Patient is scheduled to have a transthoracic CT-guided lung biopsy with a follow-up with Heme-Onc.  PET-CT fusion reviewed on previous Hematology-Oncology note.  Patient to call us if we can be of any further assistance.  Patient hemoglobin with CLL has been stable at 10.1.  Ferritin level good at 187; serum iron while reduced has been stable at 17 with percentage saturation stable but reduced at 6.  Have advised patient he can stop iron supplementation as his ferritin level have been good and holding for some time now

## 2023-04-28 ENCOUNTER — PROCEDURE VISIT (OUTPATIENT)
Dept: UROLOGY | Facility: CLINIC | Age: 73
End: 2023-04-28
Payer: MEDICARE

## 2023-04-28 VITALS — WEIGHT: 173.94 LBS | BODY MASS INDEX: 24.9 KG/M2 | HEIGHT: 70 IN

## 2023-04-28 DIAGNOSIS — R31.0 GROSS HEMATURIA: ICD-10-CM

## 2023-04-28 DIAGNOSIS — N13.8 BPH WITH OBSTRUCTION/LOWER URINARY TRACT SYMPTOMS: ICD-10-CM

## 2023-04-28 DIAGNOSIS — N40.1 BPH WITH OBSTRUCTION/LOWER URINARY TRACT SYMPTOMS: ICD-10-CM

## 2023-04-28 LAB
BILIRUBIN, UA POC OHS: NEGATIVE
BLOOD, UA POC OHS: ABNORMAL
CLARITY, UA POC OHS: CLEAR
COLOR, UA POC OHS: YELLOW
GLUCOSE, UA POC OHS: NEGATIVE
KETONES, UA POC OHS: NEGATIVE
LEUKOCYTES, UA POC OHS: NEGATIVE
NITRITE, UA POC OHS: NEGATIVE
PH, UA POC OHS: 7
PROTEIN, UA POC OHS: 100
SPECIFIC GRAVITY, UA POC OHS: 1.01
UROBILINOGEN, UA POC OHS: 0.2

## 2023-04-28 PROCEDURE — 52000 CYSTOSCOPY: ICD-10-PCS | Mod: S$GLB,,, | Performed by: STUDENT IN AN ORGANIZED HEALTH CARE EDUCATION/TRAINING PROGRAM

## 2023-04-28 PROCEDURE — 52000 CYSTOURETHROSCOPY: CPT | Mod: S$GLB,,, | Performed by: STUDENT IN AN ORGANIZED HEALTH CARE EDUCATION/TRAINING PROGRAM

## 2023-04-28 PROCEDURE — 81003 POCT URINALYSIS(INSTRUMENT): ICD-10-PCS | Mod: QW,S$GLB,, | Performed by: STUDENT IN AN ORGANIZED HEALTH CARE EDUCATION/TRAINING PROGRAM

## 2023-04-28 PROCEDURE — 81003 URINALYSIS AUTO W/O SCOPE: CPT | Mod: QW,S$GLB,, | Performed by: STUDENT IN AN ORGANIZED HEALTH CARE EDUCATION/TRAINING PROGRAM

## 2023-04-28 NOTE — PROCEDURES
Cystoscopy    Date/Time: 4/28/2023 10:00 AM  Performed by: Leonides Cruz MD  Authorized by: Leonides Cruz MD     Procedure:   Flexible cysto-urethroscopy    Pre Procedure Diagnosis:  BPH, gross hematuria, and lesion on US and CT    Post Procedure Diagnosis:  Same and bladder tumor    Surgeon: Leonides Cruz MD     Anesthesia: 2% uro-jet lidocaine jelly for local analgesia    Procedure note:  The risks and benefits were explained and informed consent was obtained. 2% lidocaine urojet was used for local analgesia. The genitalia was prepped and draped in the sterile fashion.    The flexible scope was advanced into the urethra and into the bladder. There were no urethral strictures noted throughout the course of the urethra.    The bladder was completely surveyed in a systematic fashion. There were no foreign bodies, stones, diverticula. There were 2+ trabeculations. An abnormality was seen: large bladder tumor along the right base of the bladder extending up the lateral wall - difficult to see the right UO.      Upon retroflexion there was significant intravesical median lobe enlargement. As the flexible cystoscope was being withdrawn, the prostate was evaluated carefully. The lateral lobes of the prostate were significantly enlarged.     The patient tolerated the procedure well without complication.     Findings in summary:  Bladder tumor and significantly enlarged prostate                Assessment: 72 y.o. male with BPH and gross hematuria with a large bladder tumor    The risks and benefits of resection of a bladder tumor were discussed with the patient in detail.  The risks include but are not limited to burning with urination, bleeding, infection, pain, incomplete removal of bladder tumor, no guarantee of cancer cure, need for further procedures, injury to the kidney, ureter, bladder, bladder perforation and need for open surgery. The patient was informed that they may require a ureteral stent and that stents can  cause irritative voiding symptoms. The patient understands a catheter may be required post operatively. Patient understands these risks and has agreed to proceed with surgery.     Recently started on Eliquis then lovenox for suspected PE. CT chest with enlarging nodule pending CT-guided biopsy.   Discussed with Dr. St to hold lovenox pre and post-op.    Plan:  TURBT possible TURP  Hold lovenox 24 hours prior to surgery and plan to restart POD #3.   Follow up for surgery

## 2023-05-01 ENCOUNTER — TELEPHONE (OUTPATIENT)
Dept: UROLOGY | Facility: CLINIC | Age: 73
End: 2023-05-01
Payer: MEDICARE

## 2023-05-01 ENCOUNTER — PATIENT MESSAGE (OUTPATIENT)
Dept: HEMATOLOGY/ONCOLOGY | Facility: CLINIC | Age: 73
End: 2023-05-01
Payer: MEDICARE

## 2023-05-01 DIAGNOSIS — R31.0 GROSS HEMATURIA: Primary | ICD-10-CM

## 2023-05-01 DIAGNOSIS — N40.1 BPH WITH OBSTRUCTION/LOWER URINARY TRACT SYMPTOMS: ICD-10-CM

## 2023-05-01 DIAGNOSIS — N13.8 BPH WITH OBSTRUCTION/LOWER URINARY TRACT SYMPTOMS: ICD-10-CM

## 2023-05-02 LAB
BCLL FINAL DIAGNOSIS: NORMAL
BCLL RESULT: NORMAL
BCLL SPECIMEN TYPE: NORMAL

## 2023-05-03 ENCOUNTER — TELEPHONE (OUTPATIENT)
Dept: UROLOGY | Facility: CLINIC | Age: 73
End: 2023-05-03
Payer: MEDICARE

## 2023-05-03 NOTE — TELEPHONE ENCOUNTER
----- Message from Aracelis Siegel MA sent at 5/2/2023  3:28 PM CDT -----  Contact: Patient    ----- Message -----  From: Jolanta Lee  Sent: 5/2/2023   3:11 PM CDT  To: [facility] C[facility] Staff    Type:  Patient Needs Advice    Who Called:  Patient  What this is regarding?:  Pt was told the was going to have to change the date and time of his surgery and would like to verify it with Ms. Cerda.  Best Call Back Number:  [phone]  Additional Information:  Please call the patient back at the phone number listed above to advise. Thank you!

## 2023-05-04 ENCOUNTER — TELEPHONE (OUTPATIENT)
Dept: UROLOGY | Facility: CLINIC | Age: 73
End: 2023-05-04
Payer: MEDICARE

## 2023-05-04 DIAGNOSIS — N40.1 BPH WITH OBSTRUCTION/LOWER URINARY TRACT SYMPTOMS: Primary | ICD-10-CM

## 2023-05-04 DIAGNOSIS — N13.8 BPH WITH OBSTRUCTION/LOWER URINARY TRACT SYMPTOMS: Primary | ICD-10-CM

## 2023-05-04 LAB
CLINICAL CYTOGENETICIST REVIEW: NORMAL
CLL GENE MUT ANL BLD/T: NORMAL
CLL, FISH ADDITIONAL INFORMATION: NORMAL
CLL, FISH DISCLAIMER: NORMAL
CLL, FISH RELEASED BY: NORMAL
CLL, FISH RESULT SUMMARY: NORMAL
CLL, FISH RESULT TABLE: NORMAL
FCLL REASON FOR REFERRAL: NORMAL
FCLL SPECIMEN: NORMAL
REF LAB TEST METHOD: NORMAL
SPECIMEN SOURCE: NORMAL

## 2023-05-08 ENCOUNTER — PATIENT MESSAGE (OUTPATIENT)
Dept: UROLOGY | Facility: CLINIC | Age: 73
End: 2023-05-08
Payer: MEDICARE

## 2023-05-11 ENCOUNTER — PATIENT MESSAGE (OUTPATIENT)
Dept: UROLOGY | Facility: CLINIC | Age: 73
End: 2023-05-11
Payer: MEDICARE

## 2023-05-11 ENCOUNTER — TELEPHONE (OUTPATIENT)
Dept: HEMATOLOGY/ONCOLOGY | Facility: CLINIC | Age: 73
End: 2023-05-11
Payer: MEDICARE

## 2023-05-12 ENCOUNTER — TELEPHONE (OUTPATIENT)
Dept: HEMATOLOGY/ONCOLOGY | Facility: CLINIC | Age: 73
End: 2023-05-12
Payer: MEDICARE

## 2023-05-25 DIAGNOSIS — Z86.711 HISTORY OF PULMONARY EMBOLUS (PE): ICD-10-CM

## 2023-05-25 RX ORDER — ENOXAPARIN SODIUM 150 MG/ML
120 INJECTION SUBCUTANEOUS DAILY
Qty: 14 EACH | Refills: 2 | Status: SHIPPED | OUTPATIENT
Start: 2023-05-25 | End: 2023-10-16

## 2023-05-25 NOTE — TELEPHONE ENCOUNTER
----- Message from Lauren Guajardo sent at 5/25/2023 10:32 AM CDT -----  Regarding: Medication Refill  Contact: Patient  Type:  RX Refill Request    Who Called: Patient   Refill or New Rx: Refill   RX Name and Strength:enoxaparin (LOVENOX) 120 mg/0.8 mL Syrg  How is the patient currently taking it? (ex. 1XDay):Inject 0.8 mLs (120 mg total) into the skin Daily. Begin today injecting 120 mg of enoxaparin subcutaneous daily through and inclusive of April 26; hold Lovenox on April 27th, for planned cystoscopy on April 28th.  Resumption of Lovenox when okay with Urology, which can be the next day after cystoscopy if okay with Urology.  Generic please. - Subcutaneous  Is this a 30 day or 90 day RX: 30 day   Preferred Pharmacy with phone number:[facility] [address] [place], [place] - [address]   Phone:  [phone]  Local or Mail Order: Local   Ordering Provider: Sandhya   Would the patient rather a call back or a response via [facility]? Call back   Best Call Back Number: [phone]  Additional Information: Pt was advised by  pharmacy refill authorization was sent several times since 05/15 and they have no received a response from physician please assist

## 2023-05-25 NOTE — TELEPHONE ENCOUNTER
Called to check on patient and overall claims he has been handling things okay.  PET-CT fusion reviewed with him.  Possibility of 2 different primary tumors occurring verses 1 primary with metastatic disease needing to be entertained.  Patient will need to stop Lovenox which was renewed for him at least 1 day prior before his planned TURBT, then hold the day of the procedure and resume afterwards when okay by urology; to discuss with his urologist before the procedure.  Pulmonary service plans presently not to risk transthoracic CT-guided needle biopsy for biopsy of lung lesion due to high risk of pneumothorax.  Patient to call if he needs anything for anxiety but right now declines.  To keep in touch.

## 2023-05-30 ENCOUNTER — PATIENT MESSAGE (OUTPATIENT)
Dept: HEMATOLOGY/ONCOLOGY | Facility: CLINIC | Age: 73
End: 2023-05-30
Payer: MEDICARE

## 2023-05-30 ENCOUNTER — TELEPHONE (OUTPATIENT)
Dept: HEMATOLOGY/ONCOLOGY | Facility: CLINIC | Age: 73
End: 2023-05-30
Payer: MEDICARE

## 2023-05-31 DIAGNOSIS — N40.0 BENIGN PROSTATIC HYPERPLASIA, UNSPECIFIED WHETHER LOWER URINARY TRACT SYMPTOMS PRESENT: ICD-10-CM

## 2023-05-31 DIAGNOSIS — N13.8 BPH WITH OBSTRUCTION/LOWER URINARY TRACT SYMPTOMS: ICD-10-CM

## 2023-05-31 DIAGNOSIS — N40.1 BPH WITH OBSTRUCTION/LOWER URINARY TRACT SYMPTOMS: ICD-10-CM

## 2023-05-31 RX ORDER — TAMSULOSIN HYDROCHLORIDE 0.4 MG/1
0.8 CAPSULE ORAL DAILY
Qty: 180 CAPSULE | Refills: 3 | Status: SHIPPED | OUTPATIENT
Start: 2023-05-31

## 2023-05-31 RX ORDER — FINASTERIDE 5 MG/1
5 TABLET, FILM COATED ORAL DAILY
Qty: 90 TABLET | Refills: 3 | Status: SHIPPED | OUTPATIENT
Start: 2023-05-31

## 2023-05-31 NOTE — TELEPHONE ENCOUNTER
No care due was identified.  [facility] Embedded Care Due Messages. Reference number: [id number].   5/31/2023 9:59:28 AM CDT

## 2023-05-31 NOTE — TELEPHONE ENCOUNTER
Refill Decision Note   Saurav Bahena  is requesting a refill authorization.  Brief Assessment and Rationale for Refill:  Approve     Medication Therapy Plan:       Medication Reconciliation Completed: No   Comments:     No Care Gaps recommended.     Note composed:10:28 AM 05/31/2023

## 2023-05-31 NOTE — TELEPHONE ENCOUNTER
No care due was identified.  [facility] Embedded Care Due Messages. Reference number: [id number].   5/31/2023 10:25:32 AM CDT

## 2023-05-31 NOTE — TELEPHONE ENCOUNTER
Refill Decision Note   Saurav Bahena  is requesting a refill authorization.  Brief Assessment and Rationale for Refill:  Approve     Medication Therapy Plan:         Comments:     No Care Gaps recommended.     Note composed:12:27 PM 05/31/2023

## 2023-06-01 ENCOUNTER — OFFICE VISIT (OUTPATIENT)
Dept: HEMATOLOGY/ONCOLOGY | Facility: CLINIC | Age: 73
End: 2023-06-01
Payer: MEDICARE

## 2023-06-01 ENCOUNTER — PATIENT MESSAGE (OUTPATIENT)
Dept: UROLOGY | Facility: CLINIC | Age: 73
End: 2023-06-01
Payer: MEDICARE

## 2023-06-01 ENCOUNTER — LAB VISIT (OUTPATIENT)
Dept: LAB | Facility: HOSPITAL | Age: 73
End: 2023-06-01
Attending: INTERNAL MEDICINE
Payer: MEDICARE

## 2023-06-01 VITALS
BODY MASS INDEX: 25.15 KG/M2 | TEMPERATURE: 98 F | HEIGHT: 70 IN | RESPIRATION RATE: 16 BRPM | WEIGHT: 175.69 LBS | SYSTOLIC BLOOD PRESSURE: 132 MMHG | OXYGEN SATURATION: 96 % | DIASTOLIC BLOOD PRESSURE: 64 MMHG | HEART RATE: 73 BPM

## 2023-06-01 DIAGNOSIS — D63.8 ANEMIA OF CHRONIC DISEASE: ICD-10-CM

## 2023-06-01 DIAGNOSIS — E79.0 ELEVATED URIC ACID IN BLOOD: ICD-10-CM

## 2023-06-01 DIAGNOSIS — D47.2 MGUS (MONOCLONAL GAMMOPATHY OF UNKNOWN SIGNIFICANCE): ICD-10-CM

## 2023-06-01 DIAGNOSIS — E78.5 HYPERLIPIDEMIA, UNSPECIFIED HYPERLIPIDEMIA TYPE: ICD-10-CM

## 2023-06-01 DIAGNOSIS — C91.10 CLL (CHRONIC LYMPHOCYTIC LEUKEMIA): Primary | ICD-10-CM

## 2023-06-01 DIAGNOSIS — C67.9 MALIGNANT NEOPLASM OF URINARY BLADDER, UNSPECIFIED SITE: ICD-10-CM

## 2023-06-01 DIAGNOSIS — D69.6 THROMBOCYTOPENIA: ICD-10-CM

## 2023-06-01 DIAGNOSIS — R91.1 LUNG NODULE: ICD-10-CM

## 2023-06-01 DIAGNOSIS — I10 PRIMARY HYPERTENSION: ICD-10-CM

## 2023-06-01 LAB — B2 MICROGLOB SERPL-MCNC: 5.3 UG/ML (ref 0–2.5)

## 2023-06-01 PROCEDURE — 3078F DIAST BP <80 MM HG: CPT | Mod: CPTII,S$GLB,, | Performed by: INTERNAL MEDICINE

## 2023-06-01 PROCEDURE — 3075F SYST BP GE 130 - 139MM HG: CPT | Mod: CPTII,S$GLB,, | Performed by: INTERNAL MEDICINE

## 2023-06-01 PROCEDURE — 3078F PR MOST RECENT DIASTOLIC BLOOD PRESSURE < 80 MM HG: ICD-10-PCS | Mod: CPTII,S$GLB,, | Performed by: INTERNAL MEDICINE

## 2023-06-01 PROCEDURE — 1159F MED LIST DOCD IN RCRD: CPT | Mod: CPTII,S$GLB,, | Performed by: INTERNAL MEDICINE

## 2023-06-01 PROCEDURE — 83521 IG LIGHT CHAINS FREE EACH: CPT | Mod: 59 | Performed by: INTERNAL MEDICINE

## 2023-06-01 PROCEDURE — 1126F PR PAIN SEVERITY QUANTIFIED, NO PAIN PRESENT: ICD-10-PCS | Mod: CPTII,S$GLB,, | Performed by: INTERNAL MEDICINE

## 2023-06-01 PROCEDURE — 99999 PR PBB SHADOW E&M-EST. PATIENT-LVL IV: CPT | Mod: PBBFAC,,, | Performed by: INTERNAL MEDICINE

## 2023-06-01 PROCEDURE — 99215 OFFICE O/P EST HI 40 MIN: CPT | Mod: S$GLB,,, | Performed by: INTERNAL MEDICINE

## 2023-06-01 PROCEDURE — 84156 ASSAY OF PROTEIN URINE: CPT | Performed by: INTERNAL MEDICINE

## 2023-06-01 PROCEDURE — 36415 COLL VENOUS BLD VENIPUNCTURE: CPT | Mod: PN | Performed by: INTERNAL MEDICINE

## 2023-06-01 PROCEDURE — 3008F PR BODY MASS INDEX (BMI) DOCUMENTED: ICD-10-PCS | Mod: CPTII,S$GLB,, | Performed by: INTERNAL MEDICINE

## 2023-06-01 PROCEDURE — 1101F PR PT FALLS ASSESS DOC 0-1 FALLS W/OUT INJ PAST YR: ICD-10-PCS | Mod: CPTII,S$GLB,, | Performed by: INTERNAL MEDICINE

## 2023-06-01 PROCEDURE — 84166 PROTEIN E-PHORESIS/URINE/CSF: CPT | Performed by: INTERNAL MEDICINE

## 2023-06-01 PROCEDURE — 84166 PROTEIN E-PHORESIS/URINE/CSF: CPT | Mod: 26,,, | Performed by: PATHOLOGY

## 2023-06-01 PROCEDURE — 84166 PATHOLOGIST INTERPRETATION UPE: ICD-10-PCS | Mod: 26,,, | Performed by: PATHOLOGY

## 2023-06-01 PROCEDURE — 1160F RVW MEDS BY RX/DR IN RCRD: CPT | Mod: CPTII,S$GLB,, | Performed by: INTERNAL MEDICINE

## 2023-06-01 PROCEDURE — 1101F PT FALLS ASSESS-DOCD LE1/YR: CPT | Mod: CPTII,S$GLB,, | Performed by: INTERNAL MEDICINE

## 2023-06-01 PROCEDURE — 3288F FALL RISK ASSESSMENT DOCD: CPT | Mod: CPTII,S$GLB,, | Performed by: INTERNAL MEDICINE

## 2023-06-01 PROCEDURE — 3288F PR FALLS RISK ASSESSMENT DOCUMENTED: ICD-10-PCS | Mod: CPTII,S$GLB,, | Performed by: INTERNAL MEDICINE

## 2023-06-01 PROCEDURE — 3062F PR POS MACROALBUMINURIA RESULT DOCUMENTED/REVIEW: ICD-10-PCS | Mod: CPTII,S$GLB,, | Performed by: INTERNAL MEDICINE

## 2023-06-01 PROCEDURE — 3008F BODY MASS INDEX DOCD: CPT | Mod: CPTII,S$GLB,, | Performed by: INTERNAL MEDICINE

## 2023-06-01 PROCEDURE — 1126F AMNT PAIN NOTED NONE PRSNT: CPT | Mod: CPTII,S$GLB,, | Performed by: INTERNAL MEDICINE

## 2023-06-01 PROCEDURE — 1160F PR REVIEW ALL MEDS BY PRESCRIBER/CLIN PHARMACIST DOCUMENTED: ICD-10-PCS | Mod: CPTII,S$GLB,, | Performed by: INTERNAL MEDICINE

## 2023-06-01 PROCEDURE — 99999 PR PBB SHADOW E&M-EST. PATIENT-LVL IV: ICD-10-PCS | Mod: PBBFAC,,, | Performed by: INTERNAL MEDICINE

## 2023-06-01 PROCEDURE — 3066F NEPHROPATHY DOC TX: CPT | Mod: CPTII,S$GLB,, | Performed by: INTERNAL MEDICINE

## 2023-06-01 PROCEDURE — 4010F PR ACE/ARB THEARPY RXD/TAKEN: ICD-10-PCS | Mod: CPTII,S$GLB,, | Performed by: INTERNAL MEDICINE

## 2023-06-01 PROCEDURE — 4010F ACE/ARB THERAPY RXD/TAKEN: CPT | Mod: CPTII,S$GLB,, | Performed by: INTERNAL MEDICINE

## 2023-06-01 PROCEDURE — 82232 ASSAY OF BETA-2 PROTEIN: CPT | Performed by: INTERNAL MEDICINE

## 2023-06-01 PROCEDURE — 99215 PR OFFICE/OUTPT VISIT, EST, LEVL V, 40-54 MIN: ICD-10-PCS | Mod: S$GLB,,, | Performed by: INTERNAL MEDICINE

## 2023-06-01 PROCEDURE — 3075F PR MOST RECENT SYSTOLIC BLOOD PRESS GE 130-139MM HG: ICD-10-PCS | Mod: CPTII,S$GLB,, | Performed by: INTERNAL MEDICINE

## 2023-06-01 PROCEDURE — 3062F POS MACROALBUMINURIA REV: CPT | Mod: CPTII,S$GLB,, | Performed by: INTERNAL MEDICINE

## 2023-06-01 PROCEDURE — 3066F PR DOCUMENTATION OF TREATMENT FOR NEPHROPATHY: ICD-10-PCS | Mod: CPTII,S$GLB,, | Performed by: INTERNAL MEDICINE

## 2023-06-01 PROCEDURE — 1159F PR MEDICATION LIST DOCUMENTED IN MEDICAL RECORD: ICD-10-PCS | Mod: CPTII,S$GLB,, | Performed by: INTERNAL MEDICINE

## 2023-06-01 RX ORDER — ALLOPURINOL 100 MG/1
100 TABLET ORAL DAILY
Qty: 30 TABLET | Refills: 3 | Status: SHIPPED | OUTPATIENT
Start: 2023-06-01 | End: 2023-08-08 | Stop reason: SDUPTHER

## 2023-06-01 NOTE — PROGRESS NOTES
Answers submitted by the patient for this visit:  Review of Systems Questionnaire (Submitted on 2023)  appetite change : No  unexpected weight change: No  mouth sores: No  visual disturbance: No  cough: No  shortness of breath: Yes  chest pain: No  abdominal pain: No  diarrhea: No  frequency: Yes  back pain: No  rash: No  headaches: No  adenopathy: No  nervous/ anxious: No      Subjective:       Name: Saurav Bahena  : 1950  MRN: [id number]    Chief Complaint   Patient presents with    CLL (chronic lymphocytic leukemia)     Follow up with CT      Patient is in clinic by himself.    HPI: Saurav Bahena is a 72 y.o. male presents to discuss the evaluation done to assess his CLL (chronic lymphocytic leukemia) (Follow up with CT)    He underwent blood workup done on 2023 that showed an eye a normal iron profile vitamin B12 HIV hepatitis panel TSH free T4 haptoglobin LDH.  His ESR was elevated at 74.  His SPEP with immunofixation showed a monoclonal gammopathy affecting IgM lambda.  His quantitative immunoglobulin showed an elevation of his IgM at 755.  His uric acid was elevated at 7.5.    His TP53 gene somatic mutation came back normal.  His FISH analysis for CLL showed a deletion 13q.  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    Unfortunately the patient did not proceed with a CT-guided lung biopsy are of concern about a pneumothorax and lung collapse in view of his history of smoking.  He might reconsider it at a later time.    He was seen by Dr. Cruz on 2023 to assess his gross hematuria and abnormal CT kidney protocol.  He recommended to proceed with a cystoscopy in the office. It was scheduled to have it on 2023.  The patient canceled the appointment.  He would like to reschedule it for next week.    He is still taking his Lovenox injections.    The patient denies any fever chills night sweats weight loss recurrent infection easy bruising  bleeding.    Oncology History    No history exists.        Past Medical History:   Diagnosis Date    Anticoagulant long-term use     aspirin    Atherosclerosis of native coronary artery of native heart without angina pectoris 03/08/2022    Atrophic kidney     right    Bladder cancer     BPH (benign prostatic hyperplasia)     Cat bite of left forearm with infection 02/11/2019    CLL (chronic lymphocytic leukemia)     sees Dr. Solares    CTS (carpal tunnel syndrome)     Current smoker on some days     using e cig, regular cigarettes sometimes    DJD (degenerative joint disease) of knee     left    DJD (degenerative joint disease) of knee     bilateral/ LT more than RT    Emphysema lung     HLD (hyperlipidemia)     no current meds    HTN (hypertension)     Insomnia     Nocturia     Other pulmonary embolism without acute cor pulmonale 03/17/2023    Panlobular emphysema 03/01/2023    Sleep apnea     CPAP       Past Surgical History:   Procedure Laterality Date    COLONOSCOPY  09/01/2010    Dr. Aguilar, in legacy: int. hemorrhoids, repeat in 10 years for screening    CYSTOSCOPY W/ RETROGRADES Bilateral 07/02/2020    Procedure: CYSTOSCOPY, WITH RETROGRADE PYELOGRAM;  Surgeon: Andrea Tam MD;  Location: [facility] OR;  Service: Urology;  Laterality: Bilateral;    ELBOW SURGERY      left elbow, bursitis    KNEE SURGERY  1981?    right    MULTIPLE TOOTH EXTRACTIONS      TRANSRECTAL ULTRASOUND EXAMINATION N/A 07/02/2020    Procedure: ULTRASOUND, RECTAL APPROACH;  Surgeon: Andrea Tam MD;  Location: [facility] OR;  Service: Urology;  Laterality: N/A;    TRIGGER FINGER RELEASE Left        Family History   Problem Relation Age of Onset    Diabetes Mother     Heart disease Father 65        MI    Arthritis Father         RA    Heart disease Brother     Colon cancer Neg Hx     Crohn's disease Neg Hx     Ulcerative colitis Neg Hx     Stomach cancer Neg Hx     Esophageal cancer Neg Hx        Social History     Socioeconomic History     Marital status: Single   Occupational History    Occupation:    Tobacco Use    Smoking status: Former     Packs/day: 0.30     Years: 20.00     Pack years: 6.00     Types: Cigarettes     Quit date: 2021     Years since quittin.4    Smokeless tobacco: Never    Tobacco comments:     nicorette, vapor cigs/ 3 cigarettes   Substance and Sexual Activity    Alcohol use: Yes     Alcohol/week: 2.0 - 3.0 standard drinks     Types: 2 - 3 Shots of liquor per week     Comment: 2-3/day on weekends    Drug use: No     Social Determinants of Health     Financial Resource Strain: Low Risk     Difficulty of Paying Living Expenses: Not very hard   Food Insecurity: No Food Insecurity    Worried About Running Out of Food in the Last Year: Never true    Ran Out of Food in the Last Year: Never true   Transportation Needs: No Transportation Needs    Lack of Transportation (Medical): No    Lack of Transportation (Non-Medical): No   Physical Activity: Insufficiently Active    Days of Exercise per Week: 3 days    Minutes of Exercise per Session: 30 min   Stress: Stress Concern Present    Feeling of Stress : To some extent   Social Connections: Unknown    Frequency of Communication with Friends and Family: More than three times a week    Frequency of Social Gatherings with Friends and Family: Once a week    Attends Worship Services: Patient refused    Active Member of Clubs or Organizations: Yes    Attends Club or Organization Meetings: More than 4 times per year    Marital Status: Never    Housing Stability: Low Risk     Unable to Pay for Housing in the Last Year: No    Number of Places Lived in the Last Year: 1    Unstable Housing in the Last Year: No       Review of patient's allergies indicates:   Allergen Reactions    No known drug allergies        Review of Systems   Constitutional:  Negative for appetite change, fatigue, fever and unexpected weight change.   HENT:  Negative for mouth sores and  "sore throat.    Eyes:  Negative for photophobia and visual disturbance.   Respiratory:  Negative for cough and shortness of breath.    Cardiovascular:  Negative for chest pain.   Gastrointestinal:  Negative for abdominal pain, constipation and diarrhea.   Genitourinary:  Positive for frequency. Negative for dysuria and hematuria.   Musculoskeletal:  Positive for back pain. Negative for arthralgias and joint swelling.   Integumentary:  Negative for rash.   Neurological:  Negative for dizziness, weakness, light-headedness and headaches.   Hematological:  Negative for adenopathy. Does not bruise/bleed easily.   Psychiatric/Behavioral:  Negative for sleep disturbance. The patient is not nervous/anxious.           Objective:     Vitals:    06/01/23 0844   BP: 132/64   BP Location: Right arm   Patient Position: Sitting   BP Method: Medium (Manual)   Pulse: 73   Resp: 16   Temp: 98.1 °F (36.7 °C)   TempSrc: Temporal   SpO2: 96%   Weight: 79.7 kg (175 lb 11.3 oz)   Height: 5' 10" (1.778 m)        Physical Exam  Vitals reviewed.   Constitutional:       Appearance: Normal appearance.   HENT:      Head: Normocephalic and atraumatic.   Eyes:      General: No scleral icterus.     Pupils: Pupils are equal, round, and reactive to light.   Cardiovascular:      Rate and Rhythm: Normal rate and regular rhythm.      Pulses: Normal pulses.      Heart sounds: Normal heart sounds.   Pulmonary:      Effort: Pulmonary effort is normal.      Breath sounds: Normal breath sounds.   Abdominal:      General: Bowel sounds are normal. There is no distension.   Musculoskeletal:         General: No swelling.   Lymphadenopathy:      Cervical: No cervical adenopathy.   Skin:     General: Skin is warm.      Findings: No rash.   Neurological:      General: No focal deficit present.      Mental Status: He is alert and oriented to person, place, and time.      Cranial Nerves: No cranial nerve deficit.      Motor: No weakness.   Psychiatric:         Mood " and Affect: Mood normal.         Behavior: Behavior normal.              Current Outpatient Medications on File Prior to Visit   Medication Sig    albuterol (PROVENTIL/VENTOLIN HFA) 90 mcg/actuation inhaler Inhale 2 puffs into the lungs every 6 (six) hours as needed for Wheezing. Rescue    amLODIPine (NORVASC) 2.5 MG tablet Take 1 tablet (2.5 mg total) by mouth once daily.    ascorbic acid, vitamin C, (VITAMIN C) 100 MG tablet Take 100 mg by mouth once daily.    cholecalciferol, vitamin D3, (VITAMIN D3 ORAL) Take 1 capsule by mouth once daily.    enoxaparin (LOVENOX) 120 mg/0.8 mL Syrg Inject 0.8 mLs (120 mg total) into the skin Daily. Begin today injecting 120 mg of enoxaparin subcutaneous daily through and inclusive of April 26; hold Lovenox on April 27th, for planned cystoscopy on April 28th.  Resumption of Lovenox when okay with Urology, which can be the next day after cystoscopy if okay with Urology.  Generic please.    finasteride (PROSCAR) 5 mg tablet TAKE 1 TABLET BY MOUTH ONCE DAILY    losartan (COZAAR) 50 MG tablet Take 1 tablet (50 mg total) by mouth once daily.    multivit-min/folic/vit K/lycop (MEN'S 50 PLUS MULTIVITAMIN ORAL) Take 1 tablet by mouth once daily.    tamsulosin (FLOMAX) 0.4 mg Cap Take 2 capsules (0.8 mg total) by mouth once daily.    tiotropium-olodateroL (STIOLTO RESPIMAT) 2.5-2.5 mcg/actuation Mist Inhale 1 puff into the lungs once daily. Controller    zinc gluconate 50 mg tablet Take 50 mg by mouth once daily.    ascorbic acid (VITAMIN C ORAL) Take 1 tablet by mouth once daily.    ZINC ORAL Take 1 tablet by mouth once daily.    zolpidem (AMBIEN) 5 MG Tab To use trazodone prescribed earlier as needed for sleep and wean off Ambien 5 mg by mouth at bedtime as needed for sleep still needed 1 hour after trazodone.  Will not be able to continue to prescribe Ambien.  If he feels like he needs to be on Ambien will need to obtain from psych.  Generic please (Patient not taking: Reported on  5/24/2023)     Current Facility-Administered Medications on File Prior to Visit   Medication    lactated ringers infusion    lidocaine (PF) 10 mg/ml (1%) injection 10 mg    triamcinolone acetonide injection 40 mg       CBC:  Lab Results   Component Value Date    WBC 9.69 05/18/2023    HGB 10.3 (L) 05/18/2023    HCT 32.4 (L) 05/18/2023    MCV 79 (L) 05/18/2023     (L) 05/18/2023         CMP:  Sodium   Date Value Ref Range Status   04/24/2023 135 (L) 136 - 145 mmol/L Final     Potassium   Date Value Ref Range Status   04/24/2023 5.0 3.5 - 5.1 mmol/L Final     Chloride   Date Value Ref Range Status   04/24/2023 99 95 - 110 mmol/L Final     CO2   Date Value Ref Range Status   04/24/2023 26 23 - 29 mmol/L Final     Glucose   Date Value Ref Range Status   04/24/2023 112 (H) 70 - 110 mg/dL Final     BUN   Date Value Ref Range Status   04/24/2023 14 8 - 23 mg/dL Final     Creatinine   Date Value Ref Range Status   04/24/2023 0.9 0.5 - 1.4 mg/dL Final     Calcium   Date Value Ref Range Status   04/24/2023 9.5 8.7 - 10.5 mg/dL Final     Total Protein   Date Value Ref Range Status   03/28/2023 7.8 6.0 - 8.4 g/dL Final     Albumin   Date Value Ref Range Status   03/28/2023 3.6 3.5 - 5.2 g/dL Final     Total Bilirubin   Date Value Ref Range Status   03/28/2023 0.3 0.1 - 1.0 mg/dL Final     Comment:     For infants and newborns, interpretation of results should be based  on gestational age, weight and in agreement with clinical  observations.    Premature Infant recommended reference ranges:  Up to 24 hours.............<8.0 mg/dL  Up to 48 hours............<12.0 mg/dL  3-5 days..................<15.0 mg/dL  6-29 days.................<15.0 mg/dL       Alkaline Phosphatase   Date Value Ref Range Status   03/28/2023 79 55 - 135 U/L Final     [facility]   Date Value Ref Range Status   04/04/2016 16 (L) 17 - 59 U/L Final     AST   Date Value Ref Range Status   03/28/2023 12 10 - 40 U/L Final     ALT   Date Value Ref  Range Status   03/28/2023 11 10 - 44 U/L Final     Anion Gap   Date Value Ref Range Status   04/24/2023 10 8 - 16 mmol/L Final     eGFR if    Date Value Ref Range Status   01/21/2022 >60.0 >60 mL/min/1.73 m^2 Final     eGFR if non    Date Value Ref Range Status   01/21/2022 >60.0 >60 mL/min/1.73 m^2 Final     Comment:     Calculation used to obtain the estimated glomerular filtration  rate (eGFR) is the CKD-EPI equation.          NM PET CT Routine Skull to Mid Thigh  Narrative: EXAMINATION:  NM PET CT ROUTINE    CLINICAL HISTORY:  Spiculated opacity anterior right upper lobe new since 05/07/2020; 5.5 cm bladder mass also noted on imaging with patient scheduled for cystoscopy for further evaluation please see epic;  Other specified disorders of bladder    72-year-old male with a history of CLL.  The patient now presents with a bladder mass, recent pulmonary embolism, and new pulmonary nodule.    TECHNIQUE:  Following IV injection of 12.27 mCi F-18 FDG, 3D PET imaging was performed from the skull base to the mid thighs.  A noncontrast non breath hold CT was obtained in conjunction with the PET scan for attenuation correction and anatomic correlation.  PET-CT fusion images were generated.    COMPARISON:  Correlation is made with the CT urogram dated 04/11/2023 and CT chest dated 04/05/2023.    FINDINGS:  Head/neck:    No significant abnormal hypermetabolic foci are identified within the head and neck region.  No lymphadenopathy is present.    Chest:    There is a slightly irregular subpleural markedly hypermetabolic nodule in the anterior right upper lobe which is new compared to a CT dated 05/07/2020 and is highly suspicious for malignancy.  On image 85 the nodule measures 1.6 x 0.8 cm and has a max SUV of 10.2.  No other significant abnormal hypermetabolic foci are identified within the soft tissues of the chest.  No lymphadenopathy or pleural effusion are present.  Findings of severe  centrilobular pulmonary emphysema are present.    Abdomen/pelvis:    There is a markedly hypermetabolic mass arising from the posterior bladder wall with the bulk of the mass extending to the right posterior and right posterolateral aspect of the bladder.  The mass measures 6.1 x 4.3 x 3.8 cm and obstructs the right ureterovesical junction with severe right hydroureter and right hydronephrosis and associated severe right renal atrophy.  The max SUV is 29.8.  The spleen is enlarged and measures 21.5 x 14.6 x 8.8 cm.  No lymphadenopathy or ascites are present.  The adrenal glands are normal.    Skeleton:    There is a focus of hypermetabolism involving the distal aspect of the right 2nd rib with no associated fracture, lytic destruction, or sclerosis.  This must be regarded as suspicious for metastatic disease and the max SUV is 3.6.  No other significant abnormal hypermetabolic foci are identified within the skeleton.  Impression: 1. Markedly hypermetabolic new subpleural anterior right upper lobe nodule which is highly suspicious for malignancy.  Biopsy is recommended.  2. Markedly hypermetabolic urinary bladder mass which is highly suspicious for malignancy.  The mass causes obstruction of the right ureterovesical junction with associated severe right hydroureter and right hydronephrosis and chronic severe right renal atrophy.  3. Hypermetabolic focus within the distal aspect of the right 2nd rib which is suspicious for osseous metastatic disease.  4. Splenomegaly.  5. Severe centrilobular pulmonary emphysema.    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  The patient has been seen and followed by Dr. Solares  for years.  He did not require treatment for his disease.  -I reviewed with the patient the natural progression of this disease.  Usually it is not treated unless it starts causing end-organ damage and this include but not restricted to fever chills night sweats unintentional weight loss recurrent infection anemia thrombocytopenia large lymphadenopathy etcetera.  -his blood workup done to risk stratify his disease showed a deletion 13q and a normal TP53 and a mutated IGH making his disease in a good category risk.  -his uric acid was elevated.  He will be started on allopurinol 100 mg daily.    -His LDH and haptoglobin came back normal.      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  He will have this abnormality further evaluated with blood workup that will include free light chain UPEP with immunofixation and beta 2 microglobulin.  The patient also will require to underwent bone marrow biopsy for further evaluation of his normocytic anemia and intermittent thrombocytopenia since his blood workup failed to show a reason behind his anemia and thrombocytopenia.  This could be related to CLL or possibly a blood related dyscrasia.  The patient will call us back next week with his decision.        Lung nodule:  -I reviewed independently the patient imaging studies and discussed the findings with the patient.  His recent PET scan showed an active right lung nodule with an SUV of 10.  This is highly worsened either of a primary or distant metastatic disease possibly coming from his bladder cancer.    -the patient is undecided at this time.  Depending on his cystoscopy results he will consider to proceed with his CT-guided lung biopsy.     Bladder tumor:  -in view of his recent onset of hematuria on Eliquis the patient underwent imaging studies that confirmed the presence of a bladder cancer.  I reviewed independently the results of his imaging studies as well.  He is due to be  seen by  to undergo a cystoscopy.    Normocytic anemia and intermittent thrombocytopenia:  - Hgb 10 MCV 82 and plat 181. His plat count has been fluctuating between 148 and 178.    -the patient workup showed normal iron profile vitamin B12 TSH free T4.  His SPEP with immunofixation showed an MGUS affecting IgM lambda.  The patient will require a bone marrow biopsy to assess if his anemia and thrombocytopenia is related to a CLL versus a blood related dyscrasia.  Currently he is undecided.  He will call us back next week with his decision.        Pulmonary embolism.  -patient is currently on Lovenox at a dose of 120 mg subQ daily.  Awaiting him to finish his urologic workup he will be switched at that time to Eliquis.  He will require lifelong anticoagulation since his PE is induced by his malignancy.    HTN and dyslipidemia:  Patient is on amlodipine            Med Onc Chart Routing      Follow up with physician 6 weeks. to discuss theresults of his labs drawn today.   Follow up with ALISON    Infusion scheduling note    Injection scheduling note    Labs Free light chains   Scheduling:  Preferred lab:  Lab interval:  24 hours urine collection for UPEP and IF beta 2 microglobulin   Imaging    Pharmacy appointment    Other referrals             Plan was discussed with the patient at length, and he verbalized understanding. Saurav was given an opportunity to ask questions that were answered to his satisfaction, and he was advised to call in the interval if any problems or questions arise.  Signed:  Linnea St MD   Hematology and Oncology  [facility] - HEMATOLOGY ONCOLOGY  [facility], [place]

## 2023-06-02 LAB
KAPPA LC SER QL IA: 4.23 MG/DL (ref 0.33–1.94)
KAPPA LC/LAMBDA SER IA: 0.2 (ref 0.26–1.65)
LAMBDA LC SER QL IA: 21.17 MG/DL (ref 0.57–2.63)

## 2023-06-05 DIAGNOSIS — I10 PRIMARY HYPERTENSION: ICD-10-CM

## 2023-06-05 DIAGNOSIS — E87.1 HYPONATREMIA: ICD-10-CM

## 2023-06-05 LAB
PROT 24H UR-MRATE: 770 MG/SPEC (ref 0–100)
PROT UR-MCNC: 27 MG/DL (ref 0–15)
URINE COLLECTION DURATION: 24 HR
URINE VOLUME: 2850 ML

## 2023-06-05 RX ORDER — AMLODIPINE BESYLATE 2.5 MG/1
2.5 TABLET ORAL DAILY
Qty: 30 TABLET | Refills: 3 | Status: CANCELLED | OUTPATIENT
Start: 2023-06-05 | End: 2024-06-04

## 2023-06-05 RX ORDER — LOSARTAN POTASSIUM 50 MG/1
50 TABLET ORAL DAILY
Qty: 30 TABLET | Refills: 3 | Status: CANCELLED | OUTPATIENT
Start: 2023-06-05 | End: 2024-06-04

## 2023-06-05 RX ORDER — LOSARTAN POTASSIUM 50 MG/1
50 TABLET ORAL DAILY
Qty: 30 TABLET | Refills: 3 | Status: SHIPPED | OUTPATIENT
Start: 2023-06-05 | End: 2023-08-27 | Stop reason: SDUPTHER

## 2023-06-05 RX ORDER — AMLODIPINE BESYLATE 2.5 MG/1
2.5 TABLET ORAL DAILY
Qty: 30 TABLET | Refills: 3 | Status: SHIPPED | OUTPATIENT
Start: 2023-06-05 | End: 2023-08-27 | Stop reason: SDUPTHER

## 2023-06-05 NOTE — TELEPHONE ENCOUNTER
No care due was identified.  [facility] Embedded Care Due Messages. Reference number: [id number].   6/05/2023 1:26:18 PM CDT

## 2023-06-05 NOTE — TELEPHONE ENCOUNTER
No care due was identified.  [facility] Embedded Care Due Messages. Reference number: [id number].   6/05/2023 4:33:26 PM CDT

## 2023-06-08 LAB
PATHOLOGIST INTERPRETATION UPE: NORMAL
PROT PATTERN UR ELPH-IMP: NORMAL

## 2023-06-15 ENCOUNTER — PATIENT MESSAGE (OUTPATIENT)
Dept: UROLOGY | Facility: CLINIC | Age: 73
End: 2023-06-15
Payer: MEDICARE

## 2023-06-15 ENCOUNTER — TELEPHONE (OUTPATIENT)
Dept: UROLOGY | Facility: CLINIC | Age: 73
End: 2023-06-15
Payer: MEDICARE

## 2023-06-15 ENCOUNTER — TELEPHONE (OUTPATIENT)
Dept: FAMILY MEDICINE | Facility: CLINIC | Age: 73
End: 2023-06-15
Payer: MEDICARE

## 2023-06-15 DIAGNOSIS — D50.9 IRON DEFICIENCY ANEMIA, UNSPECIFIED IRON DEFICIENCY ANEMIA TYPE: ICD-10-CM

## 2023-06-15 DIAGNOSIS — E78.5 HYPERLIPIDEMIA, UNSPECIFIED HYPERLIPIDEMIA TYPE: ICD-10-CM

## 2023-06-15 DIAGNOSIS — I10 PRIMARY HYPERTENSION: Primary | ICD-10-CM

## 2023-06-15 DIAGNOSIS — D69.6 THROMBOCYTOPENIA: ICD-10-CM

## 2023-06-15 DIAGNOSIS — D50.0 ANEMIA DUE TO BLOOD LOSS: ICD-10-CM

## 2023-06-15 DIAGNOSIS — M19.90 OSTEOARTHRITIS, UNSPECIFIED OSTEOARTHRITIS TYPE, UNSPECIFIED SITE: ICD-10-CM

## 2023-06-15 DIAGNOSIS — Z12.5 PROSTATE CANCER SCREENING: ICD-10-CM

## 2023-06-15 DIAGNOSIS — Z87.39 HISTORY OF GOUT: ICD-10-CM

## 2023-06-15 DIAGNOSIS — N13.8 BPH WITH OBSTRUCTION/LOWER URINARY TRACT SYMPTOMS: ICD-10-CM

## 2023-06-15 DIAGNOSIS — C91.10 CLL (CHRONIC LYMPHOCYTIC LEUKEMIA): ICD-10-CM

## 2023-06-15 DIAGNOSIS — N40.1 BPH WITH OBSTRUCTION/LOWER URINARY TRACT SYMPTOMS: ICD-10-CM

## 2023-06-15 NOTE — TELEPHONE ENCOUNTER
Please call patient and tell him I have ordered labs to be updated after an overnight fast by him.  Please have him schedule a follow-up appointment for reassessment and go over lab results sometime in the next few weeks.  Patient with a history of coronary artery disease and not on a statin with a history of hyperlipidemia in the past.  Will await lipid results before choosing strength of statin

## 2023-06-16 ENCOUNTER — PATIENT MESSAGE (OUTPATIENT)
Dept: FAMILY MEDICINE | Facility: CLINIC | Age: 73
End: 2023-06-16
Payer: MEDICARE

## 2023-06-22 ENCOUNTER — TELEPHONE (OUTPATIENT)
Dept: UROLOGY | Facility: CLINIC | Age: 73
End: 2023-06-22
Payer: MEDICARE

## 2023-06-22 NOTE — TELEPHONE ENCOUNTER
----- Message from Claudine Lai sent at 6/22/2023  3:04 PM CDT -----  Type: Needs Medical Advice  Who Called:  dragan/ [facility]     Best Call Back Number: [phone]  Additional Information: requesting a call back . Need dx code for rx HYDROcodone-acetaminophen (NORCO) 5-325 mg per tablet please advise thank you

## 2023-06-26 ENCOUNTER — PATIENT MESSAGE (OUTPATIENT)
Dept: UROLOGY | Facility: CLINIC | Age: 73
End: 2023-06-26
Payer: MEDICARE

## 2023-06-26 DIAGNOSIS — R91.1 LUNG NODULE: ICD-10-CM

## 2023-06-26 DIAGNOSIS — C67.9 MALIGNANT NEOPLASM OF URINARY BLADDER, UNSPECIFIED SITE: Primary | ICD-10-CM

## 2023-07-05 ENCOUNTER — LAB VISIT (OUTPATIENT)
Dept: LAB | Facility: HOSPITAL | Age: 73
End: 2023-07-05
Attending: INTERNAL MEDICINE
Payer: MEDICARE

## 2023-07-05 DIAGNOSIS — C91.10 CLL (CHRONIC LYMPHOCYTIC LEUKEMIA): ICD-10-CM

## 2023-07-05 DIAGNOSIS — N13.8 BPH WITH OBSTRUCTION/LOWER URINARY TRACT SYMPTOMS: ICD-10-CM

## 2023-07-05 DIAGNOSIS — Z12.5 PROSTATE CANCER SCREENING: ICD-10-CM

## 2023-07-05 DIAGNOSIS — E78.5 HYPERLIPIDEMIA, UNSPECIFIED HYPERLIPIDEMIA TYPE: ICD-10-CM

## 2023-07-05 DIAGNOSIS — Z87.39 HISTORY OF GOUT: ICD-10-CM

## 2023-07-05 DIAGNOSIS — D69.6 THROMBOCYTOPENIA: ICD-10-CM

## 2023-07-05 DIAGNOSIS — D50.9 IRON DEFICIENCY ANEMIA, UNSPECIFIED IRON DEFICIENCY ANEMIA TYPE: ICD-10-CM

## 2023-07-05 DIAGNOSIS — N40.1 BPH WITH OBSTRUCTION/LOWER URINARY TRACT SYMPTOMS: ICD-10-CM

## 2023-07-05 DIAGNOSIS — I10 PRIMARY HYPERTENSION: ICD-10-CM

## 2023-07-05 DIAGNOSIS — D50.0 ANEMIA DUE TO BLOOD LOSS: ICD-10-CM

## 2023-07-05 LAB
ALBUMIN SERPL BCP-MCNC: 3.6 G/DL (ref 3.5–5.2)
ALP SERPL-CCNC: 75 U/L (ref 55–135)
ALT SERPL W/O P-5'-P-CCNC: 15 U/L (ref 10–44)
ANION GAP SERPL CALC-SCNC: 7 MMOL/L (ref 8–16)
AST SERPL-CCNC: 11 U/L (ref 10–40)
BASOPHILS # BLD AUTO: 0.04 K/UL (ref 0–0.2)
BASOPHILS NFR BLD: 0.3 % (ref 0–1.9)
BILIRUB SERPL-MCNC: 0.4 MG/DL (ref 0.1–1)
BUN SERPL-MCNC: 15 MG/DL (ref 8–23)
CALCIUM SERPL-MCNC: 9.3 MG/DL (ref 8.7–10.5)
CHLORIDE SERPL-SCNC: 98 MMOL/L (ref 95–110)
CHOLEST SERPL-MCNC: 173 MG/DL (ref 120–199)
CHOLEST/HDLC SERPL: 4.3 {RATIO} (ref 2–5)
CO2 SERPL-SCNC: 26 MMOL/L (ref 23–29)
COMPLEXED PSA SERPL-MCNC: 0.07 NG/ML (ref 0–4)
CREAT SERPL-MCNC: 1 MG/DL (ref 0.5–1.4)
DIFFERENTIAL METHOD: ABNORMAL
EOSINOPHIL # BLD AUTO: 0 K/UL (ref 0–0.5)
EOSINOPHIL NFR BLD: 0.3 % (ref 0–8)
ERYTHROCYTE [DISTWIDTH] IN BLOOD BY AUTOMATED COUNT: 16.1 % (ref 11.5–14.5)
EST. GFR  (NO RACE VARIABLE): >60 ML/MIN/1.73 M^2
GLUCOSE SERPL-MCNC: 106 MG/DL (ref 70–110)
HCT VFR BLD AUTO: 32.5 % (ref 40–54)
HDLC SERPL-MCNC: 40 MG/DL (ref 40–75)
HDLC SERPL: 23.1 % (ref 20–50)
HGB BLD-MCNC: 10.1 G/DL (ref 14–18)
IMM GRANULOCYTES # BLD AUTO: 0.02 K/UL (ref 0–0.04)
IMM GRANULOCYTES NFR BLD AUTO: 0.2 % (ref 0–0.5)
LDLC SERPL CALC-MCNC: 116.4 MG/DL (ref 63–159)
LYMPHOCYTES # BLD AUTO: 8 K/UL (ref 1–4.8)
LYMPHOCYTES NFR BLD: 70.1 % (ref 18–48)
MAGNESIUM SERPL-MCNC: 1.8 MG/DL (ref 1.6–2.6)
MCH RBC QN AUTO: 24.8 PG (ref 27–31)
MCHC RBC AUTO-ENTMCNC: 31.1 G/DL (ref 32–36)
MCV RBC AUTO: 80 FL (ref 82–98)
MONOCYTES # BLD AUTO: 0.5 K/UL (ref 0.3–1)
MONOCYTES NFR BLD: 4.3 % (ref 4–15)
NEUTROPHILS # BLD AUTO: 2.8 K/UL (ref 1.8–7.7)
NEUTROPHILS NFR BLD: 24.8 % (ref 38–73)
NONHDLC SERPL-MCNC: 133 MG/DL
NRBC BLD-RTO: 0 /100 WBC
PLATELET # BLD AUTO: 177 K/UL (ref 150–450)
PLATELET BLD QL SMEAR: ABNORMAL
PMV BLD AUTO: 8.9 FL (ref 9.2–12.9)
POTASSIUM SERPL-SCNC: 4.9 MMOL/L (ref 3.5–5.1)
PROT SERPL-MCNC: 7.6 G/DL (ref 6–8.4)
RBC # BLD AUTO: 4.08 M/UL (ref 4.6–6.2)
SODIUM SERPL-SCNC: 131 MMOL/L (ref 136–145)
TRIGL SERPL-MCNC: 83 MG/DL (ref 30–150)
TSH SERPL DL<=0.005 MIU/L-ACNC: 1.86 UIU/ML (ref 0.4–4)
URATE SERPL-MCNC: 6.5 MG/DL (ref 3.4–7)
WBC # BLD AUTO: 11.43 K/UL (ref 3.9–12.7)
WBC TOXIC VACUOLES BLD QL SMEAR: PRESENT

## 2023-07-05 PROCEDURE — 80053 COMPREHEN METABOLIC PANEL: CPT | Performed by: INTERNAL MEDICINE

## 2023-07-05 PROCEDURE — 83735 ASSAY OF MAGNESIUM: CPT | Performed by: INTERNAL MEDICINE

## 2023-07-05 PROCEDURE — 84153 ASSAY OF PSA TOTAL: CPT | Performed by: INTERNAL MEDICINE

## 2023-07-05 PROCEDURE — 85025 COMPLETE CBC W/AUTO DIFF WBC: CPT | Performed by: INTERNAL MEDICINE

## 2023-07-05 PROCEDURE — 36415 COLL VENOUS BLD VENIPUNCTURE: CPT | Mod: PN | Performed by: INTERNAL MEDICINE

## 2023-07-05 PROCEDURE — 84550 ASSAY OF BLOOD/URIC ACID: CPT | Performed by: INTERNAL MEDICINE

## 2023-07-05 PROCEDURE — 80061 LIPID PANEL: CPT | Performed by: INTERNAL MEDICINE

## 2023-07-05 PROCEDURE — 84443 ASSAY THYROID STIM HORMONE: CPT | Performed by: INTERNAL MEDICINE

## 2023-07-13 ENCOUNTER — LAB VISIT (OUTPATIENT)
Dept: LAB | Facility: HOSPITAL | Age: 73
End: 2023-07-13
Attending: INTERNAL MEDICINE
Payer: MEDICARE

## 2023-07-13 ENCOUNTER — OFFICE VISIT (OUTPATIENT)
Dept: HEMATOLOGY/ONCOLOGY | Facility: CLINIC | Age: 73
End: 2023-07-13
Payer: MEDICARE

## 2023-07-13 ENCOUNTER — TELEPHONE (OUTPATIENT)
Dept: HEMATOLOGY/ONCOLOGY | Facility: CLINIC | Age: 73
End: 2023-07-13

## 2023-07-13 VITALS
RESPIRATION RATE: 16 BRPM | HEIGHT: 70 IN | SYSTOLIC BLOOD PRESSURE: 124 MMHG | OXYGEN SATURATION: 96 % | DIASTOLIC BLOOD PRESSURE: 68 MMHG | TEMPERATURE: 97 F | BODY MASS INDEX: 25.19 KG/M2 | WEIGHT: 175.94 LBS | HEART RATE: 83 BPM

## 2023-07-13 DIAGNOSIS — M65.30 TRIGGER FINGER, ACQUIRED: ICD-10-CM

## 2023-07-13 DIAGNOSIS — R30.0 DYSURIA: ICD-10-CM

## 2023-07-13 DIAGNOSIS — C91.10 CLL (CHRONIC LYMPHOCYTIC LEUKEMIA): ICD-10-CM

## 2023-07-13 DIAGNOSIS — R30.0 DYSURIA: Primary | ICD-10-CM

## 2023-07-13 DIAGNOSIS — I10 PRIMARY HYPERTENSION: ICD-10-CM

## 2023-07-13 DIAGNOSIS — E78.5 HYPERLIPIDEMIA, UNSPECIFIED HYPERLIPIDEMIA TYPE: ICD-10-CM

## 2023-07-13 DIAGNOSIS — C67.9 MALIGNANT NEOPLASM OF URINARY BLADDER, UNSPECIFIED SITE: ICD-10-CM

## 2023-07-13 DIAGNOSIS — R91.1 LUNG NODULE: ICD-10-CM

## 2023-07-13 LAB
BACTERIA #/AREA URNS HPF: NORMAL /HPF
BILIRUB UR QL STRIP: NEGATIVE
CLARITY UR: CLEAR
COLOR UR: YELLOW
GLUCOSE UR QL STRIP: NEGATIVE
HGB UR QL STRIP: ABNORMAL
KETONES UR QL STRIP: NEGATIVE
LEUKOCYTE ESTERASE UR QL STRIP: NEGATIVE
MICROSCOPIC COMMENT: NORMAL
NITRITE UR QL STRIP: NEGATIVE
PH UR STRIP: 7 [PH] (ref 5–8)
PROT UR QL STRIP: NEGATIVE
RBC #/AREA URNS HPF: 1 /HPF (ref 0–4)
SP GR UR STRIP: 1.01 (ref 1–1.03)
SQUAMOUS #/AREA URNS HPF: 1 /HPF
URN SPEC COLLECT METH UR: ABNORMAL
WBC #/AREA URNS HPF: 1 /HPF (ref 0–5)

## 2023-07-13 PROCEDURE — 3008F BODY MASS INDEX DOCD: CPT | Mod: CPTII,S$GLB,, | Performed by: INTERNAL MEDICINE

## 2023-07-13 PROCEDURE — 1159F MED LIST DOCD IN RCRD: CPT | Mod: CPTII,S$GLB,, | Performed by: INTERNAL MEDICINE

## 2023-07-13 PROCEDURE — 1101F PT FALLS ASSESS-DOCD LE1/YR: CPT | Mod: CPTII,S$GLB,, | Performed by: INTERNAL MEDICINE

## 2023-07-13 PROCEDURE — 3078F DIAST BP <80 MM HG: CPT | Mod: CPTII,S$GLB,, | Performed by: INTERNAL MEDICINE

## 2023-07-13 PROCEDURE — 99215 OFFICE O/P EST HI 40 MIN: CPT | Mod: S$GLB,,, | Performed by: INTERNAL MEDICINE

## 2023-07-13 PROCEDURE — 4010F ACE/ARB THERAPY RXD/TAKEN: CPT | Mod: CPTII,S$GLB,, | Performed by: INTERNAL MEDICINE

## 2023-07-13 PROCEDURE — 99999 PR PBB SHADOW E&M-EST. PATIENT-LVL IV: ICD-10-PCS | Mod: PBBFAC,,, | Performed by: INTERNAL MEDICINE

## 2023-07-13 PROCEDURE — 4010F PR ACE/ARB THEARPY RXD/TAKEN: ICD-10-PCS | Mod: CPTII,S$GLB,, | Performed by: INTERNAL MEDICINE

## 2023-07-13 PROCEDURE — 3078F PR MOST RECENT DIASTOLIC BLOOD PRESSURE < 80 MM HG: ICD-10-PCS | Mod: CPTII,S$GLB,, | Performed by: INTERNAL MEDICINE

## 2023-07-13 PROCEDURE — 1126F AMNT PAIN NOTED NONE PRSNT: CPT | Mod: CPTII,S$GLB,, | Performed by: INTERNAL MEDICINE

## 2023-07-13 PROCEDURE — 3074F SYST BP LT 130 MM HG: CPT | Mod: CPTII,S$GLB,, | Performed by: INTERNAL MEDICINE

## 2023-07-13 PROCEDURE — 1101F PR PT FALLS ASSESS DOC 0-1 FALLS W/OUT INJ PAST YR: ICD-10-PCS | Mod: CPTII,S$GLB,, | Performed by: INTERNAL MEDICINE

## 2023-07-13 PROCEDURE — 1160F PR REVIEW ALL MEDS BY PRESCRIBER/CLIN PHARMACIST DOCUMENTED: ICD-10-PCS | Mod: CPTII,S$GLB,, | Performed by: INTERNAL MEDICINE

## 2023-07-13 PROCEDURE — 3062F PR POS MACROALBUMINURIA RESULT DOCUMENTED/REVIEW: ICD-10-PCS | Mod: CPTII,S$GLB,, | Performed by: INTERNAL MEDICINE

## 2023-07-13 PROCEDURE — 3062F POS MACROALBUMINURIA REV: CPT | Mod: CPTII,S$GLB,, | Performed by: INTERNAL MEDICINE

## 2023-07-13 PROCEDURE — 3066F NEPHROPATHY DOC TX: CPT | Mod: CPTII,S$GLB,, | Performed by: INTERNAL MEDICINE

## 2023-07-13 PROCEDURE — 81000 URINALYSIS NONAUTO W/SCOPE: CPT | Mod: PN | Performed by: INTERNAL MEDICINE

## 2023-07-13 PROCEDURE — 3074F PR MOST RECENT SYSTOLIC BLOOD PRESSURE < 130 MM HG: ICD-10-PCS | Mod: CPTII,S$GLB,, | Performed by: INTERNAL MEDICINE

## 2023-07-13 PROCEDURE — 3288F FALL RISK ASSESSMENT DOCD: CPT | Mod: CPTII,S$GLB,, | Performed by: INTERNAL MEDICINE

## 2023-07-13 PROCEDURE — 1126F PR PAIN SEVERITY QUANTIFIED, NO PAIN PRESENT: ICD-10-PCS | Mod: CPTII,S$GLB,, | Performed by: INTERNAL MEDICINE

## 2023-07-13 PROCEDURE — 3008F PR BODY MASS INDEX (BMI) DOCUMENTED: ICD-10-PCS | Mod: CPTII,S$GLB,, | Performed by: INTERNAL MEDICINE

## 2023-07-13 PROCEDURE — 99999 PR PBB SHADOW E&M-EST. PATIENT-LVL IV: CPT | Mod: PBBFAC,,, | Performed by: INTERNAL MEDICINE

## 2023-07-13 PROCEDURE — 1159F PR MEDICATION LIST DOCUMENTED IN MEDICAL RECORD: ICD-10-PCS | Mod: CPTII,S$GLB,, | Performed by: INTERNAL MEDICINE

## 2023-07-13 PROCEDURE — 1160F RVW MEDS BY RX/DR IN RCRD: CPT | Mod: CPTII,S$GLB,, | Performed by: INTERNAL MEDICINE

## 2023-07-13 PROCEDURE — 3066F PR DOCUMENTATION OF TREATMENT FOR NEPHROPATHY: ICD-10-PCS | Mod: CPTII,S$GLB,, | Performed by: INTERNAL MEDICINE

## 2023-07-13 PROCEDURE — 99215 PR OFFICE/OUTPT VISIT, EST, LEVL V, 40-54 MIN: ICD-10-PCS | Mod: S$GLB,,, | Performed by: INTERNAL MEDICINE

## 2023-07-13 PROCEDURE — 3288F PR FALLS RISK ASSESSMENT DOCUMENTED: ICD-10-PCS | Mod: CPTII,S$GLB,, | Performed by: INTERNAL MEDICINE

## 2023-07-13 NOTE — PROGRESS NOTES
Subjective:       Name: Saurav Bahena  : 1950  MRN: [id number]    Chief Complaint   Patient presents with    Bladder Cancer      Patient is in clinic by himself.    HPI: Saurav Bahena is a 73 y.o. male presents to discuss the evaluation done to assess his Bladder Cancer      He underwent a TURBT on 2023:  Findings:   5 cm bladder tumor  After the resection it appeared that the tumor was extending into the expected location of the right ureter  EUA post-TURBT showed no palpable or fixed disease.   Unfortunately the patient did not proceed with a CT-guided lung biopsy are of concern about a pneumothorax and lung collapse in view of his history of smoking.  He might reconsider it at a later time.    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  Deep muscle was present.      His blood workup done on 2023 showing a normal white blood cell count with a hemoglobin of 10.1 MCV of 80 and platelet count of 177.  His CMP was normal.  His uric acid went down to 6.5.  His PSA was 0.07.         He is still taking his Lovenox injections. He is reporting dark urine since undergoing his TURBT.  He denies any gross hematuria.  He is still concerned about proceeding with his lung biopsy.      The patient denies any fever chills night sweats weight loss recurrent infection easy bruising bleeding.    Oncology History    No history exists.        Past Medical History:   Diagnosis Date    Anticoagulant long-term use     aspirin    Atherosclerosis of native coronary artery of native heart without angina pectoris 2022    Atrophic kidney     right    Bladder cancer     BPH (benign prostatic hyperplasia)     Cat bite of left forearm with infection 2019    CLL (chronic lymphocytic leukemia)     sees Dr. Solares    CTS (carpal tunnel syndrome)     Current smoker on some days     using e cig, regular cigarettes sometimes    DJD (degenerative joint disease) of  knee     left    DJD (degenerative joint disease) of knee     bilateral/ LT more than RT    Emphysema lung     Gout, unspecified     HLD (hyperlipidemia)     no current meds    HTN (hypertension)     Insomnia     Nocturia     Other pulmonary embolism without acute cor pulmonale 2023    Panlobular emphysema 2023    Sleep apnea     CPAP       Past Surgical History:   Procedure Laterality Date    COLONOSCOPY  2010    Dr. Aguilar, in legacy: int. hemorrhoids, repeat in 10 years for screening    CYSTOSCOPY W/ RETROGRADES Bilateral 2020    Procedure: CYSTOSCOPY, WITH RETROGRADE PYELOGRAM;  Surgeon: Andrea Tam MD;  Location: [facility] OR;  Service: Urology;  Laterality: Bilateral;    ELBOW SURGERY      left elbow, bursitis    KNEE SURGERY  ?    right    MULTIPLE TOOTH EXTRACTIONS      TRANSRECTAL ULTRASOUND EXAMINATION N/A 2020    Procedure: ULTRASOUND, RECTAL APPROACH;  Surgeon: Andrea Tam MD;  Location: [facility] OR;  Service: Urology;  Laterality: N/A;    TRIGGER FINGER RELEASE Left     TURBT (TRANSURETHRAL RESECTION OF BLADDER TUMOR) N/A 2023    Procedure: TURBT (TRANSURETHRAL RESECTION OF BLADDER TUMOR);  Surgeon: Leonides Cruz MD;  Location: [facility] OR;  Service: Urology;  Laterality: N/A;       Family History   Problem Relation Age of Onset    Diabetes Mother     Heart disease Father 65        MI    Arthritis Father         RA    Heart disease Brother     Colon cancer Neg Hx     Crohn's disease Neg Hx     Ulcerative colitis Neg Hx     Stomach cancer Neg Hx     Esophageal cancer Neg Hx        Social History     Socioeconomic History    Marital status: Single   Occupational History    Occupation:    Tobacco Use    Smoking status: Former     Packs/day: 0.30     Years: 20.00     Pack years: 6.00     Types: Cigarettes     Quit date: 2021     Years since quittin.5    Smokeless tobacco: Never    Tobacco comments:     nicorette, vapor cigs/ 3 cigarettes    Substance and Sexual Activity    Alcohol use: Yes     Alcohol/week: 2.0 - 3.0 standard drinks     Types: 2 - 3 Shots of liquor per week     Comment: 2-3/day on weekends    Drug use: No     Social Determinants of Health     Financial Resource Strain: Low Risk     Difficulty of Paying Living Expenses: Not very hard   Food Insecurity: No Food Insecurity    Worried About Running Out of Food in the Last Year: Never true    Ran Out of Food in the Last Year: Never true   Transportation Needs: No Transportation Needs    Lack of Transportation (Medical): No    Lack of Transportation (Non-Medical): No   Physical Activity: Insufficiently Active    Days of Exercise per Week: 3 days    Minutes of Exercise per Session: 30 min   Stress: Stress Concern Present    Feeling of Stress : To some extent   Social Connections: Unknown    Frequency of Communication with Friends and Family: More than three times a week    Frequency of Social Gatherings with Friends and Family: Once a week    Attends Restorationism Services: Patient refused    Active Member of Clubs or Organizations: Yes    Attends Club or Organization Meetings: More than 4 times per year    Marital Status: Never    Housing Stability: Low Risk     Unable to Pay for Housing in the Last Year: No    Number of Places Lived in the Last Year: 1    Unstable Housing in the Last Year: No       Review of patient's allergies indicates:   Allergen Reactions    No known drug allergies        Review of Systems   Constitutional:  Negative for appetite change, fatigue, fever and unexpected weight change.   HENT:  Negative for mouth sores and sore throat.    Eyes:  Negative for photophobia and visual disturbance.   Respiratory:  Negative for cough and shortness of breath.    Cardiovascular:  Negative for chest pain.   Gastrointestinal:  Negative for abdominal pain, constipation and diarrhea.   Genitourinary:  Positive for frequency. Negative for dysuria and hematuria.   Musculoskeletal:   "Positive for back pain. Negative for arthralgias and joint swelling.   Integumentary:  Negative for rash.   Neurological:  Negative for dizziness, weakness, light-headedness and headaches.   Hematological:  Negative for adenopathy. Does not bruise/bleed easily.   Psychiatric/Behavioral:  Negative for sleep disturbance. The patient is not nervous/anxious.           Objective:     Vitals:    07/13/23 0841   BP: 124/68   BP Location: Left arm   Patient Position: Sitting   BP Method: Medium (Manual)   Pulse: 83   Resp: 16   Temp: 97.4 °F (36.3 °C)   TempSrc: Temporal   SpO2: 96%   Weight: 79.8 kg (175 lb 14.8 oz)   Height: 5' 10" (1.778 m)              Current Outpatient Medications on File Prior to Visit   Medication Sig    albuterol (PROVENTIL/VENTOLIN HFA) 90 mcg/actuation inhaler Inhale 2 puffs into the lungs every 6 (six) hours as needed for Wheezing. Rescue    allopurinoL (ZYLOPRIM) 100 MG tablet Take 1 tablet (100 mg total) by mouth once daily.    amLODIPine (NORVASC) 2.5 MG tablet Take 1 tablet (2.5 mg total) by mouth once daily.    ascorbic acid (VITAMIN C ORAL) Take 1 tablet by mouth once daily.    ascorbic acid, vitamin C, (VITAMIN C) 100 MG tablet Take 100 mg by mouth once daily.    cholecalciferol, vitamin D3, (VITAMIN D3 ORAL) Take 1 capsule by mouth once daily.    enoxaparin (LOVENOX) 120 mg/0.8 mL Syrg Inject 0.8 mLs (120 mg total) into the skin Daily. Begin today injecting 120 mg of enoxaparin subcutaneous daily through and inclusive of April 26; hold Lovenox on April 27th, for planned cystoscopy on April 28th.  Resumption of Lovenox when okay with Urology, which can be the next day after cystoscopy if okay with Urology.  Generic please.    finasteride (PROSCAR) 5 mg tablet TAKE 1 TABLET BY MOUTH ONCE DAILY (Patient taking differently: Take 5 mg by mouth every evening.)    losartan (COZAAR) 50 MG tablet Take 1 tablet (50 mg total) by mouth once daily.    multivit-min/folic/vit K/lycop (MEN'S 50 PLUS " MULTIVITAMIN ORAL) Take 1 tablet by mouth once daily.    tamsulosin (FLOMAX) 0.4 mg Cap Take 2 capsules (0.8 mg total) by mouth once daily. (Patient taking differently: Take 0.8 mg by mouth every evening.)    zinc gluconate 50 mg tablet Take 50 mg by mouth once daily.    HYDROcodone-acetaminophen (NORCO) 5-325 mg per tablet Take 1 tablet by mouth every 4 (four) hours as needed for Pain. (Patient not taking: Reported on 7/13/2023)    ZINC ORAL Take 1 tablet by mouth once daily.     Current Facility-Administered Medications on File Prior to Visit   Medication    lactated ringers infusion    lactated ringers infusion    lidocaine (PF) 10 mg/ml (1%) injection 10 mg    LIDOcaine (PF) 10 mg/ml (1%) injection 10 mg    triamcinolone acetonide injection 40 mg       CBC:  Lab Results   Component Value Date    WBC 11.43 07/05/2023    HGB 10.1 (L) 07/05/2023    HCT 32.5 (L) 07/05/2023    MCV 80 (L) 07/05/2023     07/05/2023         CMP:  Sodium   Date Value Ref Range Status   07/05/2023 131 (L) 136 - 145 mmol/L Final     Potassium   Date Value Ref Range Status   07/05/2023 4.9 3.5 - 5.1 mmol/L Final     Chloride   Date Value Ref Range Status   07/05/2023 98 95 - 110 mmol/L Final     CO2   Date Value Ref Range Status   07/05/2023 26 23 - 29 mmol/L Final     Glucose   Date Value Ref Range Status   07/05/2023 106 70 - 110 mg/dL Final     BUN   Date Value Ref Range Status   07/05/2023 15 8 - 23 mg/dL Final     Creatinine   Date Value Ref Range Status   07/05/2023 1.0 0.5 - 1.4 mg/dL Final     Calcium   Date Value Ref Range Status   07/05/2023 9.3 8.7 - 10.5 mg/dL Final     Total Protein   Date Value Ref Range Status   07/05/2023 7.6 6.0 - 8.4 g/dL Final     Albumin   Date Value Ref Range Status   07/05/2023 3.6 3.5 - 5.2 g/dL Final     Total Bilirubin   Date Value Ref Range Status   07/05/2023 0.4 0.1 - 1.0 mg/dL Final     Comment:     For infants and newborns, interpretation of results should be based  on gestational age,  weight and in agreement with clinical  observations.    Premature Infant recommended reference ranges:  Up to 24 hours.............<8.0 mg/dL  Up to 48 hours............<12.0 mg/dL  3-5 days..................<15.0 mg/dL  6-29 days.................<15.0 mg/dL       Alkaline Phosphatase   Date Value Ref Range Status   07/05/2023 75 55 - 135 U/L Final     AST ([place])   Date Value Ref Range Status   04/04/2016 16 (L) 17 - 59 U/L Final     AST   Date Value Ref Range Status   07/05/2023 11 10 - 40 U/L Final     ALT   Date Value Ref Range Status   07/05/2023 15 10 - 44 U/L Final     Anion Gap   Date Value Ref Range Status   07/05/2023 7 (L) 8 - 16 mmol/L Final     eGFR if    Date Value Ref Range Status   01/21/2022 >60.0 >60 mL/min/1.73 m^2 Final     eGFR if non    Date Value Ref Range Status   01/21/2022 >60.0 >60 mL/min/1.73 m^2 Final     Comment:     Calculation used to obtain the estimated glomerular filtration  rate (eGFR) is the CKD-EPI equation.          NM PET CT Routine Skull to Mid Thigh  Narrative: EXAMINATION:  NM PET CT ROUTINE    CLINICAL HISTORY:  Spiculated opacity anterior right upper lobe new since 05/07/2020; 5.5 cm bladder mass also noted on imaging with patient scheduled for cystoscopy for further evaluation please see epic;  Other specified disorders of bladder    72-year-old male with a history of CLL.  The patient now presents with a bladder mass, recent pulmonary embolism, and new pulmonary nodule.    TECHNIQUE:  Following IV injection of 12.27 mCi F-18 FDG, 3D PET imaging was performed from the skull base to the mid thighs.  A noncontrast non breath hold CT was obtained in conjunction with the PET scan for attenuation correction and anatomic correlation.  PET-CT fusion images were generated.    COMPARISON:  Correlation is made with the CT urogram dated 04/11/2023 and CT chest dated 04/05/2023.    FINDINGS:  Head/neck:    No significant abnormal  hypermetabolic foci are identified within the head and neck region.  No lymphadenopathy is present.    Chest:    There is a slightly irregular subpleural markedly hypermetabolic nodule in the anterior right upper lobe which is new compared to a CT dated 05/07/2020 and is highly suspicious for malignancy.  On image 85 the nodule measures 1.6 x 0.8 cm and has a max SUV of 10.2.  No other significant abnormal hypermetabolic foci are identified within the soft tissues of the chest.  No lymphadenopathy or pleural effusion are present.  Findings of severe centrilobular pulmonary emphysema are present.    Abdomen/pelvis:    There is a markedly hypermetabolic mass arising from the posterior bladder wall with the bulk of the mass extending to the right posterior and right posterolateral aspect of the bladder.  The mass measures 6.1 x 4.3 x 3.8 cm and obstructs the right ureterovesical junction with severe right hydroureter and right hydronephrosis and associated severe right renal atrophy.  The max SUV is 29.8.  The spleen is enlarged and measures 21.5 x 14.6 x 8.8 cm.  No lymphadenopathy or ascites are present.  The adrenal glands are normal.    Skeleton:    There is a focus of hypermetabolism involving the distal aspect of the right 2nd rib with no associated fracture, lytic destruction, or sclerosis.  This must be regarded as suspicious for metastatic disease and the max SUV is 3.6.  No other significant abnormal hypermetabolic foci are identified within the skeleton.  Impression: 1. Markedly hypermetabolic new subpleural anterior right upper lobe nodule which is highly suspicious for malignancy.  Biopsy is recommended.  2. Markedly hypermetabolic urinary bladder mass which is highly suspicious for malignancy.  The mass causes obstruction of the right ureterovesical junction with associated severe right hydroureter and right hydronephrosis and chronic severe right renal atrophy.  3. Hypermetabolic focus within the distal  aspect of the right 2nd rib which is suspicious for osseous metastatic disease.  4. Splenomegaly.  5. Severe centrilobular pulmonary emphysema.    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  The patient has been seen and followed by Dr. Solares for years.  He did not require treatment for his disease.  -I reviewed with the patient the natural progression of this disease.  Usually it is not treated unless it starts causing end-organ damage and this include but not restricted to fever chills night sweats unintentional weight loss recurrent infection anemia thrombocytopenia large lymphadenopathy etcetera.  -his blood workup done to risk stratify his disease showed a deletion 13q and a normal TP53 and a mutated IGH making his disease in a good category risk.  -his uric acid was elevated.  He will continue on allopurinol 100 mg daily.    -His LDH and haptoglobin came back normal.      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  He will have this abnormality further evaluated with blood workup that will include free light chain UPEP with immunofixation and beta 2 microglobulin.  The patient also will require to underwent bone marrow biopsy for further evaluation of his normocytic anemia and intermittent thrombocytopenia since his blood workup failed to show a reason behind his anemia and thrombocytopenia.  This could be related to CLL or possibly a blood related dyscrasia.  The patient currently is reluctant to have it done.        Lung nodule:  -I reviewed independently the patient imaging studies and discussed the findings with the patient.  His recent PET scan  showed an active right lung nodule with an SUV of 10.  This is highly worsened either of a primary lung cancer in view of his history of smoking.  -the patient is  willing to proceed with his lung biopsy. Will see him in 6 weeks to discuss the results.      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  I reviewed independently the results of his imaging studies as well.    Results of the results of his TURBT was reviewed at length.  The patient has a high-grade noninvasive bladder cancer.  He will require to continue to be followed clinically and with repeat TURBT by Dr. Hilton.  He also may benefit from beta hCG injections.  He will be referred back to Dr. Thornton to discuss further management of his bladder cancer.  -He will have a UA and urine microscopy today        Normocytic anemia and intermittent thrombocytopenia:  - Hgb 10 MCV 82 and plat 181. His plat count has been fluctuating between 148 and 178.    -the patient workup showed normal iron profile vitamin B12 TSH free T4.  His SPEP with immunofixation showed an MGUS affecting IgM lambda.  The patient will require a bone marrow biopsy to assess if his anemia and thrombocytopenia is related to a CLL versus a blood related dyscrasia.  Currently he is undecided.        Pulmonary embolism.  -patient is currently on Lovenox at a dose of 120 mg subQ daily.   He will require lifelong anticoagulation since his PE is induced by his malignancy.    HTN and dyslipidemia:  Patient is on amlodipine            Med Onc Chart Routing      Follow up with physician 6 weeks. to discuss the results of his lung biopsy. he will have a UA  and microscopy. Referral to urology   Follow up with ALISON    Infusion scheduling note    Injection scheduling note    Labs    Imaging    Pharmacy appointment    Other referrals             Plan  was discussed with the patient at length, and he verbalized understanding. Saurav was given an opportunity to ask questions that were answered to his satisfaction, and he was advised to call in the interval if any problems or questions arise.  Signed:  Linnea St MD   Hematology and Oncology  [facility] - HEMATOLOGY ONCOLOGY  [facility], [place]

## 2023-07-25 ENCOUNTER — TELEPHONE (OUTPATIENT)
Dept: HEMATOLOGY/ONCOLOGY | Facility: CLINIC | Age: 73
End: 2023-07-25
Payer: MEDICARE

## 2023-07-25 ENCOUNTER — OFFICE VISIT (OUTPATIENT)
Dept: UROLOGY | Facility: CLINIC | Age: 73
End: 2023-07-25
Payer: MEDICARE

## 2023-07-25 VITALS — HEIGHT: 70 IN | WEIGHT: 179 LBS | BODY MASS INDEX: 25.62 KG/M2

## 2023-07-25 DIAGNOSIS — R91.1 LUNG NODULE: ICD-10-CM

## 2023-07-25 DIAGNOSIS — C67.8 MALIGNANT NEOPLASM OF OVERLAPPING SITES OF BLADDER: Primary | ICD-10-CM

## 2023-07-25 LAB
BILIRUBIN, UA POC OHS: NEGATIVE
BLOOD, UA POC OHS: ABNORMAL
CLARITY, UA POC OHS: CLEAR
COLOR, UA POC OHS: YELLOW
GLUCOSE, UA POC OHS: NEGATIVE
KETONES, UA POC OHS: NEGATIVE
LEUKOCYTES, UA POC OHS: NEGATIVE
NITRITE, UA POC OHS: NEGATIVE
PH, UA POC OHS: 8
PROTEIN, UA POC OHS: NEGATIVE
SPECIFIC GRAVITY, UA POC OHS: 1.01
UROBILINOGEN, UA POC OHS: 0.2

## 2023-07-25 PROCEDURE — 3062F POS MACROALBUMINURIA REV: CPT | Mod: CPTII,S$GLB,, | Performed by: STUDENT IN AN ORGANIZED HEALTH CARE EDUCATION/TRAINING PROGRAM

## 2023-07-25 PROCEDURE — 1101F PR PT FALLS ASSESS DOC 0-1 FALLS W/OUT INJ PAST YR: ICD-10-PCS | Mod: CPTII,S$GLB,, | Performed by: STUDENT IN AN ORGANIZED HEALTH CARE EDUCATION/TRAINING PROGRAM

## 2023-07-25 PROCEDURE — 99999 PR PBB SHADOW E&M-EST. PATIENT-LVL III: ICD-10-PCS | Mod: PBBFAC,,, | Performed by: STUDENT IN AN ORGANIZED HEALTH CARE EDUCATION/TRAINING PROGRAM

## 2023-07-25 PROCEDURE — 99214 PR OFFICE/OUTPT VISIT, EST, LEVL IV, 30-39 MIN: ICD-10-PCS | Mod: S$GLB,,, | Performed by: STUDENT IN AN ORGANIZED HEALTH CARE EDUCATION/TRAINING PROGRAM

## 2023-07-25 PROCEDURE — 3066F PR DOCUMENTATION OF TREATMENT FOR NEPHROPATHY: ICD-10-PCS | Mod: CPTII,S$GLB,, | Performed by: STUDENT IN AN ORGANIZED HEALTH CARE EDUCATION/TRAINING PROGRAM

## 2023-07-25 PROCEDURE — 3008F BODY MASS INDEX DOCD: CPT | Mod: CPTII,S$GLB,, | Performed by: STUDENT IN AN ORGANIZED HEALTH CARE EDUCATION/TRAINING PROGRAM

## 2023-07-25 PROCEDURE — 1160F PR REVIEW ALL MEDS BY PRESCRIBER/CLIN PHARMACIST DOCUMENTED: ICD-10-PCS | Mod: CPTII,S$GLB,, | Performed by: STUDENT IN AN ORGANIZED HEALTH CARE EDUCATION/TRAINING PROGRAM

## 2023-07-25 PROCEDURE — 3288F PR FALLS RISK ASSESSMENT DOCUMENTED: ICD-10-PCS | Mod: CPTII,S$GLB,, | Performed by: STUDENT IN AN ORGANIZED HEALTH CARE EDUCATION/TRAINING PROGRAM

## 2023-07-25 PROCEDURE — 3008F PR BODY MASS INDEX (BMI) DOCUMENTED: ICD-10-PCS | Mod: CPTII,S$GLB,, | Performed by: STUDENT IN AN ORGANIZED HEALTH CARE EDUCATION/TRAINING PROGRAM

## 2023-07-25 PROCEDURE — 3288F FALL RISK ASSESSMENT DOCD: CPT | Mod: CPTII,S$GLB,, | Performed by: STUDENT IN AN ORGANIZED HEALTH CARE EDUCATION/TRAINING PROGRAM

## 2023-07-25 PROCEDURE — 99999 PR PBB SHADOW E&M-EST. PATIENT-LVL III: CPT | Mod: PBBFAC,,, | Performed by: STUDENT IN AN ORGANIZED HEALTH CARE EDUCATION/TRAINING PROGRAM

## 2023-07-25 PROCEDURE — 1126F PR PAIN SEVERITY QUANTIFIED, NO PAIN PRESENT: ICD-10-PCS | Mod: CPTII,S$GLB,, | Performed by: STUDENT IN AN ORGANIZED HEALTH CARE EDUCATION/TRAINING PROGRAM

## 2023-07-25 PROCEDURE — 1159F PR MEDICATION LIST DOCUMENTED IN MEDICAL RECORD: ICD-10-PCS | Mod: CPTII,S$GLB,, | Performed by: STUDENT IN AN ORGANIZED HEALTH CARE EDUCATION/TRAINING PROGRAM

## 2023-07-25 PROCEDURE — 1160F RVW MEDS BY RX/DR IN RCRD: CPT | Mod: CPTII,S$GLB,, | Performed by: STUDENT IN AN ORGANIZED HEALTH CARE EDUCATION/TRAINING PROGRAM

## 2023-07-25 PROCEDURE — 1159F MED LIST DOCD IN RCRD: CPT | Mod: CPTII,S$GLB,, | Performed by: STUDENT IN AN ORGANIZED HEALTH CARE EDUCATION/TRAINING PROGRAM

## 2023-07-25 PROCEDURE — 4010F ACE/ARB THERAPY RXD/TAKEN: CPT | Mod: CPTII,S$GLB,, | Performed by: STUDENT IN AN ORGANIZED HEALTH CARE EDUCATION/TRAINING PROGRAM

## 2023-07-25 PROCEDURE — 1101F PT FALLS ASSESS-DOCD LE1/YR: CPT | Mod: CPTII,S$GLB,, | Performed by: STUDENT IN AN ORGANIZED HEALTH CARE EDUCATION/TRAINING PROGRAM

## 2023-07-25 PROCEDURE — 4010F PR ACE/ARB THEARPY RXD/TAKEN: ICD-10-PCS | Mod: CPTII,S$GLB,, | Performed by: STUDENT IN AN ORGANIZED HEALTH CARE EDUCATION/TRAINING PROGRAM

## 2023-07-25 PROCEDURE — 81003 POCT URINALYSIS(INSTRUMENT): ICD-10-PCS | Mod: QW,S$GLB,, | Performed by: STUDENT IN AN ORGANIZED HEALTH CARE EDUCATION/TRAINING PROGRAM

## 2023-07-25 PROCEDURE — 1126F AMNT PAIN NOTED NONE PRSNT: CPT | Mod: CPTII,S$GLB,, | Performed by: STUDENT IN AN ORGANIZED HEALTH CARE EDUCATION/TRAINING PROGRAM

## 2023-07-25 PROCEDURE — 99214 OFFICE O/P EST MOD 30 MIN: CPT | Mod: S$GLB,,, | Performed by: STUDENT IN AN ORGANIZED HEALTH CARE EDUCATION/TRAINING PROGRAM

## 2023-07-25 PROCEDURE — 3066F NEPHROPATHY DOC TX: CPT | Mod: CPTII,S$GLB,, | Performed by: STUDENT IN AN ORGANIZED HEALTH CARE EDUCATION/TRAINING PROGRAM

## 2023-07-25 PROCEDURE — 3062F PR POS MACROALBUMINURIA RESULT DOCUMENTED/REVIEW: ICD-10-PCS | Mod: CPTII,S$GLB,, | Performed by: STUDENT IN AN ORGANIZED HEALTH CARE EDUCATION/TRAINING PROGRAM

## 2023-07-25 PROCEDURE — 81003 URINALYSIS AUTO W/O SCOPE: CPT | Mod: QW,S$GLB,, | Performed by: STUDENT IN AN ORGANIZED HEALTH CARE EDUCATION/TRAINING PROGRAM

## 2023-07-25 NOTE — TELEPHONE ENCOUNTER
----- Message from Linnea St MD sent at 7/25/2023  9:52 AM CDT -----  Regarding: RE: Lung biopsy  Thanks Evita for the update. Yes he can move forward with treating his bladder cancer. The possibility of having another primary lung cancer will remain high.  Linnea  ----- Message -----  From: Evita Garcia RN  Sent: 7/25/2023   9:34 AM CDT  To: Linnea St MD  Subject: Lung biopsy                                      Mr. Bahena said he does not want to do it at this time, he will decide next week.  He also wants to know if he can just move forward with treating the bladder cancer if he chooses not to have anymore biopsy's or surgeries.   Evita  ----- Message -----  From: Linnea St MD  Sent: 7/25/2023   9:25 AM CDT  To: YUN Daly C. Nelly.  an you please reach out to the patient and ask him if he is interested in proceeding with his CT-guided lung biopsy.  Thank you.  Lninea      Spoke with pt, informed him that he could go forward with the bladder cancer treatment as he requests. Also made him aware that the possibility of having another primary lung caner will remain high.  Pt stated he's aware and would at this time prefer to not have anymore surgeries.

## 2023-07-25 NOTE — PROGRESS NOTES
"[facility] - [facility]rology   Clinic Note    Subjective:     Chief Complaint: Follow-up (Post op)      History of Present Illness:  Saurav Bahena is a 73 y.o. male who presents to clinic for evaluation and management of bladder cancer. He is established to our clinic    He was recently diagnosed with a PE and was started on Eliquis and subsequently developed hematuria leading to a diagnosis of bladder cancer.   He also has a significantly enlarged prostate. TRUS volume was 174 cc in 2020. He has chronic right hydronephrosis which was previously evaluated with a retrograde pyelogram but not ureteroscopy.  He underwent TURBT for a large bladder tumor and there was concern that the tumor was involving the right UO.     Bladder Cancer Chronology:    6/22/2023: High grade Ta, muscle was present       He has no family history of prostate cancer.         Lab Results   Component Value Date     PSA 0.15 12/14/2022     PSA 0.15 12/14/2022     PSA 0.08 01/21/2022       Past medical, family, surgical and social history reviewed as documented in chart with pertinent positive medical, family, surgical and social history detailed in HPI.    A review systems was conducted with pertinent positive and negative findings documented in HPI.    Anticoagulation/Antiplatelets:  Yes lovenox    Objective:     Estimated body mass index is 25.69 kg/m² as calculated from the following:    Height as of this encounter: 5' 10" (1.778 m).    Weight as of this encounter: 81.2 kg (179 lb 0.2 oz).    Vital Signs (Most Recent)       Physical Exam  Vitals and nursing note reviewed.   Constitutional:       General: He is not in acute distress.     Appearance: He is not ill-appearing or toxic-appearing.   Pulmonary:      Effort: Pulmonary effort is normal. No accessory muscle usage or respiratory distress.   Neurological:      Mental Status: He is alert.       Labs reviewed below:  Lab Results   Component Value Date    BUN 15 07/05/2023    CREATININE 1.0 " 07/05/2023    WBC 11.43 07/05/2023    HGB 10.1 (L) 07/05/2023    HCT 32.5 (L) 07/05/2023     07/05/2023    AST 11 07/05/2023    ALT 15 07/05/2023    ALKPHOS 75 07/05/2023    ALBUMIN 3.6 07/05/2023    HGBA1C 5.3 12/14/2022        PSA trend reviewed below:  Lab Results   Component Value Date    PSA 0.07 07/05/2023    PSA 0.15 12/14/2022    PSA 0.15 12/14/2022    PSA 0.08 01/21/2022    PSA 0.70 12/09/2019    PSA 0.86 12/26/2018    PSA 0.72 12/11/2017    PSA 0.74 09/19/2016    PSA 0.97 04/04/2016    PSA 1.3 02/02/2015      Urine dipstick today showed trace blood, no leukocyte esterase, and negative nitrite.     Assessment:     1. Malignant neoplasm of overlapping sites of bladder    2. Lung nodule      Plan:     We reviewed his pathology. I recommend he proceed with the lung biopsy and that will significantly  if he is metastatic vs. Primary lung cancer. He is still hesitant due to risks of the lung biopsy.   I also discussed that he should undergo a repeat TURBT and possible ureteroscopy with ureteral biopsy. But I would prefer he have the lung biopsy prior as this may be unnecessary in the setting of metastatic disease.     Leonides Cruz MD

## 2023-08-08 DIAGNOSIS — E79.0 ELEVATED URIC ACID IN BLOOD: ICD-10-CM

## 2023-08-08 RX ORDER — ALLOPURINOL 100 MG/1
100 TABLET ORAL DAILY
Qty: 30 TABLET | Refills: 3 | Status: CANCELLED | OUTPATIENT
Start: 2023-08-08 | End: 2023-12-06

## 2023-08-09 ENCOUNTER — TELEPHONE (OUTPATIENT)
Dept: UROLOGY | Facility: CLINIC | Age: 73
End: 2023-08-09
Payer: MEDICARE

## 2023-08-09 DIAGNOSIS — E79.0 ELEVATED URIC ACID IN BLOOD: ICD-10-CM

## 2023-08-09 RX ORDER — ALLOPURINOL 100 MG/1
100 TABLET ORAL DAILY
Qty: 30 TABLET | Refills: 3 | Status: SHIPPED | OUTPATIENT
Start: 2023-08-09 | End: 2024-01-12

## 2023-08-10 NOTE — TELEPHONE ENCOUNTER
Spoke with patient ,he reports he has not yet had lung biopsy and will call back once he does have the lung biopsy to schedule procedure with Dr Cruz .

## 2023-08-12 ENCOUNTER — PATIENT MESSAGE (OUTPATIENT)
Dept: ADMINISTRATIVE | Facility: OTHER | Age: 73
End: 2023-08-12
Payer: MEDICARE

## 2023-08-24 ENCOUNTER — OFFICE VISIT (OUTPATIENT)
Dept: HEMATOLOGY/ONCOLOGY | Facility: CLINIC | Age: 73
End: 2023-08-24
Payer: MEDICARE

## 2023-08-24 ENCOUNTER — LAB VISIT (OUTPATIENT)
Dept: LAB | Facility: HOSPITAL | Age: 73
End: 2023-08-24
Attending: INTERNAL MEDICINE
Payer: MEDICARE

## 2023-08-24 VITALS
TEMPERATURE: 97 F | SYSTOLIC BLOOD PRESSURE: 124 MMHG | BODY MASS INDEX: 25.47 KG/M2 | OXYGEN SATURATION: 97 % | WEIGHT: 177.94 LBS | HEART RATE: 79 BPM | DIASTOLIC BLOOD PRESSURE: 62 MMHG | RESPIRATION RATE: 16 BRPM | HEIGHT: 70 IN

## 2023-08-24 DIAGNOSIS — C91.10 CLL (CHRONIC LYMPHOCYTIC LEUKEMIA): Primary | ICD-10-CM

## 2023-08-24 DIAGNOSIS — D69.6 THROMBOCYTOPENIA: ICD-10-CM

## 2023-08-24 DIAGNOSIS — R91.1 LUNG NODULE: Primary | ICD-10-CM

## 2023-08-24 DIAGNOSIS — C67.8 MALIGNANT NEOPLASM OF OVERLAPPING SITES OF BLADDER: ICD-10-CM

## 2023-08-24 DIAGNOSIS — D63.8 ANEMIA OF CHRONIC DISEASE: ICD-10-CM

## 2023-08-24 DIAGNOSIS — R91.1 LUNG NODULE: ICD-10-CM

## 2023-08-24 DIAGNOSIS — C91.10 CLL (CHRONIC LYMPHOCYTIC LEUKEMIA): ICD-10-CM

## 2023-08-24 LAB
ALBUMIN SERPL BCP-MCNC: 3.7 G/DL (ref 3.5–5.2)
ALP SERPL-CCNC: 79 U/L (ref 55–135)
ALT SERPL W/O P-5'-P-CCNC: 11 U/L (ref 10–44)
ANION GAP SERPL CALC-SCNC: 9 MMOL/L (ref 8–16)
ANISOCYTOSIS BLD QL SMEAR: SLIGHT
AST SERPL-CCNC: 10 U/L (ref 10–40)
BASOPHILS NFR BLD: 0 % (ref 0–1.9)
BILIRUB SERPL-MCNC: 0.4 MG/DL (ref 0.1–1)
BUN SERPL-MCNC: 15 MG/DL (ref 8–23)
CALCIUM SERPL-MCNC: 9.5 MG/DL (ref 8.7–10.5)
CHLORIDE SERPL-SCNC: 95 MMOL/L (ref 95–110)
CO2 SERPL-SCNC: 25 MMOL/L (ref 23–29)
CREAT SERPL-MCNC: 1 MG/DL (ref 0.5–1.4)
DIFFERENTIAL METHOD: ABNORMAL
EOSINOPHIL NFR BLD: 0 % (ref 0–8)
ERYTHROCYTE [DISTWIDTH] IN BLOOD BY AUTOMATED COUNT: 15.7 % (ref 11.5–14.5)
ERYTHROCYTE [SEDIMENTATION RATE] IN BLOOD BY PHOTOMETRIC METHOD: 86 MM/HR (ref 0–23)
EST. GFR  (NO RACE VARIABLE): >60 ML/MIN/1.73 M^2
GLUCOSE SERPL-MCNC: 109 MG/DL (ref 70–110)
HCT VFR BLD AUTO: 32.2 % (ref 40–54)
HGB BLD-MCNC: 9.9 G/DL (ref 14–18)
HYPOCHROMIA BLD QL SMEAR: ABNORMAL
IMM GRANULOCYTES # BLD AUTO: ABNORMAL K/UL (ref 0–0.04)
IMM GRANULOCYTES NFR BLD AUTO: ABNORMAL % (ref 0–0.5)
LDH SERPL L TO P-CCNC: 108 U/L (ref 110–260)
LYMPHOCYTES NFR BLD: 83 % (ref 18–48)
MCH RBC QN AUTO: 24.3 PG (ref 27–31)
MCHC RBC AUTO-ENTMCNC: 30.7 G/DL (ref 32–36)
MCV RBC AUTO: 79 FL (ref 82–98)
MONOCYTES NFR BLD: 0 % (ref 4–15)
NEUTROPHILS NFR BLD: 17 % (ref 38–73)
NRBC BLD-RTO: 0 /100 WBC
PLATELET # BLD AUTO: 146 K/UL (ref 150–450)
PLATELET BLD QL SMEAR: ABNORMAL
PMV BLD AUTO: 8.3 FL (ref 9.2–12.9)
POIKILOCYTOSIS BLD QL SMEAR: SLIGHT
POTASSIUM SERPL-SCNC: 5 MMOL/L (ref 3.5–5.1)
PROT SERPL-MCNC: 7.6 G/DL (ref 6–8.4)
RBC # BLD AUTO: 4.07 M/UL (ref 4.6–6.2)
SODIUM SERPL-SCNC: 129 MMOL/L (ref 136–145)
WBC # BLD AUTO: 10.92 K/UL (ref 3.9–12.7)

## 2023-08-24 PROCEDURE — 85007 BL SMEAR W/DIFF WBC COUNT: CPT | Mod: PN | Performed by: INTERNAL MEDICINE

## 2023-08-24 PROCEDURE — 3066F NEPHROPATHY DOC TX: CPT | Mod: CPTII,S$GLB,, | Performed by: INTERNAL MEDICINE

## 2023-08-24 PROCEDURE — 36415 COLL VENOUS BLD VENIPUNCTURE: CPT | Mod: PN | Performed by: INTERNAL MEDICINE

## 2023-08-24 PROCEDURE — 99214 PR OFFICE/OUTPT VISIT, EST, LEVL IV, 30-39 MIN: ICD-10-PCS | Mod: S$GLB,,, | Performed by: INTERNAL MEDICINE

## 2023-08-24 PROCEDURE — 1160F PR REVIEW ALL MEDS BY PRESCRIBER/CLIN PHARMACIST DOCUMENTED: ICD-10-PCS | Mod: CPTII,S$GLB,, | Performed by: INTERNAL MEDICINE

## 2023-08-24 PROCEDURE — 1125F PR PAIN SEVERITY QUANTIFIED, PAIN PRESENT: ICD-10-PCS | Mod: CPTII,S$GLB,, | Performed by: INTERNAL MEDICINE

## 2023-08-24 PROCEDURE — 83615 LACTATE (LD) (LDH) ENZYME: CPT | Mod: PN | Performed by: INTERNAL MEDICINE

## 2023-08-24 PROCEDURE — 3062F POS MACROALBUMINURIA REV: CPT | Mod: CPTII,S$GLB,, | Performed by: INTERNAL MEDICINE

## 2023-08-24 PROCEDURE — 3062F PR POS MACROALBUMINURIA RESULT DOCUMENTED/REVIEW: ICD-10-PCS | Mod: CPTII,S$GLB,, | Performed by: INTERNAL MEDICINE

## 2023-08-24 PROCEDURE — 3288F FALL RISK ASSESSMENT DOCD: CPT | Mod: CPTII,S$GLB,, | Performed by: INTERNAL MEDICINE

## 2023-08-24 PROCEDURE — 4010F ACE/ARB THERAPY RXD/TAKEN: CPT | Mod: CPTII,S$GLB,, | Performed by: INTERNAL MEDICINE

## 2023-08-24 PROCEDURE — 99214 OFFICE O/P EST MOD 30 MIN: CPT | Mod: S$GLB,,, | Performed by: INTERNAL MEDICINE

## 2023-08-24 PROCEDURE — 99999 PR PBB SHADOW E&M-EST. PATIENT-LVL IV: CPT | Mod: PBBFAC,,, | Performed by: INTERNAL MEDICINE

## 2023-08-24 PROCEDURE — 3008F PR BODY MASS INDEX (BMI) DOCUMENTED: ICD-10-PCS | Mod: CPTII,S$GLB,, | Performed by: INTERNAL MEDICINE

## 2023-08-24 PROCEDURE — 4010F PR ACE/ARB THEARPY RXD/TAKEN: ICD-10-PCS | Mod: CPTII,S$GLB,, | Performed by: INTERNAL MEDICINE

## 2023-08-24 PROCEDURE — 1101F PR PT FALLS ASSESS DOC 0-1 FALLS W/OUT INJ PAST YR: ICD-10-PCS | Mod: CPTII,S$GLB,, | Performed by: INTERNAL MEDICINE

## 2023-08-24 PROCEDURE — 3078F DIAST BP <80 MM HG: CPT | Mod: CPTII,S$GLB,, | Performed by: INTERNAL MEDICINE

## 2023-08-24 PROCEDURE — 3074F PR MOST RECENT SYSTOLIC BLOOD PRESSURE < 130 MM HG: ICD-10-PCS | Mod: CPTII,S$GLB,, | Performed by: INTERNAL MEDICINE

## 2023-08-24 PROCEDURE — 1101F PT FALLS ASSESS-DOCD LE1/YR: CPT | Mod: CPTII,S$GLB,, | Performed by: INTERNAL MEDICINE

## 2023-08-24 PROCEDURE — 80053 COMPREHEN METABOLIC PANEL: CPT | Mod: PN | Performed by: INTERNAL MEDICINE

## 2023-08-24 PROCEDURE — 1125F AMNT PAIN NOTED PAIN PRSNT: CPT | Mod: CPTII,S$GLB,, | Performed by: INTERNAL MEDICINE

## 2023-08-24 PROCEDURE — 1160F RVW MEDS BY RX/DR IN RCRD: CPT | Mod: CPTII,S$GLB,, | Performed by: INTERNAL MEDICINE

## 2023-08-24 PROCEDURE — 3008F BODY MASS INDEX DOCD: CPT | Mod: CPTII,S$GLB,, | Performed by: INTERNAL MEDICINE

## 2023-08-24 PROCEDURE — 85652 RBC SED RATE AUTOMATED: CPT | Performed by: INTERNAL MEDICINE

## 2023-08-24 PROCEDURE — 1159F MED LIST DOCD IN RCRD: CPT | Mod: CPTII,S$GLB,, | Performed by: INTERNAL MEDICINE

## 2023-08-24 PROCEDURE — 85027 COMPLETE CBC AUTOMATED: CPT | Mod: PN | Performed by: INTERNAL MEDICINE

## 2023-08-24 PROCEDURE — 3078F PR MOST RECENT DIASTOLIC BLOOD PRESSURE < 80 MM HG: ICD-10-PCS | Mod: CPTII,S$GLB,, | Performed by: INTERNAL MEDICINE

## 2023-08-24 PROCEDURE — 3074F SYST BP LT 130 MM HG: CPT | Mod: CPTII,S$GLB,, | Performed by: INTERNAL MEDICINE

## 2023-08-24 PROCEDURE — 1159F PR MEDICATION LIST DOCUMENTED IN MEDICAL RECORD: ICD-10-PCS | Mod: CPTII,S$GLB,, | Performed by: INTERNAL MEDICINE

## 2023-08-24 PROCEDURE — 3288F PR FALLS RISK ASSESSMENT DOCUMENTED: ICD-10-PCS | Mod: CPTII,S$GLB,, | Performed by: INTERNAL MEDICINE

## 2023-08-24 PROCEDURE — 3066F PR DOCUMENTATION OF TREATMENT FOR NEPHROPATHY: ICD-10-PCS | Mod: CPTII,S$GLB,, | Performed by: INTERNAL MEDICINE

## 2023-08-24 PROCEDURE — 99999 PR PBB SHADOW E&M-EST. PATIENT-LVL IV: ICD-10-PCS | Mod: PBBFAC,,, | Performed by: INTERNAL MEDICINE

## 2023-08-24 NOTE — PROGRESS NOTES
Answers submitted by the patient for this visit:  Review of Systems Questionnaire (Submitted on 2023)  appetite change : No  unexpected weight change: No  mouth sores: No  visual disturbance: No  cough: Yes  shortness of breath: Yes  chest pain: No  abdominal pain: No  diarrhea: No  frequency: Yes  back pain: No  rash: No  headaches: No  adenopathy: No  nervous/ anxious: No        Subjective:       Name: Saurav Bahena  : 1950  MRN: [id number]    Chief Complaint   Patient presents with    lung nodule      Patient is in clinic by himself.    HPI: Saurav Bahena is a 73 y.o. male presents to discuss the evaluation done to assess his lung nodule    He did not proceed with his lung biopsy. He is still concerned about the risk of pneumothorax. He is due to undergo another TURBT. He is still taking his Lovenox injections and noticing dark urine.    The patient denies any fever chills night sweats weight loss recurrent infection easy bruising bleeding.      ONCOLOGIC HISTORY:  He underwent a TURBT on 2023:  Findings:   5 cm bladder tumor  After the resection it appeared that the tumor was extending into the expected location of the right ureter  EUA post-TURBT showed no palpable or fixed disease.   Unfortunately the patient did not proceed with a CT-guided lung biopsy are of concern about a pneumothorax and lung collapse in view of his history of smoking.  He might reconsider it at a later time.    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  Deep muscle was present.          Oncology History    No history exists.        Past Medical History:   Diagnosis Date    Anticoagulant long-term use     aspirin    Atherosclerosis of native coronary artery of native heart without angina pectoris 2022    Atrophic kidney     right    Bladder cancer     BPH (benign prostatic hyperplasia)     Cat bite of left forearm with infection 2019    CLL (chronic  lymphocytic leukemia)     sees Dr. Sloares    CTS (carpal tunnel syndrome)     Current smoker on some days     using e cig, regular cigarettes sometimes    DJD (degenerative joint disease) of knee     left    DJD (degenerative joint disease) of knee     bilateral/ LT more than RT    Emphysema lung     Gout, unspecified     HLD (hyperlipidemia)     no current meds    HTN (hypertension)     Insomnia     Nocturia     Other pulmonary embolism without acute cor pulmonale 03/17/2023    Panlobular emphysema 03/01/2023    Sleep apnea     CPAP       Past Surgical History:   Procedure Laterality Date    COLONOSCOPY  09/01/2010    Dr. Aguilar, in legacy: int. hemorrhoids, repeat in 10 years for screening    CYSTOSCOPY W/ RETROGRADES Bilateral 07/02/2020    Procedure: CYSTOSCOPY, WITH RETROGRADE PYELOGRAM;  Surgeon: Andrea Tam MD;  Location: [facility] OR;  Service: Urology;  Laterality: Bilateral;    ELBOW SURGERY      left elbow, bursitis    KNEE SURGERY  1981?    right    MULTIPLE TOOTH EXTRACTIONS      TRANSRECTAL ULTRASOUND EXAMINATION N/A 07/02/2020    Procedure: ULTRASOUND, RECTAL APPROACH;  Surgeon: Andrea Tam MD;  Location: [facility] OR;  Service: Urology;  Laterality: N/A;    TRIGGER FINGER RELEASE Left     TURBT (TRANSURETHRAL RESECTION OF BLADDER TUMOR) N/A 6/22/2023    Procedure: TURBT (TRANSURETHRAL RESECTION OF BLADDER TUMOR);  Surgeon: Leonides Cruz MD;  Location: [facility] OR;  Service: Urology;  Laterality: N/A;       Family History   Problem Relation Age of Onset    Diabetes Mother     Heart disease Father 65        MI    Arthritis Father         RA    Heart disease Brother     Colon cancer Neg Hx     Crohn's disease Neg Hx     Ulcerative colitis Neg Hx     Stomach cancer Neg Hx     Esophageal cancer Neg Hx        Social History     Socioeconomic History    Marital status: Single   Occupational History    Occupation:    Tobacco Use    Smoking status: Former     Current packs/day: 0.00      Average packs/day: 0.3 packs/day for 20.0 years (6.0 ttl pk-yrs)     Types: Cigarettes     Start date: 2001     Quit date: 2021     Years since quittin.6    Smokeless tobacco: Never    Tobacco comments:     nicorette, vapor cigs/ 3 cigarettes   Substance and Sexual Activity    Alcohol use: Yes     Alcohol/week: 2.0 - 3.0 standard drinks of alcohol     Types: 2 - 3 Shots of liquor per week     Comment: 2-3/day on weekends    Drug use: No     Social Determinants of Health     Financial Resource Strain: Low Risk  (2023)    Overall Financial Resource Strain (CARDIA)     Difficulty of Paying Living Expenses: Not very hard   Food Insecurity: No Food Insecurity (2023)    Hunger Vital Sign     Worried About Running Out of Food in the Last Year: Never true     Ran Out of Food in the Last Year: Never true   Transportation Needs: No Transportation Needs (2023)    PRAPARE - Transportation     Lack of Transportation (Medical): No     Lack of Transportation (Non-Medical): No   Physical Activity: Insufficiently Active (2023)    Exercise Vital Sign     Days of Exercise per Week: 3 days     Minutes of Exercise per Session: 30 min   Stress: Stress Concern Present (2023)    [employer] - Occupational Stress Questionnaire     Feeling of Stress : To some extent   Social Connections: Unknown (2023)    Social Connection and Isolation Panel [NHANES]     Frequency of Communication with Friends and Family: More than three times a week     Frequency of Social Gatherings with Friends and Family: Once a week     Attends Sabianism Services: Patient refused     Active Member of Clubs or Organizations: Yes     Attends Club or Organization Meetings: More than 4 times per year     Marital Status: Never    Housing Stability: Low Risk  (2023)    Housing Stability Vital Sign     Unable to Pay for Housing in the Last Year: No     Number of Places Lived in the Last Year: 1  "    Unstable Housing in the Last Year: No       Review of patient's allergies indicates:   Allergen Reactions    No known drug allergies        Review of Systems   Constitutional:  Negative for appetite change, fatigue, fever and unexpected weight change.   HENT:  Negative for mouth sores and sore throat.    Eyes:  Negative for photophobia and visual disturbance.   Respiratory:  Negative for cough and shortness of breath.    Cardiovascular:  Negative for chest pain.   Gastrointestinal:  Negative for abdominal pain, constipation and diarrhea.   Genitourinary:  Positive for frequency. Negative for dysuria and hematuria.   Musculoskeletal:  Positive for back pain. Negative for arthralgias and joint swelling.   Integumentary:  Negative for rash.   Neurological:  Negative for dizziness, weakness, light-headedness and headaches.   Hematological:  Negative for adenopathy. Does not bruise/bleed easily.   Psychiatric/Behavioral:  Negative for sleep disturbance. The patient is not nervous/anxious.             Objective:     Vitals:    08/24/23 0951   BP: 124/62   BP Location: Left arm   Patient Position: Sitting   BP Method: Medium (Manual)   Pulse: 79   Resp: 16   Temp: 97 °F (36.1 °C)   TempSrc: Temporal   SpO2: 97%   Weight: 80.7 kg (177 lb 14.6 oz)   Height: 5' 10" (1.778 m)              Current Outpatient Medications on File Prior to Visit   Medication Sig    albuterol (PROVENTIL/VENTOLIN HFA) 90 mcg/actuation inhaler Inhale 2 puffs into the lungs every 6 (six) hours as needed for Wheezing. Rescue    allopurinoL (ZYLOPRIM) 100 MG tablet Take 1 tablet (100 mg total) by mouth once daily.    amLODIPine (NORVASC) 2.5 MG tablet Take 1 tablet (2.5 mg total) by mouth once daily.    ascorbic acid (VITAMIN C ORAL) Take 1 tablet by mouth once daily.    ascorbic acid, vitamin C, (VITAMIN C) 100 MG tablet Take 100 mg by mouth once daily.    cholecalciferol, vitamin D3, (VITAMIN D3 ORAL) Take 1 capsule by mouth once daily.    " enoxaparin (LOVENOX) 120 mg/0.8 mL Syrg Inject 0.8 mLs (120 mg total) into the skin Daily. Begin today injecting 120 mg of enoxaparin subcutaneous daily through and inclusive of April 26; hold Lovenox on April 27th, for planned cystoscopy on April 28th.  Resumption of Lovenox when okay with Urology, which can be the next day after cystoscopy if okay with Urology.  Generic please.    finasteride (PROSCAR) 5 mg tablet TAKE 1 TABLET BY MOUTH ONCE DAILY (Patient taking differently: Take 5 mg by mouth every evening.)    losartan (COZAAR) 50 MG tablet Take 1 tablet (50 mg total) by mouth once daily.    multivit-min/folic/vit K/lycop (MEN'S 50 PLUS MULTIVITAMIN ORAL) Take 1 tablet by mouth once daily.    tamsulosin (FLOMAX) 0.4 mg Cap Take 2 capsules (0.8 mg total) by mouth once daily. (Patient taking differently: Take 0.8 mg by mouth every evening.)    ZINC ORAL Take 1 tablet by mouth once daily.    HYDROcodone-acetaminophen (NORCO) 5-325 mg per tablet Take 1 tablet by mouth every 4 (four) hours as needed for Pain. (Patient not taking: Reported on 7/13/2023)    [DISCONTINUED] zinc gluconate 50 mg tablet Take 50 mg by mouth once daily.     Current Facility-Administered Medications on File Prior to Visit   Medication    lactated ringers infusion    lactated ringers infusion    lidocaine (PF) 10 mg/ml (1%) injection 10 mg    LIDOcaine (PF) 10 mg/ml (1%) injection 10 mg    triamcinolone acetonide injection 40 mg       CBC:  Lab Results   Component Value Date    WBC 11.43 07/05/2023    HGB 10.1 (L) 07/05/2023    HCT 32.5 (L) 07/05/2023    MCV 80 (L) 07/05/2023     07/05/2023         CMP:  Sodium   Date Value Ref Range Status   07/05/2023 131 (L) 136 - 145 mmol/L Final     Potassium   Date Value Ref Range Status   07/05/2023 4.9 3.5 - 5.1 mmol/L Final     Chloride   Date Value Ref Range Status   07/05/2023 98 95 - 110 mmol/L Final     CO2   Date Value Ref Range Status   07/05/2023 26 23 - 29 mmol/L Final     Glucose    Date Value Ref Range Status   07/05/2023 106 70 - 110 mg/dL Final     BUN   Date Value Ref Range Status   07/05/2023 15 8 - 23 mg/dL Final     Creatinine   Date Value Ref Range Status   07/05/2023 1.0 0.5 - 1.4 mg/dL Final     Calcium   Date Value Ref Range Status   07/05/2023 9.3 8.7 - 10.5 mg/dL Final     Total Protein   Date Value Ref Range Status   07/05/2023 7.6 6.0 - 8.4 g/dL Final     Albumin   Date Value Ref Range Status   07/05/2023 3.6 3.5 - 5.2 g/dL Final     Total Bilirubin   Date Value Ref Range Status   07/05/2023 0.4 0.1 - 1.0 mg/dL Final     Comment:     For infants and newborns, interpretation of results should be based  on gestational age, weight and in agreement with clinical  observations.    Premature Infant recommended reference ranges:  Up to 24 hours.............<8.0 mg/dL  Up to 48 hours............<12.0 mg/dL  3-5 days..................<15.0 mg/dL  6-29 days.................<15.0 mg/dL       Alkaline Phosphatase   Date Value Ref Range Status   07/05/2023 75 55 - 135 U/L Final     AST ([place])   Date Value Ref Range Status   04/04/2016 16 (L) 17 - 59 U/L Final     AST   Date Value Ref Range Status   07/05/2023 11 10 - 40 U/L Final     ALT   Date Value Ref Range Status   07/05/2023 15 10 - 44 U/L Final     Anion Gap   Date Value Ref Range Status   07/05/2023 7 (L) 8 - 16 mmol/L Final     eGFR if    Date Value Ref Range Status   01/21/2022 >60.0 >60 mL/min/1.73 m^2 Final     eGFR if non    Date Value Ref Range Status   01/21/2022 >60.0 >60 mL/min/1.73 m^2 Final     Comment:     Calculation used to obtain the estimated glomerular filtration  rate (eGFR) is the CKD-EPI equation.          NM PET CT Routine Skull to Mid Thigh  Narrative: EXAMINATION:  NM PET CT ROUTINE    CLINICAL HISTORY:  Spiculated opacity anterior right upper lobe new since 05/07/2020; 5.5 cm bladder mass also noted on imaging with patient scheduled for cystoscopy for further  evaluation please see epic;  Other specified disorders of bladder    72-year-old male with a history of CLL.  The patient now presents with a bladder mass, recent pulmonary embolism, and new pulmonary nodule.    TECHNIQUE:  Following IV injection of 12.27 mCi F-18 FDG, 3D PET imaging was performed from the skull base to the mid thighs.  A noncontrast non breath hold CT was obtained in conjunction with the PET scan for attenuation correction and anatomic correlation.  PET-CT fusion images were generated.    COMPARISON:  Correlation is made with the CT urogram dated 04/11/2023 and CT chest dated 04/05/2023.    FINDINGS:  Head/neck:    No significant abnormal hypermetabolic foci are identified within the head and neck region.  No lymphadenopathy is present.    Chest:    There is a slightly irregular subpleural markedly hypermetabolic nodule in the anterior right upper lobe which is new compared to a CT dated 05/07/2020 and is highly suspicious for malignancy.  On image 85 the nodule measures 1.6 x 0.8 cm and has a max SUV of 10.2.  No other significant abnormal hypermetabolic foci are identified within the soft tissues of the chest.  No lymphadenopathy or pleural effusion are present.  Findings of severe centrilobular pulmonary emphysema are present.    Abdomen/pelvis:    There is a markedly hypermetabolic mass arising from the posterior bladder wall with the bulk of the mass extending to the right posterior and right posterolateral aspect of the bladder.  The mass measures 6.1 x 4.3 x 3.8 cm and obstructs the right ureterovesical junction with severe right hydroureter and right hydronephrosis and associated severe right renal atrophy.  The max SUV is 29.8.  The spleen is enlarged and measures 21.5 x 14.6 x 8.8 cm.  No lymphadenopathy or ascites are present.  The adrenal glands are normal.    Skeleton:    There is a focus of hypermetabolism involving the distal aspect of the right 2nd rib with no associated fracture,  lytic destruction, or sclerosis.  This must be regarded as suspicious for metastatic disease and the max SUV is 3.6.  No other significant abnormal hypermetabolic foci are identified within the skeleton.  Impression: 1. Markedly hypermetabolic new subpleural anterior right upper lobe nodule which is highly suspicious for malignancy.  Biopsy is recommended.  2. Markedly hypermetabolic urinary bladder mass which is highly suspicious for malignancy.  The mass causes obstruction of the right ureterovesical junction with associated severe right hydroureter and right hydronephrosis and chronic severe right renal atrophy.  3. Hypermetabolic focus within the distal aspect of the right 2nd rib which is suspicious for osseous metastatic disease.  4. Splenomegaly.  5. Severe centrilobular pulmonary emphysema.    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  The patient has been seen and followed by Dr. Solares for years.  He did not require treatment for his disease.  -I reviewed with the patient the natural progression of this disease.  Usually it is not treated unless it starts causing end-organ damage and this include but not restricted to fever chills night sweats unintentional weight loss recurrent infection anemia thrombocytopenia large lymphadenopathy etcetera.  -his blood workup done to risk stratify his disease showed a deletion 13q and a normal TP53 and a mutated IGH making his disease in a good category risk.  -his uric acid was elevated.  He will continue on allopurinol 100 mg daily.    -His LDH and haptoglobin came back normal.  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  He will have this abnormality further evaluated with blood workup that will include free light chain UPEP with immunofixation and beta 2 microglobulin.  The patient also will require to underwent bone marrow biopsy for further evaluation of his normocytic anemia and intermittent thrombocytopenia since his blood workup failed to show a reason behind his anemia and thrombocytopenia.  This could be related to CLL or possibly a blood related dyscrasia.  The patient currently is reluctant to have it done.        Lung nodule:  -I reviewed independently the patient imaging studies and discussed the findings with the patient.  His recent PET scan showed an active right lung nodule with an SUV of 10.  This is highly worsened either of a primary lung cancer in view of his history of smoking.  -the patient is  willing to proceed with his lung biopsy. Will see him in 6 weeks to discuss the results.      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  I reviewed independently the results of his imaging studies as well.    Results of the results of his TURBT was reviewed at length.  The patient has a high-grade noninvasive bladder cancer.  He will require to continue to be followed clinically and with repeat TURBT by Dr. Thornton.  He also may benefit from beta hCG injections.   -UA showed a microscopic hematuria          Normocytic anemia and intermittent thrombocytopenia:  - Hgb 10 MCV 82 and plat 181. His plat count has been fluctuating between 148 and 178.    -the patient workup showed normal iron profile vitamin B12 TSH free T4.  His SPEP with immunofixation showed an MGUS affecting IgM lambda.  The patient will require a bone marrow biopsy to assess if his anemia and thrombocytopenia is related to a  CLL versus a blood related dyscrasia.  Currently he is undecided.        Pulmonary embolism.  -patient is currently on Lovenox at a dose of 120 mg subQ daily.   He will require lifelong anticoagulation since his PE is induced by his malignancy.  -Will switch him back to Eliquis after his lung biopsy    HTN and dyslipidemia:  Patient is on amlodipine            Med Onc Chart Routing      Follow up with physician 6 weeks. to discuss his lung biopsy. He will have blood work-up today CBC CMP ESR LDH   Follow up with [facility]    Infusion scheduling note    Injection scheduling note    Labs    Imaging    Pharmacy appointment    Other referrals               Plan was discussed with the patient at length, and he verbalized understanding. Saurav was given an opportunity to ask questions that were answered to his satisfaction, and he was advised to call in the interval if any problems or questions arise.  Signed:  Linnea St MD   Hematology and Oncology  [facility] - HEMATOLOGY ONCOLOGY  [facility], [place]

## 2023-08-27 DIAGNOSIS — E87.1 HYPONATREMIA: ICD-10-CM

## 2023-08-27 DIAGNOSIS — I10 PRIMARY HYPERTENSION: ICD-10-CM

## 2023-08-27 NOTE — TELEPHONE ENCOUNTER
No care due was identified.  Health [facility] Embedded Care Due Messages. Reference number: [id number].   8/27/2023 4:57:44 PM CDT

## 2023-08-28 RX ORDER — AMLODIPINE BESYLATE 2.5 MG/1
2.5 TABLET ORAL DAILY
Qty: 90 TABLET | Refills: 2 | Status: SHIPPED | OUTPATIENT
Start: 2023-08-28 | End: 2023-12-20

## 2023-08-28 RX ORDER — LOSARTAN POTASSIUM 50 MG/1
50 TABLET ORAL DAILY
Qty: 90 TABLET | Refills: 2 | Status: SHIPPED | OUTPATIENT
Start: 2023-08-28 | End: 2023-12-20

## 2023-08-28 NOTE — TELEPHONE ENCOUNTER
Refill Decision Note   Saurav Bahena  is requesting a refill authorization.  Brief Assessment and Rationale for Refill:  Approve     Medication Therapy Plan:  Provider retiring, renewal provider set to [facility] provider Dr. Oden    Medication Reconciliation Completed: No    Comments:     No Care Gaps recommended.     Note composed:1:06 PM 08/28/2023

## 2023-08-30 ENCOUNTER — TELEPHONE (OUTPATIENT)
Dept: FAMILY MEDICINE | Facility: CLINIC | Age: 73
End: 2023-08-30

## 2023-08-30 DIAGNOSIS — E87.1 HYPONATREMIA: Primary | ICD-10-CM

## 2023-08-30 NOTE — TELEPHONE ENCOUNTER
Discussed case with patient at length serial deterioration in his serum sodium has occurred where he has gone from 135 on 04/24/2023 to now 129 on 08/24/2023.  Patient has been drinking a lot of water of note.  5-6 a day of 16 oz water containers each; presently drinks 6869-0521 cc (2.5 L to 3.00 L of water a day) of water a day.  Not including 2-Propel drinks a day.  Have advised him to decrease his total volume of fluid to less than equal to 2 L a day.  With water content 800-1000 cc per day of that total.  With the rest of fluid as a sports drink or water with electrolytes added, to make up the difference to 2 L.    Please order a urine spot sodium, urine osmolality, and serum osmolality which have been ordered to rule out SIADH versus hyponatremia secondary to water excess, also to rule out sodium loss in urine due to medication.  Patient to call 2-3 days after results submitted for results and to speak to Dr. Oden who will be taking my place as his new PCP.  Depending on results may decide add low-dose Lasix as well

## 2023-08-31 ENCOUNTER — TELEPHONE (OUTPATIENT)
Dept: UROLOGY | Facility: CLINIC | Age: 73
End: 2023-08-31
Payer: MEDICARE

## 2023-08-31 ENCOUNTER — PATIENT OUTREACH (OUTPATIENT)
Dept: ADMINISTRATIVE | Facility: HOSPITAL | Age: 73
End: 2023-08-31
Payer: MEDICARE

## 2023-08-31 NOTE — PROGRESS NOTES
Population Health Chart Review & Patient Outreach Details:     Reason for Outreach Encounter:     [x]  Non-Compliant Report   []  Payor Report (Hum[employer], PHN, BCBS, MSSP, MCIP, UHC, etc.)   []  Pre-Visit Chart Review     Updates Requested / Reviewed:     []  Care Everywhere    []     []  External Sources ([facility], [facility], DIS, etc.)   []  Care Team Updated    Patient Outreach Method:    []  Telephone Outreach Completed   [] Successful   [] Left Voicemail   [] Unable to Contact (wrong number, no voicemail)  []  [facility] Portal Outreach Sent  []  Letter Outreach Mailed  []  Fax Sent for External Records  []  External Records Upload    Health Maintenance Topics Addressed and Outreach Outcomes / Actions Taken:        []      Breast Cancer Screening []  Mammo Scheduled      []  External Records Requested     []  Added Reminder to Complete to Upcoming Primary Care Appt Notes     []  Patient Declined     []  Patient Will Call Back to Schedule     []  Patient Will Schedule with External Provider / Order Routed if Applicable             []       Cervical Cancer Screening []  Pap Scheduled      []  External Records Requested     []  Added Reminder to Complete to Upcoming Primary Care Appt Notes     []  Patient Declined     []  Patient Will Call Back to Schedule     []  Patient Will Schedule with External Provider               []          Colorectal Cancer Screening []  Colonoscopy Case Request or Referral Placed     []  External Records Requested     []  Added Reminder to Complete to Upcoming Primary Care Appt Notes     []  Patient Declined     []  Patient Will Call Back to Schedule     []  Patient Will Schedule with External Provider     []  Fit Kit Mailed (add the SmartPhrase under additional notes)     []  Reminded Patient to Complete Home Test             []      Diabetic Eye Exam []  Eye Camera Scheduled or Optometry Referral Placed     []  External Records Requested     []  Added Reminder to Complete to  Upcoming Primary Care Appt Notes     []  Patient Declined     []  Patient Will Call Back to Schedule     []  Patient Will Schedule with External Provider             []      Blood Pressure Control []  Primary Care Follow Up Visit Scheduled     []  Remote Blood Pressure Reading Captured     []  Added Reminder to Complete to Upcoming Primary Care Appt Notes     []  Patient Declined     []  Patient Will Call Back / Patient Will Send Portal Message with Reading     []  Patient Will Call Back to Schedule Provider Visit             []       HbA1c & Other Labs []  Lab Appt Scheduled for Due Labs     []  Primary Care Follow Up Visit Scheduled      []  Reminded Patient to Complete Home Test     []  Added Reminder to Complete to Upcoming Primary Care Appt Notes     []  Patient Declined     []  Patient Will Call Back to Schedule     []  Patient Will Schedule with External Provider / Order Routed if Applicable           []    Schedule Primary Care Appt []  Primary Care Appt Scheduled     []  Patient Declined     []  Patient Will Call Back to Schedule     []  Pt Established with External Provider & Updated Care Team             [x]      Medication Adherence []  Primary Care Appointment Scheduled     []  Added Reminder to Upcoming Primary Care Appt Notes     []  Patient Reminded to  Prescription     []  Patient Declined, Provider Notified if Needed     [x]  Sent Provider Message to Review and/or Add Exclusion to Problem List             []      Osteoporosis Screening []  DXA Appointment Scheduled     []  External Records Requested     []  Added Reminder to Complete to Upcoming Primary Care Appt Notes     []  Patient Declined     []  Patient Will Call Back to Schedule     []  Patient Will Schedule with External Provider / Order Routed if Applicable     Additional Care Coordinator Notes:      Further Action Needed If Patient Returns Outreach:

## 2023-09-01 ENCOUNTER — LAB VISIT (OUTPATIENT)
Dept: LAB | Facility: HOSPITAL | Age: 73
End: 2023-09-01
Attending: INTERNAL MEDICINE
Payer: MEDICARE

## 2023-09-01 DIAGNOSIS — E87.1 HYPONATREMIA: ICD-10-CM

## 2023-09-01 LAB
ANION GAP SERPL CALC-SCNC: 10 MMOL/L (ref 8–16)
BUN SERPL-MCNC: 14 MG/DL (ref 8–23)
CALCIUM SERPL-MCNC: 8.9 MG/DL (ref 8.7–10.5)
CHLORIDE SERPL-SCNC: 93 MMOL/L (ref 95–110)
CO2 SERPL-SCNC: 24 MMOL/L (ref 23–29)
CREAT SERPL-MCNC: 1 MG/DL (ref 0.5–1.4)
EST. GFR  (NO RACE VARIABLE): >60 ML/MIN/1.73 M^2
GLUCOSE SERPL-MCNC: 120 MG/DL (ref 70–110)
POTASSIUM SERPL-SCNC: 5.1 MMOL/L (ref 3.5–5.1)
SODIUM SERPL-SCNC: 127 MMOL/L (ref 136–145)
TSH SERPL DL<=0.005 MIU/L-ACNC: 2.2 UIU/ML (ref 0.4–4)

## 2023-09-01 PROCEDURE — 84443 ASSAY THYROID STIM HORMONE: CPT | Performed by: INTERNAL MEDICINE

## 2023-09-01 PROCEDURE — 80048 BASIC METABOLIC PNL TOTAL CA: CPT | Performed by: INTERNAL MEDICINE

## 2023-09-01 PROCEDURE — 36415 COLL VENOUS BLD VENIPUNCTURE: CPT | Mod: PN | Performed by: INTERNAL MEDICINE

## 2023-09-11 ENCOUNTER — TELEPHONE (OUTPATIENT)
Dept: UROLOGY | Facility: CLINIC | Age: 73
End: 2023-09-11
Payer: MEDICARE

## 2023-09-11 NOTE — TELEPHONE ENCOUNTER
Spoke with patient in regards to scheduling procedure with Dr Cruz,  patient he stated he has not had his lung biopsy yet , and would like to call our office back when he is ready to schedule.

## 2023-10-05 ENCOUNTER — OFFICE VISIT (OUTPATIENT)
Dept: HEMATOLOGY/ONCOLOGY | Facility: CLINIC | Age: 73
End: 2023-10-05
Payer: MEDICARE

## 2023-10-05 ENCOUNTER — LAB VISIT (OUTPATIENT)
Dept: LAB | Facility: HOSPITAL | Age: 73
End: 2023-10-05
Attending: INTERNAL MEDICINE
Payer: MEDICARE

## 2023-10-05 VITALS
HEART RATE: 69 BPM | DIASTOLIC BLOOD PRESSURE: 62 MMHG | BODY MASS INDEX: 26.2 KG/M2 | HEIGHT: 70 IN | SYSTOLIC BLOOD PRESSURE: 150 MMHG | WEIGHT: 183 LBS | OXYGEN SATURATION: 97 % | RESPIRATION RATE: 16 BRPM | TEMPERATURE: 97 F

## 2023-10-05 DIAGNOSIS — D69.6 THROMBOCYTOPENIA: ICD-10-CM

## 2023-10-05 DIAGNOSIS — C67.8 MALIGNANT NEOPLASM OF OVERLAPPING SITES OF BLADDER: Primary | ICD-10-CM

## 2023-10-05 DIAGNOSIS — D47.2 MGUS (MONOCLONAL GAMMOPATHY OF UNKNOWN SIGNIFICANCE): ICD-10-CM

## 2023-10-05 DIAGNOSIS — I27.82 CHRONIC PULMONARY EMBOLISM WITHOUT ACUTE COR PULMONALE, UNSPECIFIED PULMONARY EMBOLISM TYPE: ICD-10-CM

## 2023-10-05 DIAGNOSIS — R91.1 LUNG NODULE: ICD-10-CM

## 2023-10-05 DIAGNOSIS — C91.10 CLL (CHRONIC LYMPHOCYTIC LEUKEMIA): ICD-10-CM

## 2023-10-05 DIAGNOSIS — I10 PRIMARY HYPERTENSION: ICD-10-CM

## 2023-10-05 DIAGNOSIS — E78.2 MIXED HYPERLIPIDEMIA: ICD-10-CM

## 2023-10-05 DIAGNOSIS — D63.8 ANEMIA OF CHRONIC DISEASE: ICD-10-CM

## 2023-10-05 LAB
ALBUMIN SERPL BCP-MCNC: 3.5 G/DL (ref 3.5–5.2)
ALP SERPL-CCNC: 82 U/L (ref 55–135)
ALT SERPL W/O P-5'-P-CCNC: 10 U/L (ref 10–44)
ANION GAP SERPL CALC-SCNC: 11 MMOL/L (ref 8–16)
ANISOCYTOSIS BLD QL SMEAR: SLIGHT
AST SERPL-CCNC: 9 U/L (ref 10–40)
BASOPHILS # BLD AUTO: ABNORMAL K/UL (ref 0–0.2)
BASOPHILS NFR BLD: 0 % (ref 0–1.9)
BILIRUB SERPL-MCNC: 0.4 MG/DL (ref 0.1–1)
BUN SERPL-MCNC: 12 MG/DL (ref 8–23)
CALCIUM SERPL-MCNC: 9.5 MG/DL (ref 8.7–10.5)
CHLORIDE SERPL-SCNC: 95 MMOL/L (ref 95–110)
CO2 SERPL-SCNC: 25 MMOL/L (ref 23–29)
CREAT SERPL-MCNC: 0.9 MG/DL (ref 0.5–1.4)
DIFFERENTIAL METHOD: ABNORMAL
EOSINOPHIL # BLD AUTO: ABNORMAL K/UL (ref 0–0.5)
EOSINOPHIL NFR BLD: 0 % (ref 0–8)
ERYTHROCYTE [DISTWIDTH] IN BLOOD BY AUTOMATED COUNT: 15.6 % (ref 11.5–14.5)
ERYTHROCYTE [SEDIMENTATION RATE] IN BLOOD BY PHOTOMETRIC METHOD: 67 MM/HR (ref 0–23)
EST. GFR  (NO RACE VARIABLE): >60 ML/MIN/1.73 M^2
FERRITIN SERPL-MCNC: 176 NG/ML (ref 20–300)
GLUCOSE SERPL-MCNC: 97 MG/DL (ref 70–110)
HCT VFR BLD AUTO: 31.6 % (ref 40–54)
HGB BLD-MCNC: 9.6 G/DL (ref 14–18)
HYPOCHROMIA BLD QL SMEAR: ABNORMAL
IGA SERPL-MCNC: 119 MG/DL (ref 40–350)
IGG SERPL-MCNC: 1229 MG/DL (ref 650–1600)
IGM SERPL-MCNC: 721 MG/DL (ref 50–300)
IMM GRANULOCYTES # BLD AUTO: ABNORMAL K/UL (ref 0–0.04)
IMM GRANULOCYTES NFR BLD AUTO: ABNORMAL % (ref 0–0.5)
IRON SERPL-MCNC: 24 UG/DL (ref 45–160)
LDH SERPL L TO P-CCNC: 129 U/L (ref 110–260)
LYMPHOCYTES # BLD AUTO: ABNORMAL K/UL (ref 1–4.8)
LYMPHOCYTES NFR BLD: 75 % (ref 18–48)
MCH RBC QN AUTO: 24 PG (ref 27–31)
MCHC RBC AUTO-ENTMCNC: 30.4 G/DL (ref 32–36)
MCV RBC AUTO: 79 FL (ref 82–98)
MONOCYTES # BLD AUTO: ABNORMAL K/UL (ref 0.3–1)
MONOCYTES NFR BLD: 2 % (ref 4–15)
NEUTROPHILS NFR BLD: 23 % (ref 38–73)
NRBC BLD-RTO: 0 /100 WBC
OVALOCYTES BLD QL SMEAR: ABNORMAL
PLATELET # BLD AUTO: 149 K/UL (ref 150–450)
PLATELET BLD QL SMEAR: ABNORMAL
PMV BLD AUTO: 8.6 FL (ref 9.2–12.9)
POIKILOCYTOSIS BLD QL SMEAR: SLIGHT
POLYCHROMASIA BLD QL SMEAR: ABNORMAL
POTASSIUM SERPL-SCNC: 5 MMOL/L (ref 3.5–5.1)
PROT SERPL-MCNC: 7.6 G/DL (ref 6–8.4)
RBC # BLD AUTO: 4 M/UL (ref 4.6–6.2)
SATURATED IRON: 8 % (ref 20–50)
SODIUM SERPL-SCNC: 131 MMOL/L (ref 136–145)
TOTAL IRON BINDING CAPACITY: 289 UG/DL (ref 250–450)
TRANSFERRIN SERPL-MCNC: 195 MG/DL (ref 200–375)
URATE SERPL-MCNC: 5.6 MG/DL (ref 3.4–7)
VIT B12 SERPL-MCNC: >2000 PG/ML (ref 210–950)
WBC # BLD AUTO: 11.72 K/UL (ref 3.9–12.7)

## 2023-10-05 PROCEDURE — 3078F DIAST BP <80 MM HG: CPT | Mod: CPTII,S$GLB,, | Performed by: INTERNAL MEDICINE

## 2023-10-05 PROCEDURE — 86334 PATHOLOGIST INTERPRETATION IFE: ICD-10-PCS | Mod: 26,,, | Performed by: PATHOLOGY

## 2023-10-05 PROCEDURE — 3008F BODY MASS INDEX DOCD: CPT | Mod: CPTII,S$GLB,, | Performed by: INTERNAL MEDICINE

## 2023-10-05 PROCEDURE — 86334 IMMUNOFIX E-PHORESIS SERUM: CPT | Mod: 26,,, | Performed by: PATHOLOGY

## 2023-10-05 PROCEDURE — 85007 BL SMEAR W/DIFF WBC COUNT: CPT | Performed by: INTERNAL MEDICINE

## 2023-10-05 PROCEDURE — 82607 VITAMIN B-12: CPT | Performed by: INTERNAL MEDICINE

## 2023-10-05 PROCEDURE — 99214 PR OFFICE/OUTPT VISIT, EST, LEVL IV, 30-39 MIN: ICD-10-PCS | Mod: S$GLB,,, | Performed by: INTERNAL MEDICINE

## 2023-10-05 PROCEDURE — 1126F AMNT PAIN NOTED NONE PRSNT: CPT | Mod: CPTII,S$GLB,, | Performed by: INTERNAL MEDICINE

## 2023-10-05 PROCEDURE — 3062F POS MACROALBUMINURIA REV: CPT | Mod: CPTII,S$GLB,, | Performed by: INTERNAL MEDICINE

## 2023-10-05 PROCEDURE — 3077F SYST BP >= 140 MM HG: CPT | Mod: CPTII,S$GLB,, | Performed by: INTERNAL MEDICINE

## 2023-10-05 PROCEDURE — 99999 PR PBB SHADOW E&M-EST. PATIENT-LVL IV: ICD-10-PCS | Mod: PBBFAC,,, | Performed by: INTERNAL MEDICINE

## 2023-10-05 PROCEDURE — 3288F FALL RISK ASSESSMENT DOCD: CPT | Mod: CPTII,S$GLB,, | Performed by: INTERNAL MEDICINE

## 2023-10-05 PROCEDURE — 36415 COLL VENOUS BLD VENIPUNCTURE: CPT | Mod: PN | Performed by: INTERNAL MEDICINE

## 2023-10-05 PROCEDURE — 3288F PR FALLS RISK ASSESSMENT DOCUMENTED: ICD-10-PCS | Mod: CPTII,S$GLB,, | Performed by: INTERNAL MEDICINE

## 2023-10-05 PROCEDURE — 99999 PR PBB SHADOW E&M-EST. PATIENT-LVL IV: CPT | Mod: PBBFAC,,, | Performed by: INTERNAL MEDICINE

## 2023-10-05 PROCEDURE — 84165 PROTEIN E-PHORESIS SERUM: CPT | Performed by: INTERNAL MEDICINE

## 2023-10-05 PROCEDURE — 82784 ASSAY IGA/IGD/IGG/IGM EACH: CPT | Mod: 59 | Performed by: INTERNAL MEDICINE

## 2023-10-05 PROCEDURE — 1160F RVW MEDS BY RX/DR IN RCRD: CPT | Mod: CPTII,S$GLB,, | Performed by: INTERNAL MEDICINE

## 2023-10-05 PROCEDURE — 99214 OFFICE O/P EST MOD 30 MIN: CPT | Mod: S$GLB,,, | Performed by: INTERNAL MEDICINE

## 2023-10-05 PROCEDURE — 3066F NEPHROPATHY DOC TX: CPT | Mod: CPTII,S$GLB,, | Performed by: INTERNAL MEDICINE

## 2023-10-05 PROCEDURE — 85027 COMPLETE CBC AUTOMATED: CPT | Performed by: INTERNAL MEDICINE

## 2023-10-05 PROCEDURE — 1101F PT FALLS ASSESS-DOCD LE1/YR: CPT | Mod: CPTII,S$GLB,, | Performed by: INTERNAL MEDICINE

## 2023-10-05 PROCEDURE — 83615 LACTATE (LD) (LDH) ENZYME: CPT | Mod: PN | Performed by: INTERNAL MEDICINE

## 2023-10-05 PROCEDURE — 86334 IMMUNOFIX E-PHORESIS SERUM: CPT | Performed by: INTERNAL MEDICINE

## 2023-10-05 PROCEDURE — 80053 COMPREHEN METABOLIC PANEL: CPT | Mod: PN | Performed by: INTERNAL MEDICINE

## 2023-10-05 PROCEDURE — 1159F PR MEDICATION LIST DOCUMENTED IN MEDICAL RECORD: ICD-10-PCS | Mod: CPTII,S$GLB,, | Performed by: INTERNAL MEDICINE

## 2023-10-05 PROCEDURE — 3077F PR MOST RECENT SYSTOLIC BLOOD PRESSURE >= 140 MM HG: ICD-10-PCS | Mod: CPTII,S$GLB,, | Performed by: INTERNAL MEDICINE

## 2023-10-05 PROCEDURE — 4010F ACE/ARB THERAPY RXD/TAKEN: CPT | Mod: CPTII,S$GLB,, | Performed by: INTERNAL MEDICINE

## 2023-10-05 PROCEDURE — 1101F PR PT FALLS ASSESS DOC 0-1 FALLS W/OUT INJ PAST YR: ICD-10-PCS | Mod: CPTII,S$GLB,, | Performed by: INTERNAL MEDICINE

## 2023-10-05 PROCEDURE — 84550 ASSAY OF BLOOD/URIC ACID: CPT | Mod: PN | Performed by: INTERNAL MEDICINE

## 2023-10-05 PROCEDURE — 3078F PR MOST RECENT DIASTOLIC BLOOD PRESSURE < 80 MM HG: ICD-10-PCS | Mod: CPTII,S$GLB,, | Performed by: INTERNAL MEDICINE

## 2023-10-05 PROCEDURE — 83010 ASSAY OF HAPTOGLOBIN QUANT: CPT | Performed by: INTERNAL MEDICINE

## 2023-10-05 PROCEDURE — 83540 ASSAY OF IRON: CPT | Performed by: INTERNAL MEDICINE

## 2023-10-05 PROCEDURE — 4010F PR ACE/ARB THEARPY RXD/TAKEN: ICD-10-PCS | Mod: CPTII,S$GLB,, | Performed by: INTERNAL MEDICINE

## 2023-10-05 PROCEDURE — 1159F MED LIST DOCD IN RCRD: CPT | Mod: CPTII,S$GLB,, | Performed by: INTERNAL MEDICINE

## 2023-10-05 PROCEDURE — 82728 ASSAY OF FERRITIN: CPT | Performed by: INTERNAL MEDICINE

## 2023-10-05 PROCEDURE — 3066F PR DOCUMENTATION OF TREATMENT FOR NEPHROPATHY: ICD-10-PCS | Mod: CPTII,S$GLB,, | Performed by: INTERNAL MEDICINE

## 2023-10-05 PROCEDURE — 84466 ASSAY OF TRANSFERRIN: CPT | Performed by: INTERNAL MEDICINE

## 2023-10-05 PROCEDURE — 1126F PR PAIN SEVERITY QUANTIFIED, NO PAIN PRESENT: ICD-10-PCS | Mod: CPTII,S$GLB,, | Performed by: INTERNAL MEDICINE

## 2023-10-05 PROCEDURE — 3062F PR POS MACROALBUMINURIA RESULT DOCUMENTED/REVIEW: ICD-10-PCS | Mod: CPTII,S$GLB,, | Performed by: INTERNAL MEDICINE

## 2023-10-05 PROCEDURE — 3008F PR BODY MASS INDEX (BMI) DOCUMENTED: ICD-10-PCS | Mod: CPTII,S$GLB,, | Performed by: INTERNAL MEDICINE

## 2023-10-05 PROCEDURE — 1160F PR REVIEW ALL MEDS BY PRESCRIBER/CLIN PHARMACIST DOCUMENTED: ICD-10-PCS | Mod: CPTII,S$GLB,, | Performed by: INTERNAL MEDICINE

## 2023-10-05 PROCEDURE — 85652 RBC SED RATE AUTOMATED: CPT | Performed by: INTERNAL MEDICINE

## 2023-10-05 NOTE — PROGRESS NOTES
Subjective:       Name: Saurav Bahena  : 1950  MRN: [id number]    Chief Complaint   Patient presents with    CLL (chronic lymphocytic leukemia)     6 wk follow up      Patient is in clinic by himself.    HPI: Saurav Bahena is a 73 y.o. male presents to discuss the evaluation done to assess his CLL (chronic lymphocytic leukemia) (6 wk follow up)    He did not proceed with his lung biopsy. He spoke with a friend who advised him to undergo a bronchoscopy.  He is still taking Lovenox injections.  The patient denies any fever chills night sweats weight loss recurrent infection easy bruising bleeding hematuria.  His blood workup done on 2023 showing white count of 10.92 a hemoglobin of 9.9 MCV of 79 and platelet count of 146.  His absolute lymphocytic count was 8000.  His CMP showed a sodium of 129.  His ESR was elevated at 86.      ONCOLOGIC HISTORY:  He underwent a TURBT on 2023:  Findings:   5 cm bladder tumor  After the resection it appeared that the tumor was extending into the expected location of the right ureter  EUA post-TURBT showed no palpable or fixed disease.   Unfortunately the patient did not proceed with a CT-guided lung biopsy are of concern about a pneumothorax and lung collapse in view of his history of smoking.  He might reconsider it at a later time.    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  Deep muscle was present.          Oncology History    No history exists.        Past Medical History:   Diagnosis Date    Anticoagulant long-term use     aspirin    Atherosclerosis of native coronary artery of native heart without angina pectoris 2022    Atrophic kidney     right    Bladder cancer     BPH (benign prostatic hyperplasia)     Cat bite of left forearm with infection 2019    CLL (chronic lymphocytic leukemia)     sees Dr. Solares    CTS (carpal tunnel syndrome)     Current smoker on some days     using e cig,  regular cigarettes sometimes    DJD (degenerative joint disease) of knee     left    DJD (degenerative joint disease) of knee     bilateral/ LT more than RT    Emphysema lung     Gout, unspecified     HLD (hyperlipidemia)     no current meds    HTN (hypertension)     Insomnia     Nocturia     Other pulmonary embolism without acute cor pulmonale 03/17/2023    Panlobular emphysema 03/01/2023    Sleep apnea     CPAP       Past Surgical History:   Procedure Laterality Date    COLONOSCOPY  09/01/2010    Dr. Aguilar, in [place]: int. hemorrhoids, repeat in 10 years for screening    CYSTOSCOPY W/ RETROGRADES Bilateral 07/02/2020    Procedure: CYSTOSCOPY, WITH RETROGRADE PYELOGRAM;  Surgeon: Andrea Tam MD;  Location: [facility] OR;  Service: Urology;  Laterality: Bilateral;    ELBOW SURGERY      left elbow, bursitis    KNEE SURGERY  1981?    right    MULTIPLE TOOTH EXTRACTIONS      TRANSRECTAL ULTRASOUND EXAMINATION N/A 07/02/2020    Procedure: ULTRASOUND, RECTAL APPROACH;  Surgeon: Andrea Tam MD;  Location: [facility] OR;  Service: Urology;  Laterality: N/A;    TRIGGER FINGER RELEASE Left     TURBT (TRANSURETHRAL RESECTION OF BLADDER TUMOR) N/A 6/22/2023    Procedure: TURBT (TRANSURETHRAL RESECTION OF BLADDER TUMOR);  Surgeon: Leonides Cruz MD;  Location: [facility] OR;  Service: Urology;  Laterality: N/A;       Family History   Problem Relation Age of Onset    Diabetes Mother     Heart disease Father 65        MI    Arthritis Father         RA    Heart disease Brother     Colon cancer Neg Hx     Crohn's disease Neg Hx     Ulcerative colitis Neg Hx     Stomach cancer Neg Hx     Esophageal cancer Neg Hx        Social History     Socioeconomic History    Marital status: Single   Occupational History    Occupation:    Tobacco Use    Smoking status: Former     Current packs/day: 0.00     Average packs/day: 0.3 packs/day for 20.0 years (6.0 ttl pk-yrs)     Types: Cigarettes     Start date: 1/1/2001     Quit  date: 2021     Years since quittin.7    Smokeless tobacco: Never    Tobacco comments:     nicorette, vapor cigs/ 3 cigarettes   Substance and Sexual Activity    Alcohol use: Yes     Alcohol/week: 2.0 - 3.0 standard drinks of alcohol     Types: 2 - 3 Shots of liquor per week     Comment: 2-3/day on weekends    Drug use: No     Social Determinants of Health     Financial Resource Strain: Low Risk  (2023)    Overall Financial Resource Strain (CARDIA)     Difficulty of Paying Living Expenses: Not very hard   Food Insecurity: No Food Insecurity (2023)    Hunger Vital Sign     Worried About Running Out of Food in the Last Year: Never true     Ran Out of Food in the Last Year: Never true   Transportation Needs: No Transportation Needs (2023)    PRAPARE - Transportation     Lack of Transportation (Medical): No     Lack of Transportation (Non-Medical): No   Physical Activity: Insufficiently Active (2023)    Exercise Vital Sign     Days of Exercise per Week: 3 days     Minutes of Exercise per Session: 30 min   Stress: Stress Concern Present (2023)    [employer] - Occupational Stress Questionnaire     Feeling of Stress : To some extent   Social Connections: Unknown (2023)    Social Connection and Isolation Panel [NHANES]     Frequency of Communication with Friends and Family: More than three times a week     Frequency of Social Gatherings with Friends and Family: Once a week     Attends Evangelical Services: Patient refused     Active Member of Clubs or Organizations: Yes     Attends Club or Organization Meetings: More than 4 times per year     Marital Status: Never    Housing Stability: Low Risk  (2023)    Housing Stability Vital Sign     Unable to Pay for Housing in the Last Year: No     Number of Places Lived in the Last Year: 1     Unstable Housing in the Last Year: No       Review of patient's allergies indicates:   Allergen Reactions    No  "known drug allergies        Review of Systems   Constitutional:  Negative for appetite change, fatigue, fever and unexpected weight change.   HENT:  Negative for mouth sores and sore throat.    Eyes:  Negative for photophobia and visual disturbance.   Respiratory:  Negative for cough and shortness of breath.    Cardiovascular:  Negative for chest pain.   Gastrointestinal:  Negative for abdominal pain, constipation and diarrhea.   Genitourinary:  Positive for frequency. Negative for dysuria and hematuria.   Musculoskeletal:  Positive for back pain. Negative for arthralgias and joint swelling.   Integumentary:  Negative for rash.   Neurological:  Negative for dizziness, weakness, light-headedness and headaches.   Hematological:  Negative for adenopathy. Does not bruise/bleed easily.   Psychiatric/Behavioral:  Negative for sleep disturbance. The patient is not nervous/anxious.             Objective:     Vitals:    10/05/23 1026   BP: (!) 150/62   BP Location: Left arm   Patient Position: Sitting   BP Method: Medium (Automatic)   Pulse: 69   Resp: 16   Temp: 97 °F (36.1 °C)   TempSrc: Temporal   SpO2: 97%   Weight: 83 kg (182 lb 15.7 oz)   Height: 5' 10" (1.778 m)              Current Outpatient Medications on File Prior to Visit   Medication Sig    allopurinoL (ZYLOPRIM) 100 MG tablet Take 1 tablet (100 mg total) by mouth once daily.    amLODIPine (NORVASC) 2.5 MG tablet Take 1 tablet (2.5 mg total) by mouth once daily.    ascorbic acid, vitamin C, (VITAMIN C) 100 MG tablet Take 100 mg by mouth once daily.    cholecalciferol, vitamin D3, (VITAMIN D3 ORAL) Take 1 capsule by mouth once daily.    enoxaparin (LOVENOX) 120 mg/0.8 mL Syrg Inject 0.8 mLs (120 mg total) into the skin Daily. Begin today injecting 120 mg of enoxaparin subcutaneous daily through and inclusive of April 26; hold Lovenox on April 27th, for planned cystoscopy on April 28th.  Resumption of Lovenox when okay with Urology, which can be the next day " after cystoscopy if okay with Urology.  Generic please.    finasteride (PROSCAR) 5 mg tablet TAKE 1 TABLET BY MOUTH ONCE DAILY (Patient taking differently: Take 5 mg by mouth every evening.)    losartan (COZAAR) 50 MG tablet Take 1 tablet (50 mg total) by mouth once daily.    multivit-min/folic/vit K/lycop (MEN'S 50 PLUS MULTIVITAMIN ORAL) Take 1 tablet by mouth once daily.    tamsulosin (FLOMAX) 0.4 mg Cap Take 2 capsules (0.8 mg total) by mouth once daily. (Patient taking differently: Take 0.8 mg by mouth every evening.)    ZINC ORAL Take 1 tablet by mouth once daily.    albuterol (PROVENTIL/VENTOLIN HFA) 90 mcg/actuation inhaler Inhale 2 puffs into the lungs every 6 (six) hours as needed for Wheezing. Rescue (Patient not taking: Reported on 10/5/2023)    ascorbic acid (VITAMIN C ORAL) Take 1 tablet by mouth once daily.    HYDROcodone-acetaminophen (NORCO) 5-325 mg per tablet Take 1 tablet by mouth every 4 (four) hours as needed for Pain. (Patient not taking: Reported on 7/13/2023)     Current Facility-Administered Medications on File Prior to Visit   Medication    lactated ringers infusion    lactated ringers infusion    lidocaine (PF) 10 mg/ml (1%) injection 10 mg    LIDOcaine (PF) 10 mg/ml (1%) injection 10 mg    triamcinolone acetonide injection 40 mg       CBC:  Lab Results   Component Value Date    WBC 11.72 10/05/2023    HGB 9.6 (L) 10/05/2023    HCT 31.6 (L) 10/05/2023    MCV 79 (L) 10/05/2023     (L) 10/05/2023         CMP:  Sodium   Date Value Ref Range Status   10/05/2023 131 (L) 136 - 145 mmol/L Final     Potassium   Date Value Ref Range Status   10/05/2023 5.0 3.5 - 5.1 mmol/L Final     Chloride   Date Value Ref Range Status   10/05/2023 95 95 - 110 mmol/L Final     CO2   Date Value Ref Range Status   10/05/2023 25 23 - 29 mmol/L Final     Glucose   Date Value Ref Range Status   10/05/2023 97 70 - 110 mg/dL Final     BUN   Date Value Ref Range Status   10/05/2023 12 8 - 23 mg/dL Final      Creatinine   Date Value Ref Range Status   10/05/2023 0.9 0.5 - 1.4 mg/dL Final     Calcium   Date Value Ref Range Status   10/05/2023 9.5 8.7 - 10.5 mg/dL Final     Total Protein   Date Value Ref Range Status   10/05/2023 7.6 6.0 - 8.4 g/dL Final     Albumin   Date Value Ref Range Status   10/05/2023 3.5 3.5 - 5.2 g/dL Final     Total Bilirubin   Date Value Ref Range Status   10/05/2023 0.4 0.1 - 1.0 mg/dL Final     Comment:     For infants and newborns, interpretation of results should be based  on gestational age, weight and in agreement with clinical  observations.    Premature Infant recommended reference ranges:  Up to 24 hours.............<8.0 mg/dL  Up to 48 hours............<12.0 mg/dL  3-5 days..................<15.0 mg/dL  6-29 days.................<15.0 mg/dL       Alkaline Phosphatase   Date Value Ref Range Status   10/05/2023 82 55 - 135 U/L Final     AST ([place])   Date Value Ref Range Status   04/04/2016 16 (L) 17 - 59 U/L Final     AST   Date Value Ref Range Status   10/05/2023 9 (L) 10 - 40 U/L Final     ALT   Date Value Ref Range Status   10/05/2023 10 10 - 44 U/L Final     Anion Gap   Date Value Ref Range Status   10/05/2023 11 8 - 16 mmol/L Final     eGFR if    Date Value Ref Range Status   01/21/2022 >60.0 >60 mL/min/1.73 m^2 Final     eGFR if non    Date Value Ref Range Status   01/21/2022 >60.0 >60 mL/min/1.73 m^2 Final     Comment:     Calculation used to obtain the estimated glomerular filtration  rate (eGFR) is the CKD-EPI equation.          NM PET CT Routine Skull to Mid Thigh  Narrative: EXAMINATION:  NM PET CT ROUTINE    CLINICAL HISTORY:  Spiculated opacity anterior right upper lobe new since 05/07/2020; 5.5 cm bladder mass also noted on imaging with patient scheduled for cystoscopy for further evaluation please see epic;  Other specified disorders of bladder    72-year-old male with a history of CLL.  The patient now presents with a bladder  mass, recent pulmonary embolism, and new pulmonary nodule.    TECHNIQUE:  Following IV injection of 12.27 mCi F-18 FDG, 3D PET imaging was performed from the skull base to the mid thighs.  A noncontrast non breath hold CT was obtained in conjunction with the PET scan for attenuation correction and anatomic correlation.  PET-CT fusion images were generated.    COMPARISON:  Correlation is made with the CT urogram dated 04/11/2023 and CT chest dated 04/05/2023.    FINDINGS:  Head/neck:    No significant abnormal hypermetabolic foci are identified within the head and neck region.  No lymphadenopathy is present.    Chest:    There is a slightly irregular subpleural markedly hypermetabolic nodule in the anterior right upper lobe which is new compared to a CT dated 05/07/2020 and is highly suspicious for malignancy.  On image 85 the nodule measures 1.6 x 0.8 cm and has a max SUV of 10.2.  No other significant abnormal hypermetabolic foci are identified within the soft tissues of the chest.  No lymphadenopathy or pleural effusion are present.  Findings of severe centrilobular pulmonary emphysema are present.    Abdomen/pelvis:    There is a markedly hypermetabolic mass arising from the posterior bladder wall with the bulk of the mass extending to the right posterior and right posterolateral aspect of the bladder.  The mass measures 6.1 x 4.3 x 3.8 cm and obstructs the right ureterovesical junction with severe right hydroureter and right hydronephrosis and associated severe right renal atrophy.  The max SUV is 29.8.  The spleen is enlarged and measures 21.5 x 14.6 x 8.8 cm.  No lymphadenopathy or ascites are present.  The adrenal glands are normal.    Skeleton:    There is a focus of hypermetabolism involving the distal aspect of the right 2nd rib with no associated fracture, lytic destruction, or sclerosis.  This must be regarded as suspicious for metastatic disease and the max SUV is 3.6.  No other significant abnormal  hypermetabolic foci are identified within the skeleton.  Impression: 1. Markedly hypermetabolic new subpleural anterior right upper lobe nodule which is highly suspicious for malignancy.  Biopsy is recommended.  2. Markedly hypermetabolic urinary bladder mass which is highly suspicious for malignancy.  The mass causes obstruction of the right ureterovesical junction with associated severe right hydroureter and right hydronephrosis and chronic severe right renal atrophy.  3. Hypermetabolic focus within the distal aspect of the right 2nd rib which is suspicious for osseous metastatic disease.  4. Splenomegaly.  5. Severe centrilobular pulmonary emphysema.    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  The patient has been seen and followed by Dr. Solares for years.  He did not require treatment for his disease.  -I reviewed with the patient the natural progression of this disease.  Usually it is not treated unless it starts causing end-organ damage and this include but not restricted to fever chills night sweats unintentional weight loss recurrent infection anemia thrombocytopenia large lymphadenopathy etcetera.  -his blood workup done to risk stratify his disease showed a deletion 13q and a normal TP53 and a mutated IGH making his disease in a good category risk.  -his uric acid was elevated.  He will continue on allopurinol 100 mg daily.    -He will have blood work-up drawn today for CBC CMP ESR LDH uric acid haptoglobin iron studies vitamin B12 SPEP with immunofixation and quantitative immunoglobulins.  -in case his blood workup consistently showed anemia and thrombocytopenia he will require to  proceed with systemic therapy.      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  He will have this abnormality further evaluated with blood workup that will include free light chain UPEP with immunofixation and beta 2 microglobulin.  The patient also will require to underwent bone marrow biopsy for further evaluation of his normocytic anemia and intermittent thrombocytopenia since his blood workup failed to show a reason behind his anemia and thrombocytopenia.  This could be related to CLL or possibly a blood related dyscrasia.  The patient currently is reluctant to have it done.        Lung nodule:  -I reviewed independently the patient imaging studies and discussed the findings with the patient.  His recent PET scan showed an active right lung nodule with an SUV of 10.  This is highly worsened either of a primary lung cancer in view of his history of smoking.  -since the patient is reluctant to proceed with a lung biopsy who will undergo a repeat PET scan to assess the status of his lung nodule as well if there is any signs of progression of his  bladder cancer.  Depending on the results he will assess if the patient will require a bronchoscopy with EBUS versus a CT-guided biopsy.      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  I reviewed independently the results of his imaging studies as well.    Results of the results of his TURBT was reviewed at length.  The patient has a high-grade noninvasive bladder cancer.  He will require to continue to be followed clinically and with repeat TURBT by Dr. Thornton.  He also may benefit from beta hCG injections.   -UA showed a microscopic hematuria          Normocytic anemia and intermittent thrombocytopenia:  - Hgb 9.9 MCV 79 and plat 148. His plat count has been fluctuating  between 148 and 178.    -the patient workup showed normal iron profile vitamin B12 TSH free T4.  His SPEP with immunofixation showed an MGUS affecting IgM lambda.  The patient will require a bone marrow biopsy to assess if his anemia and thrombocytopenia is related to a CLL versus a blood related dyscrasia.  Currently he is undecided.        Pulmonary embolism.  -patient is currently on Lovenox at a dose of 120 mg subQ daily.   He will require lifelong anticoagulation since his PE is induced by his malignancy.  -Will switch him back to Eliquis after his lung biopsy    HTN and dyslipidemia:  Patient is on amlodipine            Med Onc Chart Routing      Follow up with physician 3 weeks. to discuss PET scan and labs drawn today   Follow up with [facility]    Infusion scheduling note    Injection scheduling note    Labs CBC, CMP, ferritin, iron and TIBC, LDH and SPEP   Scheduling:  Preferred lab:  Lab interval:  ESR, uric acid, Vitamin B12,IF, quatitative IG, haptoglobulin   Imaging    Pharmacy appointment    Other referrals                   Plan was discussed with the patient at length, and he verbalized understanding. Saurav was given an opportunity to ask questions that were answered to his satisfaction, and he was advised to call in the interval if any problems or questions arise.  Signed:  Linnea St MD   Hematology and Oncology  [facility] - HEMATOLOGY ONCOLOGY  [facility], [place]

## 2023-10-06 LAB
ALBUMIN SERPL ELPH-MCNC: 3.64 G/DL (ref 3.35–5.55)
ALPHA1 GLOB SERPL ELPH-MCNC: 0.43 G/DL (ref 0.17–0.41)
ALPHA2 GLOB SERPL ELPH-MCNC: 0.87 G/DL (ref 0.43–0.99)
B-GLOBULIN SERPL ELPH-MCNC: 0.7 G/DL (ref 0.5–1.1)
GAMMA GLOB SERPL ELPH-MCNC: 1.56 G/DL (ref 0.67–1.58)
HAPTOGLOB SERPL-MCNC: 241 MG/DL (ref 30–250)
INTERPRETATION SERPL IFE-IMP: NORMAL
PROT SERPL-MCNC: 7.2 G/DL (ref 6–8.4)

## 2023-10-09 LAB — PATHOLOGIST INTERPRETATION IFE: NORMAL

## 2023-10-11 ENCOUNTER — HOSPITAL ENCOUNTER (OUTPATIENT)
Dept: RADIOLOGY | Facility: HOSPITAL | Age: 73
Discharge: HOME | End: 2023-10-11
Attending: INTERNAL MEDICINE
Payer: MEDICARE

## 2023-10-11 DIAGNOSIS — C91.10 CLL (CHRONIC LYMPHOCYTIC LEUKEMIA): ICD-10-CM

## 2023-10-11 DIAGNOSIS — C67.8 MALIGNANT NEOPLASM OF OVERLAPPING SITES OF BLADDER: ICD-10-CM

## 2023-10-11 DIAGNOSIS — R91.1 LUNG NODULE: ICD-10-CM

## 2023-10-11 LAB — GLUCOSE SERPL-MCNC: 112 MG/DL (ref 70–110)

## 2023-10-11 PROCEDURE — 78815 PET IMAGE W/CT SKULL-THIGH: CPT | Mod: 26,PS,, | Performed by: RADIOLOGY

## 2023-10-11 PROCEDURE — 78815 NM PET CT ROUTINE: ICD-10-PCS | Mod: 26,PS,, | Performed by: RADIOLOGY

## 2023-10-11 PROCEDURE — A9552 F18 FDG: HCPCS | Mod: PN

## 2023-10-11 NOTE — PROGRESS NOTES
PET Imaging Questionnaire    Are you a Diabetic? Recent Blood Sugar level? No    Are you anemic? Bone Marrow Stimulation Meds? No    Have you had a CT Scan, if so when & where was your last one? Yes -     Have you had a PET Scan, if so when & where was your last one? Yes -     Chemotherapy or currently on Chemotherapy? No    Radiation therapy? No    Surgical History:   Past Surgical History:   Procedure Laterality Date    COLONOSCOPY  09/01/2010    Dr. Aguilar, in legacy: int. hemorrhoids, repeat in 10 years for screening    CYSTOSCOPY W/ RETROGRADES Bilateral 07/02/2020    Procedure: CYSTOSCOPY, WITH RETROGRADE PYELOGRAM;  Surgeon: Andrea Tam MD;  Location: [facility] OR;  Service: Urology;  Laterality: Bilateral;    ELBOW SURGERY      left elbow, bursitis    KNEE SURGERY  1981?    right    MULTIPLE TOOTH EXTRACTIONS      TRANSRECTAL ULTRASOUND EXAMINATION N/A 07/02/2020    Procedure: ULTRASOUND, RECTAL APPROACH;  Surgeon: Andrea Tam MD;  Location: [facility] OR;  Service: Urology;  Laterality: N/A;    TRIGGER FINGER RELEASE Left     TURBT (TRANSURETHRAL RESECTION OF BLADDER TUMOR) N/A 6/22/2023    Procedure: TURBT (TRANSURETHRAL RESECTION OF BLADDER TUMOR);  Surgeon: Leonides Cruz MD;  Location: [facility] OR;  Service: Urology;  Laterality: N/A;        Have you been fasting for at least 6 hours? Yes    Is there any chance you may be pregnant or breastfeeding? No    Assay: 12.2 MCi@:11.20   Injection Site:lt ac     Residual: .746 mCi@: 11.22   Technologist: Agnes Avila Injected:11.45mCi

## 2023-10-16 ENCOUNTER — TELEPHONE (OUTPATIENT)
Dept: FAMILY MEDICINE | Facility: CLINIC | Age: 73
End: 2023-10-16
Payer: MEDICARE

## 2023-10-16 DIAGNOSIS — Z86.711 HISTORY OF PULMONARY EMBOLUS (PE): ICD-10-CM

## 2023-10-16 RX ORDER — ENOXAPARIN SODIUM 150 MG/ML
INJECTION SUBCUTANEOUS
Qty: 14 ML | Refills: 0 | Status: ON HOLD | OUTPATIENT
Start: 2023-10-16 | End: 2023-12-06 | Stop reason: HOSPADM

## 2023-10-16 NOTE — TELEPHONE ENCOUNTER
----- Message from Aravind Oden MD sent at 10/5/2023  7:12 AM CDT -----  Patient needs an appointment with someone to discuss his labs.

## 2023-10-17 NOTE — TELEPHONE ENCOUNTER
This patient really needs to establish with somebody sooner, he may be better served with a provider who is accepting patients with a sooner appointment.  See if he is willing to see a different provider that can get him in sooner

## 2023-10-25 ENCOUNTER — OFFICE VISIT (OUTPATIENT)
Dept: HEMATOLOGY/ONCOLOGY | Facility: CLINIC | Age: 73
End: 2023-10-25
Payer: MEDICARE

## 2023-10-25 VITALS
TEMPERATURE: 98 F | SYSTOLIC BLOOD PRESSURE: 149 MMHG | RESPIRATION RATE: 16 BRPM | DIASTOLIC BLOOD PRESSURE: 67 MMHG | WEIGHT: 185.88 LBS | OXYGEN SATURATION: 95 % | BODY MASS INDEX: 26.61 KG/M2 | HEIGHT: 70 IN | HEART RATE: 74 BPM

## 2023-10-25 DIAGNOSIS — R91.1 LUNG NODULE: Primary | ICD-10-CM

## 2023-10-25 DIAGNOSIS — C77.5 METASTASIS TO ILIAC LYMPH NODE: ICD-10-CM

## 2023-10-25 PROCEDURE — 99999 PR PBB SHADOW E&M-EST. PATIENT-LVL IV: CPT | Mod: PBBFAC,,, | Performed by: INTERNAL MEDICINE

## 2023-10-25 PROCEDURE — 3008F PR BODY MASS INDEX (BMI) DOCUMENTED: ICD-10-PCS | Mod: CPTII,S$GLB,, | Performed by: INTERNAL MEDICINE

## 2023-10-25 PROCEDURE — 1159F MED LIST DOCD IN RCRD: CPT | Mod: CPTII,S$GLB,, | Performed by: INTERNAL MEDICINE

## 2023-10-25 PROCEDURE — 99999 PR PBB SHADOW E&M-EST. PATIENT-LVL IV: ICD-10-PCS | Mod: PBBFAC,,, | Performed by: INTERNAL MEDICINE

## 2023-10-25 PROCEDURE — 4010F PR ACE/ARB THEARPY RXD/TAKEN: ICD-10-PCS | Mod: CPTII,S$GLB,, | Performed by: INTERNAL MEDICINE

## 2023-10-25 PROCEDURE — 1126F PR PAIN SEVERITY QUANTIFIED, NO PAIN PRESENT: ICD-10-PCS | Mod: CPTII,S$GLB,, | Performed by: INTERNAL MEDICINE

## 2023-10-25 PROCEDURE — 99215 OFFICE O/P EST HI 40 MIN: CPT | Mod: S$GLB,,, | Performed by: INTERNAL MEDICINE

## 2023-10-25 PROCEDURE — 1101F PR PT FALLS ASSESS DOC 0-1 FALLS W/OUT INJ PAST YR: ICD-10-PCS | Mod: CPTII,S$GLB,, | Performed by: INTERNAL MEDICINE

## 2023-10-25 PROCEDURE — 1160F RVW MEDS BY RX/DR IN RCRD: CPT | Mod: CPTII,S$GLB,, | Performed by: INTERNAL MEDICINE

## 2023-10-25 PROCEDURE — 3062F POS MACROALBUMINURIA REV: CPT | Mod: CPTII,S$GLB,, | Performed by: INTERNAL MEDICINE

## 2023-10-25 PROCEDURE — 1101F PT FALLS ASSESS-DOCD LE1/YR: CPT | Mod: CPTII,S$GLB,, | Performed by: INTERNAL MEDICINE

## 2023-10-25 PROCEDURE — 4010F ACE/ARB THERAPY RXD/TAKEN: CPT | Mod: CPTII,S$GLB,, | Performed by: INTERNAL MEDICINE

## 2023-10-25 PROCEDURE — 3078F DIAST BP <80 MM HG: CPT | Mod: CPTII,S$GLB,, | Performed by: INTERNAL MEDICINE

## 2023-10-25 PROCEDURE — 3077F SYST BP >= 140 MM HG: CPT | Mod: CPTII,S$GLB,, | Performed by: INTERNAL MEDICINE

## 2023-10-25 PROCEDURE — 1160F PR REVIEW ALL MEDS BY PRESCRIBER/CLIN PHARMACIST DOCUMENTED: ICD-10-PCS | Mod: CPTII,S$GLB,, | Performed by: INTERNAL MEDICINE

## 2023-10-25 PROCEDURE — 3077F PR MOST RECENT SYSTOLIC BLOOD PRESSURE >= 140 MM HG: ICD-10-PCS | Mod: CPTII,S$GLB,, | Performed by: INTERNAL MEDICINE

## 2023-10-25 PROCEDURE — 3062F PR POS MACROALBUMINURIA RESULT DOCUMENTED/REVIEW: ICD-10-PCS | Mod: CPTII,S$GLB,, | Performed by: INTERNAL MEDICINE

## 2023-10-25 PROCEDURE — 3008F BODY MASS INDEX DOCD: CPT | Mod: CPTII,S$GLB,, | Performed by: INTERNAL MEDICINE

## 2023-10-25 PROCEDURE — 3288F FALL RISK ASSESSMENT DOCD: CPT | Mod: CPTII,S$GLB,, | Performed by: INTERNAL MEDICINE

## 2023-10-25 PROCEDURE — 3078F PR MOST RECENT DIASTOLIC BLOOD PRESSURE < 80 MM HG: ICD-10-PCS | Mod: CPTII,S$GLB,, | Performed by: INTERNAL MEDICINE

## 2023-10-25 PROCEDURE — 3066F NEPHROPATHY DOC TX: CPT | Mod: CPTII,S$GLB,, | Performed by: INTERNAL MEDICINE

## 2023-10-25 PROCEDURE — 99215 PR OFFICE/OUTPT VISIT, EST, LEVL V, 40-54 MIN: ICD-10-PCS | Mod: S$GLB,,, | Performed by: INTERNAL MEDICINE

## 2023-10-25 PROCEDURE — 1159F PR MEDICATION LIST DOCUMENTED IN MEDICAL RECORD: ICD-10-PCS | Mod: CPTII,S$GLB,, | Performed by: INTERNAL MEDICINE

## 2023-10-25 PROCEDURE — 3288F PR FALLS RISK ASSESSMENT DOCUMENTED: ICD-10-PCS | Mod: CPTII,S$GLB,, | Performed by: INTERNAL MEDICINE

## 2023-10-25 PROCEDURE — 3066F PR DOCUMENTATION OF TREATMENT FOR NEPHROPATHY: ICD-10-PCS | Mod: CPTII,S$GLB,, | Performed by: INTERNAL MEDICINE

## 2023-10-25 PROCEDURE — 1126F AMNT PAIN NOTED NONE PRSNT: CPT | Mod: CPTII,S$GLB,, | Performed by: INTERNAL MEDICINE

## 2023-10-25 NOTE — PROGRESS NOTES
Subjective:       Name: Saurav Bahena  : 1950  MRN: [id number]    Chief Complaint   Patient presents with    discuss PET scan      Patient is in clinic by himself.    HPI: Saurav Bahena is a 73 y.o. male presents to discuss PET scan    Since he did not proceed with his lung biopsy he wanted to have a PET scan to help guide the management.    PET scan done on 2023 increase in the size of the prevascular lymph node from 1 cm to 1.3 with an SUV of 4 compare to 2.1 previously.  The rest of the mediastinal lymph node were mildly increased in size and activity.  There was upper limits of normal sized lymph nodes affecting the left axilla with a mild SUV of 2.4.  This was concerning for CLL versus metastatic disease.  The mass in the anterior right upper lobe measuring 1.6 by 0.8 cm is unchanged.  The SUV is 9.8 compared to 10.2 previously not significantly changed.  In the abdomen and pelvis there was interval development of hypermetabolic large right distal external iliac lymph node measuring 15 mm with an SUV of 9.3.  This was concerning for metastatic bladder cancer.  A focal uptake was seen in the right anterior 2nd rib current SUV is 4.16 compared to 3.6.  This was concerning for metastatic bone disease.    He is still taking Lovenox injections.  The patient denies any fever chills night sweats weight loss recurrent infection easy bruising bleeding hematuria.      ONCOLOGIC HISTORY:  He underwent a TURBT on 2023:  Findings:   5 cm bladder tumor  After the resection it appeared that the tumor was extending into the expected location of the right ureter  EUA post-TURBT showed no palpable or fixed disease.   Unfortunately the patient did not proceed with a CT-guided lung biopsy are of concern about a pneumothorax and lung collapse in view of his history of smoking.  He might reconsider it at a later time.    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  Deep muscle was present.          Oncology History    No history exists.        Past Medical History:   Diagnosis Date    Anticoagulant long-term use     aspirin    Atherosclerosis of native coronary artery of native heart without angina pectoris 03/08/2022    Atrophic kidney     right    Bladder cancer     BPH (benign prostatic hyperplasia)     Cat bite of left forearm with infection 02/11/2019    CLL (chronic lymphocytic leukemia)     sees Dr. Solares    CTS (carpal tunnel syndrome)     Current smoker on some days     using e cig, regular cigarettes sometimes    DJD (degenerative joint disease) of knee     left    DJD (degenerative joint disease) of knee     bilateral/ LT more than RT    Emphysema lung     Gout, unspecified     HLD (hyperlipidemia)     no current meds    HTN (hypertension)     Insomnia     Nocturia     Other pulmonary embolism without acute cor pulmonale 03/17/2023    Panlobular emphysema 03/01/2023    Sleep apnea     CPAP       Past Surgical History:   Procedure Laterality Date    COLONOSCOPY  09/01/2010    Dr. Aguilar, in legacy: int. hemorrhoids, repeat in 10 years for screening    CYSTOSCOPY W/ RETROGRADES Bilateral 07/02/2020    Procedure: CYSTOSCOPY, WITH RETROGRADE PYELOGRAM;  Surgeon: Andrea Tam MD;  Location: [facility] OR;  Service: Urology;  Laterality: Bilateral;    ELBOW SURGERY      left elbow, bursitis    KNEE SURGERY  1981?    right    MULTIPLE TOOTH EXTRACTIONS      TRANSRECTAL ULTRASOUND EXAMINATION N/A 07/02/2020    Procedure: ULTRASOUND, RECTAL APPROACH;  Surgeon: Andrea Tam MD;  Location: [facility] OR;  Service: Urology;  Laterality: N/A;    TRIGGER FINGER RELEASE Left     TURBT (TRANSURETHRAL RESECTION OF BLADDER TUMOR) N/A 6/22/2023    Procedure: TURBT (TRANSURETHRAL RESECTION OF BLADDER TUMOR);  Surgeon: Leonides Cruz MD;  Location: [facility] OR;  Service: Urology;  Laterality: N/A;       Family History   Problem Relation Age of Onset    Diabetes Mother     Heart  disease Father 65        MI    Arthritis Father         RA    Heart disease Brother     Colon cancer Neg Hx     Crohn's disease Neg Hx     Ulcerative colitis Neg Hx     Stomach cancer Neg Hx     Esophageal cancer Neg Hx        Social History     Socioeconomic History    Marital status: Single   Occupational History    Occupation:    Tobacco Use    Smoking status: Former     Current packs/day: 0.00     Average packs/day: 0.3 packs/day for 20.0 years (6.0 ttl pk-yrs)     Types: Cigarettes     Start date: 2001     Quit date: 2021     Years since quittin.8    Smokeless tobacco: Never    Tobacco comments:     nicorette, vapor cigs/ 3 cigarettes   Substance and Sexual Activity    Alcohol use: Yes     Alcohol/week: 2.0 - 3.0 standard drinks of alcohol     Types: 2 - 3 Shots of liquor per week     Comment: 2-3/day on weekends    Drug use: No     Social Determinants of Health     Financial Resource Strain: Low Risk  (2023)    Overall Financial Resource Strain (CARDIA)     Difficulty of Paying Living Expenses: Not very hard   Food Insecurity: No Food Insecurity (2023)    Hunger Vital Sign     Worried About Running Out of Food in the Last Year: Never true     Ran Out of Food in the Last Year: Never true   Transportation Needs: No Transportation Needs (2023)    PRAPARE - Transportation     Lack of Transportation (Medical): No     Lack of Transportation (Non-Medical): No   Physical Activity: Insufficiently Active (2023)    Exercise Vital Sign     Days of Exercise per Week: 3 days     Minutes of Exercise per Session: 30 min   Stress: Stress Concern Present (2023)    [place] [employer] - Occupational Stress Questionnaire     Feeling of Stress : To some extent   Social Connections: Unknown (2023)    Social Connection and Isolation Panel [NHANES]     Frequency of Communication with Friends and Family: More than three times a week     Frequency of  "Social Gatherings with Friends and Family: Once a week     Attends Sabianist Services: Patient refused     Active Member of Clubs or Organizations: Yes     Attends Club or Organization Meetings: More than 4 times per year     Marital Status: Never    Housing Stability: Low Risk  (5/29/2023)    Housing Stability Vital Sign     Unable to Pay for Housing in the Last Year: No     Number of Places Lived in the Last Year: 1     Unstable Housing in the Last Year: No       Review of patient's allergies indicates:   Allergen Reactions    No known drug allergies        Review of Systems   Constitutional:  Negative for appetite change, fatigue, fever and unexpected weight change.   HENT:  Negative for mouth sores and sore throat.    Eyes:  Negative for photophobia and visual disturbance.   Respiratory:  Negative for cough and shortness of breath.    Cardiovascular:  Negative for chest pain.   Gastrointestinal:  Negative for abdominal pain, constipation and diarrhea.   Genitourinary:  Positive for frequency. Negative for dysuria and hematuria.   Musculoskeletal:  Positive for back pain. Negative for arthralgias and joint swelling.   Integumentary:  Negative for rash.   Neurological:  Negative for dizziness, weakness, light-headedness and headaches.   Hematological:  Negative for adenopathy. Does not bruise/bleed easily.   Psychiatric/Behavioral:  Negative for sleep disturbance. The patient is not nervous/anxious.             Objective:     Vitals:    10/25/23 1006   BP: (!) 149/67   BP Location: Left arm   Patient Position: Sitting   BP Method: Medium (Automatic)   Pulse: 74   Resp: 16   Temp: 97.5 °F (36.4 °C)   TempSrc: Temporal   SpO2: 95%   Weight: 84.3 kg (185 lb 13.6 oz)   Height: 5' 10" (1.778 m)              Current Outpatient Medications on File Prior to Visit   Medication Sig    allopurinoL (ZYLOPRIM) 100 MG tablet Take 1 tablet (100 mg total) by mouth once daily.    amLODIPine (NORVASC) 2.5 MG tablet Take 1 " tablet (2.5 mg total) by mouth once daily.    ascorbic acid, vitamin C, (VITAMIN C) 100 MG tablet Take 100 mg by mouth once daily.    cholecalciferol, vitamin D3, (VITAMIN D3 ORAL) Take 1 capsule by mouth once daily.    enoxaparin (LOVENOX) 120 mg/0.8 mL Syrg INJECT 1 SYRINGE INTO THE SKIN ONCE DAILY    finasteride (PROSCAR) 5 mg tablet TAKE 1 TABLET BY MOUTH ONCE DAILY (Patient taking differently: Take 5 mg by mouth every evening.)    HYDROcodone-acetaminophen (NORCO) 5-325 mg per tablet Take 1 tablet by mouth every 4 (four) hours as needed for Pain.    losartan (COZAAR) 50 MG tablet Take 1 tablet (50 mg total) by mouth once daily.    multivit-min/folic/vit K/lycop (MEN'S 50 PLUS MULTIVITAMIN ORAL) Take 1 tablet by mouth once daily.    tamsulosin (FLOMAX) 0.4 mg Cap Take 2 capsules (0.8 mg total) by mouth once daily. (Patient taking differently: Take 0.8 mg by mouth every evening.)    ZINC ORAL Take 1 tablet by mouth once daily.    albuterol (PROVENTIL/VENTOLIN HFA) 90 mcg/actuation inhaler Inhale 2 puffs into the lungs every 6 (six) hours as needed for Wheezing. Rescue (Patient not taking: Reported on 10/25/2023)    tiotropium-olodateroL (STIOLTO RESPIMAT) 2.5-2.5 mcg/actuation Mist Inhale 2 puffs into the lungs once daily. Controller (Patient not taking: Reported on 10/25/2023)     Current Facility-Administered Medications on File Prior to Visit   Medication    lactated ringers infusion    lactated ringers infusion    lidocaine (PF) 10 mg/ml (1%) injection 10 mg    LIDOcaine (PF) 10 mg/ml (1%) injection 10 mg    triamcinolone acetonide injection 40 mg       CBC:  Lab Results   Component Value Date    WBC 11.72 10/05/2023    HGB 9.6 (L) 10/05/2023    HCT 31.6 (L) 10/05/2023    MCV 79 (L) 10/05/2023     (L) 10/05/2023         CMP:  Sodium   Date Value Ref Range Status   10/05/2023 131 (L) 136 - 145 mmol/L Final     Potassium   Date Value Ref Range Status   10/05/2023 5.0 3.5 - 5.1 mmol/L Final     Chloride    Date Value Ref Range Status   10/05/2023 95 95 - 110 mmol/L Final     CO2   Date Value Ref Range Status   10/05/2023 25 23 - 29 mmol/L Final     Glucose   Date Value Ref Range Status   10/05/2023 97 70 - 110 mg/dL Final     BUN   Date Value Ref Range Status   10/05/2023 12 8 - 23 mg/dL Final     Creatinine   Date Value Ref Range Status   10/05/2023 0.9 0.5 - 1.4 mg/dL Final     Calcium   Date Value Ref Range Status   10/05/2023 9.5 8.7 - 10.5 mg/dL Final     Total Protein   Date Value Ref Range Status   10/05/2023 7.6 6.0 - 8.4 g/dL Final     Albumin   Date Value Ref Range Status   10/05/2023 3.5 3.5 - 5.2 g/dL Final     Total Bilirubin   Date Value Ref Range Status   10/05/2023 0.4 0.1 - 1.0 mg/dL Final     Comment:     For infants and newborns, interpretation of results should be based  on gestational age, weight and in agreement with clinical  observations.    Premature Infant recommended reference ranges:  Up to 24 hours.............<8.0 mg/dL  Up to 48 hours............<12.0 mg/dL  3-5 days..................<15.0 mg/dL  6-29 days.................<15.0 mg/dL       Alkaline Phosphatase   Date Value Ref Range Status   10/05/2023 82 55 - 135 U/L Final     AST ([place])   Date Value Ref Range Status   04/04/2016 16 (L) 17 - 59 U/L Final     AST   Date Value Ref Range Status   10/05/2023 9 (L) 10 - 40 U/L Final     ALT   Date Value Ref Range Status   10/05/2023 10 10 - 44 U/L Final     Anion Gap   Date Value Ref Range Status   10/05/2023 11 8 - 16 mmol/L Final     eGFR if    Date Value Ref Range Status   01/21/2022 >60.0 >60 mL/min/1.73 m^2 Final     eGFR if non    Date Value Ref Range Status   01/21/2022 >60.0 >60 mL/min/1.73 m^2 Final     Comment:     Calculation used to obtain the estimated glomerular filtration  rate (eGFR) is the CKD-EPI equation.          NM PET CT Routine FDG  Narrative: EXAMINATION:  NM PET CT ROUTINE    CLINICAL HISTORY:  CLL.  High-grade papillary  urothelial carcinoma of the urinary bladder status post TURBT.  Hypermetabolic lung nodule.    TECHNIQUE:  11.5 mCi of F18-FDG was administered intravenously in the left antecubital fossa.  After an approximately 60 min distribution time, PET/CT images were acquired from the skull base to the mid thigh. Transmission images were acquired to correct for attenuation using a whole body low-dose CT scan without contrast with the arms positioned above the head.  SUV mean/max in mediastinal blood pool is 1.53/2.58.    COMPARISON:  Prior PET-CT of 04/20/2023    FINDINGS:  Neck: There is no abnormal FDG uptake in the neck.  No cervical adenopathy is noted.  Carotid artery calcifications are present.    Chest: Interval increase in size of a pre-vascular lymph node is noted with a short axis diameter of 13 mm on series 3, image 87 an SUV max of 4.0.  Previously, this measured 1.0 cm in short axis and had an SUV max similar to blood pool at 2.1.  There is a mildly hypermetabolic pretracheal lymph node with a short axis diameter of 10 mm, previously 8 mm.  The SUV max is 2.9, previously 2.5.  A similar degree of activity is noted in a borderline sized AP window lymph node on series 3, image 88.  Low level activity in hilar lymph nodes has developed, with an SUV max of up to 3.4 measured on the left on PET axial image 102. There are upper limits of normal sized lymph nodes in the left axilla with a mild FDG uptake, SUV max of 2.4. Both the sizes of the nodes and the degree of activity has increased slightly since the previous study.    The mass in the anterior right upper lobe measures 16 x  8 mm, unchanged.  SUV max is 9.8 series 3, image 83, previously 10.2, not significantly changed.    Emphysema is present.  Coronary artery calcifications are noted.  There is no pleural effusion.  No new findings are present in the lungs.    Abdomen/pelvis: Interval development of a hypermetabolic enlarged right distal external iliac lymph  node is noted, with short axis diameter of 15 mm series 3, image 227 an SUV max of 9.3.  The bladder mass is no longer visualized status post interval TURBT.    The spleen remains markedly enlarged, without FDG uptake.  The liver, gallbladder, adrenal glands, pancreas and left kidney are grossly normal.  Chronic severe right hydronephrosis and hydroureter has worsened with respect to the previous study.  There is no ascites.  No bowel obstruction.  Findings suggestive of constipation noted.  Numerous colonic diverticula are present.  The prostate is enlarged.    Bones: Once again, focally increased activity is noted in right anterior rib 2, just adjacent to the hypermetabolic lung nodule.  Current SUV max is 4.16, and was previously 3.6, without significant change.  No fracture, lytic or sclerotic bone lesion noted in this location on CT.  No new sites of uptake are present in the bones.  Impression: Development of a markedly hypermetabolic distal right external iliac lymph node, concerning for metastatic bladder carcinoma.    Hypermetabolic anterior right upper lobe lung nodule unchanged, concerning for primary bronchogenic carcinoma versus metastasis.    Interval increase in size and degree of uptake in the pre-vascular lymph node, concerning for metastatic node.  Lower level activity in additional mediastinal and hilar lymph nodes equivocal for metastatic disease.  CLL included in the differential for these nodes and for the left axillary lymph nodes.    Persistent increased activity in right anterior rib 2.  Metastatic disease to bone is not ruled out.    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  The patient has been seen and followed by Dr. Solares for years.  He did not require treatment for his disease.  -I reviewed with the patient the natural progression of this disease.  Usually it is not treated unless it starts causing end-organ damage and this include but not restricted to fever chills night sweats unintentional weight loss recurrent infection anemia thrombocytopenia large lymphadenopathy etcetera.  -his blood workup done to risk stratify his disease showed a deletion 13q and a normal TP53 and a mutated IGH making his disease in a good category risk.  -his uric acid was elevated.  He will continue on allopurinol 100 mg daily.    -blood drawn on October 5, 2023 showed a hemoglobin of 9.6 with a white count of 11.72 with 75% lymphocytes.  His platelet count was borderline low at 149.  His ESR was elevated at CMP ESR LDH uric acid haptoglobin iron studies vitamin B12 were normal.  SPEP with immunofixation showed a stable M spike at 0.43 grams/deciliter and quantitative immunoglobulins showed an elevation of the IgM at 721..  -Since his blood workup showed persistence of his anemia and thrombocytopenia he will require to proceed with systemic therapy.  In view of multiple abnormalities seen on the Pet scan worrisome for more than one cancer will obtain a tissue confirmation before deciding on sequencing his management.I reinforced the importance on getting the biopsy done for the lung and the right iliac LN ASAP as more delays might be detrimental in managing his condition. He verbalized understanding.      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  The repeat blood workup on October 5, 2023 a showing a stable M spike-  Blood workup done on June 9, 2023 showed an increase in the beta 2 microglobulin 5.3 kappa over lambda was 0.2 with an increase in the lambda free light 21.17 as for the 24 hour urine collection for UPEP with immunofixation showed  no monoclonal peaks identified.    The patient  will require to underwent bone marrow biopsy for further evaluation of his normocytic anemia and intermittent thrombocytopenia since his blood workup failed to show a reason behind his anemia and thrombocytopenia.  This could be related to CLL or possibly a blood related dyscrasia.  The patient currently is reluctant to have it done.        Lung nodule:  -I reviewed independently the patient imaging studies and discussed the findings with the patient.  His recent PET scan showed an active right lung nodule with an SUV of 9.8.  This is highly worsened either of a primary lung cancer in view of his history of smoking. It also be a distant metastasis from his bladder cancer.  -He was agreeable to proceed with the biopsy. I reinforce the importance to have it done to sequence his management as he might have more than just one cancer.    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  I reviewed independently the results of his imaging studies as well.    Results of the results of his TURBT was reviewed at length.  The patient has a high-grade noninvasive bladder cancer.  He will require to continue to be followed clinically and with repeat TURBT by Dr. Thornton.  He also may benefit from beta hCG injections.   -UA showed a microscopic hematuria  - in view of the development of a markedly hypermetabolic distal right external iliac lymph node, concerning for metastatic bladder carcinoma he also will require to have this biopsied. He will be referred to IR.I also discussed his case with Eliceo who is concern about he progression of his disease.          Normocytic anemia and intermittent thrombocytopenia:  - Hgb 9.9 MCV 79 and plat 148. His plat count has been fluctuating between 148 and 178.    -the patient workup showed  normal iron profile vitamin B12 TSH free T4.  His SPEP with immunofixation showed an MGUS affecting IgM lambda.  The patient will require a bone marrow biopsy to assess if his anemia and thrombocytopenia is related to a CLL versus a blood related dyscrasia.  Currently he is undecided.        Pulmonary embolism.  -patient is currently on Lovenox at a dose of 120 mg subQ daily.   He will require lifelong anticoagulation since his PE is induced by his malignancy.  -Will switch him back to Eliquis after his lung biopsy    HTN and dyslipidemia:  Patient is on amlodipine            Med Onc Chart Routing      Follow up with physician 1 month. to discuss the results of the biopsy of the lung nodule and right iliac LN   Follow up with ALISON    Infusion scheduling note    Injection scheduling note    Labs    Imaging    Pharmacy appointment    Other referrals                   Plan was discussed with the patient at length, and he verbalized understanding. Saurav was given an opportunity to ask questions that were answered to his satisfaction, and he was advised to call in the interval if any problems or questions arise.  Signed:  Linnea St MD   Hematology and Oncology  [facility] - HEMATOLOGY ONCOLOGY  [facility], [place]

## 2023-10-31 ENCOUNTER — OFFICE VISIT (OUTPATIENT)
Dept: FAMILY MEDICINE | Facility: CLINIC | Age: 73
End: 2023-10-31
Payer: MEDICARE

## 2023-10-31 VITALS
HEIGHT: 70 IN | WEIGHT: 184.63 LBS | HEART RATE: 80 BPM | BODY MASS INDEX: 26.43 KG/M2 | OXYGEN SATURATION: 99 % | SYSTOLIC BLOOD PRESSURE: 134 MMHG | DIASTOLIC BLOOD PRESSURE: 70 MMHG

## 2023-10-31 DIAGNOSIS — I25.10 CORONARY ARTERY CALCIFICATION: ICD-10-CM

## 2023-10-31 DIAGNOSIS — G47.00 INSOMNIA, UNSPECIFIED TYPE: ICD-10-CM

## 2023-10-31 DIAGNOSIS — D69.6 THROMBOCYTOPENIA: ICD-10-CM

## 2023-10-31 DIAGNOSIS — N40.1 BPH WITH OBSTRUCTION/LOWER URINARY TRACT SYMPTOMS: ICD-10-CM

## 2023-10-31 DIAGNOSIS — D63.8 ANEMIA OF CHRONIC DISEASE: ICD-10-CM

## 2023-10-31 DIAGNOSIS — C91.10 CLL (CHRONIC LYMPHOCYTIC LEUKEMIA): ICD-10-CM

## 2023-10-31 DIAGNOSIS — I25.84 CORONARY ARTERY CALCIFICATION: ICD-10-CM

## 2023-10-31 DIAGNOSIS — R91.1 LUNG NODULE: ICD-10-CM

## 2023-10-31 DIAGNOSIS — I27.82 CHRONIC PULMONARY EMBOLISM WITHOUT ACUTE COR PULMONALE, UNSPECIFIED PULMONARY EMBOLISM TYPE: ICD-10-CM

## 2023-10-31 DIAGNOSIS — J43.1 PANLOBULAR EMPHYSEMA: ICD-10-CM

## 2023-10-31 DIAGNOSIS — E78.2 MIXED HYPERLIPIDEMIA: ICD-10-CM

## 2023-10-31 DIAGNOSIS — C67.8 MALIGNANT NEOPLASM OF OVERLAPPING SITES OF BLADDER: ICD-10-CM

## 2023-10-31 DIAGNOSIS — I10 PRIMARY HYPERTENSION: Primary | ICD-10-CM

## 2023-10-31 DIAGNOSIS — N13.8 BPH WITH OBSTRUCTION/LOWER URINARY TRACT SYMPTOMS: ICD-10-CM

## 2023-10-31 DIAGNOSIS — D47.2 MGUS (MONOCLONAL GAMMOPATHY OF UNKNOWN SIGNIFICANCE): ICD-10-CM

## 2023-10-31 DIAGNOSIS — I25.10 ATHEROSCLEROSIS OF NATIVE CORONARY ARTERY OF NATIVE HEART WITHOUT ANGINA PECTORIS: ICD-10-CM

## 2023-10-31 PROCEDURE — 1159F MED LIST DOCD IN RCRD: CPT | Mod: CPTII,S$GLB,, | Performed by: FAMILY MEDICINE

## 2023-10-31 PROCEDURE — 3288F PR FALLS RISK ASSESSMENT DOCUMENTED: ICD-10-PCS | Mod: CPTII,S$GLB,, | Performed by: FAMILY MEDICINE

## 2023-10-31 PROCEDURE — 99214 OFFICE O/P EST MOD 30 MIN: CPT | Mod: S$GLB,,, | Performed by: FAMILY MEDICINE

## 2023-10-31 PROCEDURE — 1101F PT FALLS ASSESS-DOCD LE1/YR: CPT | Mod: CPTII,S$GLB,, | Performed by: FAMILY MEDICINE

## 2023-10-31 PROCEDURE — 99999 PR PBB SHADOW E&M-EST. PATIENT-LVL IV: ICD-10-PCS | Mod: PBBFAC,,, | Performed by: FAMILY MEDICINE

## 2023-10-31 PROCEDURE — 3062F POS MACROALBUMINURIA REV: CPT | Mod: CPTII,S$GLB,, | Performed by: FAMILY MEDICINE

## 2023-10-31 PROCEDURE — 3062F PR POS MACROALBUMINURIA RESULT DOCUMENTED/REVIEW: ICD-10-PCS | Mod: CPTII,S$GLB,, | Performed by: FAMILY MEDICINE

## 2023-10-31 PROCEDURE — 1159F PR MEDICATION LIST DOCUMENTED IN MEDICAL RECORD: ICD-10-PCS | Mod: CPTII,S$GLB,, | Performed by: FAMILY MEDICINE

## 2023-10-31 PROCEDURE — 1160F PR REVIEW ALL MEDS BY PRESCRIBER/CLIN PHARMACIST DOCUMENTED: ICD-10-PCS | Mod: CPTII,S$GLB,, | Performed by: FAMILY MEDICINE

## 2023-10-31 PROCEDURE — 3008F PR BODY MASS INDEX (BMI) DOCUMENTED: ICD-10-PCS | Mod: CPTII,S$GLB,, | Performed by: FAMILY MEDICINE

## 2023-10-31 PROCEDURE — 3078F PR MOST RECENT DIASTOLIC BLOOD PRESSURE < 80 MM HG: ICD-10-PCS | Mod: CPTII,S$GLB,, | Performed by: FAMILY MEDICINE

## 2023-10-31 PROCEDURE — 3066F NEPHROPATHY DOC TX: CPT | Mod: CPTII,S$GLB,, | Performed by: FAMILY MEDICINE

## 2023-10-31 PROCEDURE — 1101F PR PT FALLS ASSESS DOC 0-1 FALLS W/OUT INJ PAST YR: ICD-10-PCS | Mod: CPTII,S$GLB,, | Performed by: FAMILY MEDICINE

## 2023-10-31 PROCEDURE — 4010F ACE/ARB THERAPY RXD/TAKEN: CPT | Mod: CPTII,S$GLB,, | Performed by: FAMILY MEDICINE

## 2023-10-31 PROCEDURE — 3288F FALL RISK ASSESSMENT DOCD: CPT | Mod: CPTII,S$GLB,, | Performed by: FAMILY MEDICINE

## 2023-10-31 PROCEDURE — 4010F PR ACE/ARB THEARPY RXD/TAKEN: ICD-10-PCS | Mod: CPTII,S$GLB,, | Performed by: FAMILY MEDICINE

## 2023-10-31 PROCEDURE — 3078F DIAST BP <80 MM HG: CPT | Mod: CPTII,S$GLB,, | Performed by: FAMILY MEDICINE

## 2023-10-31 PROCEDURE — 1126F AMNT PAIN NOTED NONE PRSNT: CPT | Mod: CPTII,S$GLB,, | Performed by: FAMILY MEDICINE

## 2023-10-31 PROCEDURE — 99214 PR OFFICE/OUTPT VISIT, EST, LEVL IV, 30-39 MIN: ICD-10-PCS | Mod: S$GLB,,, | Performed by: FAMILY MEDICINE

## 2023-10-31 PROCEDURE — 3066F PR DOCUMENTATION OF TREATMENT FOR NEPHROPATHY: ICD-10-PCS | Mod: CPTII,S$GLB,, | Performed by: FAMILY MEDICINE

## 2023-10-31 PROCEDURE — 3075F PR MOST RECENT SYSTOLIC BLOOD PRESS GE 130-139MM HG: ICD-10-PCS | Mod: CPTII,S$GLB,, | Performed by: FAMILY MEDICINE

## 2023-10-31 PROCEDURE — 99999 PR PBB SHADOW E&M-EST. PATIENT-LVL IV: CPT | Mod: PBBFAC,,, | Performed by: FAMILY MEDICINE

## 2023-10-31 PROCEDURE — 3075F SYST BP GE 130 - 139MM HG: CPT | Mod: CPTII,S$GLB,, | Performed by: FAMILY MEDICINE

## 2023-10-31 PROCEDURE — 1160F RVW MEDS BY RX/DR IN RCRD: CPT | Mod: CPTII,S$GLB,, | Performed by: FAMILY MEDICINE

## 2023-10-31 PROCEDURE — 1126F PR PAIN SEVERITY QUANTIFIED, NO PAIN PRESENT: ICD-10-PCS | Mod: CPTII,S$GLB,, | Performed by: FAMILY MEDICINE

## 2023-10-31 PROCEDURE — 3008F BODY MASS INDEX DOCD: CPT | Mod: CPTII,S$GLB,, | Performed by: FAMILY MEDICINE

## 2023-10-31 NOTE — PROGRESS NOTES
THIS DOCUMENT WAS MADE IN PART WITH VOICE RECOGNITION SOFTWARE.  OCCASIONALLY THIS SOFTWARE WILL MISINTERPRET WORDS OR PHRASES.    Assessment and Plan:    1. Primary hypertension        2. Mixed hyperlipidemia        3. BPH with obstruction/lower urinary tract symptoms        4. CLL (chronic lymphocytic leukemia)        5. Insomnia, unspecified type        6. Anemia of chronic disease        7. Thrombocytopenia        8. Atherosclerosis of native coronary artery of native heart without angina pectoris        9. MGUS (monoclonal gammopathy of unknown significance)        10. Chronic pulmonary embolism without acute cor pulmonale, unspecified pulmonary embolism type        11. Lung nodule        12. Malignant neoplasm of overlapping sites of bladder        13. Panlobular emphysema        14. Coronary artery calcification  Ambulatory referral/consult to Cardiology          Referral to cardiologist for evaluation of coronary artery disease-has significant risk factors given his multiple other comorbidities, would like to find out about coronary artery disease before proceeding with any higher risk procedures to address his cancer issues     Chronic conditions reviewed, see bolded text below   Counseled on immunizations    ______________________________________________________________________  Subjective:    Chief Complaint:  Chief Complaint   Patient presents with    [facility]          HPI:  Saurav is a 73 y.o. year old     Patient presents today to establish care  No specific new complaints or concerns  Currently undergoing workup of pulmonary nodule concerning for metastatic bladder cancer, also has diagnosis of CLL           Coronary artery calcification noted on CT scan 2023, dyslipidemia  Rx-none   Denies any exertional chest pain    Essential hypertension   Rx-amlodipine 2.5 mg, losartan 50 mg  Normotensive in clinic   Home-110s to 140 systolic    Emphysema   Rx-albuterol, Stiolto  Pulm : Arnaldo  "MD     Benign prostatic hyperplasia  Rx-finasteride, Flomax  Urology : MD Anthony     History of pulmonary embolism  Diagnosed February 2023  Rx-Lovenox  Onc : MD Alma Rosa   Per Onc : "Awaiting him to finish his urologic workup he will be switched at that time to Eliquis"    Thrombocytopenia, Anemia of chronic disease  Onc : MD Alma Rosa      History of bladder cancer   Onc : MD Alma Rosa   Uro : MD Anthony   Diagnosed 2023 following gross hematuria  Prev sx : TURBT    CLL  Onc : MD Alma Rosa     Lung nodule   Onc: MD Alma Rosa  PET scan positive   Biopsy pending    Gouty arthritis   Rx-allopurinol 100 mg  Previous flare - "years ago"     Tobacco use   In remission     Insomnia  Rx-none  Prev rx : Trazodone (ineffective)     Sleep apnea  CPAP compliant (falls off at night)  Sleep MD : Naina Salomon MD          Past Medical History:  Past Medical History:   Diagnosis Date    Anticoagulant long-term use     aspirin    Atherosclerosis of native coronary artery of native heart without angina pectoris 03/08/2022    Atrophic kidney     right    Bladder cancer     BPH (benign prostatic hyperplasia)     Cat bite of left forearm with infection 02/11/2019    CLL (chronic lymphocytic leukemia)     sees Dr. Solares    CTS (carpal tunnel syndrome)     Current smoker on some days     using e cig, regular cigarettes sometimes    DJD (degenerative joint disease) of knee     left    DJD (degenerative joint disease) of knee     bilateral/ LT more than RT    Emphysema lung     Gout, unspecified     HLD (hyperlipidemia)     no current meds    HTN (hypertension)     Insomnia     Nocturia     Other pulmonary embolism without acute cor pulmonale 03/17/2023    Panlobular emphysema 03/01/2023    Sleep apnea     CPAP       Past Surgical History:  Past Surgical History:   Procedure Laterality Date    COLONOSCOPY  09/01/2010    Dr. Aguilar, in legacy: int. hemorrhoids, repeat in 10 years for screening    CYSTOSCOPY W/ RETROGRADES Bilateral 07/02/2020    " Procedure: CYSTOSCOPY, WITH RETROGRADE PYELOGRAM;  Surgeon: Andrea Tam MD;  Location: [facility] OR;  Service: Urology;  Laterality: Bilateral;    ELBOW SURGERY      left elbow, bursitis    KNEE SURGERY  1981?    right    MULTIPLE TOOTH EXTRACTIONS      TRANSRECTAL ULTRASOUND EXAMINATION N/A 2020    Procedure: ULTRASOUND, RECTAL APPROACH;  Surgeon: Andrea Tam MD;  Location: [facility] OR;  Service: Urology;  Laterality: N/A;    TRIGGER FINGER RELEASE Left     TURBT (TRANSURETHRAL RESECTION OF BLADDER TUMOR) N/A 2023    Procedure: TURBT (TRANSURETHRAL RESECTION OF BLADDER TUMOR);  Surgeon: Leonides Cruz MD;  Location: [facility] OR;  Service: Urology;  Laterality: N/A;       Family History:  Family History   Problem Relation Age of Onset    Diabetes Mother     Heart disease Father 65        MI    Arthritis Father         RA    Heart disease Brother     Colon cancer Neg Hx     Crohn's disease Neg Hx     Ulcerative colitis Neg Hx     Stomach cancer Neg Hx     Esophageal cancer Neg Hx        Social History:  Social History     Socioeconomic History    Marital status: Single   Occupational History    Occupation:    Tobacco Use    Smoking status: Former     Current packs/day: 0.00     Average packs/day: 0.3 packs/day for 20.0 years (6.0 ttl pk-yrs)     Types: Cigarettes     Start date: 2001     Quit date: 2021     Years since quittin.8    Smokeless tobacco: Never    Tobacco comments:     nicorette, vapor cigs/ 3 cigarettes   Substance and Sexual Activity    Alcohol use: Yes     Alcohol/week: 2.0 - 3.0 standard drinks of alcohol     Types: 2 - 3 Shots of liquor per week     Comment: 2-3/day on weekends    Drug use: No     Social Determinants of Health     Financial Resource Strain: Low Risk  (2023)    Overall Financial Resource Strain (CARDIA)     Difficulty of Paying Living Expenses: Not very hard   Food Insecurity: No Food Insecurity (2023)    Hunger Vital Sign     Worried  About Running Out of Food in the Last Year: Never true     Ran Out of Food in the Last Year: Never true   Transportation Needs: No Transportation Needs (5/29/2023)    PRAPARE - Transportation     Lack of Transportation (Medical): No     Lack of Transportation (Non-Medical): No   Physical Activity: Insufficiently Active (5/29/2023)    Exercise Vital Sign     Days of Exercise per Week: 3 days     Minutes of Exercise per Session: 30 min   Stress: Stress Concern Present (5/29/2023)    [employer] - Occupational Stress Questionnaire     Feeling of Stress : To some extent   Social Connections: Unknown (5/29/2023)    Social Connection and Isolation Panel [NHANES]     Frequency of Communication with Friends and Family: More than three times a week     Frequency of Social Gatherings with Friends and Family: Once a week     Attends Holiness Services: Patient refused     Active Member of Clubs or Organizations: Yes     Attends Club or Organization Meetings: More than 4 times per year     Marital Status: Never    Housing Stability: Low Risk  (5/29/2023)    Housing Stability Vital Sign     Unable to Pay for Housing in the Last Year: No     Number of Places Lived in the Last Year: 1     Unstable Housing in the Last Year: No       Medications:  Current Outpatient Medications on File Prior to Visit   Medication Sig Dispense Refill    allopurinoL (ZYLOPRIM) 100 MG tablet Take 1 tablet (100 mg total) by mouth once daily. 30 tablet 3    amLODIPine (NORVASC) 2.5 MG tablet Take 1 tablet (2.5 mg total) by mouth once daily. 90 tablet 2    ascorbic acid, vitamin C, (VITAMIN C) 100 MG tablet Take 100 mg by mouth once daily.      cholecalciferol, vitamin D3, (VITAMIN D3 ORAL) Take 1 capsule by mouth once daily.      enoxaparin (LOVENOX) 120 mg/0.8 mL Syrg INJECT 1 SYRINGE INTO THE SKIN ONCE DAILY 14 mL 0    finasteride (PROSCAR) 5 mg tablet TAKE 1 TABLET BY MOUTH ONCE DAILY (Patient taking differently:  Take 5 mg by mouth every evening.) 90 tablet 3    losartan (COZAAR) 50 MG tablet Take 1 tablet (50 mg total) by mouth once daily. 90 tablet 2    multivit-min/folic/vit K/lycop (MEN'S 50 PLUS MULTIVITAMIN ORAL) Take 1 tablet by mouth once daily.      tamsulosin (FLOMAX) 0.4 mg Cap Take 2 capsules (0.8 mg total) by mouth once daily. (Patient taking differently: Take 0.8 mg by mouth every evening.) 180 capsule 3    ZINC ORAL Take 1 tablet by mouth once daily.      albuterol (PROVENTIL/VENTOLIN HFA) 90 mcg/actuation inhaler Inhale 2 puffs into the lungs every 6 (six) hours as needed for Wheezing. Rescue (Patient not taking: Reported on 10/25/2023) 18 g 3    HYDROcodone-acetaminophen (NORCO) 5-325 mg per tablet Take 1 tablet by mouth every 4 (four) hours as needed for Pain. (Patient not taking: Reported on 10/31/2023) 5 tablet 0    tiotropium-olodateroL (STIOLTO RESPIMAT) 2.5-2.5 mcg/actuation Mist Inhale 2 puffs into the lungs once daily. Controller (Patient not taking: Reported on 10/25/2023) 4 g 5     Current Facility-Administered Medications on File Prior to Visit   Medication Dose Route Frequency Provider Last Rate Last Admin    lactated ringers infusion   Intravenous Continuous Mateo Cheng MD   Stopped at 07/02/20 0824    lactated ringers infusion   Intravenous Continuous Leonides Cruz MD 20 mL/hr at 06/22/23 0808 Restarted at 06/22/23 0925    lidocaine (PF) 10 mg/ml (1%) injection 10 mg  1 mL Intradermal Once Mateo Cheng MD        LIDOcaine (PF) 10 mg/ml (1%) injection 10 mg  1 mL Other Once Leonides Cruz MD        triamcinolone acetonide injection 40 mg  40 mg Intra-articular  Gregory Avila MD   40 mg at 07/30/21 0900       Allergies:  No known drug allergies    Immunizations:  Immunization History   Administered Date(s) Administered    COVID-19, MRNA, LN-S, PF (MODERNA FULL 0.5 ML DOSE) 12/03/2021    COVID-19, vector-nr, [id number], PF ([facility]) 03/08/2021    Influenza 09/23/2013, 12/28/2013,  "10/10/2014, 09/13/2016, 10/19/2018, 10/08/2019    Influenza - High Dose - PF (65 years and older) 10/19/2018, 10/09/2019    Influenza - Quadrivalent - High Dose - PF (65 years and older) 08/13/2021, 10/10/2022    Influenza - Quadrivalent - PF *Preferred* (6 months and older) 09/23/2013, 12/28/2013, 10/10/2014    Influenza Split 09/23/2013, 09/02/2014    Pneumococcal Conjugate - 13 Valent 12/11/2017    Pneumococcal Polysaccharide - 23 Valent 09/19/2016, 06/29/2022    Rabies - IM 02/07/2019, 02/14/2019    Tdap 09/19/2016    Zoster 09/01/2013    Zoster Recombinant 11/21/2020       Review of Systems:  Review of Systems   All other systems reviewed and are negative.      Objective:    Vitals:  Vitals:    10/31/23 1057   BP: 134/70   Pulse: 80   SpO2: 99%   Weight: 83.7 kg (184 lb 10.2 oz)   Height: 5' 10" (1.778 m)   PainSc: 0-No pain       Physical Exam  Vitals reviewed.   Constitutional:       General: He is not in acute distress.  HENT:      Head: Normocephalic and atraumatic.   Eyes:      Pupils: Pupils are equal, round, and reactive to light.   Cardiovascular:      Rate and Rhythm: Normal rate and regular rhythm.      Heart sounds: No murmur heard.     No friction rub.   Pulmonary:      Effort: Pulmonary effort is normal.      Breath sounds: Normal breath sounds.   Abdominal:      General: Bowel sounds are normal. There is no distension.      Palpations: Abdomen is soft.      Tenderness: There is no abdominal tenderness.   Musculoskeletal:      Cervical back: Neck supple.   Skin:     General: Skin is warm and dry.      Findings: No rash.   Psychiatric:         Behavior: Behavior normal.             Aravind Oden MD  Family Medicine      "

## 2023-11-02 ENCOUNTER — PATIENT MESSAGE (OUTPATIENT)
Dept: FAMILY MEDICINE | Facility: CLINIC | Age: 73
End: 2023-11-02
Payer: MEDICARE

## 2023-11-02 ENCOUNTER — PATIENT OUTREACH (OUTPATIENT)
Dept: ADMINISTRATIVE | Facility: HOSPITAL | Age: 73
End: 2023-11-02
Payer: MEDICARE

## 2023-11-02 NOTE — PROGRESS NOTES
PAYOR NON COMPLIANT REPORT  STATIN RX    Staff Msg sent to pcp .  Patient falls under ASCVD, therefore a Statin rx is triggered.   Population Health Chart Review & Patient Outreach Details    Outreach Performed: NO    Additional Pop Health Notes:    PAYOR NON COMPLIANT REPORT  STATIN RX    Staff Msg sent to pcp .  Patient falls under ASCVD, therefore a Statin rx is triggered.        Updates Requested / Reviewed:      Health Maintenance Topics Overdue:    Health Maintenance Due   Topic Date Due    High Dose Statin  Never done    RSV Vaccine (Age 60+) (1 - 1-dose 60+ series) Never done    COVID-19 Vaccine (3 - 2023-24 season) 09/01/2023    Colorectal Cancer Screening  02/01/2024         Health Maintenance Topic(s) Outreach Outcomes & Actions Taken:    Medication Adherence / Statins - Outreach Outcomes & Actions Taken  : Sent Provider Message to Review to Evaluate Pt for Statin, Add Exclusion Dx Codes, Document Exclusion in Problem List, Change Statin Intensity Level to Moderate or High Intensity if Applicable

## 2023-11-13 ENCOUNTER — OFFICE VISIT (OUTPATIENT)
Dept: HEMATOLOGY/ONCOLOGY | Facility: CLINIC | Age: 73
End: 2023-11-13
Payer: MEDICARE

## 2023-11-13 VITALS
HEART RATE: 85 BPM | WEIGHT: 187.63 LBS | RESPIRATION RATE: 22 BRPM | SYSTOLIC BLOOD PRESSURE: 130 MMHG | DIASTOLIC BLOOD PRESSURE: 60 MMHG | HEIGHT: 70 IN | OXYGEN SATURATION: 97 % | TEMPERATURE: 98 F | BODY MASS INDEX: 26.86 KG/M2

## 2023-11-13 DIAGNOSIS — R59.0 ILIAC LYMPHADENOPATHY: ICD-10-CM

## 2023-11-13 DIAGNOSIS — C67.8 MALIGNANT NEOPLASM OF OVERLAPPING SITES OF BLADDER: ICD-10-CM

## 2023-11-13 DIAGNOSIS — D50.9 MICROCYTIC ANEMIA: ICD-10-CM

## 2023-11-13 DIAGNOSIS — C91.10 CLL (CHRONIC LYMPHOCYTIC LEUKEMIA): ICD-10-CM

## 2023-11-13 DIAGNOSIS — D69.6 THROMBOCYTOPENIA: ICD-10-CM

## 2023-11-13 DIAGNOSIS — R91.1 LUNG NODULE: Primary | ICD-10-CM

## 2023-11-13 PROCEDURE — 1126F AMNT PAIN NOTED NONE PRSNT: CPT | Mod: CPTII,S$GLB,, | Performed by: INTERNAL MEDICINE

## 2023-11-13 PROCEDURE — 4010F ACE/ARB THERAPY RXD/TAKEN: CPT | Mod: CPTII,S$GLB,, | Performed by: INTERNAL MEDICINE

## 2023-11-13 PROCEDURE — 99999 PR PBB SHADOW E&M-EST. PATIENT-LVL IV: CPT | Mod: PBBFAC,,, | Performed by: INTERNAL MEDICINE

## 2023-11-13 PROCEDURE — 1101F PR PT FALLS ASSESS DOC 0-1 FALLS W/OUT INJ PAST YR: ICD-10-PCS | Mod: CPTII,S$GLB,, | Performed by: INTERNAL MEDICINE

## 2023-11-13 PROCEDURE — 3288F FALL RISK ASSESSMENT DOCD: CPT | Mod: CPTII,S$GLB,, | Performed by: INTERNAL MEDICINE

## 2023-11-13 PROCEDURE — 3008F BODY MASS INDEX DOCD: CPT | Mod: CPTII,S$GLB,, | Performed by: INTERNAL MEDICINE

## 2023-11-13 PROCEDURE — 1126F PR PAIN SEVERITY QUANTIFIED, NO PAIN PRESENT: ICD-10-PCS | Mod: CPTII,S$GLB,, | Performed by: INTERNAL MEDICINE

## 2023-11-13 PROCEDURE — 3066F PR DOCUMENTATION OF TREATMENT FOR NEPHROPATHY: ICD-10-PCS | Mod: CPTII,S$GLB,, | Performed by: INTERNAL MEDICINE

## 2023-11-13 PROCEDURE — 4010F PR ACE/ARB THEARPY RXD/TAKEN: ICD-10-PCS | Mod: CPTII,S$GLB,, | Performed by: INTERNAL MEDICINE

## 2023-11-13 PROCEDURE — 99213 OFFICE O/P EST LOW 20 MIN: CPT | Mod: S$GLB,,, | Performed by: INTERNAL MEDICINE

## 2023-11-13 PROCEDURE — 3288F PR FALLS RISK ASSESSMENT DOCUMENTED: ICD-10-PCS | Mod: CPTII,S$GLB,, | Performed by: INTERNAL MEDICINE

## 2023-11-13 PROCEDURE — 3008F PR BODY MASS INDEX (BMI) DOCUMENTED: ICD-10-PCS | Mod: CPTII,S$GLB,, | Performed by: INTERNAL MEDICINE

## 2023-11-13 PROCEDURE — 3075F PR MOST RECENT SYSTOLIC BLOOD PRESS GE 130-139MM HG: ICD-10-PCS | Mod: CPTII,S$GLB,, | Performed by: INTERNAL MEDICINE

## 2023-11-13 PROCEDURE — 3062F POS MACROALBUMINURIA REV: CPT | Mod: CPTII,S$GLB,, | Performed by: INTERNAL MEDICINE

## 2023-11-13 PROCEDURE — 3066F NEPHROPATHY DOC TX: CPT | Mod: CPTII,S$GLB,, | Performed by: INTERNAL MEDICINE

## 2023-11-13 PROCEDURE — 1101F PT FALLS ASSESS-DOCD LE1/YR: CPT | Mod: CPTII,S$GLB,, | Performed by: INTERNAL MEDICINE

## 2023-11-13 PROCEDURE — 3078F PR MOST RECENT DIASTOLIC BLOOD PRESSURE < 80 MM HG: ICD-10-PCS | Mod: CPTII,S$GLB,, | Performed by: INTERNAL MEDICINE

## 2023-11-13 PROCEDURE — 99213 PR OFFICE/OUTPT VISIT, EST, LEVL III, 20-29 MIN: ICD-10-PCS | Mod: S$GLB,,, | Performed by: INTERNAL MEDICINE

## 2023-11-13 PROCEDURE — 99999 PR PBB SHADOW E&M-EST. PATIENT-LVL IV: ICD-10-PCS | Mod: PBBFAC,,, | Performed by: INTERNAL MEDICINE

## 2023-11-13 PROCEDURE — 1159F PR MEDICATION LIST DOCUMENTED IN MEDICAL RECORD: ICD-10-PCS | Mod: CPTII,S$GLB,, | Performed by: INTERNAL MEDICINE

## 2023-11-13 PROCEDURE — 1159F MED LIST DOCD IN RCRD: CPT | Mod: CPTII,S$GLB,, | Performed by: INTERNAL MEDICINE

## 2023-11-13 PROCEDURE — 3075F SYST BP GE 130 - 139MM HG: CPT | Mod: CPTII,S$GLB,, | Performed by: INTERNAL MEDICINE

## 2023-11-13 PROCEDURE — 3062F PR POS MACROALBUMINURIA RESULT DOCUMENTED/REVIEW: ICD-10-PCS | Mod: CPTII,S$GLB,, | Performed by: INTERNAL MEDICINE

## 2023-11-13 PROCEDURE — 3078F DIAST BP <80 MM HG: CPT | Mod: CPTII,S$GLB,, | Performed by: INTERNAL MEDICINE

## 2023-11-13 NOTE — PROGRESS NOTES
PATIENT: Saurav Bahena  MRN: [id number]  DATE: 11/13/2023      Diagnosis:   1. Lung nodule    2. CLL (chronic lymphocytic leukemia)    3. Malignant neoplasm of overlapping sites of bladder    4. Iliac lymphadenopathy    5. Microcytic anemia    6. Thrombocytopenia        Chief Complaint: Lung Cancer and Follow-up (Lung Nodule)    Subjective:    Initial History: Mr. Bahena is a 73 y.o. male with Bladder cancer, BPH, CLL, Emphysema, gout, HLD, HTN, insomnia who presents for second opinion on a lung nodule.  The patient underwent a PET/CT on 10/11/2023 showing an interval increase in size of prevascular lymph node measuring 13 mm; mildly hypermetabolic paratracheal lymph node measuring 10 mm; stable 16 x 8 mm mass in right upper lobe; and interval development of hypermetabolic enlarged right external iliac node measuring 15 mm.  Patient had recently undergone a trans urethral resection of bladder tumor on 06/22/2023 with path showing high-grade noninvasive papillary urothelial carcinoma with muscle present on the biopsy.  Pt had previously seen Dr St on 10/25/23 with recommendation to biopsy the lung nodule and external iliac LN.      Currently the patient states he feels well.  The patient denies CP, cough, SOB, abdominal pain, nausea, vomiting, constipation, diarrhea.  The patient denies fever, chills, night sweats, weight loss, new lumps or bumps, easy bruising or bleeding.    Past Medical History:   Past Medical History:   Diagnosis Date    Anticoagulant long-term use     aspirin    Atherosclerosis of native coronary artery of native heart without angina pectoris 03/08/2022    Atrophic kidney     right    Bladder cancer     BPH (benign prostatic hyperplasia)     Cat bite of left forearm with infection 02/11/2019    CLL (chronic lymphocytic leukemia)     sees Dr. Solares    CTS (carpal tunnel syndrome)     Current smoker on some days     using e cig, regular cigarettes sometimes    DJD (degenerative joint  disease) of knee     left    DJD (degenerative joint disease) of knee     bilateral/ LT more than RT    Emphysema lung     Gout, unspecified     HLD (hyperlipidemia)     no current meds    HTN (hypertension)     Insomnia     Nocturia     Other pulmonary embolism without acute cor pulmonale 03/17/2023    Panlobular emphysema 03/01/2023    Sleep apnea     CPAP       Past Surgical HIstory:   Past Surgical History:   Procedure Laterality Date    COLONOSCOPY  09/01/2010    Dr. Aguilar, in legacy: int. hemorrhoids, repeat in 10 years for screening    CYSTOSCOPY W/ RETROGRADES Bilateral 07/02/2020    Procedure: CYSTOSCOPY, WITH RETROGRADE PYELOGRAM;  Surgeon: Andrea Tam MD;  Location: [facility] OR;  Service: Urology;  Laterality: Bilateral;    ELBOW SURGERY      left elbow, bursitis    KNEE SURGERY  1981?    right    MULTIPLE TOOTH EXTRACTIONS      TRANSRECTAL ULTRASOUND EXAMINATION N/A 07/02/2020    Procedure: ULTRASOUND, RECTAL APPROACH;  Surgeon: Andrea Tam MD;  Location: [facility] OR;  Service: Urology;  Laterality: N/A;    TRIGGER FINGER RELEASE Left     TURBT (TRANSURETHRAL RESECTION OF BLADDER TUMOR) N/A 6/22/2023    Procedure: TURBT (TRANSURETHRAL RESECTION OF BLADDER TUMOR);  Surgeon: Leonides Cruz MD;  Location: [facility] OR;  Service: Urology;  Laterality: N/A;       Family History:   Family History   Problem Relation Age of Onset    Diabetes Mother     Heart disease Father 65        MI    Arthritis Father         RA    Heart disease Brother     Colon cancer Neg Hx     Crohn's disease Neg Hx     Ulcerative colitis Neg Hx     Stomach cancer Neg Hx     Esophageal cancer Neg Hx        Social History:  reports that he quit smoking about 2 years ago. His smoking use included cigarettes. He started smoking about 22 years ago. He has a 6.0 pack-year smoking history. He has never used smokeless tobacco. He reports current alcohol use of about 2.0 - 3.0 standard drinks of alcohol per week. He reports that he does not use  drugs.    Allergies:  Review of patient's allergies indicates:   Allergen Reactions    No known drug allergies        Medications:  Current Outpatient Medications   Medication Sig Dispense Refill    albuterol (PROVENTIL/VENTOLIN HFA) 90 mcg/actuation inhaler Inhale 2 puffs into the lungs every 6 (six) hours as needed for Wheezing. Rescue 18 g 3    allopurinoL (ZYLOPRIM) 100 MG tablet Take 1 tablet (100 mg total) by mouth once daily. 30 tablet 3    amLODIPine (NORVASC) 2.5 MG tablet Take 1 tablet (2.5 mg total) by mouth once daily. 90 tablet 2    ascorbic acid, vitamin C, (VITAMIN C) 100 MG tablet Take 100 mg by mouth once daily.      cholecalciferol, vitamin D3, (VITAMIN D3 ORAL) Take 1 capsule by mouth once daily.      enoxaparin (LOVENOX) 120 mg/0.8 mL Syrg INJECT 1 SYRINGE INTO THE SKIN ONCE DAILY 14 mL 0    finasteride (PROSCAR) 5 mg tablet TAKE 1 TABLET BY MOUTH ONCE DAILY (Patient taking differently: Take 5 mg by mouth every evening.) 90 tablet 3    losartan (COZAAR) 50 MG tablet Take 1 tablet (50 mg total) by mouth once daily. 90 tablet 2    multivit-min/folic/vit K/lycop (MEN'S 50 PLUS MULTIVITAMIN ORAL) Take 1 tablet by mouth once daily.      tamsulosin (FLOMAX) 0.4 mg Cap Take 2 capsules (0.8 mg total) by mouth once daily. (Patient taking differently: Take 0.8 mg by mouth every evening.) 180 capsule 3    tiotropium-olodateroL (STIOLTO RESPIMAT) 2.5-2.5 mcg/actuation Mist Inhale 2 puffs into the lungs once daily. Controller 4 g 5    ZINC ORAL Take 1 tablet by mouth once daily.       No current facility-administered medications for this visit.     Facility-Administered Medications Ordered in Other Visits   Medication Dose Route Frequency Provider Last Rate Last Admin    lactated ringers infusion   Intravenous Continuous Mateo Cheng MD   Stopped at 07/02/20 0824    lactated ringers infusion   Intravenous Continuous Leonides Cruz MD 20 mL/hr at 06/22/23 0808 Restarted at 06/22/23 0925    lidocaine (PF) 10  "mg/ml (1%) injection 10 mg  1 mL Intradermal Once Mateo Cheng MD        LIDOcaine (PF) 10 mg/ml (1%) injection 10 mg  1 mL Other Once Leonides Cruz MD        triamcinolone acetonide injection 40 mg  40 mg Intra-articular  Gregory Avila MD   40 mg at 07/30/21 0900       Review of Systems   Constitutional:  Negative for chills, diaphoresis, fatigue, fever and unexpected weight change.   Respiratory:  Negative for cough and shortness of breath.    Cardiovascular:  Negative for chest pain and palpitations.   Gastrointestinal:  Negative for abdominal pain, constipation, diarrhea, nausea and vomiting.   Skin:  Negative for color change and rash.   Neurological:  Negative for headaches.   Hematological:  Negative for adenopathy. Does not bruise/bleed easily.       ECOG Performance Status: 0   Objective:      Vitals:   Vitals:    11/13/23 1005   BP: 130/60   BP Location: Left arm   Patient Position: Sitting   BP Method: Medium (Manual)   Pulse: 85   Resp: (!) 22   Temp: 97.5 °F (36.4 °C)   TempSrc: Temporal   SpO2: 97%   Weight: 85.1 kg (187 lb 9.8 oz)   Height: 5' 10" (1.778 m)       Physical Exam  Constitutional:       General: He is not in acute distress.     Appearance: He is well-developed. He is not diaphoretic.   HENT:      Head: Normocephalic and atraumatic.   Cardiovascular:      Rate and Rhythm: Normal rate and regular rhythm.      Heart sounds: Normal heart sounds. No murmur heard.     No friction rub. No gallop.   Pulmonary:      Effort: Pulmonary effort is normal. No respiratory distress.      Breath sounds: Normal breath sounds. No wheezing or rales.   Chest:      Chest wall: No tenderness.   Abdominal:      General: Bowel sounds are normal. There is no distension.      Palpations: Abdomen is soft. There is no mass.      Tenderness: There is no abdominal tenderness. There is no rebound.   Musculoskeletal:      Cervical back: Normal range of motion.   Lymphadenopathy:      Cervical: No cervical " adenopathy.      Upper Body:      Right upper body: No supraclavicular or axillary adenopathy.      Left upper body: No supraclavicular or axillary adenopathy.   Skin:     General: Skin is warm and dry.      Findings: No erythema or rash.   Neurological:      Mental Status: He is alert and oriented to person, place, and time.   Psychiatric:         Behavior: Behavior normal.         Laboratory Data:  No visits with results within 1 Week(s) from this visit.   Latest known visit with results is:   Hospital Outpatient Visit on 10/11/2023   Component Date Value Ref Range Status    POC Glucose 10/11/2023 112 (A)  70 - 110 MG/DL Final         Imaging:     PET/CT 10/11/23    Neck: There is no abnormal FDG uptake in the neck.  No cervical adenopathy is noted.  Carotid artery calcifications are present.     Chest: Interval increase in size of a pre-vascular lymph node is noted with a short axis diameter of 13 mm on series 3, image 87 an SUV max of 4.0.  Previously, this measured 1.0 cm in short axis and had an SUV max similar to blood pool at 2.1.  There is a mildly hypermetabolic pretracheal lymph node with a short axis diameter of 10 mm, previously 8 mm.  The SUV max is 2.9, previously 2.5.  A similar degree of activity is noted in a borderline sized AP window lymph node on series 3, image 88.  Low level activity in hilar lymph nodes has developed, with an SUV max of up to 3.4 measured on the left on PET axial image 102. There are upper limits of normal sized lymph nodes in the left axilla with a mild FDG uptake, SUV max of 2.4. Both the sizes of the nodes and the degree of activity has increased slightly since the previous study.     The mass in the anterior right upper lobe measures 16 x  8 mm, unchanged.  SUV max is 9.8 series 3, image 83, previously 10.2, not significantly changed.     Emphysema is present.  Coronary artery calcifications are noted.  There is no pleural effusion.  No new findings are present in the  lungs.     Abdomen/pelvis: Interval development of a hypermetabolic enlarged right distal external iliac lymph node is noted, with short axis diameter of 15 mm series 3, image 227 an SUV max of 9.3.  The bladder mass is no longer visualized status post interval TURBT.     The spleen remains markedly enlarged, without FDG uptake.  The liver, gallbladder, adrenal glands, pancreas and left kidney are grossly normal.  Chronic severe right hydronephrosis and hydroureter has worsened with respect to the previous study.  There is no ascites.  No bowel obstruction.  Findings suggestive of constipation noted.  Numerous colonic diverticula are present.  The prostate is enlarged.     Bones: Once again, focally increased activity is noted in right anterior rib 2, just adjacent to the hypermetabolic lung nodule.  Current SUV max is 4.16, and was previously 3.6, without significant change.  No fracture, lytic or sclerotic bone lesion noted in this location on CT.  No new sites of uptake are present in the bones.       Assessment:       1. Lung nodule    2. CLL (chronic lymphocytic leukemia)    3. Malignant neoplasm of overlapping sites of bladder    4. Iliac lymphadenopathy    5. Microcytic anemia    6. 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  -BM biopsy discussed with the patient to assess if this is responsible for the patient's anemia and thrombocytopenia  -Pt would like to address lung nodule and external iliac LAD first  -Would refer to lymphoma team if CLL requires treatment    Bladder Cancer - pt with high-grade noninvasive papillary urothelial carcinoma s/p resection 6/22/23 under the care of Dr Cruz  -Recent PET/CT on  10/11/23 showing an avid and enalrged right external iliac LN which could be metastatic bladder cancer, CLL or met from other primary  -Will address after lung biopsy    Internal Iliac LAD - see above    Microcytic Anemia - iron studies 10/05/23 suggestive of chronic disease  -B!2 elevated when checked  -No signs of hemolysis  -Likely due to CLL as above  -Will monitor    Thrombocytopenia - platelets 149k on 10/05/23  -Likley due to CLL  -Will monitor    Route Chart for Scheduling    Med Onc Chart Routing      Follow up with physician Other. Pt needs an appt with Dr Arnaldo Raza for biopsy of the right internal iliac node.  PT needs an appt with me once he has undergone EBUS and robotic bronch with Dr Mcintosh.   Follow up with ALISON    Infusion scheduling note    Injection scheduling note    Labs    Imaging    Pharmacy appointment    Other referrals                     Kostas Perez MD  [facility]  Hematology and Oncology  [facility]   [address] [facility] [address]   [place] [address]   O: [phone]  F: [phone]

## 2023-11-15 ENCOUNTER — TELEPHONE (OUTPATIENT)
Dept: HEMATOLOGY/ONCOLOGY | Facility: CLINIC | Age: 73
End: 2023-11-15
Payer: MEDICARE

## 2023-11-15 NOTE — TELEPHONE ENCOUNTER
Spoke with patient and signed him up for meditation on 11/30 as discussed. We discussed in detail about the services provided and will discuss further on 11/30.    ----- Message from Erica Muse sent at 11/15/2023  2:20 PM CST -----  Contact: Pt  Type:  Patient Returning Call    Who Called:Pt  Who Left Message for Patient:Rao  Does the patient know what this is regarding?:yes  Would the patient rather a call back or a response via [facility]? call  Best Call Back Number:[phone]  Additional Information: Pt is returning Rao's call. Please call pt back to advise.

## 2023-11-28 ENCOUNTER — PATIENT MESSAGE (OUTPATIENT)
Dept: HEMATOLOGY/ONCOLOGY | Facility: CLINIC | Age: 73
End: 2023-11-28
Payer: MEDICARE

## 2023-12-04 PROBLEM — N00.9 ACUTE NEPHRITIS: Status: ACTIVE | Noted: 2023-12-04

## 2023-12-04 PROBLEM — Z71.89 ACP (ADVANCE CARE PLANNING): Status: RESOLVED | Noted: 2023-03-24 | Resolved: 2023-12-04

## 2023-12-04 PROBLEM — J44.9 COPD (CHRONIC OBSTRUCTIVE PULMONARY DISEASE): Status: ACTIVE | Noted: 2023-12-04

## 2023-12-04 PROBLEM — M10.9 GOUT: Status: ACTIVE | Noted: 2023-12-04

## 2023-12-04 PROBLEM — N10 ACUTE PYELONEPHRITIS: Status: ACTIVE | Noted: 2023-12-04

## 2023-12-04 PROBLEM — R13.10 DYSPHAGIA: Status: ACTIVE | Noted: 2023-12-04

## 2023-12-05 PROBLEM — N10 ACUTE PYELONEPHRITIS: Status: ACTIVE | Noted: 2023-12-05

## 2023-12-05 PROBLEM — N10 ACUTE PYELONEPHRITIS: Status: RESOLVED | Noted: 2023-12-05 | Resolved: 2023-12-05

## 2023-12-06 ENCOUNTER — TELEPHONE (OUTPATIENT)
Dept: FAMILY MEDICINE | Facility: CLINIC | Age: 73
End: 2023-12-06
Payer: MEDICARE

## 2023-12-06 NOTE — TELEPHONE ENCOUNTER
----- Message from Vaishali Shin, Patient Care Assistant sent at 12/6/2023 12:32 PM CST -----  Regarding: appointment  Contact: Smiley with [facility]  Type:  Sooner Appointment Request    Caller is requesting a sooner appointment.  Caller declined first available appointment listed below.  Caller will not accept being placed on the waitlist and is requesting a message be sent to doctor.    Name of Caller:  Smiley with [facility]    When is the first available appointment?  1/2024     Symptoms:  hospital f/u    Would the patient rather a call back or a response via [facility]? Callback     Best Call Back Number:  [phone] (home)     Additional Information:  please call Smiley with [facility] to advise. Thanks!

## 2023-12-11 ENCOUNTER — PATIENT OUTREACH (OUTPATIENT)
Dept: ADMINISTRATIVE | Facility: HOSPITAL | Age: 73
End: 2023-12-11
Payer: MEDICARE

## 2023-12-11 ENCOUNTER — PATIENT MESSAGE (OUTPATIENT)
Dept: ADMINISTRATIVE | Facility: HOSPITAL | Age: 73
End: 2023-12-11
Payer: MEDICARE

## 2023-12-11 ENCOUNTER — TELEPHONE (OUTPATIENT)
Dept: ADMINISTRATIVE | Facility: HOSPITAL | Age: 73
End: 2023-12-11
Payer: MEDICARE

## 2023-12-11 VITALS — DIASTOLIC BLOOD PRESSURE: 58 MMHG | SYSTOLIC BLOOD PRESSURE: 128 MMHG

## 2023-12-11 NOTE — PROGRESS NOTES
Population Health Chart Review & Patient Outreach Details    [facility] Medicare Advantage Gap report.  Outreach to patient for hypertension management.     RE: Need to find out if patient is monitoring blood pressure at home.  If so, the most recent blood pressure is needed to update the chart.    Message sent to patient's portal.      Updates Requested / Reviewed:     []  Care Everywhere    []     []  External Sources ([facility], [facility], [facility], etc.)    [] [facility]   [] [facility]   [] Other:    []  Care Team Updated   []  Removed  or Duplicate Orders   []  Immunization Reconciliation Completed / Queried    [] [place]   [] [place]   [] [place]   [] [place]      Health Maintenance Topics Addressed and Outreach Outcomes / Actions Taken:             Breast Cancer Screening []  Mammogram Order Placed    []  Mammogram Screening Scheduled    []  External Records Requested & Care Team Updated if Applicable    []  External Records Uploaded & Care Team Updated if Applicable    []  Pt Declined Scheduling Mammogram    []  Pt Will Schedule with External Provider / Order Routed & Care Team Updated if Applicable              Cervical Cancer Screening []  Pap Smear Scheduled in Primary Care or OBGYN    []  External Records Requested & Care Team Updated if Applicable       []  External Records Uploaded, Care Team Updated, & History Updated if Applicable    []  Patient Declined Scheduling Pap Smear    []  Patient Will Schedule with External Provider & Care Team Updated if Applicable                  Colorectal Cancer Screening []  Colonoscopy Case Request / Referral / Home Test Order Placed    []  External Records Requested & Care Team Updated if Applicable    []  External Records Uploaded, Care Team Updated, & History Updated if Applicable    []  Patient Declined Completing Colon Cancer Screening    []  Patient Will Schedule with External Provider & Care Team Updated if Applicable    []  Fit Kit Mailed (add the SmartPhrase  under additional notes)    []  Reminded Patient to Complete Home Test                Diabetic Eye Exam []  Eye Exam Screening Order Placed    []  Eye Camera Scheduled or Optometry/Ophthalmology Referral Placed    []  External Records Requested & Care Team Updated if Applicable    []  External Records Uploaded, Care Team Updated, & History Updated if Applicable    []  Patient Declined Scheduling Eye Exam    []  Patient Will Schedule with External Provider & Care Team Updated if Applicable             Blood Pressure Control []  Primary Care Follow Up Visit Scheduled     []  Remote Blood Pressure Reading Captured    []  Patient Declined Remote Reading or Scheduling Appt - Escalated to PCP    []  Patient Will Call Back or Send Portal Message with Reading                 HbA1c & Other Labs []  Overdue Lab(s) Ordered    []  Overdue Lab(s) Scheduled    []  External Records Uploaded & Care Team Updated if Applicable    []  Primary Care Follow Up Visit Scheduled     []  Reminded Patient to Complete A1c Home Test    []  Patient Declined Scheduling Labs or Will Call Back to Schedule    []  Patient Will Schedule with External Provider / Order Routed, & Care Team Updated if Applicable           Primary Care Appointment []  Primary Care Appt Scheduled    []  Patient Declined Scheduling or Will Call Back to Schedule    []  Pt Established with External Provider, Updated Care Team, & Informed Pt to Notify Payor if Applicable           Medication Adherence /    Statin Use []  Primary Care Appointment Scheduled    []  Patient Reminded to  Prescription    []  Patient Declined, Provider Notified if Needed    []  Sent Provider Message to Review to Evaluate Pt for Statin, Add Exclusion Dx Codes, Document   Exclusion in Problem List, Change Statin Intensity Level to Moderate or High Intensity if Applicable                Osteoporosis Screening []  Dexa Order Placed    []  Dexa Appointment Scheduled    []  External Records Requested  & Care Team Updated    []  External Records Uploaded, Care Team Updated, & History Updated if Applicable    []  Patient Declined Scheduling Dexa or Will Call Back to Schedule    []  Patient Will Schedule with External Provider / Order Routed & Care Team Updated if Applicable       Additional Notes:

## 2023-12-14 ENCOUNTER — TELEPHONE (OUTPATIENT)
Dept: UROLOGY | Facility: CLINIC | Age: 73
End: 2023-12-14
Payer: MEDICARE

## 2023-12-14 ENCOUNTER — PATIENT MESSAGE (OUTPATIENT)
Dept: UROLOGY | Facility: CLINIC | Age: 73
End: 2023-12-14
Payer: MEDICARE

## 2023-12-14 NOTE — TELEPHONE ENCOUNTER
----- Message from Jeni Galindo sent at 12/14/2023  9:17 AM CST -----  Contact: patient  Type:  Sooner Apoointment Request    Caller is requesting a sooner appointment.  Caller declined first available appointment listed below.  Caller will not accept being placed on the waitlist and is requesting a message be sent to doctor.    Name of Caller:patient     When is the first available appointment?    Symptoms:f/u     Would the patient rather a call back or a response via [facility]? Call     Best Call Back Number:[phone] (home)      Additional Information:

## 2023-12-15 ENCOUNTER — PATIENT OUTREACH (OUTPATIENT)
Dept: ADMINISTRATIVE | Facility: HOSPITAL | Age: 73
End: 2023-12-15
Payer: MEDICARE

## 2023-12-15 NOTE — PROGRESS NOTES
Population Health Chart Review & Patient Outreach Details    Outreach Performed: NO    Additional Pop Health Notes:           Updates Requested / Reviewed:      Updated Care Coordination Note         Health Maintenance Topics Overdue:    Health Maintenance Due   Topic Date Due    High Dose Statin  Never done    RSV Vaccine (Age 60+ and Pregnant patients) (1 - 1-dose 60+ series) Never done    COVID-19 Vaccine (3 - 2023-24 season) 09/01/2023    Colorectal Cancer Screening  02/01/2024         Health Maintenance Topic(s) Outreach Outcomes & Actions Taken:    Medication Adherence / Statins - Outreach Outcomes & Actions Taken  : Sent Provider Message to Review to Evaluate Pt for Statin, Add Exclusion Dx Codes, Document Exclusion in Problem List, Change Statin Intensity Level to Moderate or High Intensity if Applicable

## 2023-12-18 ENCOUNTER — TELEPHONE (OUTPATIENT)
Dept: FAMILY MEDICINE | Facility: CLINIC | Age: 73
End: 2023-12-18
Payer: MEDICARE

## 2023-12-18 NOTE — TELEPHONE ENCOUNTER
----- Message from Vinita Martinez sent at 12/16/2023  9:48 AM CST -----  Contact: self  Type: Needs Medical Advice  Who Called:  Patient  Pharmacy name and phone #:    [id number] - [place] - [address]  [address]  [place] [id number]  Phone: [phone] Fax: [phone]      Best Call Back Number: [phone]    Additional Information: States he would like to speak with office regarding getting prescription sent to pharm for swollen feet/ankle says this has happened before.Please call back

## 2023-12-20 ENCOUNTER — OFFICE VISIT (OUTPATIENT)
Dept: FAMILY MEDICINE | Facility: CLINIC | Age: 73
End: 2023-12-20
Payer: MEDICARE

## 2023-12-20 VITALS
WEIGHT: 186.38 LBS | BODY MASS INDEX: 26.68 KG/M2 | SYSTOLIC BLOOD PRESSURE: 134 MMHG | OXYGEN SATURATION: 99 % | DIASTOLIC BLOOD PRESSURE: 60 MMHG | HEIGHT: 70 IN | HEART RATE: 76 BPM

## 2023-12-20 DIAGNOSIS — K22.2 SCHATZKI'S RING: ICD-10-CM

## 2023-12-20 DIAGNOSIS — D50.9 IRON DEFICIENCY ANEMIA, UNSPECIFIED IRON DEFICIENCY ANEMIA TYPE: ICD-10-CM

## 2023-12-20 DIAGNOSIS — E87.1 HYPONATREMIA: ICD-10-CM

## 2023-12-20 DIAGNOSIS — K29.70 GASTRITIS, PRESENCE OF BLEEDING UNSPECIFIED, UNSPECIFIED CHRONICITY, UNSPECIFIED GASTRITIS TYPE: ICD-10-CM

## 2023-12-20 DIAGNOSIS — K20.90 ESOPHAGITIS: ICD-10-CM

## 2023-12-20 DIAGNOSIS — I10 PRIMARY HYPERTENSION: ICD-10-CM

## 2023-12-20 DIAGNOSIS — R60.0 PERIPHERAL EDEMA: ICD-10-CM

## 2023-12-20 DIAGNOSIS — E88.09 HYPOALBUMINEMIA: ICD-10-CM

## 2023-12-20 DIAGNOSIS — Z23 IMMUNIZATION DUE: ICD-10-CM

## 2023-12-20 DIAGNOSIS — Z12.11 COLON CANCER SCREENING: Primary | ICD-10-CM

## 2023-12-20 PROCEDURE — 3066F PR DOCUMENTATION OF TREATMENT FOR NEPHROPATHY: ICD-10-PCS | Mod: CPTII,S$GLB,, | Performed by: FAMILY MEDICINE

## 2023-12-20 PROCEDURE — 1126F AMNT PAIN NOTED NONE PRSNT: CPT | Mod: CPTII,S$GLB,, | Performed by: FAMILY MEDICINE

## 2023-12-20 PROCEDURE — 3078F PR MOST RECENT DIASTOLIC BLOOD PRESSURE < 80 MM HG: ICD-10-PCS | Mod: CPTII,S$GLB,, | Performed by: FAMILY MEDICINE

## 2023-12-20 PROCEDURE — 3008F BODY MASS INDEX DOCD: CPT | Mod: CPTII,S$GLB,, | Performed by: FAMILY MEDICINE

## 2023-12-20 PROCEDURE — 99999 PR PBB SHADOW E&M-EST. PATIENT-LVL IV: CPT | Mod: PBBFAC,,, | Performed by: FAMILY MEDICINE

## 2023-12-20 PROCEDURE — 3288F FALL RISK ASSESSMENT DOCD: CPT | Mod: CPTII,S$GLB,, | Performed by: FAMILY MEDICINE

## 2023-12-20 PROCEDURE — 1126F PR PAIN SEVERITY QUANTIFIED, NO PAIN PRESENT: ICD-10-PCS | Mod: CPTII,S$GLB,, | Performed by: FAMILY MEDICINE

## 2023-12-20 PROCEDURE — 99999 PR PBB SHADOW E&M-EST. PATIENT-LVL IV: ICD-10-PCS | Mod: PBBFAC,,, | Performed by: FAMILY MEDICINE

## 2023-12-20 PROCEDURE — 3066F NEPHROPATHY DOC TX: CPT | Mod: CPTII,S$GLB,, | Performed by: FAMILY MEDICINE

## 2023-12-20 PROCEDURE — 1111F DSCHRG MED/CURRENT MED MERGE: CPT | Mod: CPTII,S$GLB,, | Performed by: FAMILY MEDICINE

## 2023-12-20 PROCEDURE — 3078F DIAST BP <80 MM HG: CPT | Mod: CPTII,S$GLB,, | Performed by: FAMILY MEDICINE

## 2023-12-20 PROCEDURE — 3008F PR BODY MASS INDEX (BMI) DOCUMENTED: ICD-10-PCS | Mod: CPTII,S$GLB,, | Performed by: FAMILY MEDICINE

## 2023-12-20 PROCEDURE — 4010F PR ACE/ARB THEARPY RXD/TAKEN: ICD-10-PCS | Mod: CPTII,S$GLB,, | Performed by: FAMILY MEDICINE

## 2023-12-20 PROCEDURE — 1159F PR MEDICATION LIST DOCUMENTED IN MEDICAL RECORD: ICD-10-PCS | Mod: CPTII,S$GLB,, | Performed by: FAMILY MEDICINE

## 2023-12-20 PROCEDURE — 1101F PT FALLS ASSESS-DOCD LE1/YR: CPT | Mod: CPTII,S$GLB,, | Performed by: FAMILY MEDICINE

## 2023-12-20 PROCEDURE — 3075F SYST BP GE 130 - 139MM HG: CPT | Mod: CPTII,S$GLB,, | Performed by: FAMILY MEDICINE

## 2023-12-20 PROCEDURE — 1159F MED LIST DOCD IN RCRD: CPT | Mod: CPTII,S$GLB,, | Performed by: FAMILY MEDICINE

## 2023-12-20 PROCEDURE — 3075F PR MOST RECENT SYSTOLIC BLOOD PRESS GE 130-139MM HG: ICD-10-PCS | Mod: CPTII,S$GLB,, | Performed by: FAMILY MEDICINE

## 2023-12-20 PROCEDURE — 4010F ACE/ARB THERAPY RXD/TAKEN: CPT | Mod: CPTII,S$GLB,, | Performed by: FAMILY MEDICINE

## 2023-12-20 PROCEDURE — 3288F PR FALLS RISK ASSESSMENT DOCUMENTED: ICD-10-PCS | Mod: CPTII,S$GLB,, | Performed by: FAMILY MEDICINE

## 2023-12-20 PROCEDURE — 1101F PR PT FALLS ASSESS DOC 0-1 FALLS W/OUT INJ PAST YR: ICD-10-PCS | Mod: CPTII,S$GLB,, | Performed by: FAMILY MEDICINE

## 2023-12-20 PROCEDURE — 1111F PR DISCHARGE MEDS RECONCILED W/ CURRENT OUTPATIENT MED LIST: ICD-10-PCS | Mod: CPTII,S$GLB,, | Performed by: FAMILY MEDICINE

## 2023-12-20 PROCEDURE — 3060F POS MICROALBUMINURIA REV: CPT | Mod: CPTII,S$GLB,, | Performed by: FAMILY MEDICINE

## 2023-12-20 PROCEDURE — 99214 PR OFFICE/OUTPT VISIT, EST, LEVL IV, 30-39 MIN: ICD-10-PCS | Mod: S$GLB,,, | Performed by: FAMILY MEDICINE

## 2023-12-20 PROCEDURE — 99214 OFFICE O/P EST MOD 30 MIN: CPT | Mod: S$GLB,,, | Performed by: FAMILY MEDICINE

## 2023-12-20 PROCEDURE — 3060F PR POS MICROALBUMINURIA RESULT DOCUMENTED/REVIEW: ICD-10-PCS | Mod: CPTII,S$GLB,, | Performed by: FAMILY MEDICINE

## 2023-12-20 RX ORDER — PANTOPRAZOLE SODIUM 40 MG/1
40 TABLET, DELAYED RELEASE ORAL DAILY
Qty: 30 TABLET | Refills: 11 | Status: SHIPPED | OUTPATIENT
Start: 2023-12-20 | End: 2024-12-19

## 2023-12-20 RX ORDER — LOSARTAN POTASSIUM 50 MG/1
50 TABLET ORAL 2 TIMES DAILY
Qty: 180 TABLET | Refills: 3 | Status: SHIPPED | OUTPATIENT
Start: 2023-12-20

## 2023-12-20 RX ORDER — FERROUS GLUCONATE 324(38)MG
324 TABLET ORAL
Qty: 90 TABLET | Refills: 3 | Status: SHIPPED | OUTPATIENT
Start: 2023-12-20

## 2023-12-20 NOTE — PROGRESS NOTES
THIS DOCUMENT WAS MADE IN PART WITH VOICE RECOGNITION SOFTWARE.  OCCASIONALLY THIS SOFTWARE WILL MISINTERPRET WORDS OR PHRASES.    Assessment and Plan:    1. Colon cancer screening        2. Immunization due        3. Hypoalbuminemia        4. Peripheral edema        5. Iron deficiency anemia, unspecified iron deficiency anemia type  ferrous gluconate (FERGON) 324 MG tablet    Ambulatory referral/consult to Gastroenterology      6. Esophagitis  pantoprazole (PROTONIX) 40 MG tablet      7. Gastritis, presence of bleeding unspecified, unspecified chronicity, unspecified gastritis type        8. Hyponatremia  losartan (COZAAR) 50 MG tablet      9. Primary hypertension  losartan (COZAAR) 50 MG tablet      10. Schatzki's ring  Ambulatory referral/consult to Gastroenterology          PLAN    Will assist with scheduling follow-up appointment with Hematology, possible consideration of IV iron if appropriate   In the meantime, start oral iron supplement to assist with restocking hematocrit  Report any new or concerning findings such as shortness of breath, blood in stool    Will help schedule with Gastroenterology per recommendation of discharging physician    Suspect lower extremity swelling mostly due to hypoalbuminemia   Recommend increase in protein supplement   Compression stockings   Will discontinue amlodipine and start doing losartan b.i.d.  Follow-up in 3 weeks for titration of blood pressure medicine, reassessment of lower extremity swelling  Consider addition of hydrochlorothiazide 12.5 mg if blood pressure uncontrolled    Refilled pantoprazole for gastritis findings      ______________________________________________________________________  Subjective:    Chief Complaint:  Chief Complaint   Patient presents with    Follow-up       f/u        HPI:  Saurav is a 73 y.o. year old       Hospital follow-up December 2023   Presented to emergency department with chills, fever of 101, diarrhea for a few  "days preceding.  Noted to have severe right-sided hydronephrosis  Imaging concerning for pyelo ureteritis  Treated with IV antibiotics, IV fluids in the emergency department  Nephrology consulted to address hypotonic hyponatremia  GI consulted to address noted esophagitis on CT scan, dark stools and anemia   Hemoglobin 6.3, transfused 1 unit packed red blood cells, Lovenox discontinued   EGD performed on 12/06/2023-noted Schatzki ring, gastritis but no bleeding   Recommended outpatient follow-up   Discharged with Protonix 40 mg daily  Urology consulted, low suspicion for infection, antibiotics were stopped     ER visit December 18, 2023  Occurred approximately 12 days after hospital discharge   Presented with complaints of lower extremity edema for duration of 1 week   BNP slightly elevated, renal function unremarkable, albumin 2.8  Ultrasound bilateral lower extremity-venous-negative for DVT    Still with edema   Normal appetite              Coronary artery calcification noted on CT scan 2023, dyslipidemia  Rx-none   Denies any exertional chest pain     Essential hypertension   Rx-amlodipine 2.5 mg, losartan 50 mg  Normotensive in clinic   Home-110s to 140 systolic     Emphysema   Rx-albuterol, Stiolto  Pulm : MD Arnaldo    Gastritis, esophagitis, hiatal hernia  Previous EGD December 2023   Rx- Protonix 40 mg     Benign prostatic hyperplasia  Rx-finasteride, Flomax  Urology : MD Anthony      History of pulmonary embolism  Diagnosed February 2023  Prev Rx-Lovenox  Onc : MD Alma Rosa ---> MD Chris   Per Onc : "Awaiting him to finish his urologic workup he will be switched at that time to Eliquis"     Thrombocytopenia, Anemia of chronic disease  Onc : MD Alma Rosa       History of bladder cancer   Onc : MD Alma Rosa   Uro : MD Anthony   Diagnosed 2023 following gross hematuria  Prev sx : TURBT     CLL  Onc : MD Alma Rosa --->  MD Chris      Lung nodule   Onc: MD Alma Rosa  PET scan positive   Biopsy pending     Gouty arthritis " "  Rx-allopurinol 100 mg  Previous flare - "years ago"      Tobacco use   In remission      Insomnia  Rx-none  Prev rx : Trazodone (ineffective)      Sleep apnea  CPAP compliant (falls off at night)  Sleep MD : Naina Salomon MD           Past Medical History:  Past Medical History:   Diagnosis Date    Anticoagulant long-term use     aspirin    Atherosclerosis of native coronary artery of native heart without angina pectoris 03/08/2022    Atrophic kidney     right    Bladder cancer     BPH (benign prostatic hyperplasia)     Cat bite of left forearm with infection 02/11/2019    CLL (chronic lymphocytic leukemia)     sees Dr. Solares    CTS (carpal tunnel syndrome)     Current smoker on some days     using e cig, regular cigarettes sometimes    DJD (degenerative joint disease) of knee     left    DJD (degenerative joint disease) of knee     bilateral/ LT more than RT    Emphysema lung     Gout, unspecified     HLD (hyperlipidemia)     no current meds    HTN (hypertension)     Insomnia     Nocturia     Other pulmonary embolism without acute cor pulmonale 03/17/2023    Panlobular emphysema 03/01/2023    Sleep apnea     CPAP       Past Surgical History:  Past Surgical History:   Procedure Laterality Date    COLONOSCOPY  09/01/2010    Dr. Aguilar, in legacy: int. hemorrhoids, repeat in 10 years for screening    CYSTOSCOPY W/ RETROGRADES Bilateral 07/02/2020    Procedure: CYSTOSCOPY, WITH RETROGRADE PYELOGRAM;  Surgeon: Andrea Tam MD;  Location: [facility] OR;  Service: Urology;  Laterality: Bilateral;    ELBOW SURGERY      left elbow, bursitis    ESOPHAGEAL DILATION N/A 12/6/2023    Procedure: DILATION, ESOPHAGUS;  Surgeon: Matthieu Davis MD;  Location: [facility];  Service: Endoscopy;  Laterality: N/A;    ESOPHAGOGASTRODUODENOSCOPY N/A 12/6/2023    Procedure: EGD (ESOPHAGOGASTRODUODENOSCOPY);  Surgeon: Matthieu Davis MD;  Location: [facility];  Service: Endoscopy;  Laterality: N/A;    KNEE SURGERY  1981?    " right    MULTIPLE TOOTH EXTRACTIONS      TRANSRECTAL ULTRASOUND EXAMINATION N/A 2020    Procedure: ULTRASOUND, RECTAL APPROACH;  Surgeon: Andrea Tam MD;  Location: [facility] OR;  Service: Urology;  Laterality: N/A;    TRIGGER FINGER RELEASE Left     TURBT (TRANSURETHRAL RESECTION OF BLADDER TUMOR) N/A 2023    Procedure: TURBT (TRANSURETHRAL RESECTION OF BLADDER TUMOR);  Surgeon: Leonides Cruz MD;  Location: [facility] OR;  Service: Urology;  Laterality: N/A;       Family History:  Family History   Problem Relation Age of Onset    Diabetes Mother     Heart disease Father 65        MI    Arthritis Father         RA    Heart disease Brother     Colon cancer Neg Hx     Crohn's disease Neg Hx     Ulcerative colitis Neg Hx     Stomach cancer Neg Hx     Esophageal cancer Neg Hx        Social History:  Social History     Socioeconomic History    Marital status: Single   Occupational History    Occupation:    Tobacco Use    Smoking status: Former     Current packs/day: 0.00     Average packs/day: 0.3 packs/day for 20.0 years (6.0 ttl pk-yrs)     Types: Cigarettes     Start date: 2001     Quit date: 2021     Years since quittin.9    Smokeless tobacco: Never    Tobacco comments:     nicorette, vapor cigs/ 3 cigarettes   Substance and Sexual Activity    Alcohol use: Yes     Alcohol/week: 2.0 - 3.0 standard drinks of alcohol     Types: 2 - 3 Shots of liquor per week     Comment: 2-3/day on weekends    Drug use: No     Social Determinants of Health     Financial Resource Strain: Low Risk  (2023)    Overall Financial Resource Strain (CARDIA)     Difficulty of Paying Living Expenses: Not very hard   Food Insecurity: No Food Insecurity (2023)    Hunger Vital Sign     Worried About Running Out of Food in the Last Year: Never true     Ran Out of Food in the Last Year: Never true   Transportation Needs: No Transportation Needs (2023)    PRAPARE - Transportation     Lack of  Transportation (Medical): No     Lack of Transportation (Non-Medical): No   Physical Activity: Insufficiently Active (5/29/2023)    Exercise Vital Sign     Days of Exercise per Week: 3 days     Minutes of Exercise per Session: 30 min   Stress: Stress Concern Present (5/29/2023)    [place] [employer] - Occupational Stress Questionnaire     Feeling of Stress : To some extent   Social Connections: Unknown (5/29/2023)    Social Connection and Isolation Panel [NHANES]     Frequency of Communication with Friends and Family: More than three times a week     Frequency of Social Gatherings with Friends and Family: Once a week     Attends Protestant Services: Patient declined     Active Member of Clubs or Organizations: Yes     Attends Club or Organization Meetings: More than 4 times per year     Marital Status: Never    Housing Stability: Low Risk  (12/4/2023)    Housing Stability Vital Sign     Unable to Pay for Housing in the Last Year: No     Number of Places Lived in the Last Year: 1     Unstable Housing in the Last Year: No       Medications:  Current Outpatient Medications on File Prior to Visit   Medication Sig Dispense Refill    albuterol (PROVENTIL/VENTOLIN HFA) 90 mcg/actuation inhaler Inhale 2 puffs into the lungs every 6 (six) hours as needed for Wheezing. Rescue 18 g 3    allopurinoL (ZYLOPRIM) 100 MG tablet Take 1 tablet (100 mg total) by mouth once daily. 30 tablet 3    ascorbic acid, vitamin C, (VITAMIN C) 100 MG tablet Take 100 mg by mouth once daily.      cholecalciferol, vitamin D3, (VITAMIN D3 ORAL) Take 1 capsule by mouth once daily.      finasteride (PROSCAR) 5 mg tablet TAKE 1 TABLET BY MOUTH ONCE DAILY (Patient taking differently: Take 5 mg by mouth every evening.) 90 tablet 3    fluticasone-umeclidin-vilanter (TRELEGY ELLIPTA) 100-62.5-25 mcg DsDv Inhale 1 puff into the lungs once daily. 60 each 3    multivit-min/folic/vit K/lycop (MEN'S 50 PLUS MULTIVITAMIN ORAL) Take 1  tablet by mouth once daily.      tamsulosin (FLOMAX) 0.4 mg Cap Take 2 capsules (0.8 mg total) by mouth once daily. (Patient taking differently: Take 0.8 mg by mouth every evening.) 180 capsule 3    zinc gluconate 50 mg tablet Take 50 mg by mouth once daily.      [DISCONTINUED] amLODIPine (NORVASC) 2.5 MG tablet Take 1 tablet (2.5 mg total) by mouth once daily. 90 tablet 2    [DISCONTINUED] losartan (COZAAR) 50 MG tablet Take 1 tablet (50 mg total) by mouth once daily. 90 tablet 2    [DISCONTINUED] pantoprazole (PROTONIX) 40 MG tablet Take 1 tablet (40 mg total) by mouth once daily. 30 tablet 11     Current Facility-Administered Medications on File Prior to Visit   Medication Dose Route Frequency Provider Last Rate Last Admin    lactated ringers infusion   Intravenous Continuous Mateo Cheng MD   Stopped at 07/02/20 0824    lactated ringers infusion   Intravenous Continuous Leonides Cruz MD 20 mL/hr at 06/22/23 0808 Restarted at 06/22/23 0925    lidocaine (PF) 10 mg/ml (1%) injection 10 mg  1 mL Intradermal Once Mateo Cheng MD        LIDOcaine (PF) 10 mg/ml (1%) injection 10 mg  1 mL Other Once Leonides Cruz MD        triamcinolone acetonide injection 40 mg  40 mg Intra-articular  Gregory Avila MD   40 mg at 07/30/21 0900       Allergies:  No known drug allergies    Immunizations:  Immunization History   Administered Date(s) Administered    COVID-19, MRNA, LN-S, PF (MODERNA FULL 0.5 ML DOSE) 12/03/2021    COVID-19, vector-nr, rS-Ad26, PF (Remi) 03/08/2021    Influenza 09/23/2013, 12/28/2013, 10/10/2014, 09/13/2016, 10/19/2018, 10/08/2019    Influenza - High Dose - PF (65 years and older) 10/19/2018, 10/09/2019    Influenza - Quadrivalent - High Dose - PF (65 years and older) 08/13/2021, 10/10/2022    Influenza - Quadrivalent - PF *Preferred* (6 months and older) 09/23/2013, 12/28/2013, 10/10/2014    Influenza Split 09/23/2013, 09/02/2014    Pneumococcal Conjugate - 13 Valent 12/11/2017     "Pneumococcal Polysaccharide - 23 Valent 2016, 2022    Rabies - IM 2019, 2019    Tdap 2016    Zoster 2013    Zoster Recombinant 2020       Review of Systems:  Review of Systems   All other systems reviewed and are negative.      Objective:    Vitals:  Vitals:    23 1113   BP: 134/60   Pulse: 76   SpO2: 99%   Weight: 84.5 kg (186 lb 6.4 oz)   Height: 5' 10" (1.778 m)   PainSc: 0-No pain       Physical Exam  Vitals reviewed.   Constitutional:       General: He is not in acute distress.  HENT:      Head: Normocephalic and atraumatic.   Eyes:      Pupils: Pupils are equal, round, and reactive to light.   Cardiovascular:      Rate and Rhythm: Normal rate and regular rhythm.      Heart sounds: No murmur heard.     No friction rub.   Pulmonary:      Effort: Pulmonary effort is normal.      Breath sounds: Normal breath sounds.   Abdominal:      General: Bowel sounds are normal. There is no distension.      Palpations: Abdomen is soft.      Tenderness: There is no abdominal tenderness.   Musculoskeletal:      Cervical back: Neck supple.      Right lower le+ Pitting Edema present.      Left lower le+ Pitting Edema present.   Skin:     General: Skin is warm and dry.      Findings: No rash.   Psychiatric:         Behavior: Behavior normal.             Aravind Oden MD  Family Medicine    "

## 2024-01-01 NOTE — TELEPHONE ENCOUNTER
Returned pt telephone call regarding an appt with [facility] and informed him of date/time. Pt verbalized understanding  ----- Message from Saurav Carolina sent at 5/12/2023 12:56 PM CDT -----  Regarding: Biopsy  Type:  Needs Medical Advice    Who Called: Pt  Symptoms (please be specific): Biopsy     Would the patient rather a call back or a response via [facility]? Call back  Best Call Back Number: [phone]  Additional Information: Sts that he was told by [facility] that he doesn't have anything scheduled but he sees it in the portal for his lung biopsy and isn't sure whats going on and why they are telling him at [facility] that he doesn't have anything.  Would like to talk to the office about this.  Its supposed to be for the 18th,.  Also wants to know if this is an out pt procedure or will he be admitted for the night.    Please advise-- Thank you       Patent

## 2024-01-05 ENCOUNTER — TELEPHONE (OUTPATIENT)
Dept: FAMILY MEDICINE | Facility: CLINIC | Age: 74
End: 2024-01-05
Payer: MEDICARE

## 2024-01-05 NOTE — TELEPHONE ENCOUNTER
----- Message from Mary Beth Cheng sent at 2024 11:10 AM CST -----  Contact: Allan Tran from [employer] [employer]  Type:  Needs Medical Advice    Who Called:   Allan Tran from [employer]    Would the patient rather a call back or a response via [facility]?   Call back  Best Call Back Number:   [phone] - Allan    Additional Information:   States he needs to speak with someone regarding death certificate - states patient passed away on 2023 - please call - thank you

## 2024-03-14 NOTE — HIM RECORD RETIREMENT NOTE
[facility] of Incomplete Medical Record    3/14/24    Patient Name: Mehrdad Bahena  Contact Serial # (CSN): [id number]  Patient Medical Record # (MRN): [id number]  Date of Service: Orders Only on 6/24/2020  Physician Name: Andrea Tam,    This record has been reviewed and is being retired as incomplete by the approval of the  Medical Staff Operating Committee (MSOC)     On 9/7/2022., due to:  Unavailability of Provider     Missing Information/Comments:  []    Discharge Summary   []    DC Note/Short Stay Summary   []    ED Provider Note   []    Delivery Note   []    History & Physical   []   Operative Note   []     Procedure Note   []     Physician Order   [x]     Verbal Order   []       Other, specify:
